# Patient Record
Sex: FEMALE | Race: WHITE | NOT HISPANIC OR LATINO | Employment: OTHER | URBAN - METROPOLITAN AREA
[De-identification: names, ages, dates, MRNs, and addresses within clinical notes are randomized per-mention and may not be internally consistent; named-entity substitution may affect disease eponyms.]

---

## 2017-01-03 ENCOUNTER — OFFICE VISIT (OUTPATIENT)
Dept: URGENT CARE | Facility: MEDICAL CENTER | Age: 72
End: 2017-01-03
Payer: COMMERCIAL

## 2017-01-03 PROCEDURE — 99213 OFFICE O/P EST LOW 20 MIN: CPT

## 2017-01-03 PROCEDURE — S9088 SERVICES PROVIDED IN URGENT: HCPCS

## 2017-02-13 ENCOUNTER — HOSPITAL ENCOUNTER (OUTPATIENT)
Dept: RADIOLOGY | Facility: MEDICAL CENTER | Age: 72
Discharge: HOME | End: 2017-02-13
Payer: COMMERCIAL

## 2017-02-13 ENCOUNTER — APPOINTMENT (OUTPATIENT)
Dept: LAB | Facility: MEDICAL CENTER | Age: 72
End: 2017-02-13
Payer: COMMERCIAL

## 2017-02-13 ENCOUNTER — TRANSCRIBE ORDERS (OUTPATIENT)
Dept: ADMINISTRATIVE | Facility: HOSPITAL | Age: 72
End: 2017-02-13

## 2017-02-13 DIAGNOSIS — M48.02 SPINAL STENOSIS IN CERVICAL REGION: ICD-10-CM

## 2017-02-13 DIAGNOSIS — R53.82 CHRONIC FATIGUE SYNDROME: ICD-10-CM

## 2017-02-13 DIAGNOSIS — E55.9 UNSPECIFIED VITAMIN D DEFICIENCY: ICD-10-CM

## 2017-02-13 DIAGNOSIS — G95.20 SPINAL CORD COMPRESSION (HCC): Primary | ICD-10-CM

## 2017-02-13 DIAGNOSIS — F32.A VASCULAR DEMENTIA WITH DEPRESSED MOOD (HCC): ICD-10-CM

## 2017-02-13 DIAGNOSIS — G95.20 SPINAL CORD COMPRESSION (HCC): ICD-10-CM

## 2017-02-13 DIAGNOSIS — K55.9 ISCHEMIC BOWEL DISEASE (HCC): ICD-10-CM

## 2017-02-13 DIAGNOSIS — K21.9 GASTROESOPHAGEAL REFLUX DISEASE, ESOPHAGITIS PRESENCE NOT SPECIFIED: ICD-10-CM

## 2017-02-13 DIAGNOSIS — F34.1 DYSTHYMIC DISORDER: ICD-10-CM

## 2017-02-13 DIAGNOSIS — I10 ESSENTIAL HYPERTENSION, MALIGNANT: ICD-10-CM

## 2017-02-13 DIAGNOSIS — M47.12 CERVICAL SPONDYLOSIS WITH MYELOPATHY: ICD-10-CM

## 2017-02-13 DIAGNOSIS — F01.50 VASCULAR DEMENTIA WITH DEPRESSED MOOD (HCC): ICD-10-CM

## 2017-02-13 DIAGNOSIS — R91.1 SOLITARY PULMONARY NODULE: ICD-10-CM

## 2017-02-13 LAB
25(OH)D3 SERPL-MCNC: 15.3 NG/ML (ref 30–100)
ALBUMIN SERPL BCP-MCNC: 3.7 G/DL (ref 3.5–5)
ALP SERPL-CCNC: 120 U/L (ref 46–116)
ALT SERPL W P-5'-P-CCNC: 32 U/L (ref 12–78)
ANION GAP SERPL CALCULATED.3IONS-SCNC: 12 MMOL/L (ref 4–13)
AST SERPL W P-5'-P-CCNC: 15 U/L (ref 5–45)
BASOPHILS # BLD AUTO: 0.05 THOUSANDS/ΜL (ref 0–0.1)
BASOPHILS NFR BLD AUTO: 1 % (ref 0–1)
BILIRUB SERPL-MCNC: 0.4 MG/DL (ref 0.2–1)
BILIRUB UR QL STRIP: NEGATIVE
BUN SERPL-MCNC: 18 MG/DL (ref 5–25)
CALCIUM SERPL-MCNC: 9.3 MG/DL (ref 8.3–10.1)
CHLORIDE SERPL-SCNC: 102 MMOL/L (ref 100–108)
CHOLEST SERPL-MCNC: 206 MG/DL (ref 50–200)
CLARITY UR: CLEAR
CO2 SERPL-SCNC: 23 MMOL/L (ref 21–32)
COLOR UR: YELLOW
CREAT SERPL-MCNC: 0.77 MG/DL (ref 0.6–1.3)
EOSINOPHIL # BLD AUTO: 0.26 THOUSAND/ΜL (ref 0–0.61)
EOSINOPHIL NFR BLD AUTO: 3 % (ref 0–6)
ERYTHROCYTE [DISTWIDTH] IN BLOOD BY AUTOMATED COUNT: 13.7 % (ref 11.6–15.1)
GFR SERPL CREATININE-BSD FRML MDRD: >60 ML/MIN/1.73SQ M
GLUCOSE SERPL-MCNC: 101 MG/DL (ref 65–140)
GLUCOSE UR STRIP-MCNC: NEGATIVE MG/DL
HCT VFR BLD AUTO: 38.9 % (ref 34.8–46.1)
HDLC SERPL-MCNC: 44 MG/DL (ref 40–60)
HGB BLD-MCNC: 12.6 G/DL (ref 11.5–15.4)
HGB UR QL STRIP.AUTO: NEGATIVE
KETONES UR STRIP-MCNC: NEGATIVE MG/DL
LDLC SERPL CALC-MCNC: 90 MG/DL (ref 0–100)
LEUKOCYTE ESTERASE UR QL STRIP: NEGATIVE
LYMPHOCYTES # BLD AUTO: 3.32 THOUSANDS/ΜL (ref 0.6–4.47)
LYMPHOCYTES NFR BLD AUTO: 33 % (ref 14–44)
MCH RBC QN AUTO: 30.2 PG (ref 26.8–34.3)
MCHC RBC AUTO-ENTMCNC: 32.4 G/DL (ref 31.4–37.4)
MCV RBC AUTO: 93 FL (ref 82–98)
MONOCYTES # BLD AUTO: 0.67 THOUSAND/ΜL (ref 0.17–1.22)
MONOCYTES NFR BLD AUTO: 7 % (ref 4–12)
NEUTROPHILS # BLD AUTO: 5.65 THOUSANDS/ΜL (ref 1.85–7.62)
NEUTS SEG NFR BLD AUTO: 56 % (ref 43–75)
NITRITE UR QL STRIP: NEGATIVE
NRBC BLD AUTO-RTO: 0 /100 WBCS
PH UR STRIP.AUTO: 6 [PH] (ref 4.5–8)
PLATELET # BLD AUTO: 292 THOUSANDS/UL (ref 149–390)
PMV BLD AUTO: 11.5 FL (ref 8.9–12.7)
POTASSIUM SERPL-SCNC: 3.9 MMOL/L (ref 3.5–5.3)
PROT SERPL-MCNC: 7.9 G/DL (ref 6.4–8.2)
PROT UR STRIP-MCNC: NEGATIVE MG/DL
RBC # BLD AUTO: 4.17 MILLION/UL (ref 3.81–5.12)
SODIUM SERPL-SCNC: 137 MMOL/L (ref 136–145)
SP GR UR STRIP.AUTO: 1.02 (ref 1–1.03)
T4 FREE SERPL-MCNC: 0.98 NG/DL (ref 0.76–1.46)
TRIGL SERPL-MCNC: 359 MG/DL
TSH SERPL DL<=0.05 MIU/L-ACNC: 2.39 UIU/ML (ref 0.36–3.74)
UROBILINOGEN UR QL STRIP.AUTO: 0.2 E.U./DL
WBC # BLD AUTO: 9.98 THOUSAND/UL (ref 4.31–10.16)

## 2017-02-13 PROCEDURE — 36415 COLL VENOUS BLD VENIPUNCTURE: CPT

## 2017-02-13 PROCEDURE — 82306 VITAMIN D 25 HYDROXY: CPT

## 2017-02-13 PROCEDURE — 85025 COMPLETE CBC W/AUTO DIFF WBC: CPT

## 2017-02-13 PROCEDURE — 72040 X-RAY EXAM NECK SPINE 2-3 VW: CPT

## 2017-02-13 PROCEDURE — 81003 URINALYSIS AUTO W/O SCOPE: CPT | Performed by: INTERNAL MEDICINE

## 2017-02-13 PROCEDURE — 80053 COMPREHEN METABOLIC PANEL: CPT

## 2017-02-13 PROCEDURE — 80061 LIPID PANEL: CPT

## 2017-02-13 PROCEDURE — 71250 CT THORAX DX C-: CPT

## 2017-02-13 PROCEDURE — 84439 ASSAY OF FREE THYROXINE: CPT

## 2017-02-13 PROCEDURE — 84443 ASSAY THYROID STIM HORMONE: CPT

## 2017-02-14 ENCOUNTER — GENERIC CONVERSION - ENCOUNTER (OUTPATIENT)
Dept: OTHER | Facility: OTHER | Age: 72
End: 2017-02-14

## 2017-02-28 ENCOUNTER — GENERIC CONVERSION - ENCOUNTER (OUTPATIENT)
Dept: OTHER | Facility: OTHER | Age: 72
End: 2017-02-28

## 2017-03-07 ENCOUNTER — GENERIC CONVERSION - ENCOUNTER (OUTPATIENT)
Dept: OTHER | Facility: OTHER | Age: 72
End: 2017-03-07

## 2017-03-30 ENCOUNTER — GENERIC CONVERSION - ENCOUNTER (OUTPATIENT)
Dept: OTHER | Facility: OTHER | Age: 72
End: 2017-03-30

## 2017-04-17 ENCOUNTER — TRANSCRIBE ORDERS (OUTPATIENT)
Dept: ADMINISTRATIVE | Facility: HOSPITAL | Age: 72
End: 2017-04-17

## 2017-04-17 ENCOUNTER — ALLSCRIPTS OFFICE VISIT (OUTPATIENT)
Dept: OTHER | Facility: OTHER | Age: 72
End: 2017-04-17

## 2017-04-17 DIAGNOSIS — Z12.31 ENCOUNTER FOR MAMMOGRAM TO ESTABLISH BASELINE MAMMOGRAM: Primary | ICD-10-CM

## 2017-04-17 DIAGNOSIS — R91.1 COIN LESION OF LUNG: Primary | ICD-10-CM

## 2017-05-15 ENCOUNTER — HOSPITAL ENCOUNTER (OUTPATIENT)
Dept: RADIOLOGY | Facility: MEDICAL CENTER | Age: 72
Discharge: HOME | End: 2017-05-15
Payer: COMMERCIAL

## 2017-05-15 DIAGNOSIS — Z12.31 ENCOUNTER FOR MAMMOGRAM TO ESTABLISH BASELINE MAMMOGRAM: ICD-10-CM

## 2017-05-15 PROCEDURE — G0202 SCR MAMMO BI INCL CAD: HCPCS

## 2017-09-10 ENCOUNTER — OFFICE VISIT (OUTPATIENT)
Dept: URGENT CARE | Facility: MEDICAL CENTER | Age: 72
End: 2017-09-10
Payer: COMMERCIAL

## 2017-09-10 PROCEDURE — 99213 OFFICE O/P EST LOW 20 MIN: CPT

## 2017-09-10 PROCEDURE — S9088 SERVICES PROVIDED IN URGENT: HCPCS

## 2017-10-09 ENCOUNTER — ALLSCRIPTS OFFICE VISIT (OUTPATIENT)
Dept: OTHER | Facility: OTHER | Age: 72
End: 2017-10-09

## 2017-10-10 NOTE — CONSULTS
Assessment  1  Essential tremor (333 1) (G25 0)    Plan  Essential tremor    · Follow-up PRN Evaluation and Treatment  Follow-up  Status: Complete  Done:  [id number]   Ordered; For: Essential tremor; Ordered By: Jennifer Prince Performed:  Due: 41TKJ1102    Discussion/Summary  Discussion Summary:   Slowly progressive action tremors of the hands and head tremor without any parkinsonian symptoms, and a family history of tremor, consistent with essential tremor  Although she has imbalance, this is chronic and not the typical balance and gait issues seen with PD  Labs obtained over the past year including TSH, CMP, Lyme testing were unremarkable  MRI of the brain from Jan 2016 unremarkable other than changes in the nasopharynx that brought up question regarding a mass  Direct visualization was suggest which I assume may have already been done  We discussed the slowly progressive nature of essential tremor, its pathology, and medical and future surgical treatment options  In reviewing medication options she wonders if she could switch her amlodipine to a beta blocker such as propranolol and metoprolol  She is seeing her PCP this week and will discuss option with him  Counseling Documentation With Imm: The patient, patient's family was counseled regarding prognosis,-patient and family education,-impressions,-risks and benefits of treatment options  total time of encounter was 45 [id number] minutes was spent counseling  Patient's Capacity to Self-Care: Patient is able to Self-Care  Medication SE Review and Pt Understands Tx: Possible side effects of new medications were reviewed with the patient/guardian today  The treatment plan was reviewed with the patient/guardian  The patient/guardian understands and agrees with the treatment plan      Chief Complaint  Chief Complaint Free Text Note Form: Patient present for a consult regarding tremor, balance problems and weakness        History of Present Illness  HPI: Senthil Fu is a 66year old  woman with tremor and imbalance who presents for movement disorder evaluation  is her with her daughter who helped with history  She developed a head and hand tremors in both hands about two years ago  Tremors are intermittent and have gotten progressively worse  Handwriting can be tremulous and hard to read  She prints better than she can write  She has no issues drinking or using utensils but can sometimes notice a little shake  is having trouble arising from chairs  She has tremors and weakness in her legs when standing for prolonged periods  She has no vocal tremor but her voice is lower and hoarse  Legs are not shaky but she cannot stand for prolonged periods because she feels her legs get weak  She will have trouble arising from a bent position  She has fallen  Last fall was last week while bending over  She uses a grab bar in the shower to help with balance  She has had balance issues for over 20 years  No trouble dressing or shower  Her father had essential tremor  are no changes in olfaction  There is no drooling or difficulty swallowing  She has had some trouble trying to find he right words to say when speaking a times  She has mild difficulty with sleep, with nighttime awakenings and trouble getting back to sleep  She wears a sleep appliance for mild sleep apnea  There are no issues with lightheadedness  has a previous history of Lyme disease in 1996 treated with oral antibiotics and then IV antibiotics because she was still having symptoms  Review of Systems  Neurological ROS:   Constitutional: no fever, no chills, no recent weight gain, no recent weight loss, no complaints of feeling tired, no changes in appetite  HEENT:  no sinus problems, not feeling congested, no blurred vision, no dryness of the eyes, no eye pain, no hearing loss, no tinnitus, no mouth sores, no sore throat, no hoarseness, no dysphagia, no masses, no bleeding  Cardiovascular:  no chest pain or pressure, no palpitations present, the heart rate was not rapid or irregular, no swelling in the arms or legs, no poor circulation  Respiratory:  no unusual or persistant cough, no shortness of breath with or without exertion  Gastrointestinal: diarrhea  Genitourinary:  no incontinence, no feelings of urinary urgency, no increase in frequency, no urinary hesitancy, no dysuria, no hematuria  Musculoskeletal: head/neck/back pain  Integumentary  no masses, no rash, no skin lesions, no livedo reticularis  Psychiatric: depression  Endocrine  no unusual weight loss or gain, no excessive urination, no excessive thirst, no hair loss or gain, no hot or cold intolerance, no menstrual period change or irregularity, no loss of sexual ability or drive, no erection difficulty, no nipple discharge  Hematologic/Lymphatic:  no unusual bleeding, no tendency for easy bruising, no clotting skin or lumps  Neurological General: headache-and-lightheadedness  Neurological Mental Status: memory problems  Neurological Cranial Nerves: vertigo or dizziness  Neurological Motor findings include: tremor-and-twitching  Neurological Coordination: balance difficulties  Neurological Sensory: tingling  Neurological Gait: difficulty walking-and-has had falls  ROS Reviewed:   ROS reviewed  Active Problems  1  Abnormal PFT (794 2) (R94 2)   2  Acute sinusitis (461 9) (J01 90)   3  Depression (311) (F32 9)   4  Hypercholesteremia (272 0) (E78 00)   5  Hypertension (401 9) (I10)   6  Obstructive sleep apnea (327 23) (G47 33)   7  Postoperative examination (V67 00) (Z09)   8  Pulmonary nodule (793 11) (R91 1)   9  Short of breath on exertion (786 05) (R06 02)    Past Medical History  1  History of Acute URI (465 9) (J06 9)   2  History of Chronic GERD (530 81) (K21 9)   3  History of hiatal hernia (V12 79) (Z87 19)  Active Problems And Past Medical History Reviewed:    The active problems and past medical history were reviewed and updated today  Surgical History  1  History of Appendectomy   2  History of Breast Surgery   3  History of Cholecystectomy   4  History of Neck Surgery   5  History of Repair Of Paraesophageal Hiatus Hernia   6  History of Tonsillectomy   7  History of Total Abdominal Colectomy  Surgical History Reviewed: The surgical history was reviewed and updated today  Family History  Mother    1  Denied: Family history of Crohn's disease   2  Denied: Family history of liver disease  Father    3  Family history of Colon cancer   4  Family history of chronic obstructive pulmonary disease (V17 6) (Z82 5)   5  Denied: Family history of Crohn's disease   6  Denied: Family history of liver disease  Daughter    9  Family history of fibromyalgia (V17 89) (Z82 69)   8  Family history of scleroderma (V19 8) (Z82 69)  Son    5  Family history of cerebrovascular accident (CVA) (V17 1) (Z82 3)  Family History    10  Family history of colitis (V18 59) (Z83 79)   11  Family history of colonic polyps (V18 51) (Z83 71)  Family History Reviewed: The family history was reviewed and updated today  Social History   · Always uses seat belt   ·    · Never a smoker   · No drug use   · Retired   · Social alcohol use (Z78 9)  Social History Reviewed: The social history was reviewed and updated today  Current Meds   1  Amlodipine Besy-Benazepril HCl - 5-10 MG Oral Capsule; TAKE 1 CAPSULE DAILY; Therapy: (Recorded:00Ktj1148) to Recorded   2  Azithromycin 250 MG Oral Tablet; Take 2 tablets today, then 1 tablet daily for 4 days; Therapy: 49Bic2823 to (Last Rx:23Zsl9896)  Requested for: 59Bci5498 Ordered   3  Benzonatate 100 MG Oral Capsule; TAKE 1 CAPSULE 2-3 TIMES DAILY AS DIRECTED; Therapy: 97Uji2352 to (Evaluate:16Lpk0223)  Requested for: 38Eum7909; Last   Rx:08Psg5766 Ordered   4   Esomeprazole Magnesium 40 MG Oral Capsule Delayed Release; TAKE 1 CAPSULE   TWICE DAILY 30 MINUTES PRIOR TO BREAKFAST AND DINNER; Therapy: 15YZT3623 to (Last Rx:29Jun2015) Ordered   5  HM Vitamin B12 1000 MCG Oral Tablet Extended Release; Therapy: (Recorded:10May2016) to Recorded   6  Lexapro 10 MG Oral Tablet; One tablet daily; Therapy: (Recorded:04May2016) to Recorded   7  Simvastatin 20 MG Oral Tablet; TAKE 1 TABLET DAILY IN THE EVENING; Therapy: (Recorded:04May2016) to Recorded   8  Vitamin D3 CAPS; one capsule daily; Therapy: (Recorded:04May2016) to Recorded  Medication List Reviewed: The medication list was reviewed and updated today  Allergies  1  Adhesive Tape TAPE   2  Biaxin XL TB24   3  Cefzil TABS   4  Cipro TABS   5  Darvon CAPS    Vitals  Signs   Recorded: 18IHH8055 02:57PM   Heart Rate: 62  Recorded: 52OET7148 91:78AZ   Systolic: 920, LUE, Standing  Diastolic: 74, LUE, Standing  Recorded: 69CFM0958 30:93IH   Systolic: 024, RUE, Sitting  Diastolic: 72, RUE, Sitting  Height: 5 ft 4 5 in  Weight: 150 lb   BMI Calculated: 25 35  BSA Calculated: 1 74    Physical Exam  Cardiovascular: no peripheral edema present  Constitutional   General appearance: No acute distress, well appearing and well nourished  Eyes   Ophthalmoscopic examination: Vision is grossly normal  Gross visual field testing by confrontation shows no abnormalities  EOMI in both eyes  Conjunctivae clear  Eyelids normal palpebral fissures equal  Orbits exhibit normal position  No discharge from the eyes  PERRL  Bilateral optic discs: not visualized  Musculoskeletal   Gait and station: Normal gait, stance and balance  Muscle strength: Normal strength throughout  Muscle tone: No atrophy, abnormal movements, flaccidity, cogwheeling or spasticity  Involuntary movements: Abnormal involuntary movements were observed  -(No bradykinesia on finger taps, handgrips , NORAH or heel taps  No rigidity, No intentional tremor on FTN but has mild tremor on spirals  Rate , intermittent no-no tremor  No vocal tremor   No rest tremor  No chin or facial tremor  Handwriting is mildly tremulous in script  )   Neurologic   Orientation to person, place, and time: Normal     Recent and remote memory: Demonstrates normal memory  Attention span and concentration: Normal thought process and attention span  Language: Names objects, able to repeat phrases and speaks spontaneously  Fund of knowledge: Normal vocabulary with appropriate knowledge of current events and past history  2nd cranial nerve: Normal     3rd, 4th, and 6th cranial nerves: Normal     5th cranial nerve: Abnormal  -(asymmetry on left side more sensitive V1 and V2?)   7th cranial nerve: Normal     8th cranial nerve: Normal     9th cranial nerve: Normal     11th cranial nerve: Normal     12th cranial nerve: Normal     Sensation: Abnormal   Sensory exam: intact to light touch  Pain/Temperature: (decreased in left C6 distribution)  Vibration: Vibratory sensation was decreased at the toes of the right foot, but normal at the toes of the left foot  Reflexes: Normal   Deep tendon reflexes: 2+ right biceps,-2+ left biceps,-2+ right patella,-2+ left patella,-2+ right ankle jerk-and-2+ left ankle jerk  Superficial/Primitive Reflexes: Babinski reflex absent on the right,-Babinski reflex absent on the left-and-primitive reflexes were absent  Coordination: Normal  -(unable to tandem) Coordination: no past-pointing,-no dysdiadochokinesia,-no finger to nose dysmetria-and-no heel-shin dysmetria        Signatures   Electronically signed by : Imelda Abbasi MD; Oct  9 2017  4:09PM EST                       (Author)

## 2017-10-11 ENCOUNTER — APPOINTMENT (OUTPATIENT)
Dept: LAB | Facility: MEDICAL CENTER | Age: 72
End: 2017-10-11
Payer: COMMERCIAL

## 2017-10-11 ENCOUNTER — TRANSCRIBE ORDERS (OUTPATIENT)
Dept: LAB | Facility: MEDICAL CENTER | Age: 72
End: 2017-10-11

## 2017-10-11 ENCOUNTER — TRANSCRIBE ORDERS (OUTPATIENT)
Dept: ADMINISTRATIVE | Facility: HOSPITAL | Age: 72
End: 2017-10-11

## 2017-10-11 DIAGNOSIS — K44.9 HIATAL HERNIA: ICD-10-CM

## 2017-10-11 DIAGNOSIS — F32.A VASCULAR DEMENTIA WITH DEPRESSED MOOD (HCC): ICD-10-CM

## 2017-10-11 DIAGNOSIS — F34.1 DYSTHYMIC DISORDER: ICD-10-CM

## 2017-10-11 DIAGNOSIS — R53.82 CHRONIC FATIGUE SYNDROME: ICD-10-CM

## 2017-10-11 DIAGNOSIS — K21.9 GASTROESOPHAGEAL REFLUX DISEASE WITHOUT ESOPHAGITIS: ICD-10-CM

## 2017-10-11 DIAGNOSIS — I10 ESSENTIAL HYPERTENSION, BENIGN: Primary | ICD-10-CM

## 2017-10-11 DIAGNOSIS — G47.30 SLEEP APNEA, UNSPECIFIED TYPE: ICD-10-CM

## 2017-10-11 DIAGNOSIS — R52 PAIN: Primary | ICD-10-CM

## 2017-10-11 DIAGNOSIS — F01.50 VASCULAR DEMENTIA WITH DEPRESSED MOOD (HCC): ICD-10-CM

## 2017-10-11 DIAGNOSIS — E55.9 AVITAMINOSIS D: ICD-10-CM

## 2017-10-11 LAB
ALBUMIN SERPL BCP-MCNC: 3.8 G/DL (ref 3.5–5)
ALP SERPL-CCNC: 108 U/L (ref 46–116)
ALT SERPL W P-5'-P-CCNC: 23 U/L (ref 12–78)
ANION GAP SERPL CALCULATED.3IONS-SCNC: 7 MMOL/L (ref 4–13)
AST SERPL W P-5'-P-CCNC: 14 U/L (ref 5–45)
BASOPHILS # BLD AUTO: 0.07 THOUSANDS/ΜL (ref 0–0.1)
BASOPHILS NFR BLD AUTO: 1 % (ref 0–1)
BILIRUB SERPL-MCNC: 0.26 MG/DL (ref 0.2–1)
BILIRUB UR QL STRIP: NEGATIVE
BUN SERPL-MCNC: 23 MG/DL (ref 5–25)
CALCIUM SERPL-MCNC: 9.3 MG/DL (ref 8.3–10.1)
CHLORIDE SERPL-SCNC: 109 MMOL/L (ref 100–108)
CHOLEST SERPL-MCNC: 132 MG/DL (ref 50–200)
CLARITY UR: CLEAR
CO2 SERPL-SCNC: 23 MMOL/L (ref 21–32)
COLOR UR: YELLOW
CREAT SERPL-MCNC: 0.79 MG/DL (ref 0.6–1.3)
EOSINOPHIL # BLD AUTO: 0.3 THOUSAND/ΜL (ref 0–0.61)
EOSINOPHIL NFR BLD AUTO: 2 % (ref 0–6)
ERYTHROCYTE [DISTWIDTH] IN BLOOD BY AUTOMATED COUNT: 13.6 % (ref 11.6–15.1)
GFR SERPL CREATININE-BSD FRML MDRD: 74 ML/MIN/1.73SQ M
GLUCOSE SERPL-MCNC: 92 MG/DL (ref 65–140)
GLUCOSE UR STRIP-MCNC: NEGATIVE MG/DL
HCT VFR BLD AUTO: 39.1 % (ref 34.8–46.1)
HDLC SERPL-MCNC: 43 MG/DL (ref 40–60)
HGB BLD-MCNC: 12.6 G/DL (ref 11.5–15.4)
HGB UR QL STRIP.AUTO: NEGATIVE
KETONES UR STRIP-MCNC: NEGATIVE MG/DL
LDLC SERPL CALC-MCNC: 33 MG/DL (ref 0–100)
LEUKOCYTE ESTERASE UR QL STRIP: NEGATIVE
LYMPHOCYTES # BLD AUTO: 3.33 THOUSANDS/ΜL (ref 0.6–4.47)
LYMPHOCYTES NFR BLD AUTO: 26 % (ref 14–44)
MCH RBC QN AUTO: 30.6 PG (ref 26.8–34.3)
MCHC RBC AUTO-ENTMCNC: 32.2 G/DL (ref 31.4–37.4)
MCV RBC AUTO: 95 FL (ref 82–98)
MONOCYTES # BLD AUTO: 0.97 THOUSAND/ΜL (ref 0.17–1.22)
MONOCYTES NFR BLD AUTO: 8 % (ref 4–12)
NEUTROPHILS # BLD AUTO: 8.19 THOUSANDS/ΜL (ref 1.85–7.62)
NEUTS SEG NFR BLD AUTO: 63 % (ref 43–75)
NITRITE UR QL STRIP: NEGATIVE
NRBC BLD AUTO-RTO: 0 /100 WBCS
PH UR STRIP.AUTO: 6 [PH] (ref 4.5–8)
PLATELET # BLD AUTO: 270 THOUSANDS/UL (ref 149–390)
PMV BLD AUTO: 11.1 FL (ref 8.9–12.7)
POTASSIUM SERPL-SCNC: 5.2 MMOL/L (ref 3.5–5.3)
PROT SERPL-MCNC: 7.7 G/DL (ref 6.4–8.2)
PROT UR STRIP-MCNC: NEGATIVE MG/DL
RBC # BLD AUTO: 4.12 MILLION/UL (ref 3.81–5.12)
SODIUM SERPL-SCNC: 139 MMOL/L (ref 136–145)
SP GR UR STRIP.AUTO: 1.02 (ref 1–1.03)
T4 FREE SERPL-MCNC: 1.1 NG/DL (ref 0.76–1.46)
TRIGL SERPL-MCNC: 280 MG/DL
TSH SERPL DL<=0.05 MIU/L-ACNC: 1.14 UIU/ML (ref 0.36–3.74)
UROBILINOGEN UR QL STRIP.AUTO: 0.2 E.U./DL
WBC # BLD AUTO: 12.9 THOUSAND/UL (ref 4.31–10.16)

## 2017-10-11 PROCEDURE — 84443 ASSAY THYROID STIM HORMONE: CPT | Performed by: INTERNAL MEDICINE

## 2017-10-11 PROCEDURE — 36415 COLL VENOUS BLD VENIPUNCTURE: CPT | Performed by: INTERNAL MEDICINE

## 2017-10-11 PROCEDURE — 81003 URINALYSIS AUTO W/O SCOPE: CPT | Performed by: INTERNAL MEDICINE

## 2017-10-11 PROCEDURE — 85025 COMPLETE CBC W/AUTO DIFF WBC: CPT | Performed by: INTERNAL MEDICINE

## 2017-10-11 PROCEDURE — 80061 LIPID PANEL: CPT | Performed by: INTERNAL MEDICINE

## 2017-10-11 PROCEDURE — 84439 ASSAY OF FREE THYROXINE: CPT | Performed by: INTERNAL MEDICINE

## 2017-10-11 PROCEDURE — 80053 COMPREHEN METABOLIC PANEL: CPT | Performed by: INTERNAL MEDICINE

## 2017-10-17 ENCOUNTER — HOSPITAL ENCOUNTER (OUTPATIENT)
Dept: RADIOLOGY | Facility: MEDICAL CENTER | Age: 72
Discharge: HOME | End: 2017-10-17
Payer: COMMERCIAL

## 2017-10-17 DIAGNOSIS — R52 PAIN: ICD-10-CM

## 2017-10-17 PROCEDURE — 74176 CT ABD & PELVIS W/O CONTRAST: CPT

## 2017-11-26 ENCOUNTER — APPOINTMENT (EMERGENCY)
Dept: RADIOLOGY | Facility: HOSPITAL | Age: 72
End: 2017-11-26
Payer: COMMERCIAL

## 2017-11-26 ENCOUNTER — APPOINTMENT (EMERGENCY)
Dept: CT IMAGING | Facility: HOSPITAL | Age: 72
End: 2017-11-26
Payer: COMMERCIAL

## 2017-11-26 ENCOUNTER — HOSPITAL ENCOUNTER (OUTPATIENT)
Facility: HOSPITAL | Age: 72
Setting detail: OBSERVATION
Discharge: HOME | End: 2017-11-27
Attending: EMERGENCY MEDICINE | Admitting: INTERNAL MEDICINE
Payer: COMMERCIAL

## 2017-11-26 DIAGNOSIS — Z90.49 STATUS POST COLECTOMY: ICD-10-CM

## 2017-11-26 DIAGNOSIS — E86.0 DEHYDRATION: Primary | ICD-10-CM

## 2017-11-26 DIAGNOSIS — R55 SYNCOPE: ICD-10-CM

## 2017-11-26 DIAGNOSIS — Z98.890 STATUS POST LAPAROSCOPIC NISSEN FUNDOPLICATION: ICD-10-CM

## 2017-11-26 PROBLEM — R19.7 INTRACTABLE DIARRHEA: Status: ACTIVE | Noted: 2017-11-26

## 2017-11-26 PROBLEM — I10 ESSENTIAL HYPERTENSION: Chronic | Status: ACTIVE | Noted: 2017-11-26

## 2017-11-26 PROBLEM — E78.5 HLD (HYPERLIPIDEMIA): Chronic | Status: ACTIVE | Noted: 2017-11-26

## 2017-11-26 LAB
ALBUMIN SERPL BCP-MCNC: 4.2 G/DL (ref 3.5–5)
ALP SERPL-CCNC: 144 U/L (ref 46–116)
ALT SERPL W P-5'-P-CCNC: 43 U/L (ref 12–78)
ANION GAP SERPL CALCULATED.3IONS-SCNC: 12 MMOL/L (ref 4–13)
AST SERPL W P-5'-P-CCNC: 27 U/L (ref 5–45)
ATRIAL RATE: 57 BPM
ATRIAL RATE: 66 BPM
BASOPHILS # BLD AUTO: 0.03 THOUSANDS/ΜL (ref 0–0.1)
BASOPHILS NFR BLD AUTO: 0 % (ref 0–1)
BILIRUB SERPL-MCNC: 0.4 MG/DL (ref 0.2–1)
BUN SERPL-MCNC: 37 MG/DL (ref 5–25)
CALCIUM SERPL-MCNC: 10.7 MG/DL (ref 8.3–10.1)
CHLORIDE SERPL-SCNC: 104 MMOL/L (ref 100–108)
CO2 SERPL-SCNC: 17 MMOL/L (ref 21–32)
CREAT SERPL-MCNC: 1.26 MG/DL (ref 0.6–1.3)
EOSINOPHIL # BLD AUTO: 0.1 THOUSAND/ΜL (ref 0–0.61)
EOSINOPHIL NFR BLD AUTO: 1 % (ref 0–6)
ERYTHROCYTE [DISTWIDTH] IN BLOOD BY AUTOMATED COUNT: 13.5 % (ref 11.6–15.1)
GFR SERPL CREATININE-BSD FRML MDRD: 42 ML/MIN/1.73SQ M
GLUCOSE SERPL-MCNC: 113 MG/DL (ref 65–140)
HCT VFR BLD AUTO: 42.1 % (ref 34.8–46.1)
HGB BLD-MCNC: 13.7 G/DL (ref 11.5–15.4)
HOLD SPECIMEN: NORMAL
LACTATE SERPL-SCNC: 1.2 MMOL/L (ref 0.5–2)
LIPASE SERPL-CCNC: 316 U/L (ref 73–393)
LYMPHOCYTES # BLD AUTO: 1.57 THOUSANDS/ΜL (ref 0.6–4.47)
LYMPHOCYTES NFR BLD AUTO: 10 % (ref 14–44)
MCH RBC QN AUTO: 30 PG (ref 26.8–34.3)
MCHC RBC AUTO-ENTMCNC: 32.5 G/DL (ref 31.4–37.4)
MCV RBC AUTO: 92 FL (ref 82–98)
MONOCYTES # BLD AUTO: 0.97 THOUSAND/ΜL (ref 0.17–1.22)
MONOCYTES NFR BLD AUTO: 6 % (ref 4–12)
NEUTROPHILS # BLD AUTO: 12.99 THOUSANDS/ΜL (ref 1.85–7.62)
NEUTS SEG NFR BLD AUTO: 83 % (ref 43–75)
P AXIS: 12 DEGREES
P AXIS: 68 DEGREES
PLATELET # BLD AUTO: 253 THOUSANDS/UL (ref 149–390)
PLATELET # BLD AUTO: 285 THOUSANDS/UL (ref 149–390)
PMV BLD AUTO: 10.7 FL (ref 8.9–12.7)
PMV BLD AUTO: 9.7 FL (ref 8.9–12.7)
POTASSIUM SERPL-SCNC: 5.1 MMOL/L (ref 3.5–5.3)
PR INTERVAL: 146 MS
PR INTERVAL: 148 MS
PROT SERPL-MCNC: 8.5 G/DL (ref 6.4–8.2)
QRS AXIS: 34 DEGREES
QRS AXIS: 39 DEGREES
QRSD INTERVAL: 74 MS
QRSD INTERVAL: 80 MS
QT INTERVAL: 386 MS
QT INTERVAL: 416 MS
QTC INTERVAL: 404 MS
QTC INTERVAL: 404 MS
RBC # BLD AUTO: 4.56 MILLION/UL (ref 3.81–5.12)
SODIUM SERPL-SCNC: 133 MMOL/L (ref 136–145)
T WAVE AXIS: 43 DEGREES
T WAVE AXIS: 67 DEGREES
TROPONIN I SERPL-MCNC: <0.02 NG/ML
TROPONIN I SERPL-MCNC: <0.02 NG/ML
VENTRICULAR RATE: 57 BPM
VENTRICULAR RATE: 66 BPM
WBC # BLD AUTO: 15.66 THOUSAND/UL (ref 4.31–10.16)

## 2017-11-26 PROCEDURE — 84484 ASSAY OF TROPONIN QUANT: CPT | Performed by: EMERGENCY MEDICINE

## 2017-11-26 PROCEDURE — 93005 ELECTROCARDIOGRAM TRACING: CPT | Performed by: EMERGENCY MEDICINE

## 2017-11-26 PROCEDURE — 96374 THER/PROPH/DIAG INJ IV PUSH: CPT

## 2017-11-26 PROCEDURE — 87493 C DIFF AMPLIFIED PROBE: CPT | Performed by: EMERGENCY MEDICINE

## 2017-11-26 PROCEDURE — 96361 HYDRATE IV INFUSION ADD-ON: CPT

## 2017-11-26 PROCEDURE — 85049 AUTOMATED PLATELET COUNT: CPT | Performed by: PHYSICIAN ASSISTANT

## 2017-11-26 PROCEDURE — 83605 ASSAY OF LACTIC ACID: CPT | Performed by: EMERGENCY MEDICINE

## 2017-11-26 PROCEDURE — 93005 ELECTROCARDIOGRAM TRACING: CPT

## 2017-11-26 PROCEDURE — 85025 COMPLETE CBC W/AUTO DIFF WBC: CPT | Performed by: EMERGENCY MEDICINE

## 2017-11-26 PROCEDURE — 83690 ASSAY OF LIPASE: CPT | Performed by: EMERGENCY MEDICINE

## 2017-11-26 PROCEDURE — 84484 ASSAY OF TROPONIN QUANT: CPT | Performed by: INTERNAL MEDICINE

## 2017-11-26 PROCEDURE — 70450 CT HEAD/BRAIN W/O DYE: CPT

## 2017-11-26 PROCEDURE — 80053 COMPREHEN METABOLIC PANEL: CPT | Performed by: EMERGENCY MEDICINE

## 2017-11-26 PROCEDURE — 99285 EMERGENCY DEPT VISIT HI MDM: CPT

## 2017-11-26 PROCEDURE — 74176 CT ABD & PELVIS W/O CONTRAST: CPT

## 2017-11-26 PROCEDURE — 71020 HB CHEST X-RAY 2VW FRONTAL&LATL: CPT

## 2017-11-26 PROCEDURE — 36415 COLL VENOUS BLD VENIPUNCTURE: CPT

## 2017-11-26 RX ORDER — PROPRANOLOL HYDROCHLORIDE 60 MG/1
60 TABLET ORAL DAILY
COMMUNITY

## 2017-11-26 RX ORDER — HEPARIN SODIUM 5000 [USP'U]/ML
5000 INJECTION, SOLUTION INTRAVENOUS; SUBCUTANEOUS EVERY 8 HOURS SCHEDULED
Status: DISCONTINUED | OUTPATIENT
Start: 2017-11-26 | End: 2017-11-27 | Stop reason: HOSPADM

## 2017-11-26 RX ORDER — MELATONIN
5000
Status: DISCONTINUED | OUTPATIENT
Start: 2017-11-26 | End: 2017-11-27 | Stop reason: HOSPADM

## 2017-11-26 RX ORDER — PANTOPRAZOLE SODIUM 40 MG/1
40 TABLET, DELAYED RELEASE ORAL
Status: DISCONTINUED | OUTPATIENT
Start: 2017-11-27 | End: 2017-11-27 | Stop reason: HOSPADM

## 2017-11-26 RX ORDER — SODIUM CHLORIDE 9 MG/ML
125 INJECTION, SOLUTION INTRAVENOUS CONTINUOUS
Status: DISCONTINUED | OUTPATIENT
Start: 2017-11-26 | End: 2017-11-27 | Stop reason: HOSPADM

## 2017-11-26 RX ORDER — MELATONIN
5000 DAILY
Status: DISCONTINUED | OUTPATIENT
Start: 2017-11-27 | End: 2017-11-26

## 2017-11-26 RX ORDER — PRAVASTATIN SODIUM 80 MG/1
80 TABLET ORAL
Status: DISCONTINUED | OUTPATIENT
Start: 2017-11-27 | End: 2017-11-26

## 2017-11-26 RX ORDER — ESCITALOPRAM OXALATE 10 MG/1
10 TABLET ORAL DAILY
Status: DISCONTINUED | OUTPATIENT
Start: 2017-11-27 | End: 2017-11-26

## 2017-11-26 RX ORDER — PRAVASTATIN SODIUM 80 MG/1
80 TABLET ORAL
Status: DISCONTINUED | OUTPATIENT
Start: 2017-11-26 | End: 2017-11-27 | Stop reason: HOSPADM

## 2017-11-26 RX ORDER — ESCITALOPRAM OXALATE 10 MG/1
10 TABLET ORAL
Status: DISCONTINUED | OUTPATIENT
Start: 2017-11-26 | End: 2017-11-27 | Stop reason: HOSPADM

## 2017-11-26 RX ORDER — ONDANSETRON 2 MG/ML
4 INJECTION INTRAMUSCULAR; INTRAVENOUS EVERY 6 HOURS PRN
Status: DISCONTINUED | OUTPATIENT
Start: 2017-11-26 | End: 2017-11-27 | Stop reason: HOSPADM

## 2017-11-26 RX ORDER — ACETAMINOPHEN 325 MG/1
650 TABLET ORAL EVERY 6 HOURS PRN
Status: DISCONTINUED | OUTPATIENT
Start: 2017-11-26 | End: 2017-11-27 | Stop reason: HOSPADM

## 2017-11-26 RX ORDER — ONDANSETRON 2 MG/ML
4 INJECTION INTRAMUSCULAR; INTRAVENOUS ONCE
Status: COMPLETED | OUTPATIENT
Start: 2017-11-26 | End: 2017-11-26

## 2017-11-26 RX ADMIN — ONDANSETRON 4 MG: 2 INJECTION INTRAMUSCULAR; INTRAVENOUS at 15:28

## 2017-11-26 RX ADMIN — PRAVASTATIN SODIUM 80 MG: 80 TABLET ORAL at 22:35

## 2017-11-26 RX ADMIN — SODIUM CHLORIDE 125 ML/HR: 0.9 INJECTION, SOLUTION INTRAVENOUS at 20:45

## 2017-11-26 RX ADMIN — SODIUM CHLORIDE 1000 ML: 0.9 INJECTION, SOLUTION INTRAVENOUS at 15:35

## 2017-11-26 RX ADMIN — ESCITALOPRAM OXALATE 10 MG: 10 TABLET ORAL at 22:35

## 2017-11-26 RX ADMIN — HEPARIN SODIUM 5000 UNITS: 5000 INJECTION, SOLUTION INTRAVENOUS; SUBCUTANEOUS at 22:42

## 2017-11-26 RX ADMIN — ACETAMINOPHEN 650 MG: 325 TABLET ORAL at 22:41

## 2017-11-26 RX ADMIN — CHOLECALCIFEROL TAB 25 MCG (1000 UNIT) 5000 UNITS: 25 TAB at 22:35

## 2017-11-26 NOTE — ED PROVIDER NOTES
History  Chief Complaint   Patient presents with    Syncope     pt reports passing out yesterday, reports she attempted to grab onto furniture and "a small armuore fell onto me"  pt reports she may have hit head on way down  Pt comes intoday because she reports new onset of vertigo and dry heaves  Pt states "i think i passed out from getting up to fast to use restroom "  pt admits to diarrhea for approx 1 month  History provided by:  Patient   used: No    Syncope   Associated symptoms: nausea and vomiting    Associated symptoms: no chest pain, no diaphoresis, no dizziness, no fever, no headaches, no palpitations, no shortness of breath and no weakness      Patient is a 75-year-old female presenting to emergency department with diarrhea, abdominal pain, syncope  Yesterday while she was walking from bathroom she had a syncopal episode  No prodromal symptoms  She is nauseous  Has had diarrhea for 3 months  So for colorectal surgeon will had no intervention  No chest pain or shortness of breath  No headache  No neck pain  No weakness, numbness, tingling  No recent antibiotics  No recent travel  MDM will do cardiac workup for syncope, CT head for trauma  Abdominal labs  CT abdomen pelvis  Re-evaluate      Prior to Admission Medications   Prescriptions Last Dose Informant Patient Reported? Taking? Cholecalciferol (VITAMIN D3) 5000 UNITS CAPS   Yes Yes   Sig: Take 1 capsule by mouth daily  acetaminophen (TYLENOL) 325 mg tablet   No Yes   Si mg every 4 to 6 hours as needed for pain  amLODIPine-benazepril (LOTREL 5-10) 5-10 MG per capsule   Yes Yes   Sig: Take 1 capsule by mouth daily  escitalopram (LEXAPRO) 10 mg tablet   Yes Yes   Sig: Take 10 mg by mouth daily  esomeprazole (NexIUM) 40 MG capsule   Yes No   Sig: Take 40 mg by mouth every evening  ibuprofen (MOTRIN) 200 mg tablet   Yes Yes   Sig: Take 400 mg by mouth every 6 (six) hours as needed for mild pain  oxyCODONE-acetaminophen (PERCOCET) 5-325 mg per tablet   No No   Sig: Earliest Fill Date: 6/3/16  1 to 2 tablets every 4 to 6 hours as needed for moderate to severe pain  propranolol (INDERAL) 60 mg tablet   Yes Yes   Sig: Take 60 mg by mouth daily   psyllium (METAMUCIL) 0 52 g capsule   Yes Yes   Sig: Take 0 52 g by mouth daily   simvastatin (ZOCOR) 40 mg tablet   Yes Yes   Sig: Take 40 mg by mouth daily at bedtime  Facility-Administered Medications: None       Past Medical History:   Diagnosis Date    Colitis     Depression     GERD (gastroesophageal reflux disease)     Hiatal hernia     High cholesterol     Hyperlipidemia     Hypertension     Sleep apnea     C pap       Past Surgical History:   Procedure Laterality Date    APPENDECTOMY      BREAST BIOPSY      CHOLECYSTECTOMY      COLECTOMY      ND LAP, REPAIR PARAESOPHAGEAL HERNIA, INCL FUNDOPLASTY W/ MESH N/A 6/2/2016    Procedure: ROBOTIC LAPAROSCOPIC Daquan Fundoplication, Liver biopsy;  Surgeon: Jack Becerra MD;  Location: [facility];  Service: General       History reviewed  No pertinent family history  I have reviewed and agree with the history as documented  Social History   Substance Use Topics    Smoking status: Never Smoker    Smokeless tobacco: Never Used    Alcohol use Yes      Comment: social        Review of Systems   Constitutional: Negative for chills, diaphoresis and fever  HENT: Negative for congestion and sore throat  Eyes: Negative for photophobia and visual disturbance  Respiratory: Negative for cough, shortness of breath, wheezing and stridor  Cardiovascular: Positive for syncope  Negative for chest pain, palpitations and leg swelling  Gastrointestinal: Positive for abdominal pain, diarrhea, nausea and vomiting  Negative for blood in stool  Genitourinary: Negative for dysuria, frequency and urgency  Musculoskeletal: Negative for neck pain and neck stiffness     Skin: Negative for pallor and rash    Neurological: Positive for syncope  Negative for dizziness, weakness, light-headedness and headaches  All other systems reviewed and are negative  Physical Exam  ED Triage Vitals   Temperature Pulse Respirations Blood Pressure SpO2   11/26/17 1400 11/26/17 1359 11/26/17 1359 11/26/17 1359 11/26/17 1359   97 9 °F (36 6 °C) 72 16 107/56 98 %      Temp Source Heart Rate Source Patient Position - Orthostatic VS BP Location FiO2 (%)   11/26/17 1400 11/26/17 1359 11/26/17 1359 11/26/17 1359 --   Oral Monitor Lying Right arm       Pain Score       11/26/17 1359       2           Orthostatic Vital Signs  Vitals:    11/26/17 2247 11/27/17 0100 11/27/17 0700 11/27/17 1524   BP: 110/56 99/53 109/60 107/53   Pulse: 64 65 68 69   Patient Position - Orthostatic VS: Lying Lying Lying        Physical Exam   Constitutional: She is oriented to person, place, and time  She appears well-developed and well-nourished  HENT:   Head: Normocephalic and atraumatic  Eyes: EOM are normal  Pupils are equal, round, and reactive to light  Neck: Normal range of motion  Neck supple  Cardiovascular: Normal rate, regular rhythm, normal heart sounds and intact distal pulses  Pulmonary/Chest: Effort normal and breath sounds normal  No respiratory distress  Abdominal: Soft  Bowel sounds are normal  There is tenderness  Periumbilical and lower abdomen   Musculoskeletal: Normal range of motion  She exhibits no edema or tenderness  Neurological: She is alert and oriented to person, place, and time  No cranial nerve deficit or sensory deficit  She exhibits normal muscle tone  Coordination normal    Skin: Skin is warm and dry  Capillary refill takes less than 2 seconds  No rash noted  No erythema  Vitals reviewed        ED Medications  Medications   sodium chloride 0 9 % bolus 1,000 mL (1,000 mL Intravenous New Bag 11/26/17 1535)   ondansetron (ZOFRAN) injection 4 mg (4 mg Intravenous Given 11/26/17 1528)       Diagnostic Studies  Results Reviewed     Procedure Component Value Units Date/Time    Clostridium difficile toxin by PCR [[id number]]  (Normal) Collected:  11/26/17 1520    Lab Status:  Final result Specimen:  Stool from Rectum Updated:  11/27/17 1118     C difficile toxin by PCR NEGATIVE for C difficle toxin by PCR  Lactic acid, plasma [[id number]]  (Normal) Collected:  11/26/17 1528    Lab Status:  Final result Specimen:  Blood from Arm, Left Updated:  11/26/17 1558     LACTIC ACID 1 2 mmol/L     Narrative:         Result may be elevated if tourniquet was used during collection  Troponin I [[id number]]  (Normal) Collected:  11/26/17 1454    Lab Status:  Final result Specimen:  Blood from Arm, Left Updated:  11/26/17 1549     Troponin I <0 02 ng/mL     Narrative:         [employer] Chemistry analyzer 99% cutoff is > 0 04 ng/mL in network labs    o cTnI 99% cutoff is useful only when applied to patients in the clinical setting of myocardial ischemia  o cTnI 99% cutoff should be interpreted in the context of clinical history, ECG findings and possibly cardiac imaging to establish correct diagnosis  o cTnI 99% cutoff may be suggestive but clearly not indicative of a coronary event without the clinical setting of myocardial ischemia      Comprehensive metabolic panel [[id number]]  (Abnormal) Collected:  11/26/17 1454    Lab Status:  Final result Specimen:  Blood from Arm, Left Updated:  11/26/17 1523     Sodium 133 (L) mmol/L      Potassium 5 1 mmol/L      Chloride 104 mmol/L      CO2 17 (L) mmol/L      Anion Gap 12 mmol/L      BUN 37 (H) mg/dL      Creatinine 1 26 mg/dL      Glucose 113 mg/dL      Calcium 10 7 (H) mg/dL      AST 27 U/L      ALT 43 U/L      Alkaline Phosphatase 144 (H) U/L      Total Protein 8 5 (H) g/dL      Albumin 4 2 g/dL      Total Bilirubin 0 40 mg/dL      eGFR 42 ml/min/1 73sq m     Narrative:         National Kidney Disease Education Program recommendations are as follows:  GFR calculation is accurate only with a steady state creatinine  Chronic Kidney disease less than 60 ml/min/1 73 sq  meters  Kidney failure less than 15 ml/min/1 73 sq  meters  Lipase [[id number]]  (Normal) Collected:  11/26/17 1454    Lab Status:  Final result Specimen:  Blood from Arm, Left Updated:  11/26/17 1515     Lipase 316 u/L     CBC and differential [[id number]]  (Abnormal) Collected:  11/26/17 1454    Lab Status:  Final result Specimen:  Blood from Arm, Left Updated:  11/26/17 1501     WBC 15 66 (H) Thousand/uL      RBC 4 56 Million/uL      Hemoglobin 13 7 g/dL      Hematocrit 42 1 %      MCV 92 fL      MCH 30 0 pg      MCHC 32 5 g/dL      RDW 13 5 %      MPV 10 7 fL      Platelets 147 Thousands/uL      Neutrophils Relative 83 (H) %      Lymphocytes Relative 10 (L) %      Monocytes Relative 6 %      Eosinophils Relative 1 %      Basophils Relative 0 %      Neutrophils Absolute 12 99 (H) Thousands/µL      Lymphocytes Absolute 1 57 Thousands/µL      Monocytes Absolute 0 97 Thousand/µL      Eosinophils Absolute 0 10 Thousand/µL      Basophils Absolute 0 03 Thousands/µL                  XR chest 2 views   Final Result by Jose Chau MD (11/27 0417)      No active pulmonary disease  Workstation performed: [id number]         CT abdomen pelvis wo contrast   Final Result by Nini Sosa DO (11/26 1626)   Limited exam performed without oral or IV contrast    No significant abnormalities are detected  No visceral or osseous injury  Workstation performed: [id number]         CT head without contrast   Final Result by Nini Sosa DO (11/26 1624)   No acute intracranial abnormality  Workstation performed: [id number]                    Procedures  Procedures       Phone Contacts  ED Phone Contact    ED Course  ED Course as of Nov 29 2004   Narayan Becker Nov 26, 2017   1523 ECG interpreted independently by me    Shows normal sinus rhythm, normal axis, normal QRS, flat T-waves which are new laterally, 1 aVL V5 and V6, no significant ST changes    1645 EKG repeated  Still flat T-waves laterally  [facility]  CritCare Time    Disposition  Final diagnoses:   Dehydration   Syncope     Time reflects when diagnosis was documented in both MDM as applicable and the Disposition within this note     Time User Action Codes Description Comment    11/26/2017  5:14 PM Franklin Leigh Add [E86 [id number]] Dehydration     11/26/2017  5:15 PM Edwardoslesli Leigh Add [[id number]] Syncope     11/27/2017 11:48 AM [place] Mcintosh L Add [[id number]] Status post colectomy     11/27/2017 11:48 AM [place] Mcintosh L Add [[id number]] Status post laparoscopic Nissen fundoplication       ED Disposition     ED Disposition Condition Comment    Admit  Case was discussed with HASEEB and the patient's admission status was agreed to be Admission Status: observation status to the service of Dr Karo Gomes   Follow-up Information     Follow up With Specialties Details Why Scot Santo MD Nephrology Call in 1 day(s) Please call to make a follow-up appointment in regards recent hospitalization for syncope with diarrhea within 7-10 days   [place], [place] [facility]ology Go on 12/19/2017 Please go to your follow-up appointment with Gastroenterology on 12/19/2017 at 2:30 p m  for chronic diarrhea [id number] [facility]  [phone]          Discharge Medication List as of 11/27/2017  5:05 PM      START taking these medications    Details   diphenoxylate-atropine (LOMOTIL) 2 5-0 025 mg per tablet Take 1 tablet by mouth 4 (four) times a day as needed for diarrhea for up to 10 days, Starting Mon 11/27/2017, Until Thu 12/7/2017, Print      ondansetron (ZOFRAN-ODT) 8 mg disintegrating tablet Take 1 tablet by mouth every 8 (eight) hours as needed for nausea or vomiting for up to 10 days, Starting Mon 11/27/2017, Until Thu 12/7/2017, Print         CONTINUE these medications which have NOT CHANGED    Details   acetaminophen (TYLENOL) 325 mg tablet 650 mg every 4 to 6 hours as needed for pain  , Print      amLODIPine-benazepril (LOTREL 5-10) 5-10 MG per capsule Take 1 capsule by mouth daily  , Until Discontinued, Historical Med      Cholecalciferol (VITAMIN D3) 5000 UNITS CAPS Take 1 capsule by mouth daily  , Until Discontinued, Historical Med      escitalopram (LEXAPRO) 10 mg tablet Take 10 mg by mouth daily  , Until Discontinued, Historical Med      propranolol (INDERAL) 60 mg tablet Take 60 mg by mouth daily, Historical Med      psyllium (METAMUCIL) 0 52 g capsule Take 0 52 g by mouth daily, Historical Med      simvastatin (ZOCOR) 40 mg tablet Take 40 mg by mouth daily at bedtime  , Until Discontinued, Historical Med      esomeprazole (NexIUM) 40 MG capsule Take 40 mg by mouth every evening , Until Discontinued, Historical Med      oxyCODONE-acetaminophen (PERCOCET) 5-325 mg per tablet Earliest Fill Date: 6/3/16  1 to 2 tablets every 4 to 6 hours as needed for moderate to severe pain  , Print         STOP taking these medications       ibuprofen (MOTRIN) 200 mg tablet Comments:   Reason for Stopping:               Outpatient Discharge Orders  Discharge Diet     Activity as tolerated     Call provider for:  persistent nausea or vomiting         ED Provider  Electronically Signed by           Judah King MD  11/29/17 2004

## 2017-11-26 NOTE — H&P
History and Physical - [facility]    Patient Information: Miladis Pimentel 74 y o  female MRN: [id number]  Unit/Bed#: KWABENA Encounter: [id number]  Admitting Physician: Kenzie Crouch PA-C  PCP: Raymond Wiseman MD  Date of Admission:  11/26/17    Assessment/Plan:    Hospital Problem List:     Principal Problem:    Syncope  Active Problems:    Intractable diarrhea    Essential hypertension    HLD (hyperlipidemia)    Status post colectomy    Status post laparoscopic Nissen fundoplication      Plan for the Primary Problem(s):  · Syncope  · Admit patient to med/surg under observation status with telemetry monitoring  · Likely secondary to volume depletion from diarrhea  · Hydrate  cc/hr   · Check echo   · Check orthostatics   · PT consult   · Monitor vitals   · Intractable diarrhea  · Has had diarrhea for the last 3 months, no outpatient testing has been performed  · Check C  Diff  · Check Stool Enteric Panel   · Check Fecal Leukocytes   · Hydration as above  · Can consider antidiarrheals once infectious process ruled out     Plan for Additional Problems:   · HTN  · BP controlled on admission  · Continue outpatient medical management - Lotrel   · HLD  · Continue Statin   · Status Post Nissen Fundoplication  · Continue PPI   · Status Post Colectomy   · Stool studies as above  · Consider colorectal consultation if needed    VTE Prophylaxis: Heparin  / reason for no mechanical VTE prophylaxis optional   Code Status: Full Code   POLST: POLST form is not discussed and not completed at this time  Anticipated Length of Stay:  Patient will be admitted on an Observation basis with an anticipated length of stay of  Less than 2 midnights  Justification for Hospital Stay: syncope work up, needs IV fluids    Total Time for Visit, including Counseling / Coordination of Care: 1 hour  Greater than 50% of this total time spent on direct patient counseling and coordination of care      Chief Complaint:   Syncopal event     History of Present Illness:    Amilcar Cabral is a 74 y o  female with a history of HTN, HLD who presents with a syncopal episode that occurred yesterday  She reports that yesterday she was sitting on the toilet and went to stand up when suddenly she lost consciousness  She states that she lost consciousness so fast that she did not have time to notice if she had any symptoms such as chest pain, palpitations, pressure, SOB, headaches, or blurred vision  She estimates that she could not have been down for more than a minute  She was not confused after she had regained consciousness  The fall was unwitnessed as her family was in bed sleeping  She continues to deny any further episodes of LOC, chest pain, pressure, or palpitations  Denies HA, change in vision, numbness, tingling, or weakness  She does report that today she had roughly 6 episodes of dry heaving, however did not vomit because she reports that she "can't"  She states that she has some abdominal pain above her navel, but states this had been on going  More concerning is the fact that the patient states that she has been having watery diarrhea for the last 3 months  She reports that the stool is liquid yellow and "pours out like I am urinating"  She reports that she has crampy abdominal pain prior to passing the stool  Denies recent antibiotic use, travel, or recent hospital admission  States that she had seen her colorectal surgeon for the issue, but no labs were drawn  Denies blood in the stool  Review of Systems:    Review of Systems   Constitutional: Negative for appetite change, chills, diaphoresis, fatigue, fever and unexpected weight change  HENT: Negative for congestion, rhinorrhea and sore throat  Eyes: Negative for visual disturbance  Respiratory: Negative for cough, chest tightness, shortness of breath and wheezing  Cardiovascular: Negative for chest pain, palpitations and leg swelling     Gastrointestinal: Positive for abdominal pain, diarrhea and nausea  Negative for constipation and vomiting  Genitourinary: Negative for dysuria  Musculoskeletal: Negative for arthralgias and myalgias  Neurological: Positive for syncope  Negative for dizziness, weakness, light-headedness, numbness and headaches  All other systems reviewed and are negative  Past Medical and Surgical History:     Past Medical History:   Diagnosis Date    Colitis     Depression     GERD (gastroesophageal reflux disease)     Hiatal hernia     High cholesterol     Hyperlipidemia     Hypertension     Sleep apnea     C pap       Past Surgical History:   Procedure Laterality Date    APPENDECTOMY      BREAST BIOPSY      CHOLECYSTECTOMY      COLECTOMY      RI LAP, REPAIR PARAESOPHAGEAL HERNIA, INCL FUNDOPLASTY W/ MESH N/A 6/2/2016    Procedure: ROBOTIC LAPAROSCOPIC Daquan Fundoplication, Liver biopsy;  Surgeon: Jack Becerra MD;  Location: [facility];  Service: General       Meds/Allergies:    Prior to Admission medications    Medication Sig Start Date End Date Taking? Authorizing Provider   acetaminophen (TYLENOL) 325 mg tablet 650 mg every 4 to 6 hours as needed for pain  6/3/16   Deborah Gutiérrez PA-C   amLODIPine-benazepril (LOTREL 5-10) 5-10 MG per capsule Take 1 capsule by mouth daily  Historical Provider, MD   Cholecalciferol (VITAMIN D3) 5000 UNITS CAPS Take 1 capsule by mouth daily  Historical Provider, MD   escitalopram (LEXAPRO) 10 mg tablet Take 10 mg by mouth daily  Historical Provider, MD   esomeprazole (NexIUM) 40 MG capsule Take 40 mg by mouth every evening  Historical Provider, MD   ibuprofen (MOTRIN) 200 mg tablet Take 400 mg by mouth every 6 (six) hours as needed for mild pain  Historical Provider, MD   oxyCODONE-acetaminophen (PERCOCET) 5-325 mg per tablet Earliest Fill Date: 6/3/16  1 to 2 tablets every 4 to 6 hours as needed for moderate to severe pain   6/3/16   Deborah Gutiérrez PA-C   simvastatin (T[place]lorton) 40 mg tablet Take 40 mg by mouth daily at bedtime  Historical Provider, MD SCHMITZ have reviewed home medications with patient personally  Allergies: Allergies   Allergen Reactions    Biaxin [Clarithromycin] GI Intolerance    Darvon [Propoxyphene]     Cefzil [Cefprozil] Rash    Ciprofloxacin Rash       Social History:     Marital Status: /Civil Union   Occupation: None  Patient Pre-hospital Living Situation: Home with family   Patient Pre-hospital Level of Mobility: Full   Patient Pre-hospital Diet Restrictions: None  Substance Use History:   History   Alcohol Use    Yes     Comment: social     History   Smoking Status    Never Smoker   Smokeless Tobacco    Never Used     History   Drug Use No       Family History:    History reviewed  No pertinent family history  Physical Exam:     Vitals:   Blood Pressure: 107/56 (11/26/17 1359)  Pulse: 72 (11/26/17 1359)  Temperature: 97 9 °F (36 6 °C) (11/26/17 1400)  Temp Source: Oral (11/26/17 1400)  Respirations: 16 (11/26/17 1359)  Weight - Scale: 66 kg (145 lb 8 1 oz) (11/26/17 1359)  SpO2: 98 % (11/26/17 1359)    Physical Exam   Constitutional: She is oriented to person, place, and time  Vital signs are normal  She appears well-developed and well-nourished  Non-toxic appearance  No distress  HENT:   Head: Normocephalic and atraumatic  Mouth/Throat: Mucous membranes are dry  No oropharyngeal exudate, posterior oropharyngeal edema, posterior oropharyngeal erythema or tonsillar abscesses  Eyes: Conjunctivae and EOM are normal  Pupils are equal, round, and reactive to light  Right pupil is round and reactive  Left pupil is round and reactive  Pupils are equal    Neck: Neck supple  Cardiovascular: Normal rate, regular rhythm, S1 normal, S2 normal, normal heart sounds and intact distal pulses  Exam reveals no S3 and no S4  No murmur heard  Pulmonary/Chest: Effort normal  No accessory muscle usage  No respiratory distress   She has no decreased breath sounds  She has no wheezes  She has no rhonchi  She has no rales  She exhibits no tenderness  Abdominal: Soft  Bowel sounds are normal  She exhibits no distension and no mass  There is generalized tenderness  There is no rigidity, no rebound and no guarding  Neurological: She is alert and oriented to person, place, and time  She has normal strength  She displays no tremor  No cranial nerve deficit or sensory deficit  She displays no seizure activity  Skin: Skin is warm and dry  Skin tenting noted         Additional Data:     Lab Results: I have personally reviewed pertinent reports  Results from last 7 days  Lab Units 11/26/17  1454   WBC Thousand/uL 15 66*   HEMOGLOBIN g/dL 13 7   HEMATOCRIT % 42 1   PLATELETS Thousands/uL 285   NEUTROS PCT % 83*   LYMPHS PCT % 10*   MONOS PCT % 6   EOS PCT % 1       Results from last 7 days  Lab Units 11/26/17  1454   SODIUM mmol/L 133*   POTASSIUM mmol/L 5 1   CHLORIDE mmol/L 104   CO2 mmol/L 17*   BUN mg/dL 37*   CREATININE mg/dL 1 26   CALCIUM mg/dL 10 7*   TOTAL PROTEIN g/dL 8 5*   BILIRUBIN TOTAL mg/dL 0 40   ALK PHOS U/L 144*   ALT U/L 43   AST U/L 27   GLUCOSE RANDOM mg/dL 113           Imaging: I have personally reviewed pertinent reports  Ct Abdomen Pelvis Wo Contrast    Result Date: 11/26/2017  Narrative: CT ABDOMEN AND PELVIS WITHOUT IV CONTRAST INDICATION:  Diarrhea  Dizziness  Patient fell  COMPARISON: October 17, 2017 TECHNIQUE:  CT examination of the abdomen and pelvis was performed without intravenous contrast   Reformatted images were created in axial, sagittal, and coronal planes  Radiation dose length product (DLP) for this visit:  282 mGy-cm   This examination, like all CT scans performed in the [facility], was performed utilizing techniques to minimize radiation dose exposure, including the use of iterative reconstruction and automated exposure control  Enteric contrast was not administered   FINDINGS: ABDOMEN LOWER CHEST:  Atelectatic changes and scarring both lung bases  Cardiac size within normal limits  No pericardial effusion  LIVER/BILIARY TREE:  Unremarkable  Prominent biliary ducts, normal status post cholecystectomy  GALLBLADDER:  Removed SPLEEN:  Unremarkable  PANCREAS:  Unremarkable  ADRENAL GLANDS:  Unremarkable  KIDNEYS/URETERS:  Unremarkable  No hydronephrosis  STOMACH AND BOWEL:  Limited evaluation of GI tract without oral contrast  Small hiatal hernia  Surgical clips in the GE junction  Stomach unremarkable  Small bowel appears average caliber without obstruction  Large bowel mostly removed  Postop changes in  the region of distal sigmoid  APPENDIX:  Removed ABDOMINOPELVIC CAVITY:  No free air or free fluid  No discrete lymphadenopathy  VESSELS:  Calcified atherosclerotic plaque  No aneurysm PELVIS REPRODUCTIVE ORGANS:  Slightly retroflexed uterus  No adnexal masses  URINARY BLADDER:  Mostly empty  Otherwise unremarkable  ABDOMINAL WALL/INGUINAL REGIONS:  Tiny fat-containing umbilical hernia  OSSEOUS STRUCTURES:  Multilevel degenerative changes  Partial superior endplate collapse/large Schmorl's node superior L3 which is chronic  Stable L2 bone hemangioma  Degenerative changes with neural foraminal narrowing L4-L5 bilaterally  Impression: Limited exam performed without oral or IV contrast  No significant abnormalities are detected  No visceral or osseous injury  Workstation performed: [id number]     Ct Head Without Contrast    Result Date: 11/26/2017  Narrative: CT BRAIN - WITHOUT CONTRAST INDICATION: Dizziness  Patient fell  COMPARISON:  None  TECHNIQUE:  CT examination of the brain was performed  In addition to axial images, coronal reformatted images were created and submitted for interpretation  Radiation dose length product (DLP) for this visit:  1326 mGy-cm     This examination, like all CT scans performed in the [facility], was performed utilizing techniques to minimize radiation dose exposure, including the use of iterative reconstruction and automated exposure control  IMAGE QUALITY:  Diagnostic  FINDINGS:  PARENCHYMA:  No intracranial mass, mass effect or midline shift  No CT signs of acute infarction  There is no parenchymal hemorrhage  VENTRICLES AND EXTRA-AXIAL SPACES:  Normal for patient's age  VISUALIZED ORBITS AND PARANASAL SINUSES:  Postop changes  CALVARIUM AND EXTRACRANIAL SOFT TISSUES:   Normal      Impression: No acute intracranial abnormality  Workstation performed: [id number]       EKG, Pathology, and Other Studies Reviewed on Admission:   · CXR PA and Lateral: no active pulmonary disease  · CT head without contrast: no acute intracranial abnormality  · CT abdomen pelvis with contrast: no significant abnormalities  No visceral or abdominal injuries  Allscripts Records Reviewed: Yes     ** Please Note: Dragon 360 Dictation voice to text software may have been used in the creation of this document   **

## 2017-11-26 NOTE — ED NOTES
Pt ambulated to restroom with no distress  Provided urine cup and hat for sample collecting        Henrry Tan RN  11/26/17 9031

## 2017-11-27 ENCOUNTER — APPOINTMENT (OUTPATIENT)
Dept: NON INVASIVE DIAGNOSTICS | Facility: HOSPITAL | Age: 72
End: 2017-11-27
Payer: COMMERCIAL

## 2017-11-27 VITALS
HEIGHT: 64 IN | RESPIRATION RATE: 18 BRPM | SYSTOLIC BLOOD PRESSURE: 107 MMHG | BODY MASS INDEX: 24.99 KG/M2 | HEART RATE: 69 BPM | OXYGEN SATURATION: 97 % | DIASTOLIC BLOOD PRESSURE: 53 MMHG | WEIGHT: 146.39 LBS | TEMPERATURE: 97.6 F

## 2017-11-27 LAB
ANION GAP SERPL CALCULATED.3IONS-SCNC: 11 MMOL/L (ref 4–13)
BUN SERPL-MCNC: 32 MG/DL (ref 5–25)
C DIFF TOX GENS STL QL NAA+PROBE: NORMAL
CALCIUM SERPL-MCNC: 9.1 MG/DL (ref 8.3–10.1)
CHLORIDE SERPL-SCNC: 109 MMOL/L (ref 100–108)
CO2 SERPL-SCNC: 16 MMOL/L (ref 21–32)
CREAT SERPL-MCNC: 1.02 MG/DL (ref 0.6–1.3)
ERYTHROCYTE [DISTWIDTH] IN BLOOD BY AUTOMATED COUNT: 13.7 % (ref 11.6–15.1)
GFR SERPL CREATININE-BSD FRML MDRD: 54 ML/MIN/1.73SQ M
GLUCOSE P FAST SERPL-MCNC: 107 MG/DL (ref 65–99)
GLUCOSE SERPL-MCNC: 107 MG/DL (ref 65–140)
HCT VFR BLD AUTO: 34.3 % (ref 34.8–46.1)
HGB BLD-MCNC: 10.8 G/DL (ref 11.5–15.4)
MCH RBC QN AUTO: 30.2 PG (ref 26.8–34.3)
MCHC RBC AUTO-ENTMCNC: 31.5 G/DL (ref 31.4–37.4)
MCV RBC AUTO: 96 FL (ref 82–98)
PLATELET # BLD AUTO: 224 THOUSANDS/UL (ref 149–390)
PMV BLD AUTO: 10.2 FL (ref 8.9–12.7)
POTASSIUM SERPL-SCNC: 3.8 MMOL/L (ref 3.5–5.3)
RBC # BLD AUTO: 3.58 MILLION/UL (ref 3.81–5.12)
SODIUM SERPL-SCNC: 136 MMOL/L (ref 136–145)
TROPONIN I SERPL-MCNC: <0.02 NG/ML
WBC # BLD AUTO: 8.53 THOUSAND/UL (ref 4.31–10.16)

## 2017-11-27 PROCEDURE — 84484 ASSAY OF TROPONIN QUANT: CPT | Performed by: INTERNAL MEDICINE

## 2017-11-27 PROCEDURE — 85027 COMPLETE CBC AUTOMATED: CPT | Performed by: PHYSICIAN ASSISTANT

## 2017-11-27 PROCEDURE — 93306 TTE W/DOPPLER COMPLETE: CPT

## 2017-11-27 PROCEDURE — 80048 BASIC METABOLIC PNL TOTAL CA: CPT | Performed by: PHYSICIAN ASSISTANT

## 2017-11-27 RX ORDER — DIPHENOXYLATE HYDROCHLORIDE AND ATROPINE SULFATE 2.5; .025 MG/1; MG/1
1 TABLET ORAL 4 TIMES DAILY PRN
Qty: 30 TABLET | Refills: 0 | Status: SHIPPED | OUTPATIENT
Start: 2017-11-27 | End: 2017-12-07

## 2017-11-27 RX ORDER — DIPHENOXYLATE HYDROCHLORIDE AND ATROPINE SULFATE 2.5; .025 MG/1; MG/1
1 TABLET ORAL 4 TIMES DAILY PRN
Status: DISCONTINUED | OUTPATIENT
Start: 2017-11-27 | End: 2017-11-27 | Stop reason: HOSPADM

## 2017-11-27 RX ORDER — ONDANSETRON 8 MG/1
8 TABLET, ORALLY DISINTEGRATING ORAL EVERY 8 HOURS PRN
Qty: 20 TABLET | Refills: 0 | Status: SHIPPED | OUTPATIENT
Start: 2017-11-27 | End: 2020-01-10 | Stop reason: ALTCHOICE

## 2017-11-27 RX ORDER — LOPERAMIDE HYDROCHLORIDE 2 MG/1
2 CAPSULE ORAL 3 TIMES DAILY PRN
Status: DISCONTINUED | OUTPATIENT
Start: 2017-11-27 | End: 2017-11-27

## 2017-11-27 RX ADMIN — HEPARIN SODIUM 5000 UNITS: 5000 INJECTION, SOLUTION INTRAVENOUS; SUBCUTANEOUS at 05:32

## 2017-11-27 RX ADMIN — ONDANSETRON 4 MG: 2 INJECTION INTRAMUSCULAR; INTRAVENOUS at 09:40

## 2017-11-27 RX ADMIN — PANTOPRAZOLE SODIUM 40 MG: 40 TABLET, DELAYED RELEASE ORAL at 05:32

## 2017-11-27 RX ADMIN — HEPARIN SODIUM 5000 UNITS: 5000 INJECTION, SOLUTION INTRAVENOUS; SUBCUTANEOUS at 14:20

## 2017-11-27 RX ADMIN — SODIUM CHLORIDE 125 ML/HR: 0.9 INJECTION, SOLUTION INTRAVENOUS at 05:33

## 2017-11-27 NOTE — PLAN OF CARE
CARDIOVASCULAR - ADULT     Maintains optimal cardiac output and hemodynamic stability Progressing     Absence of cardiac dysrhythmias or at baseline rhythm Progressing        DISCHARGE PLANNING     Discharge to home or other facility with appropriate resources Progressing        GASTROINTESTINAL - ADULT     Minimal or absence of nausea and/or vomiting Progressing     Maintains or returns to baseline bowel function Progressing     Maintains adequate nutritional intake Progressing        INFECTION - ADULT     Absence or prevention of progression during hospitalization Progressing     Absence of fever/infection during neutropenic period Progressing        Knowledge Deficit     Patient/family/caregiver demonstrates understanding of disease process, treatment plan, medications, and discharge instructions Progressing        METABOLIC, FLUID AND ELECTROLYTES - ADULT     Electrolytes maintained within normal limits Progressing     Fluid balance maintained Progressing        Nutrition/Hydration-ADULT     Nutrient/Hydration intake appropriate for improving, restoring or maintaining nutritional needs Progressing        PAIN - ADULT     Verbalizes/displays adequate comfort level or baseline comfort level Progressing        Potential for Falls     Patient will remain free of falls Progressing        SAFETY ADULT     Maintain or return to baseline ADL function Progressing     Maintain or return mobility status to optimal level Progressing        SKIN/TISSUE INTEGRITY - ADULT     Skin integrity remains intact Progressing

## 2017-11-27 NOTE — CASE MANAGEMENT
Initial Clinical Review    Admission: Date/Time/Statement: 11/26/17 @ 1716 Observation Written     Orders Placed This Encounter   Procedures    Place in Observation (expected length of stay for this patient is less than two midnights)     Standing Status:   Standing     Number of Occurrences:   1     Order Specific Question:   Admitting Physician     Answer:   Iqra Vicente     Order Specific Question:   Level of Care     Answer:   Med Surg [16]         ED: Date/Time/Mode of Arrival:   ED Arrival Information     Expected Arrival Acuity Means of Arrival Escorted By Service Admission Type    - 11/26/2017 13:52 Urgent 171 Юлия Mooney Urgent    Arrival Complaint    SYNCOPE          Chief Complaint:   Chief Complaint   Patient presents with    Syncope     pt reports passing out yesterday, reports she attempted to grab onto furniture and "a small armuore fell onto me"  pt reports she may have hit head on way down  Pt comes intoday because she reports new onset of vertigo and dry heaves  Pt states "i think i passed out from getting up to fast to use restroom "  pt admits to diarrhea for approx 1 month  History of Illness: Patient is a 68-year-old female presenting to emergency department with diarrhea, abdominal pain, syncope  Yesterday while she was walking from bathroom she had a syncopal episode  No prodromal symptoms  She is nauseous  Has had diarrhea for 3 months  So for colorectal surgeon will had no intervention  No chest pain or shortness of breath  No headache  No neck pain  No weakness, numbness, tingling  No recent antibiotics  No recent travel      ED Vital Signs:   ED Triage Vitals   Temperature Pulse Respirations Blood Pressure SpO2   11/26/17 1400 11/26/17 1359 11/26/17 1359 11/26/17 1359 11/26/17 1359   97 9 °F (36 6 °C) 72 16 107/56 98 %      Temp Source Heart Rate Source Patient Position - Orthostatic VS BP Location FiO2 (%)   11/26/17 1400 11/26/17 1359 11/26/17 1359 11/26/17 1359 --   Oral Monitor Lying Right arm       Pain Score       11/26/17 1359       2        Wt Readings from Last 1 Encounters:   11/26/17 66 4 kg (146 lb 6 2 oz)       Vital Signs (abnormal): BP 98/53    Abnormal Labs/Diagnostic Test Results:   WBC 15 66*   NEUTROS PCT 83*   LYMPHS PCT 10*     SODIUM 133*   CO2 17*   BUN 37*   CALCIUM 10 7*   TOTAL PROTEIN 8 5*   ALK PHOS 144*     CT Abd, pelvis:  Limited exam performed without oral or IV contrast  No significant abnormalities are detected  No visceral or osseous injury  CT Head:  No acute intracranial abnormality  CXR:  NAD    ED Treatment:   Medication Administration from 11/26/2017 1352 to 11/26/2017 1937       Date/Time Order Dose Route Action     11/26/2017 1535 sodium chloride 0 9 % bolus 1,000 mL 1,000 mL Intravenous New Bag     11/26/2017 1528 ondansetron (ZOFRAN) injection 4 mg 4 mg Intravenous Given          Past Medical/Surgical History: Active Ambulatory Problems     Diagnosis Date Noted    No Active Ambulatory Problems     Resolved Ambulatory Problems     Diagnosis Date Noted    No Resolved Ambulatory Problems     Past Medical History:   Diagnosis Date    Colitis     Depression     GERD (gastroesophageal reflux disease)     Hiatal hernia     High cholesterol     Hyperlipidemia     Hypertension     Sleep apnea        Admitting Diagnosis: Dehydration [E86 0]  Syncope [R55]    Age/Sex: 74 y o  female    Assessment/Plan:   Principal Problem:    Syncope  Active Problems:    Intractable diarrhea    Essential hypertension    HLD (hyperlipidemia)    Status post colectomy    Status post laparoscopic Nissen fundoplication        Plan for the Primary Problem(s):  · Syncope  ? Admit patient to med/surg under observation status with telemetry monitoring  ? Likely secondary to volume depletion from diarrhea  ? Hydrate  cc/hr   ? Check echo   ? Check orthostatics   ? PT consult   ?  Monitor vitals · Intractable diarrhea  ? Has had diarrhea for the last 3 months, no outpatient testing has been performed  ? Check C  Diff  ? Check Stool Enteric Panel   ? Check Fecal Leukocytes   ? Hydration as above  ? Can consider antidiarrheals once infectious process ruled out      Plan for Additional Problems:   · HTN  ? BP controlled on admission  ? Continue outpatient medical management - Lotrel   · HLD  ? Continue Statin   · Status Post Nissen Fundoplication  ? Continue PPI   · Status Post Colectomy   ? Stool studies as above  ? Consider colorectal consultation if needed     VTE Prophylaxis: Heparin  / reason for no mechanical VTE prophylaxis optional   Code Status: Full Code   POLST: POLST form is not discussed and not completed at this time      Anticipated Length of Stay:  Patient will be admitted on an Observation basis with an anticipated length of stay of  Less than 2 midnights     Justification for Hospital Stay: syncope work up, needs IV fluids    Admission Orders:  Telemetry  Incentive spirometry  OOB with assistance  Orthostatic BP  Echocardiogram  CXR  Stool cx  Trop q3h  Pt     Scheduled Meds:   amLODIPine-benazepril (LOTREL 5/10) combo dose  Oral HS   cholecalciferol 5,000 Units Oral HS   escitalopram 10 mg Oral HS   heparin (porcine) 5,000 Units Subcutaneous Q8H [address]   pantoprazole 40 mg Oral Early Morning   pravastatin 80 mg Oral HS     Continuous Infusions:   sodium chloride 125 mL/hr Last Rate: 125 mL/hr (11/27/17 0595)     PRN Meds:   Acetaminophen 650mg PO x1 thus far    ondansetron

## 2017-11-27 NOTE — DISCHARGE SUMMARY
[facility]    Patient Information: Alondra Pimentel 74 y o  female MRN: [id number]  Unit/Bed#: -01 Encounter: [id number]    Discharging Physician / Practitioner: Zuhair Hlil PA-C  PCP: Celina Kunz MD  Admission Date: 11/26/2017  Discharge Date: 11/27/17    Reason for Admission:  Syncope secondary to hypovolemia with chronic diarrhea    Discharge Diagnoses:     Principal Problem:    Syncope  Active Problems:    Intractable diarrhea    Essential hypertension    HLD (hyperlipidemia)    Status post colectomy    Status post laparoscopic Nissen fundoplication  Resolved Problems:    * No resolved hospital problems  *      Consultations During Hospital Stay:  · none    Procedures Performed:     · none    Significant Findings / Test Results:     · Chest x-ray-no acute pulmonary disease  · Head CT no acute intracranial abnormalities  · CT abdomen/pelvis-no significant intra-abdominal abnormalities  · C difficile PCR negative    Incidental Findings:   · none     Test Results Pending at Discharge (will require follow up): · Fecal leukocytes-pending  · Enteric bacteria PCR pending     Outpatient Tests Requested:  · none    Complications:  none    Hospital Course: Alondra Pimentel is a 74 y o  female patient past medical history of hypertension, hyperlipidemia, status post Nissen fundoplication and colectomy, chronic diarrhea who originally presented to the hospital on 11/26/2017 due to syncopal episode X 1 day  Patient admitted to quickly rising from a seated position in bathroom when suddenly losing consciousness following to the floor unwitnessed without lightheadedness or dizziness, shortness of breath, chest pain, blurred vision, headache  Patient admitted to persistent watery diarrhea without hematochezia or melena and abdominal cramping  Denies recent antibiotic patient    Patient was brought to the emergency department from home via family members for further evaluation of syncopal episode  In the ED, patient was found to be hemodynamically stable with mild hyponatremia 133 and a mild leukocytosis of 15 66 negative lactic acid CT abdomen/pelvis noted no acute intra-abdominal processes, chest x-ray was negative for acute pulmonary disease in head CT was negative for acute intracranial pathology  Thus patient was admitted to medical-surgical floor for overnight stay for further evaluation of syncope likely secondary to hypovolemia given persistent diarrhea  On the medical-surgical floor patient admitted to improved diarrhea with formed stools without abdominal cramping and resolved leukocytosis and bile hyponatremia with IVF  C difficile PCR was found to be negative and pending bacterial PCR with fecal leukocytes prior to discharge  Patient was administered Lomotil p r n  to regulate diarrhea and was heavily counseled in regards to close follow-up with Colorectal surgery and Gastroenterology outpatient  Patient was also counseled in regards to appropriate bowel regimen and was discharged home with  with Lomotil and Zofran  All questions answered to the best of my abilities,    Condition at Discharge: good     Discharge Day Visit / Exam:     Subjective:  Patient was seen and examined at the bedside with RN the same  Patient admitted to no new episodes of diarrhea able to tolerate p  o  very well with a decent appetite denies abdominal pain, nausea vomiting or diarrhea changes to urination  Patient denies fevers or chills, chest pain, shortness breath, palpitations, diaphoresis  Patient asked when she was able to go home today      Vitals: Blood Pressure: 109/60 (11/27/17 0700)  Pulse: 68 (11/27/17 0700)  Temperature: 97 6 °F (36 4 °C) (11/27/17 0700)  Temp Source: Oral (11/27/17 0700)  Respirations: 18 (11/27/17 0700)  Height: 5' 4" (162 6 cm) (11/26/17 1941)  Weight - Scale: 66 4 kg (146 lb 6 2 oz) (11/26/17 1941)  SpO2: 95 % (11/27/17 0700)  Exam: Physical Exam   Constitutional: She is oriented to person, place, and time  She appears well-developed  No distress  HENT:   Head: Normocephalic and atraumatic  Mouth/Throat: Oropharynx is clear and moist  No oropharyngeal exudate  Eyes: Conjunctivae and EOM are normal  Pupils are equal, round, and reactive to light  Right eye exhibits no discharge  Left eye exhibits no discharge  No scleral icterus  Neck: Normal range of motion  Neck supple  No JVD present  No tracheal deviation present  No thyromegaly present  Cardiovascular: Normal rate, regular rhythm and intact distal pulses  Exam reveals no gallop and no friction rub  No murmur heard  DP pulses present bilaterally, mild bilateral lower extremity edema +1 nonpitting   Pulmonary/Chest: Effort normal and breath sounds normal  No respiratory distress  She has no wheezes  She has no rales  She exhibits no tenderness  Abdominal: Soft  Bowel sounds are normal  She exhibits no distension  There is no tenderness  There is no rebound and no guarding  Musculoskeletal: Normal range of motion  She exhibits edema  She exhibits no tenderness or deformity  Lymphadenopathy:     She has no cervical adenopathy  Neurological: She is alert and oriented to person, place, and time  She displays normal reflexes  No cranial nerve deficit  Coordination abnormal    Skin: Skin is warm and dry  No rash noted  She is not diaphoretic  No erythema  No pallor  Psychiatric: Her behavior is normal  Thought content normal        Discharge instructions/Information to patient and family:   See after visit summary for information provided to patient and family  Provisions for Follow-Up Care:  See after visit summary for information related to follow-up care and any pertinent home health orders        Disposition:     Home    For Discharges to [facility] SNF:   · Not Applicable to this Patient - Not Applicable to this Patient    Planned Readmission: none Discharge Statement:  I spent 30 minutes discharging the patient  This time was spent on the day of discharge  I had direct contact with the patient on the day of discharge  Greater than 50% of the total time was spent examining patient, answering all patient questions, arranging and discussing plan of care with patient as well as directly providing post-discharge instructions  Additional time then spent on discharge activities  Discharge Medications:  See after visit summary for reconciled discharge medications provided to patient and family        ** Please Note: This note has been constructed using a voice recognition system **

## 2017-11-27 NOTE — NURSING NOTE
Discharge instructions given to patient and reviewed  Patient has no questions or concerns at this time  Prescriptions given to patient and reviewed  Patients family at bedside

## 2017-11-27 NOTE — DISCHARGE INSTRUCTIONS
Dehydration   WHAT YOU NEED TO KNOW:   Dehydration is a condition that develops when your body does not have enough fluid  You may become dehydrated if you do not drink enough water or lose too much fluid  Fluid loss may also cause loss of electrolytes (minerals), such as sodium  DISCHARGE INSTRUCTIONS:   Seek care immediately if:   · You have a seizure  · You are confused or cannot think clearly  · You are extremely sleepy, or another person cannot wake you  · You become dizzy or faint when you stand  · You are not able to urinate  · You have trouble breathing  · You have a fast or irregular heartbeat  · Your hands or feet are cold, or your face is pale  Contact your healthcare provider if:   · You have trouble drinking liquids because you are vomiting  · Your symptoms get worse  · You have a fever  · You feel very weak or tired  · You have questions or concerns about your condition or care  Follow up with your healthcare provider as directed:  Write down your questions so you remember to ask them during your visits  Prevent or manage dehydration:   · Drink liquids as directed  Liquids that contain water, sugar, and minerals can help your body hold in fluid and help prevent dehydration  Drink liquids throughout the day, not just when you feel thirsty  Men should drink about 3 liters (13 eight-ounce cups) of liquid each day  Women should drink about 2 liters (9 eight-ounce cups) of liquid each day  Drink even more liquid if you will be outdoors, in the sun for a long time, or exercising  · Stay cool  Limit the time you spend outdoors during the hottest part of the day  Dress in lightweight clothes  · Keep track of how often you urinate  If you urinate less than usual or your urine is darker, drink more liquids    © 2017 [facility] [place] [employer] Information is for End User's use only and may not be sold, redistributed or otherwise used for commercial to replace body fluids lost through diarrhea  You may also need to drink an oral rehydration solution (ORS)  An ORS has the right amounts of sugar, salt, and minerals in water to replace body fluids  ORS can be found at most grocery stores or pharmacies  Ask how much liquid to drink each day and which liquids are best for you  Do not drink liquids with caffeine or alcohol  These can increase your risk for dehydration  · Do not drink or eat foods that may make your symptoms worse  These include milk and dairy products, greasy and fatty foods, spicy foods, caffeine, and alcohol  Keep a food diary to see if your symptoms are caused by certain foods  Bring this to your follow-up visits  · Eat foods that are easy to digest   These include bananas, boiled potatoes, cooked carrots, cooked chicken, plain rice, and toast  You can also try yogurt and applesauce  · Wash your hands often  Germs on your hands can get into your mouth and cause diarrhea  Use soap and water  Use an alcohol-based hand rub if soap and water are not available  Wash your hands after you use the bathroom, change a child's diaper, or sneeze  Wash your hands before you prepare or eat food  © 2017 [facility] Information is for End User's use only and may not be sold, redistributed or otherwise used for commercial purposes  All illustrations and images included in CareNotes® are the copyrighted property of [employer]  or Jose Page  The above information is an  only  It is not intended as medical advice for individual conditions or treatments  Talk to your doctor, nurse or pharmacist before following any medical regimen to see if it is safe and effective for you

## 2017-11-28 NOTE — CASE MANAGEMENT
Notification of Discharge  This is a Notification of Discharge from our facility [address]  Please be advised that this patient has been discharge from our facility  Below you will find the admission and discharge date and time including the patients disposition  PRESENTATION DATE: 11/26/2017  1:52 PM  IP ADMISSION DATE: N/A N/A  DISCHARGE DATE: 11/27/2017  5:48 PM  DISPOSITION: [facility] in the [facility] by Jose Page for 2017  Network Utilization Review Department  Phone: [phone]; Fax [phone]  ATTENTION: The Network Utilization Review Department is now centralized for our 7 Facilities  Make a note that we have a new phone and fax numbers for our Department  Please call with any questions or concerns to [phone] and carefully follow the prompts so that you are directed to the right person  All voicemails are confidential  Fax any determinations, approvals, denials, and requests for initial or continue stay review clinical to [phone]  Due to HIGH CALL volume, it would be easier if you could please send faxed requests to expedite your requests and in part, help us provide discharge notifications faster

## 2017-11-29 NOTE — CASE MANAGEMENT
Violeta Rile, RN Registered Nurse Signed   Case Management Date of Service: 11/27/2017  8:57 AM         []Hide copied text  Initial Clinical Review     Please note that this is for an Observation  Admission: Date/Time/Statement: 11/26/17 @ 1716 Observation Written            Orders Placed This Encounter   Procedures    Place in Observation (expected length of stay for this patient is less than two midnights)       Standing Status:   Standing       Number of Occurrences:   1       Order Specific Question:   Admitting Physician       Answer:   Sayra December       Order Specific Question:   Level of Care       Answer:   Med Surg [16]            ED: Date/Time/Mode of Arrival:             ED Arrival Information      Expected Arrival Acuity Means of Arrival Escorted By Service Admission Type     - 11/26/2017 13:52 Urgent [facility] Ambulance General Medicine Urgent     Arrival Complaint     SYNCOPE             Chief Complaint:        Chief Complaint   Patient presents with    Syncope       pt reports passing out yesterday, reports she attempted to grab onto furniture and "a small armuore fell onto me"  pt reports she may have hit head on way down  Pt comes intoday because she reports new onset of vertigo and dry heaves  Pt states "i think i passed out from getting up to fast to use restroom "  pt admits to diarrhea for approx 1 month           History of Illness: Patient is a 75-year-old female presenting to emergency department with diarrhea, abdominal pain, syncope   Yesterday while she was walking from bathroom she had a syncopal episode   No prodromal symptoms  She is nauseous   Has had diarrhea for 3 months   So for colorectal surgeon will had no intervention   No chest pain or shortness of breath  No headache   No neck pain   No weakness, numbness, tingling   No recent antibiotics   No recent travel      ED Vital Signs:            ED Triage Vitals   Temperature Pulse Respirations Blood Pressure SpO2   11/26/17 1400 11/26/17 1359 11/26/17 1359 11/26/17 1359 11/26/17 1359   97 9 °F (36 6 °C) 72 16 107/56 98 %       Temp Source Heart Rate Source Patient Position - Orthostatic VS BP Location FiO2 (%)   11/26/17 1400 11/26/17 1359 11/26/17 1359 11/26/17 1359 --   Oral Monitor Lying Right arm         Pain Score           11/26/17 1359           2            Wt Readings from Last 1 Encounters:   11/26/17 66 4 kg (146 lb 6 2 oz)         Vital Signs (abnormal): BP 98/53     Abnormal Labs/Diagnostic Test Results:   WBC 15 66*   NEUTROS PCT 83*   LYMPHS PCT 10*      SODIUM 133*   CO2 17*   BUN 37*   CALCIUM 10 7*   TOTAL PROTEIN 8 5*   ALK PHOS 144*      CT Abd, pelvis:  Limited exam performed without oral or IV contrast  No significant abnormalities are detected  No visceral or osseous injury  CT Head:  No acute intracranial abnormality  CXR:  NAD     ED Treatment:             Medication Administration from 11/26/2017 1352 to 11/26/2017 1937        Date/Time Order Dose Route Action       11/26/2017 1535 sodium chloride 0 9 % bolus 1,000 mL 1,000 mL Intravenous New Bag       11/26/2017 1528 ondansetron (ZOFRAN) injection 4 mg 4 mg Intravenous Given             Past Medical/Surgical History:         Active Ambulatory Problems     Diagnosis Date Noted    No Active Ambulatory Problems           Resolved Ambulatory Problems     Diagnosis Date Noted    No Resolved Ambulatory Problems           Past Medical History:   Diagnosis Date    Colitis      Depression      GERD (gastroesophageal reflux disease)      Hiatal hernia      High cholesterol      Hyperlipidemia      Hypertension      Sleep apnea           Admitting Diagnosis: Dehydration [E86 0]  Syncope [R55]     Age/Sex: 74 y o  female     Assessment/Plan:   Principal Problem:    Syncope  Active Problems:    Intractable diarrhea    Essential hypertension    HLD (hyperlipidemia)    Status post colectomy    Status post laparoscopic Nissen Care Status to our facility [facility]  Please be advised that this patient is currently in our facility under Observation Status  Below you will find the Attending Physician and Facilitys information including NPI# and contact information for the Utilization  assigned to the [facility] where the patient is receiving services  Please feel free to contact the Utilization Review Department with any questions  Patient Information:  PATIENT NAME: Zayda Stevenson  MRN: [id number]  YOB: 1945    PRESENTATION DATE/TIME: 11/26/2017  1:52 PM  OBS ADMISSION DATE/TIME:   DISCHARGE DATE/TIME: 11/27/2017  5:48 PM   DISPOSITION: Home/Self Care    Attending Physician:  SHAKILA Regalado  [facility] Practioner ID- [id number]  [address] [place] [address]  Phone 1: [phone]  Fax: [phone]  Facility:  [facility]  Address: [address] [place] [address]    Phone: [phone]  Tax ID [id number]  NPI [id number]   Medicare: [id number]    [facility] in the [facility] by Jose Page for 2017  Network Utilization Review Department  Phone: [phone]; Fax [phone]  ATTENTION: The Network Utilization Review Department is now centralized for our 7 Facilities  Make a note that we have a new phone and fax numbers for our Department  Please call with any questions or concerns to [phone] and carefully follow the prompts so that you are directed to the right person  All voicemails are confidential  Fax any determinations, approvals, denials, and requests for initial or continue stay review clinical to [phone]  Due to HIGH CALL volume, it would be easier if you could please send faxed requests to expedite your requests and in part, help us provide discharge notifications faster

## 2017-12-19 ENCOUNTER — ALLSCRIPTS OFFICE VISIT (OUTPATIENT)
Dept: OTHER | Facility: OTHER | Age: 72
End: 2017-12-19

## 2017-12-19 DIAGNOSIS — K52.9 NONINFECTIVE GASTROENTERITIS AND COLITIS: ICD-10-CM

## 2017-12-20 ENCOUNTER — TRANSCRIBE ORDERS (OUTPATIENT)
Dept: LAB | Facility: CLINIC | Age: 72
End: 2017-12-20

## 2017-12-20 ENCOUNTER — APPOINTMENT (OUTPATIENT)
Dept: LAB | Facility: CLINIC | Age: 72
End: 2017-12-20
Payer: COMMERCIAL

## 2017-12-20 DIAGNOSIS — K52.9 NONINFECTIVE GASTROENTERITIS AND COLITIS: ICD-10-CM

## 2017-12-20 DIAGNOSIS — R19.7 DIARRHEA, UNSPECIFIED TYPE: ICD-10-CM

## 2017-12-20 DIAGNOSIS — R19.7 DIARRHEA, UNSPECIFIED TYPE: Primary | ICD-10-CM

## 2017-12-20 LAB
ANION GAP SERPL CALCULATED.3IONS-SCNC: 12 MMOL/L (ref 4–13)
BUN SERPL-MCNC: 21 MG/DL (ref 5–25)
CALCIUM SERPL-MCNC: 9.7 MG/DL (ref 8.3–10.1)
CHLORIDE SERPL-SCNC: 103 MMOL/L (ref 100–108)
CO2 SERPL-SCNC: 25 MMOL/L (ref 21–32)
CREAT SERPL-MCNC: 1.03 MG/DL (ref 0.6–1.3)
ERYTHROCYTE [DISTWIDTH] IN BLOOD BY AUTOMATED COUNT: 13.3 % (ref 11.6–15.1)
GFR SERPL CREATININE-BSD FRML MDRD: 54 ML/MIN/1.73SQ M
GLUCOSE SERPL-MCNC: 108 MG/DL (ref 65–140)
HCT VFR BLD AUTO: 36.5 % (ref 34.8–46.1)
HGB BLD-MCNC: 12 G/DL (ref 11.5–15.4)
IGA SERPL-MCNC: 348 MG/DL (ref 70–400)
IGG SERPL-MCNC: 768 MG/DL (ref 700–1600)
IGM SERPL-MCNC: 398 MG/DL (ref 40–230)
MAGNESIUM SERPL-MCNC: 1.9 MG/DL (ref 1.6–2.6)
MCH RBC QN AUTO: 30.2 PG (ref 26.8–34.3)
MCHC RBC AUTO-ENTMCNC: 32.9 G/DL (ref 31.4–37.4)
MCV RBC AUTO: 92 FL (ref 82–98)
PHOSPHATE SERPL-MCNC: 3.8 MG/DL (ref 2.3–4.1)
PLATELET # BLD AUTO: 266 THOUSANDS/UL (ref 149–390)
PMV BLD AUTO: 10.1 FL (ref 8.9–12.7)
POTASSIUM SERPL-SCNC: 4 MMOL/L (ref 3.5–5.3)
RBC # BLD AUTO: 3.97 MILLION/UL (ref 3.81–5.12)
SODIUM SERPL-SCNC: 140 MMOL/L (ref 136–145)
WBC # BLD AUTO: 9.11 THOUSAND/UL (ref 4.31–10.16)

## 2017-12-20 PROCEDURE — 82784 ASSAY IGA/IGD/IGG/IGM EACH: CPT

## 2017-12-20 PROCEDURE — 86255 FLUORESCENT ANTIBODY SCREEN: CPT

## 2017-12-20 PROCEDURE — 36415 COLL VENOUS BLD VENIPUNCTURE: CPT

## 2017-12-20 PROCEDURE — 84100 ASSAY OF PHOSPHORUS: CPT

## 2017-12-20 PROCEDURE — 83735 ASSAY OF MAGNESIUM: CPT

## 2017-12-20 PROCEDURE — 83516 IMMUNOASSAY NONANTIBODY: CPT

## 2017-12-20 PROCEDURE — 80048 BASIC METABOLIC PNL TOTAL CA: CPT

## 2017-12-20 PROCEDURE — 85027 COMPLETE CBC AUTOMATED: CPT

## 2017-12-20 NOTE — PROGRESS NOTES
Assessment  1  Belching (787 3) (R14 2)   2  Epigastric discomfort (789 06) (R10 13)   3  GERD without esophagitis (530 81) (K21 9)   4  Chronic diarrhea (787 91) (K52 9)    Plan  Belching, Epigastric discomfort, GERD without esophagitis    · EGD; Status:Active; Requested for:45Vjj5862;    Perform:[facility]; Due:90Ede6451; Last Updated By:Aiyana Joy; 12/19/2017 4:48:56 PM;Ordered; For:Belching, Epigastric discomfort, GERD without esophagitis; Ordered By:Suzette Lee;  Belching, GERD without esophagitis    · RaNITidine HCl - 150 MG Oral Tablet; TAKE 1 TABLET EVERY 12 HOURS DAILY   Rx By: Darlene Rios; Dispense: 30 Days ; #:60 Tablet; Refill: 3;For: Belching, GERD without esophagitis; MANDI = N; Verified Transmission to [facility]Y #[id number] Last Updated By: System, Wave [employer]; 12/19/2017 3:24:55 PM  Chronic diarrhea    · Cholestyramine Light 4 GM Oral Packet; MIX THE CONTENTS OF 1 POWDERPACKET WITH 2-6 OZ OF NONCARBONATED BEVERAGE AND SWALLOW UP TO 3TIMES DAILY FOR DIARRHEA   Rx By: Darlene Rios; Dispense: 30 Days ; #:90 Packet; Refill: 2;For: Chronic diarrhea; MANDI = N; Verified Transmission to [facility]/PHARMACY #[id number] Last Updated By: System, Wave [employer]ator; 12/19/2017 3:35:55 PM   · (1) BASIC METABOLIC PROFILE; Status:Active; Requested for:20Pqd4042;    Perform:[facility]; Due:41Sci1786; Ordered; For:Chronic diarrhea; Ordered By:Suzette Lee;   · (1) CBC/ PLT (NO DIFF); Status:Active; Requested for:18Fld4085;    Perform:[facility]; Due:77Nzn5578; Ordered; For:Chronic diarrhea; Ordered By:Suzette Lee;   · (1) CELIAC DISEASE AB PROFILE; Status:Active; Requested for:48Lqc0475;    Perform:[facility]; Due:75Apt2278; Ordered; For:Chronic diarrhea; Ordered By:Suzette Lee;   · (1) STOOL ENTERIC BACTERIAL PATHOGENS PANEL BY PCR; Status:Active; Requested for:78Npr9894;    Perform:[facility]; Due:77Ekm6770; Ordered; For:Chronic diarrhea; Ordered By:Jesus Marcelino Nur; Discussion/Summary  Discussion Summary:   AND PLAN#1  Chronic diarrhea: C diff negative in hospital  Stool PCR never collected  Lead to dehydration and syncope  Differential includes microscopic colitis, celiac disease, IBS, less likely IBD  -Check stool PCR to complete infectious work up-CBC, BMP-Celiac panel-Avoid dairy-Start cholestyramine for symptom control-Good hydration-Patient to see Dr Angelica Torres tomorrow who follows her since colectomy  He plans for do flex sig in office per patient  Will await report  #2  Acid reflux and epigastric discomfort: s/p Nissen fundoplication; with belching  Not on any meds-Start Zantac BID-Anti-reflux measures discussed-Plan for EGD to further assess for gastritis or PUD  -If patient has resolution of symptoms on Zantac BID, she was told to let us know and we could cancel the endoscopy  Counseling Documentation With Imm: The patient was counseled regarding diagnostic results,-- instructions for management,-- risk factor reductions,-- prognosis,-- patient and family education,-- risks and benefits of treatment options,-- importance of compliance with treatment  Goals and Barriers: The patient has the current Goals: Cholestyramine  The patent has the current Barriers:   Patient's Capacity to Self-Care: Patient is able to Self-Care  Medication SE Review and Pt Understands Tx: Possible side effects of new medications were reviewed with the patient/guardian today  The treatment plan was reviewed with the patient/guardian  The patient/guardian understands and agrees with the treatment plan   Self Referrals:   Self Referrals: No      Chief Complaint  Chief Complaint Free Text Note Form: diarrhea, epigastric discomfort, belching      History of Present Illness  HPI: is a 74year old female with a 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, Nissen fundiplication 1 year ago, GERD, and HTN who is here for hospital f/u   She was admitted to the hospital due to dehydration and syncope in November  She has been having chronic watery diarrhea 5-6 times daily for the past 5 months  She denies any new medication during that time  She reports trying Lomotril and Imodium without much relief  Prior to that her baseline stool was loose due to her colectomy but was never like this  She repots urgency and incontinence at times  She denies any blood or black stools  She denies any vomiting but has nausea and belching  She also has an associated epigastric discomfort  She is not taking anything for acid reflux currently  She has a lack of appetite and has lost about 20 pounds in the last year  She is due to see Dr Angelica Torres tomorrow for possible flex sig in office  History Reviewed: The history was obtained today from the patient and I agree with the documented history  Review of Systems  Complete-Female GI Adult:  Constitutional: feeling poorly,-- feeling tired-- and-- recent 20 lb weight loss, but-- no fever,-- no recent weight gain-- and-- no chills  Eyes: No complaints of eye pain, no red eyes, no eyesight problems, no discharge, no dry eyes, no itching of eyes  ENT: no complaints of earache, no loss of hearing, no nose bleeds, no nasal discharge, no sore throat, no hoarseness  Cardiovascular: No complaints of slow heart rate, no fast heart rate, no chest pain, no palpitations, no leg claudication, no lower extremity edema  Respiratory: No complaints of shortness of breath, no wheezing, no cough, no SOB on exertion, no orthopnea, no PND  Gastrointestinal: abdominal pain,-- nausea-- and-- diarrhea, but-- no vomiting,-- no constipation-- and-- no blood in stools  Genitourinary: No complaints of dysuria, no incontinence, no pelvic pain, no dysmenorrhea, no vaginal discharge or bleeding  Musculoskeletal: No complaints of arthralgias, no myalgias, no joint swelling or stiffness, no limb pain or swelling    Integumentary: No complaints of skin rash or lesions, no itching, no skin wounds, no breast pain or lump  Psychiatric: Not suicidal, no sleep disturbance, no anxiety or depression, no change in personality, no emotional problems  Endocrine: No complaints of proptosis, no hot flashes, no muscle weakness, no deepening of the voice, no feelings of weakness  Hematologic/Lymphatic: No complaints of swollen glands, no swollen glands in the neck, does not bleed easily, does not bruise easily  Active Problems  1  Abnormal PFT (794 2) (R94 2)   2  Acute sinusitis (461 9) (J01 90)   3  Depression (311) (F32 9)   4  Essential tremor (333 1) (G25 0)   5  Hypercholesteremia (272 0) (E78 00)   6  Hypertension (401 9) (I10)   7  Obstructive sleep apnea (327 23) (G47 33)   8  Postoperative examination (V67 00) (Z09)   9  Pulmonary nodule (793 11) (R91 1)   10  Short of breath on exertion (786 05) (R06 02)    Past Medical History  1  History of Acute URI (465 9) (J06 9)   2  History of hiatal hernia (V12 79) (Z87 19)  Active Problems And Past Medical History Reviewed: The active problems and past medical history were reviewed and updated today  Surgical History  1  History of Appendectomy   2  History of Breast Surgery   3  History of Cholecystectomy   4  History of Neck Surgery   5  History of Repair Of Paraesophageal Hiatus Hernia   6  History of Tonsillectomy   7  History of Total Abdominal Colectomy  Surgical History Reviewed: The surgical history was reviewed and updated today  Family History  Mother    1  Denied: Family history of Crohn's disease   2  Denied: Family history of liver disease  Father    3  Family history of Colon cancer   4  Family history of chronic obstructive pulmonary disease (V17 6) (Z82 5)   5  Denied: Family history of Crohn's disease   6  Denied: Family history of liver disease  Daughter    9  Family history of fibromyalgia (V17 89) (Z82 69)   8  Family history of scleroderma (V19 8) (Z82 69)  Son    5   Family history of cerebrovascular accident (CVA) (V17 1) (Z82 3)  Family History    10  Family history of colitis (V18 59) (Z83 79)   11  Family history of colonic polyps (V18 51) (Z83 71)  Family History Reviewed: The family history was reviewed and updated today  Social History   · Always uses seat belt   ·    · Never a smoker   · No drug use   · Retired   · Social alcohol use (Z78 9)  Social History Reviewed: The social history was reviewed and updated today  The social history was reviewed and is unchanged  Current Meds   1  Amlodipine Besy-Benazepril HCl - 5-10 MG Oral Capsule; TAKE 1 CAPSULE DAILY; Therapy: (Recorded:91Xny6534) to Recorded   2  Azithromycin 250 MG Oral Tablet; Take 2 tablets today, then 1 tablet daily for 4 days; Therapy: 29Ozu7500 to (Last Rx:56Yhf7340)  Requested for: 05Kwj1398 Ordered   3  Benzonatate 100 MG Oral Capsule; TAKE 1 CAPSULE 2-3 TIMES DAILY AS DIRECTED; Therapy: 15Xgm8201 to (Evaluate:02Qsu2177)  Requested for: 98Ide2957; Last Rx:27Nwx9627 Ordered   4  Esomeprazole Magnesium 40 MG Oral Capsule Delayed Release; TAKE 1 CAPSULE TWICE DAILY 30 MINUTES PRIOR TO BREAKFAST AND DINNER; Therapy: 61JMB5679 to (Last Rx:29Jun2015) Ordered   5  HM Vitamin B12 1000 MCG Oral Tablet Extended Release; Therapy: (Recorded:10May2016) to Recorded   6  Lexapro 10 MG Oral Tablet; One tablet daily; Therapy: (Recorded:04May2016) to Recorded   7  Propranolol HCl - 60 MG Oral Tablet; Therapy: (Recorded:09Qhn9732) to Recorded   8  Simvastatin 20 MG Oral Tablet; TAKE 1 TABLET DAILY IN THE EVENING; Therapy: (Recorded:04May2016) to Recorded   9  Vitamin D3 CAPS; one capsule daily; Therapy: (Recorded:04May2016) to Recorded  Medication List Reviewed: The medication list was reviewed and updated today  Allergies  1  Adhesive Tape TAPE   2  Biaxin XL TB24   3  Cefzil TABS   4  Cipro TABS   5   Darvon CAPS    Vitals  Vital Signs    Recorded: 83ODD7835 02:57PM   Temperature 98 1 F   Heart Rate 83   Respiration 16   Systolic 294 Diastolic 64   Height 5 ft 4 in   Weight 150 lb 8 oz   BMI Calculated 25 83   BSA Calculated 1 73   O2 Saturation 98     Physical Exam   Constitutional  General appearance: No acute distress, well appearing and well nourished  Eyes  Conjunctiva and lids: No swelling, erythema or discharge  Pupils and irises: Equal, round and reactive to light  Ears, Nose, Mouth, and Throat  Nasal mucosa, septum, and turbinates: Normal without edema or erythema  Oropharynx: Normal with no erythema, edema, exudate or lesions  Pulmonary  Respiratory effort: No increased work of breathing or signs of respiratory distress  Auscultation of lungs: Clear to auscultation  Cardiovascular  Auscultation of heart: Normal rate and rhythm, normal S1 and S2, without murmurs  Examination of extremities for edema and/or varicosities: Normal    Abdomen  Abdomen: Non-tender, no masses  Liver and spleen: No hepatomegaly or splenomegaly  Lymphatic  Palpation of lymph nodes in neck: No lymphadenopathy  Musculoskeletal  Gait and station: Normal    Skin  Skin and subcutaneous tissue: Normal without rashes or lesions  Psychiatric  Orientation to person, place, and time: Normal    Mood and affect: Normal          Future Appointments    Date/Time Provider Specialty Site   01/22/2018 10:30 AM SHAKILA Gonzalez   Gastroenterology Adult [address] [facility] OUTPATIENT     Signatures   Electronically signed by : Alok Hernandez, [facility]; Dec 19 2017  4:13PM EST                       (Author)    Electronically signed by : SHAKILA Cai ; Dec 19 2017  7:23PM EST                       (Author)    Electronically signed by : SHAKILA Cai ; Dec 19 2017  7:24PM EST                       (Author)

## 2017-12-21 LAB
ENDOMYSIUM IGA SER QL: NEGATIVE
GLIADIN PEPTIDE IGA SER-ACNC: 4 UNITS (ref 0–19)
GLIADIN PEPTIDE IGG SER-ACNC: 3 UNITS (ref 0–19)
IGA SERPL-MCNC: 321 MG/DL (ref 64–422)
TTG IGA SER-ACNC: <2 U/ML (ref 0–3)
TTG IGG SER-ACNC: <2 U/ML (ref 0–5)

## 2017-12-22 ENCOUNTER — APPOINTMENT (OUTPATIENT)
Dept: LAB | Facility: MEDICAL CENTER | Age: 72
End: 2017-12-22
Payer: COMMERCIAL

## 2017-12-22 ENCOUNTER — TRANSCRIBE ORDERS (OUTPATIENT)
Dept: ADMINISTRATIVE | Facility: HOSPITAL | Age: 72
End: 2017-12-22

## 2017-12-22 ENCOUNTER — GENERIC CONVERSION - ENCOUNTER (OUTPATIENT)
Dept: OTHER | Facility: OTHER | Age: 72
End: 2017-12-22

## 2017-12-22 DIAGNOSIS — K52.9 NONINFECTIVE GASTROENTERITIS AND COLITIS: ICD-10-CM

## 2017-12-22 DIAGNOSIS — R19.7 DIARRHEA, UNSPECIFIED TYPE: Primary | ICD-10-CM

## 2017-12-22 PROCEDURE — 87209 SMEAR COMPLEX STAIN: CPT | Performed by: COLON & RECTAL SURGERY

## 2017-12-22 PROCEDURE — 87505 NFCT AGENT DETECTION GI: CPT

## 2017-12-22 PROCEDURE — 89055 LEUKOCYTE ASSESSMENT FECAL: CPT | Performed by: COLON & RECTAL SURGERY

## 2017-12-22 PROCEDURE — 83497 ASSAY OF 5-HIAA: CPT | Performed by: COLON & RECTAL SURGERY

## 2017-12-22 PROCEDURE — 87177 OVA AND PARASITES SMEARS: CPT | Performed by: COLON & RECTAL SURGERY

## 2017-12-22 PROCEDURE — 87493 C DIFF AMPLIFIED PROBE: CPT | Performed by: COLON & RECTAL SURGERY

## 2017-12-23 LAB
C DIFF TOX GENS STL QL NAA+PROBE: NORMAL
CAMPYLOBACTER DNA SPEC NAA+PROBE: NORMAL
SALMONELLA DNA SPEC QL NAA+PROBE: NORMAL
SHIGA TOXIN STX GENE SPEC NAA+PROBE: NORMAL
SHIGELLA DNA SPEC QL NAA+PROBE: NORMAL

## 2017-12-24 ENCOUNTER — GENERIC CONVERSION - ENCOUNTER (OUTPATIENT)
Dept: OTHER | Facility: OTHER | Age: 72
End: 2017-12-24

## 2017-12-28 LAB
5OH-INDOLEACETATE 24H UR-MCNC: 3.1 MG/L
5OH-INDOLEACETATE 24H UR-MRATE: 2 MG/24 HR (ref 0–14.9)
O+P STL CONC: NORMAL
WBC SPEC QL GRAM STN: NORMAL

## 2018-01-02 LAB — MISCELLANEOUS LAB TEST RESULT: NORMAL

## 2018-01-10 NOTE — RESULT NOTES
Verified Results  (1) TYPE & SCREEN 41UTC6678 11:16AM Dalila Turpin Order Number: [id number]     Test Name Result Flag Reference   ABO GROUPING A     RH FACTOR Positive     ANTIBODY SCREEN Negative

## 2018-01-13 VITALS
HEART RATE: 78 BPM | TEMPERATURE: 98.1 F | OXYGEN SATURATION: 96 % | WEIGHT: 155 LBS | SYSTOLIC BLOOD PRESSURE: 118 MMHG | HEIGHT: 65 IN | RESPIRATION RATE: 18 BRPM | BODY MASS INDEX: 25.83 KG/M2 | DIASTOLIC BLOOD PRESSURE: 64 MMHG

## 2018-01-14 VITALS
DIASTOLIC BLOOD PRESSURE: 74 MMHG | SYSTOLIC BLOOD PRESSURE: 124 MMHG | HEART RATE: 62 BPM | HEIGHT: 65 IN | WEIGHT: 150 LBS | BODY MASS INDEX: 24.99 KG/M2

## 2018-01-15 ENCOUNTER — ANESTHESIA EVENT (OUTPATIENT)
Dept: PERIOP | Facility: AMBULARY SURGERY CENTER | Age: 73
End: 2018-01-15
Payer: COMMERCIAL

## 2018-01-15 NOTE — MISCELLANEOUS
Message  Patient called the office today requesting CT results  I reviewed CAT scan which showed stable right upper lobe groundglass opacity  She was last seen by Dr Russell Desai in August of 2016  I reviewed CAT scan results with Regina Davis over the phone today  I have asked her to call and schedule follow-up appointment with Dr Russell Desai as he recommended 6 month follow-up at her last appointment  She is in agreement with above plan        Signatures   Electronically signed by : Severa Linea, [facility]; Feb 28 2017  1:30PM EST                       (Author)

## 2018-01-15 NOTE — RESULT NOTES
Verified Results  (1) TISSUE EXAM 78EJH3448 05:24PM Catherine Felipe     Test Name Result Flag Reference   LAB AP CASE REPORT (Report)     Surgical Pathology Report             Case: [id number]                   Authorizing Provider: Day Muñoz MD    Collected:      06/02/2016 1724        Ordering Location:   [address] [place] [address]   Received:      06/03/2016 [facility] Operating Room                            Pathologist:      Enid Serrano MD                                 Specimen:  Liver, liver bx   LAB AP FINAL DIAGNOSIS (Report)     A  Liver (core needle biopsy):  - Consistent with simple biliary cyst  - Background moderate steatosis of liver (30%)    Comment: The biopsy predominately consists of a cyst wall lined by a   single layer of bilary mucosa as demonstrated by CK19, CK7 staining  Ki67   is low  The features support the above diagnosis  Lesional cells are negative for CD31, HHV8, Factor 8,   Background   liver demonstrates moderate steatosis (30%) along with a single cauterized   von Meyenburg complex  Reticulin stain shows 1-2 cell hepatic plates  Trichrome shows no significant fibrosis in this limited material  The   iron stain shows no significant hemosiderin deposition  LAB AP SURGICAL ADDITIONAL INFORMATION (Report)     These tests were developed and their performance characteristics   determined by [facility] or [facility]  They may not be cleared or approved by the [place]  Food and   Drug Administration  The FDA has determined that such clearance or   approval is not necessary  These tests are used for clinical purposes  They should not be regarded as investigational or for research  This   laboratory has been approved by CLIA 88, designated as a high-complexity   laboratory and is qualified to perform these tests  LAB AP GROSS DESCRIPTION (Report)     A   The specimen is received in formalin, labeled with the patient's name   and medical record number, and is designated liver biopsy  The specimen   consists of 3 orange tan brown soft rubbery and partially ragged tissue   fragments measuring 0 3 cm, 0 5 centimeters, and 2 8 cm in greatest   dimension  Entirely submitted  One cassette  Note: The estimated total formalin fixation time based upon information   provided by the submitting clinician and the standard processing schedule   is 27 0 hours    MAS

## 2018-01-22 ENCOUNTER — GENERIC CONVERSION - ENCOUNTER (OUTPATIENT)
Dept: OTHER | Facility: OTHER | Age: 73
End: 2018-01-22

## 2018-01-22 ENCOUNTER — HOSPITAL ENCOUNTER (OUTPATIENT)
Facility: AMBULARY SURGERY CENTER | Age: 73
Setting detail: OUTPATIENT SURGERY
Discharge: HOME | End: 2018-01-22
Attending: INTERNAL MEDICINE | Admitting: INTERNAL MEDICINE
Payer: COMMERCIAL

## 2018-01-22 ENCOUNTER — ANESTHESIA (OUTPATIENT)
Dept: PERIOP | Facility: AMBULARY SURGERY CENTER | Age: 73
End: 2018-01-22
Payer: COMMERCIAL

## 2018-01-22 VITALS
SYSTOLIC BLOOD PRESSURE: 108 MMHG | HEART RATE: 83 BPM | BODY MASS INDEX: 25.7 KG/M2 | HEIGHT: 64 IN | TEMPERATURE: 98.1 F | WEIGHT: 150.5 LBS | OXYGEN SATURATION: 98 % | DIASTOLIC BLOOD PRESSURE: 64 MMHG | RESPIRATION RATE: 16 BRPM

## 2018-01-22 VITALS
HEART RATE: 61 BPM | TEMPERATURE: 97 F | BODY MASS INDEX: 24.32 KG/M2 | RESPIRATION RATE: 16 BRPM | DIASTOLIC BLOOD PRESSURE: 59 MMHG | SYSTOLIC BLOOD PRESSURE: 106 MMHG | WEIGHT: 146 LBS | OXYGEN SATURATION: 96 % | HEIGHT: 65 IN

## 2018-01-22 RX ORDER — BISMUTH SUBSALICYLATE 262 MG/1
262 TABLET, CHEWABLE ORAL 2 TIMES DAILY PRN
COMMUNITY

## 2018-01-22 RX ORDER — PROPOFOL 10 MG/ML
INJECTION, EMULSION INTRAVENOUS AS NEEDED
Status: DISCONTINUED | OUTPATIENT
Start: 2018-01-22 | End: 2018-01-22 | Stop reason: SURG

## 2018-01-22 RX ORDER — RANITIDINE 150 MG/1
150 TABLET ORAL 2 TIMES DAILY
COMMUNITY
End: 2019-03-21 | Stop reason: ALTCHOICE

## 2018-01-22 RX ORDER — SODIUM CHLORIDE 9 MG/ML
100 INJECTION, SOLUTION INTRAVENOUS CONTINUOUS
Status: DISCONTINUED | OUTPATIENT
Start: 2018-01-22 | End: 2018-01-22 | Stop reason: HOSPADM

## 2018-01-22 RX ADMIN — PROPOFOL 150 MG: 10 INJECTION, EMULSION INTRAVENOUS at 11:53

## 2018-01-22 RX ADMIN — SODIUM CHLORIDE 100 ML/HR: 0.9 INJECTION, SOLUTION INTRAVENOUS at 11:09

## 2018-01-22 NOTE — ANESTHESIA POSTPROCEDURE EVALUATION
Post-Op Assessment Note      CV Status:  Stable    Mental Status:  Alert and awake    Hydration Status:  Euvolemic    PONV Controlled:  Controlled    Airway Patency:  Patent and adequate    Post Op Vitals Reviewed: Yes          Staff: CRNA       Comments: Airway reflexes intact          /50 (01/22/18 1208)    Temp (!) 96 °F (35 6 °C) (01/22/18 1208)    Pulse 57 (01/22/18 1208)   Resp 16 (01/22/18 1208)    SpO2 94 % (01/22/18 1208)

## 2018-01-22 NOTE — OP NOTE
ESOPHAGOGASTRODUODENOSCOPY    PROCEDURE: EGD    INDICATIONS: Dyspepsia    POST-OP DIAGNOSIS: See the impression below    SEDATION: Monitored anesthesia care, check anesthesia records    PHYSICAL EXAM:    Vitals:    01/22/18 1050   BP: 114/83   Pulse: 55   Resp: 18   Temp: 97 7 °F (36 5 °C)   SpO2: 97%    Body mass index is 24 3 kg/m²  General: NAD  Heart: S1 & S2 normal, RRR  Lungs: CTA, No rales or rhonchi  Abdomen: Soft, nontender, nondistended, good bowel sounds    CONSENT:  Informed consent was obtained for the procedure, including sedation after explaining the risks and benefits of the procedure  Risks including but not limited to bleeding, perforation, infection, aspiration were discussed in detail  Also explained about less than 100% sensitivity with the exam and other alternatives  PREPARATION:   EKG tracing, pulse oximetry, blood pressure were monitored throughout the procedure  Patient was identified by myself both verbally and by visual inspection of ID band  DESCRIPTION:   Patient was placed in the left lateral decubitus position and was sedated with the above medication  The gastroscope was introduced in to the oropharynx and the esophagus was intubated under direct visualization  Scope was passed down the esophagus up to 2nd part of the duodenum  A careful inspection was made as the gastroscope was withdrawn, including a retroflexed view of the stomach; findings and interventions are described below  FINDINGS:    #1  Esophagus and GEJ-small hiatal hernia noted    #2  Stomach-undigested food material in the gastric body and could not visualize underlying mucosa well  #3  Duodenum-normal         IMPRESSIONS:      As above    RECOMMENDATIONS:     Advised about small frequent low-fat meal    Consider repeat upper endoscopy in few months    COMPLICATIONS:  None; patient tolerated the procedure well            DISPOSITION: PACU           CONDITION: Stable

## 2018-01-22 NOTE — H&P
History and Physical - [facility]  Alisson Pimentel 74 y o  female MRN: [id number]        HPI:  68-year-old female with history of total colectomy has been having discomfort in the stomach with belching    She was seen by Felton Schumacher and was started on ranitidine    Historical Information   Past Medical History:   Diagnosis Date    Colitis     Depression     GERD (gastroesophageal reflux disease)     Hiatal hernia     High cholesterol     Hyperlipidemia     Hypertension     Sleep apnea     C pap     Past Surgical History:   Procedure Laterality Date    APPENDECTOMY      BACK SURGERY      Cervical spine    BREAST BIOPSY      CHOLECYSTECTOMY      COLECTOMY      WA LAP, REPAIR PARAESOPHAGEAL HERNIA, INCL FUNDOPLASTY W/ MESH N/A 6/2/2016    Procedure: ROBOTIC LAPAROSCOPIC Daquan Fundoplication, Liver biopsy;  Surgeon: Sara Mccain MD;  Location: [facility];  Service: General     Social History   History   Alcohol Use    Yes     Comment: social     History   Drug Use No     History   Smoking Status    Never Smoker   Smokeless Tobacco    Never Used     Family History   Problem Relation Age of Onset    Cancer Mother     Cancer Father        Meds/Allergies     Prescriptions Prior to Admission   Medication    acetaminophen (TYLENOL) 325 mg tablet    amLODIPine-benazepril (LOTREL 5-10) 5-10 MG per capsule    bismuth subsalicylate (PEPTO BISMOL) 262 MG chewable tablet    Cholecalciferol (VITAMIN D3) 5000 UNITS CAPS    escitalopram (LEXAPRO) 10 mg tablet    propranolol (INDERAL) 60 mg tablet    ranitidine (ZANTAC) 150 mg tablet    simvastatin (ZOCOR) 40 mg tablet    ondansetron (ZOFRAN-ODT) 8 mg disintegrating tablet       Allergies   Allergen Reactions    Biaxin [Clarithromycin] GI Intolerance    Darvon [Propoxyphene]     Cefzil [Cefprozil] Rash    Ciprofloxacin Rash       Objective     Blood pressure 114/83, pulse 55, temperature 97 7 °F (36 5 °C), temperature source Temporal, resp  rate 18, height 5' 5" (1 651 m), weight 66 2 kg (146 lb), SpO2 97 %      PHYSICAL EXAM:    Gen: NAD  CV: S1 & S2 normal, RRR  CHEST: Clear to auscultate  ABD: soft, NT/ND, good bowel sounds  EXT: no edema    ASSESSMENT:     Dyspepsia    PLAN:    EGD

## 2018-01-22 NOTE — ANESTHESIA PREPROCEDURE EVALUATION
Review of Systems/Medical History  Patient summary reviewed  Chart reviewed      Cardiovascular  EKG reviewed, Exercise tolerance: good,  Hypertension controlled,   Comment: SUMMARY     LEFT VENTRICLE:  Systolic function was normal  Ejection fraction was estimated to be 60 %  There were no regional wall motion abnormalities  Doppler parameters were consistent with abnormal left ventricular relaxation  (grade 1 diastolic dysfunction)      TRICUSPID VALVE:  There was trace to mild regurgitation    ,  Pulmonary  Sleep apnea Sleep Study completed,        GI/Hepatic       Negative  ROS        Endo/Other  Negative endo/other ROS      GYN  Negative gynecology ROS          Hematology  Negative hematology ROS      Musculoskeletal  Negative musculoskeletal ROS        Neurology   Psychology           Physical Exam    Airway    Mallampati score: II  TM Distance: >3 FB  Neck ROM: full     Dental   Comment: No loose,     Cardiovascular  Rate: normal,     Pulmonary  Breath sounds clear to auscultation,     Other Findings        Anesthesia Plan  ASA Score- 2     Anesthesia Type- IV sedation with anesthesia with ASA Monitors  Additional Monitors:   Airway Plan:         Plan Factors-  Patient did not smoke on day of surgery  Induction- intravenous  Postoperative Plan-     Informed Consent- Anesthetic plan and risks discussed with patient  I personally reviewed this patient with the CRNA  Discussed and agreed on the Anesthesia Plan with the CRNA  [place]

## 2018-01-23 NOTE — RESULT NOTES
Discussion/Summary   please inform patient that there is no infection in the stool based on her stool sample  this is good news        Verified Results  (1) STOOL ENTERIC BACTERIAL PATHOGENS PANEL BY PCR 60Gna7952 02:06PM Yesenia Archibald Order Number: [id number]     Test Name Result Flag Reference   SHIGA TOXIN 1/SHIGA TOXIN 2 GENES PCR None Detected  None Detected   CAMPYLOBACTER SP (JEJUNI AND COLI) PCR None Detected  None Detected   SHIGELLA SP /ENTEROINVASIVE E  COLI (EIEC) PCR None Detected  None Detected   SALMONELLA SP PCR None Detected  None Detected

## 2018-01-23 NOTE — RESULT NOTES
Discussion/Summary   Please inform patient that all of her labs are completely normal including her celaic panel  This is a good news  I am still awaiting her stool study  Verified Results  (1) BASIC METABOLIC PROFILE [id number] 04:04PM [facility] Order Number: [id number]     Test Name Result Flag Reference   GLUCOSE,RANDM 108 mg/dL     If the patient is fasting, the ADA then defines impaired fasting glucose as > 100 mg/dL and diabetes as > or equal to 123 mg/dL  Specimen collection should occur prior to Sulfasalazine administration due to the potential for falsely depressed results  Specimen collection should occur prior to Sulfapyridine administration due to the potential for falsely elevated results  SODIUM 140 mmol/L  136-145   POTASSIUM 4 0 mmol/L  3 5-5 3   CHLORIDE 103 mmol/L  100-108   CARBON DIOXIDE 25 mmol/L  21-32   ANION GAP (CALC) 12 mmol/L  4-13   BLOOD UREA NITROGEN 21 mg/dL  5-25   CREATININE 1 03 mg/dL  0 60-1 30   Standardized to IDMS reference method   CALCIUM 9 7 mg/dL  8 3-10 1   eGFR 54 ml/min/1 73[facility] Disease Education Program recommendations are as follows:  GFR calculation is accurate only with a steady state creatinine  Chronic Kidney disease less than 60 ml/min/1 73 sq  meters  Kidney failure less than 15 ml/min/1 73 sq  meters       (1) CBC/ PLT (NO DIFF) [id number] 04:04PM [facility] Order Number: [id number]     Test Name Result Flag Reference   HEMATOCRIT 36 5 %  34 8-46 1   HEMOGLOBIN 12 0 g/dL  11 5-15 4   MCHC 32 9 g/dL  31 4-37 4   MCH 30 2 pg  26 8-34 3   MCV 92 fL  82-98   PLATELET COUNT 690 Thousands/uL  149-390   RBC COUNT 3 97 Million/uL  3 81-5 12   RDW 13 3 %  11 6-15 1   WBC COUNT 9 11 Thousand/uL  4 31-10 16   MPV 10 1 fL  8 9-12 7     (1) CELIAC DISEASE AB PROFILE [id number] 04:04PM [facility] Order Number: [id number]     Test Name Result Flag Reference   tTG IGG <2 U/mL  0 - 5   Negative 0 - 5                                Weak Positive   6 - 9                                Positive           >9   tTG IGA <2 U/mL  0 - 3   Negative        0 -  3                                Weak Positive   4 - 10                                Positive           >10   Tissue Transglutaminase (tTG) has been identified   as the endomysial antigen  Studies have demonstr-   ated that endomysial IgA antibodies have over 99%   specificity for gluten sensitive enteropathy     GLIADA 4 units  0 - 19   Negative                   0 - 19                     Weak Positive             20 - 30                     Moderate to Strong Positive   >30   GLIADG 3 units  0 - 19   Negative                   0 - 19                     Weak Positive             20 - 30                     Moderate to Strong Positive   >30   ENDOMYSIAL AB IGA Negative  Negative   Performed at:  [facility]  [id number]  : Tayla Reyna MD, Phone:  [phone]    mg/dL  64 - 581

## 2018-02-14 ENCOUNTER — APPOINTMENT (OUTPATIENT)
Dept: LAB | Facility: AMBULARY SURGERY CENTER | Age: 73
End: 2018-02-14
Payer: COMMERCIAL

## 2018-02-14 ENCOUNTER — TRANSCRIBE ORDERS (OUTPATIENT)
Dept: LAB | Facility: AMBULARY SURGERY CENTER | Age: 73
End: 2018-02-14

## 2018-02-14 DIAGNOSIS — R35.89 POLYURIA: ICD-10-CM

## 2018-02-14 DIAGNOSIS — R35.89 POLYURIA: Primary | ICD-10-CM

## 2018-02-14 LAB
EST. AVERAGE GLUCOSE BLD GHB EST-MCNC: 120 MG/DL
HBA1C MFR BLD: 5.8 % (ref 4.2–6.3)

## 2018-02-14 PROCEDURE — 36415 COLL VENOUS BLD VENIPUNCTURE: CPT

## 2018-02-14 PROCEDURE — 83036 HEMOGLOBIN GLYCOSYLATED A1C: CPT

## 2018-02-19 ENCOUNTER — HOSPITAL ENCOUNTER (OUTPATIENT)
Dept: RADIOLOGY | Facility: MEDICAL CENTER | Age: 73
Discharge: HOME | End: 2018-02-19
Payer: COMMERCIAL

## 2018-02-19 DIAGNOSIS — R91.1 SOLITARY PULMONARY NODULE: ICD-10-CM

## 2018-02-19 PROCEDURE — 71250 CT THORAX DX C-: CPT

## 2018-02-27 ENCOUNTER — TELEPHONE (OUTPATIENT)
Dept: PULMONOLOGY | Facility: CLINIC | Age: 73
End: 2018-02-27

## 2018-02-27 DIAGNOSIS — R91.1 PULMONARY NODULE: Primary | ICD-10-CM

## 2018-02-27 NOTE — TELEPHONE ENCOUNTER
Results note - Pulmonary Medicine   Vitalenasra Pimentel 74 y o  female MRN: [id number]    Result discussed:  CT scan of the chest    Pertinent details:  I discussed with the patient regarding her CT scan of the chest   This has been followed since April 2016 and has remained relatively stable radiographically  Unfortunately, the most recent study from February 2018 suggest that there has been interval growth in the lesion which raises significant concern for a malignant process  She is a lifelong nonsmoker  She currently has no malignancy risk factors other than of father with a family history of colon cancer  We discussed her potential options for surveillance, biopsy, or surgical resection  I believe she would be reasonable surgical candidate based on her prior pulmonary function testing and she has opted for surgical referral and evaluation  This likely is amenable to a wedge resection with completion lobectomy if cancer is confirmed  I will be referring her to Dr Chandra Lerner for evaluation  Pulmonary function testing will be ordered  I have told her that they may request a PET scan but I will defer that decision to thoracic surgery

## 2018-03-08 ENCOUNTER — HOSPITAL ENCOUNTER (OUTPATIENT)
Dept: PULMONOLOGY | Facility: HOSPITAL | Age: 73
Discharge: HOME | End: 2018-03-08
Attending: INTERNAL MEDICINE
Payer: COMMERCIAL

## 2018-03-08 DIAGNOSIS — R91.1 PULMONARY NODULE: ICD-10-CM

## 2018-03-08 PROCEDURE — 94729 DIFFUSING CAPACITY: CPT

## 2018-03-08 PROCEDURE — 94726 PLETHYSMOGRAPHY LUNG VOLUMES: CPT

## 2018-03-08 PROCEDURE — 94729 DIFFUSING CAPACITY: CPT | Performed by: INTERNAL MEDICINE

## 2018-03-08 PROCEDURE — 94760 N-INVAS EAR/PLS OXIMETRY 1: CPT

## 2018-03-08 PROCEDURE — 94726 PLETHYSMOGRAPHY LUNG VOLUMES: CPT | Performed by: INTERNAL MEDICINE

## 2018-03-08 PROCEDURE — 94010 BREATHING CAPACITY TEST: CPT | Performed by: INTERNAL MEDICINE

## 2018-03-08 PROCEDURE — 94010 BREATHING CAPACITY TEST: CPT

## 2018-03-20 ENCOUNTER — OFFICE VISIT (OUTPATIENT)
Dept: CARDIAC SURGERY | Facility: CLINIC | Age: 73
End: 2018-03-20
Payer: COMMERCIAL

## 2018-03-20 VITALS
BODY MASS INDEX: 25.69 KG/M2 | DIASTOLIC BLOOD PRESSURE: 55 MMHG | HEART RATE: 67 BPM | TEMPERATURE: 98.4 F | HEIGHT: 65 IN | SYSTOLIC BLOOD PRESSURE: 118 MMHG | WEIGHT: 154.2 LBS | OXYGEN SATURATION: 96 %

## 2018-03-20 DIAGNOSIS — R91.8 GROUND GLASS OPACITY PRESENT ON IMAGING OF LUNG: Primary | ICD-10-CM

## 2018-03-20 PROCEDURE — 99204 OFFICE O/P NEW MOD 45 MIN: CPT | Performed by: THORACIC SURGERY (CARDIOTHORACIC VASCULAR SURGERY)

## 2018-03-20 PROCEDURE — 4040F PNEUMOC VAC/ADMIN/RCVD: CPT | Performed by: THORACIC SURGERY (CARDIOTHORACIC VASCULAR SURGERY)

## 2018-03-20 NOTE — PROGRESS NOTES
Thoracic Consult  Assessment/Plan:    Ground glass opacity present on imaging of lung  Ms Pimentel has a 1 5 cm pure ground-glass nodule in her right upper lobe that has shown only slight interval growth (3-4mm) over the past year  We discussed the fact that persistent ground-glass nodules commonly contain a low-grade form of lung cancer called adenocarcinoma in situ  We discussed that this is a noninvasive form of lung cancer but that there is a risk that it could turn invasive over time  We discussed options including immediate resection as well as continued close follow-up using imaging and resecting it if it dramatically increased in size or developed any solid component  She prefers to follow this radiographically and we have set her up for a non contrasted CT scan of the chest in 6 months  If there is no growth that I think she could be followed with yearly CT scans  Both the patient and her  understand the risks and benefits associated with this approach  Diagnoses and all orders for this visit:    Ground glass opacity present on imaging of lung  -     CT chest wo contrast; Future          Thoracic History   Diagnosis: Right upper lobe 1 5 cm pure ground-glass nodule   Procedures/Surgeries:    Pathology:    Adjuvant Therapy:       Subjective:    Patient ID: Nella Schaumann is a 74 y o  female  Ms Madhavi Nuñez is a 47-year-old lifetime nonsmoker who is referred by Dr Josr Davis for a growing ground-glass nodule in the right upper lobe  She has had this followed since April of 2016 and her most recent CT scan demonstrated interval slight enlargement to 1 5 cm  The nodule is a pure ground-glass nodule with no solid component and there is no worrisome mediastinal or hilar adenopathy  There are no other worrisome pulmonary nodules    She underwent pulmonary function tests which demonstrated an FVC of 3 15 or 105% of predicted, an FEV1 of 2 48 or 109% of predicted, and a DLCO of 52% of predicted  Ms  Kaci Kumar denies unexpected weight loss, productive cough, or hemoptysis  She denies new headaches, blurry vision, new bone or joint pains, or new numbness or weakness in any extremity  Although she is a lifetime nonsmoker she has been exposed to pipe smoke from her  through the years  The following portions of the patient's history were reviewed and updated as appropriate: allergies, current medications, past family history, past medical history, past social history, past surgical history and problem list     Past Medical History:   Diagnosis Date    Colitis     Depression     GERD (gastroesophageal reflux disease)     Hiatal hernia     High cholesterol     Hyperlipidemia     Hypertension     Sleep apnea     C pap      Past Surgical History:   Procedure Laterality Date    APPENDECTOMY      BACK SURGERY      Cervical spine    BREAST BIOPSY      CHOLECYSTECTOMY      COLECTOMY      ESOPHAGOGASTRODUODENOSCOPY N/A 1/22/2018    Procedure: ESOPHAGOGASTRODUODENOSCOPY (EGD); Surgeon: Oliver Morales MD;  [facility]; Service: Gastroenterology    CT LAP, REPAIR PARAESOPHAGEAL HERNIA, INCL FUNDOPLASTY W/ MESH N/A 6/2/2016    Procedure: ROBOTIC LAPAROSCOPIC Daquan Fundoplication, Liver biopsy;  Surgeon: Parke Fothergill, MD;  Location: [facility];  Service: General      Family History   Problem Relation Age of Onset    Cancer Mother     Cancer Father       Social History     Social History    Marital status: /Civil Union     Spouse name: N/A    Number of children: N/A    Years of education: N/A     Occupational History    Not on file       Social History Main Topics    Smoking status: Never Smoker    Smokeless tobacco: Never Used    Alcohol use Yes      Comment: social    Drug use: No    Sexual activity: Not on file     Other Topics Concern    Not on file     Social History Narrative    No narrative on file      Review of Systems   Constitutional: Negative for activity change, appetite change, chills, fever and unexpected weight change  HENT: Negative for voice change  Respiratory: Negative for cough, shortness of breath and wheezing  Cardiovascular: Negative for chest pain, palpitations and leg swelling  Gastrointestinal: Negative for abdominal pain, constipation, diarrhea, nausea and vomiting  Musculoskeletal: Negative for arthralgias and myalgias  Skin: Negative for rash  Neurological: Negative for dizziness, seizures, weakness, numbness and headaches  Hematological: Negative for adenopathy  Psychiatric/Behavioral: Negative for confusion  Objective:   Physical Exam   Constitutional: She is oriented to person, place, and time  She appears well-developed and well-nourished  HENT:   Head: Normocephalic and atraumatic  Mouth/Throat: No oropharyngeal exudate  Eyes: Conjunctivae and EOM are normal  Pupils are equal, round, and reactive to light  No scleral icterus  Neck: Normal range of motion  Neck supple  No tracheal deviation present  No thyromegaly present  Cardiovascular: Normal rate, regular rhythm and normal heart sounds  Pulmonary/Chest: Effort normal and breath sounds normal  No respiratory distress  She has no wheezes  Abdominal: Soft  She exhibits no distension  There is no tenderness  Musculoskeletal: Normal range of motion  Lymphadenopathy:     She has no cervical adenopathy  Neurological: She is alert and oriented to person, place, and time  Skin: Skin is warm and dry  Psychiatric: She has a normal mood and affect   Her behavior is normal  Judgment and thought content normal    /55 (BP Location: Left arm, Patient Position: Sitting, Cuff Size: Adult)   Pulse 67   Temp 98 4 °F (36 9 °C)   Ht 5' 5" (1 651 m)   Wt 69 9 kg (154 lb 3 2 oz)   SpO2 96%   BMI 25 66 kg/m²          Ct Chest Wo Contrast    Result Date: 2/23/2018  Narrative CT CHEST WITHOUT IV CONTRAST INDICATION: R91 1: Solitary pulmonary nodule  History taken directly from the electronic ordering system  Follow-up nodule  COMPARISON: 2/13/2017, 4/8/2016  TECHNIQUE: CT examination of the chest was performed without intravenous contrast   Axial, sagittal, and coronal 2D reformatted images were created from the source data and submitted for interpretation  Radiation dose length product (DLP) for this visit:  78 mGy-cm   This examination, like all CT scans performed in the [facility], was performed utilizing techniques to minimize radiation dose exposure, including the use of iterative reconstruction and automated exposure control  FINDINGS: LUNGS:  There has been slight enlargement of the pure groundglass nodule at the lateral aspect right upper lobe, measuring 1 5 x 1 2 x 2 0 cm (previously 1 1 x 0 8 x 1 8 cm)  There is no tracheal or endobronchial lesion  PLEURA:  Unremarkable  HEART/GREAT VESSELS:  Normal heart size  Coronary artery calcifications noted  Normal caliber thoracic aorta with mild atherosclerotic calcifications  MEDIASTINUM AND NICOLAS:  Unremarkable  CHEST WALL AND LOWER NECK:   Unremarkable  VISUALIZED STRUCTURES IN THE UPPER ABDOMEN:  Surgical clips surrounding the gastroesophageal junction again identified  OSSEOUS STRUCTURES:  No acute fracture or destructive osseous lesion  Impression Interval enlargement of the groundglass nodule in the right upper lobe   Workstation performed: [id number]

## 2018-03-20 NOTE — ASSESSMENT & PLAN NOTE
Ms Willian Xiong has a 1 5 cm pure ground-glass nodule in her right upper lobe that has shown only slight interval growth (3-4mm) over the past year  We discussed the fact that persistent ground-glass nodules commonly contain a low-grade form of lung cancer called adenocarcinoma in situ  We discussed that this is a noninvasive form of lung cancer but that there is a risk that it could turn invasive over time  We discussed options including immediate resection as well as continued close follow-up using imaging and resecting it if it dramatically increased in size or developed any solid component  She prefers to follow this radiographically and we have set her up for a non contrasted CT scan of the chest in 6 months  If there is no growth that I think she could be followed with yearly CT scans  Both the patient and her  understand the risks and benefits associated with this approach

## 2018-05-10 ENCOUNTER — OFFICE VISIT (OUTPATIENT)
Dept: URGENT CARE | Facility: MEDICAL CENTER | Age: 73
End: 2018-05-10
Payer: COMMERCIAL

## 2018-05-10 VITALS
HEIGHT: 65 IN | TEMPERATURE: 98.3 F | RESPIRATION RATE: 18 BRPM | SYSTOLIC BLOOD PRESSURE: 110 MMHG | WEIGHT: 157 LBS | DIASTOLIC BLOOD PRESSURE: 70 MMHG | BODY MASS INDEX: 26.16 KG/M2 | HEART RATE: 80 BPM

## 2018-05-10 DIAGNOSIS — L50.9 URTICARIA: Primary | ICD-10-CM

## 2018-05-10 PROCEDURE — S9088 SERVICES PROVIDED IN URGENT: HCPCS | Performed by: PHYSICIAN ASSISTANT

## 2018-05-10 PROCEDURE — 99203 OFFICE O/P NEW LOW 30 MIN: CPT | Performed by: PHYSICIAN ASSISTANT

## 2018-05-10 RX ORDER — PREDNISONE 20 MG/1
20 TABLET ORAL 2 TIMES DAILY WITH MEALS
Qty: 10 TABLET | Refills: 0 | Status: SHIPPED | OUTPATIENT
Start: 2018-05-10 | End: 2018-05-15

## 2018-05-10 NOTE — PATIENT INSTRUCTIONS
1  Take prednisone 20mg  1 tablet twice daily x 5 days  2  Recommend Allegra 180mg  1 tablet daily until allergy season resolves  3   Follow-up with PCP if symptoms persist

## 2018-09-12 ENCOUNTER — TRANSCRIBE ORDERS (OUTPATIENT)
Dept: ADMINISTRATIVE | Facility: HOSPITAL | Age: 73
End: 2018-09-12

## 2018-09-12 DIAGNOSIS — Z12.31 VISIT FOR SCREENING MAMMOGRAM: Primary | ICD-10-CM

## 2018-09-13 ENCOUNTER — HOSPITAL ENCOUNTER (OUTPATIENT)
Dept: RADIOLOGY | Facility: MEDICAL CENTER | Age: 73
Discharge: HOME | End: 2018-09-13
Payer: COMMERCIAL

## 2018-09-13 DIAGNOSIS — Z12.31 VISIT FOR SCREENING MAMMOGRAM: ICD-10-CM

## 2018-09-13 PROCEDURE — 77067 SCR MAMMO BI INCL CAD: CPT

## 2018-09-13 PROCEDURE — 77063 BREAST TOMOSYNTHESIS BI: CPT

## 2018-09-18 ENCOUNTER — HOSPITAL ENCOUNTER (OUTPATIENT)
Dept: RADIOLOGY | Facility: MEDICAL CENTER | Age: 73
Discharge: HOME | End: 2018-09-18
Payer: COMMERCIAL

## 2018-09-18 DIAGNOSIS — R91.8 GROUND GLASS OPACITY PRESENT ON IMAGING OF LUNG: ICD-10-CM

## 2018-09-18 PROCEDURE — 71250 CT THORAX DX C-: CPT

## 2018-09-25 DIAGNOSIS — R91.8 LUNG MASS: Primary | ICD-10-CM

## 2018-10-05 RX ORDER — FLUTICASONE PROPIONATE 50 MCG
SPRAY, SUSPENSION (ML) NASAL
Refills: 0 | OUTPATIENT
Start: 2018-10-05

## 2018-12-21 ENCOUNTER — TRANSCRIBE ORDERS (OUTPATIENT)
Dept: ADMINISTRATIVE | Facility: HOSPITAL | Age: 73
End: 2018-12-21

## 2018-12-21 ENCOUNTER — APPOINTMENT (OUTPATIENT)
Dept: LAB | Facility: MEDICAL CENTER | Age: 73
End: 2018-12-21
Payer: COMMERCIAL

## 2018-12-21 ENCOUNTER — APPOINTMENT (OUTPATIENT)
Dept: RADIOLOGY | Facility: MEDICAL CENTER | Age: 73
End: 2018-12-21
Payer: COMMERCIAL

## 2018-12-21 DIAGNOSIS — F32.A VASCULAR DEMENTIA WITH DEPRESSED MOOD (HCC): ICD-10-CM

## 2018-12-21 DIAGNOSIS — Z13.220 SCREENING FOR LIPOID DISORDERS: ICD-10-CM

## 2018-12-21 DIAGNOSIS — F01.50 VASCULAR DEMENTIA WITH DEPRESSED MOOD (HCC): ICD-10-CM

## 2018-12-21 DIAGNOSIS — R52 PAIN: Primary | ICD-10-CM

## 2018-12-21 DIAGNOSIS — R52 PAIN: ICD-10-CM

## 2018-12-21 DIAGNOSIS — K21.9 GASTROESOPHAGEAL REFLUX DISEASE, ESOPHAGITIS PRESENCE NOT SPECIFIED: ICD-10-CM

## 2018-12-21 DIAGNOSIS — I10 ESSENTIAL HYPERTENSION, BENIGN: ICD-10-CM

## 2018-12-21 DIAGNOSIS — I10 ESSENTIAL HYPERTENSION, BENIGN: Primary | ICD-10-CM

## 2018-12-21 LAB
ALBUMIN SERPL BCP-MCNC: 3.9 G/DL (ref 3.5–5)
ALP SERPL-CCNC: 95 U/L (ref 46–116)
ALT SERPL W P-5'-P-CCNC: 23 U/L (ref 12–78)
ANION GAP SERPL CALCULATED.3IONS-SCNC: 7 MMOL/L (ref 4–13)
AST SERPL W P-5'-P-CCNC: 13 U/L (ref 5–45)
BACTERIA UR QL AUTO: ABNORMAL /HPF
BASOPHILS # BLD AUTO: 0.11 THOUSANDS/ΜL (ref 0–0.1)
BASOPHILS NFR BLD AUTO: 1 % (ref 0–1)
BILIRUB SERPL-MCNC: 0.36 MG/DL (ref 0.2–1)
BILIRUB UR QL STRIP: NEGATIVE
BUN SERPL-MCNC: 24 MG/DL (ref 5–25)
CALCIUM SERPL-MCNC: 9.6 MG/DL (ref 8.3–10.1)
CHLORIDE SERPL-SCNC: 105 MMOL/L (ref 100–108)
CHOLEST SERPL-MCNC: 188 MG/DL (ref 50–200)
CLARITY UR: CLEAR
CO2 SERPL-SCNC: 26 MMOL/L (ref 21–32)
COLOR UR: YELLOW
CREAT SERPL-MCNC: 1.02 MG/DL (ref 0.6–1.3)
EOSINOPHIL # BLD AUTO: 0.3 THOUSAND/ΜL (ref 0–0.61)
EOSINOPHIL NFR BLD AUTO: 3 % (ref 0–6)
ERYTHROCYTE [DISTWIDTH] IN BLOOD BY AUTOMATED COUNT: 12.7 % (ref 11.6–15.1)
GFR SERPL CREATININE-BSD FRML MDRD: 54 ML/MIN/1.73SQ M
GLUCOSE P FAST SERPL-MCNC: 95 MG/DL (ref 65–99)
GLUCOSE UR STRIP-MCNC: NEGATIVE MG/DL
HCT VFR BLD AUTO: 38.8 % (ref 34.8–46.1)
HDLC SERPL-MCNC: 48 MG/DL (ref 40–60)
HGB BLD-MCNC: 12.3 G/DL (ref 11.5–15.4)
HGB UR QL STRIP.AUTO: NEGATIVE
HYALINE CASTS #/AREA URNS LPF: ABNORMAL /LPF
IMM GRANULOCYTES # BLD AUTO: 0.02 THOUSAND/UL (ref 0–0.2)
IMM GRANULOCYTES NFR BLD AUTO: 0 % (ref 0–2)
KETONES UR STRIP-MCNC: NEGATIVE MG/DL
LDLC SERPL CALC-MCNC: 89 MG/DL (ref 0–100)
LEUKOCYTE ESTERASE UR QL STRIP: NEGATIVE
LYMPHOCYTES # BLD AUTO: 3.38 THOUSANDS/ΜL (ref 0.6–4.47)
LYMPHOCYTES NFR BLD AUTO: 32 % (ref 14–44)
MCH RBC QN AUTO: 31.1 PG (ref 26.8–34.3)
MCHC RBC AUTO-ENTMCNC: 31.7 G/DL (ref 31.4–37.4)
MCV RBC AUTO: 98 FL (ref 82–98)
MONOCYTES # BLD AUTO: 0.91 THOUSAND/ΜL (ref 0.17–1.22)
MONOCYTES NFR BLD AUTO: 9 % (ref 4–12)
NEUTROPHILS # BLD AUTO: 5.88 THOUSANDS/ΜL (ref 1.85–7.62)
NEUTS SEG NFR BLD AUTO: 55 % (ref 43–75)
NITRITE UR QL STRIP: NEGATIVE
NON-SQ EPI CELLS URNS QL MICRO: ABNORMAL /HPF
NONHDLC SERPL-MCNC: 140 MG/DL
NRBC BLD AUTO-RTO: 0 /100 WBCS
PH UR STRIP.AUTO: 5.5 [PH] (ref 4.5–8)
PLATELET # BLD AUTO: 263 THOUSANDS/UL (ref 149–390)
PMV BLD AUTO: 10.4 FL (ref 8.9–12.7)
POTASSIUM SERPL-SCNC: 4.7 MMOL/L (ref 3.5–5.3)
PROT SERPL-MCNC: 7.8 G/DL (ref 6.4–8.2)
PROT UR STRIP-MCNC: NEGATIVE MG/DL
RBC # BLD AUTO: 3.96 MILLION/UL (ref 3.81–5.12)
RBC #/AREA URNS AUTO: ABNORMAL /HPF
SODIUM SERPL-SCNC: 138 MMOL/L (ref 136–145)
SP GR UR STRIP.AUTO: 1.02 (ref 1–1.03)
T4 FREE SERPL-MCNC: 1.01 NG/DL (ref 0.76–1.46)
TRIGL SERPL-MCNC: 256 MG/DL
TSH SERPL DL<=0.05 MIU/L-ACNC: 1.47 UIU/ML (ref 0.36–3.74)
UROBILINOGEN UR QL STRIP.AUTO: 0.2 E.U./DL
WBC # BLD AUTO: 10.6 THOUSAND/UL (ref 4.31–10.16)
WBC #/AREA URNS AUTO: ABNORMAL /HPF

## 2018-12-21 PROCEDURE — 85025 COMPLETE CBC W/AUTO DIFF WBC: CPT

## 2018-12-21 PROCEDURE — 84439 ASSAY OF FREE THYROXINE: CPT

## 2018-12-21 PROCEDURE — 84443 ASSAY THYROID STIM HORMONE: CPT

## 2018-12-21 PROCEDURE — 80061 LIPID PANEL: CPT

## 2018-12-21 PROCEDURE — 81001 URINALYSIS AUTO W/SCOPE: CPT | Performed by: INTERNAL MEDICINE

## 2018-12-21 PROCEDURE — 72050 X-RAY EXAM NECK SPINE 4/5VWS: CPT

## 2018-12-21 PROCEDURE — 73030 X-RAY EXAM OF SHOULDER: CPT

## 2018-12-21 PROCEDURE — 36415 COLL VENOUS BLD VENIPUNCTURE: CPT

## 2018-12-21 PROCEDURE — 80053 COMPREHEN METABOLIC PANEL: CPT

## 2018-12-21 PROCEDURE — 73562 X-RAY EXAM OF KNEE 3: CPT

## 2019-03-21 ENCOUNTER — TELEPHONE (OUTPATIENT)
Dept: GASTROENTEROLOGY | Facility: AMBULARY SURGERY CENTER | Age: 74
End: 2019-03-21

## 2019-03-21 ENCOUNTER — OFFICE VISIT (OUTPATIENT)
Dept: GASTROENTEROLOGY | Facility: AMBULARY SURGERY CENTER | Age: 74
End: 2019-03-21
Payer: COMMERCIAL

## 2019-03-21 VITALS
BODY MASS INDEX: 27.52 KG/M2 | WEIGHT: 165.2 LBS | RESPIRATION RATE: 14 BRPM | SYSTOLIC BLOOD PRESSURE: 124 MMHG | DIASTOLIC BLOOD PRESSURE: 82 MMHG | TEMPERATURE: 98 F | HEIGHT: 65 IN | HEART RATE: 72 BPM

## 2019-03-21 DIAGNOSIS — Z90.49 STATUS POST COLECTOMY: ICD-10-CM

## 2019-03-21 DIAGNOSIS — R10.13 EPIGASTRIC PAIN: Primary | ICD-10-CM

## 2019-03-21 DIAGNOSIS — K59.1 FUNCTIONAL DIARRHEA: ICD-10-CM

## 2019-03-21 DIAGNOSIS — Z98.890 STATUS POST LAPAROSCOPIC NISSEN FUNDOPLICATION: ICD-10-CM

## 2019-03-21 DIAGNOSIS — R11.0 NAUSEA: ICD-10-CM

## 2019-03-21 PROBLEM — R19.7 DIARRHEA: Status: ACTIVE | Noted: 2019-03-21

## 2019-03-21 PROBLEM — A48.0: Status: ACTIVE | Noted: 2019-03-21

## 2019-03-21 PROCEDURE — 99214 OFFICE O/P EST MOD 30 MIN: CPT | Performed by: PHYSICIAN ASSISTANT

## 2019-03-21 RX ORDER — FAMOTIDINE 40 MG/1
40 TABLET, FILM COATED ORAL DAILY
Qty: 30 TABLET | Refills: 5 | Status: SHIPPED | OUTPATIENT
Start: 2019-03-21 | End: 2019-09-11 | Stop reason: SDUPTHER

## 2019-03-21 RX ORDER — CHOLESTYRAMINE 4 G/9G
1 POWDER, FOR SUSPENSION ORAL
Qty: 60 PACKET | Refills: 11 | Status: SHIPPED | OUTPATIENT
Start: 2019-03-21

## 2019-03-21 NOTE — PATIENT INSTRUCTIONS
Patient is scheduled for an egd at [address] [address] on 4/17/19 with dr Nicole Nava  Patient reports that she is hypogylcemic

## 2019-03-21 NOTE — PROGRESS NOTES
[facility] - Outpatient Follow-up Note  Dori Pimentel 74 y o  female MRN: [id number]  Encounter: [id number]    ASSESSMENT AND PLAN:        Chronic Diarrhea  -hx of ischemic colitis s/p total colectomy 5 years ago  -she has had diarrhea since the procedure  -she will trial cholestyramine powder  -follow up in 2-3 months      GERD  Epigastric pain   -s/p laparoscopic nissen fundoplication  -EGD last year incomplete given retained food in the stomach, we will repeat EGD at this time  -She will start on famotidine once daily  -reviewed dietary modifications for gerd/gastritis    ______________________________________________________________________    SUBJECTIVEBlaise Carlton presents for follow up  She has a history of ischemic colitis and colectomy 4 years ago with Dr Crow To  Since her surgery she has struggled with diarrhea  She states she has a bm right after eating and many times during the day and it has been quite disruptive for her  She had a flex sig with Dr Crow To last year that was unremarkable  She had tried imodium however this gave her stomach cramping  She also has a history of GERD sp nissen fundoplication  She reports epigastric discomfort, worst with eating  She is not currently taking a PPI or h2 blocker  She had an EGD 1/2018 however was incomplete as she had retained food in the stomach  She denies NSAID use  No nausea  No black or bloody stools  No unintentional weight loss      REVIEW OF SYSTEMS IS OTHERWISE NEGATIVE        Historical Information   Past Medical History:   Diagnosis Date    Colitis     Dehydration     Depression     Diabetes mellitus (HCC)     GERD (gastroesophageal reflux disease)     Ground glass opacity present on imaging of lung     Hiatal hernia     High cholesterol     Hyperlipidemia     Hypertension     Sleep apnea     C pap    Solitary pulmonary nodule     Syncope      Past Surgical History:   Procedure Laterality Date    APPENDECTOMY      BACK SURGERY      Cervical spine    BREAST BIOPSY      CHOLECYSTECTOMY      COLECTOMY      ESOPHAGOGASTRODUODENOSCOPY N/A 1/22/2018    Procedure: ESOPHAGOGASTRODUODENOSCOPY (EGD); Surgeon: Christiana Saldaña MD;  [facility]; Service: Gastroenterology    TX LAP, REPAIR PARAESOPHAGEAL HERNIA, INCL FUNDOPLASTY W/ MESH N/A 6/2/2016    Procedure: ROBOTIC LAPAROSCOPIC Daquan Fundoplication, Liver biopsy;  Surgeon: Matt Howard MD;  Location:  MAIN OR;  Service: General     Social History   Social History     Substance and Sexual Activity   Alcohol Use Not Currently    Comment: social     Social History     Substance and Sexual Activity   Drug Use No     Social History     Tobacco Use   Smoking Status Never Smoker   Smokeless Tobacco Never Used     Family History   Problem Relation Age of Onset    Cancer Mother     Cancer Father        Meds/Allergies       Current Outpatient Medications:     acetaminophen (TYLENOL) 325 mg tablet    bismuth subsalicylate (PEPTO BISMOL) 262 MG chewable tablet    Cholecalciferol (VITAMIN D3) 5000 UNITS CAPS    escitalopram (LEXAPRO) 10 mg tablet    propranolol (INDERAL) 60 mg tablet    simvastatin (ZOCOR) 40 mg tablet    amLODIPine-benazepril (LOTREL 5-10) 5-10 MG per capsule    ondansetron (ZOFRAN-ODT) 8 mg disintegrating tablet    ranitidine (ZANTAC) 150 mg tablet    Allergies   Allergen Reactions    Biaxin [Clarithromycin] GI Intolerance    Cephalosporins     Darvon [Propoxyphene]     Other     Wound Dressing Adhesive     Cefzil [Cefprozil] Rash    Ciprofloxacin Rash           Objective     Blood pressure 124/82, pulse 72, temperature 98 °F (36 7 °C), temperature source Tympanic, resp  rate 14, height 5' 5" (1 651 m), weight 74 9 kg (165 lb 3 2 oz)  Body mass index is 27 49 kg/m²        PHYSICAL EXAM:      General Appearance:   Alert, cooperative, no distress   HEENT:   Normocephalic, atraumatic, anicteric      Neck:  Supple, symmetrical, trachea midline   Lungs:   Clear to auscultation bilaterally   Heart[de-identified]   Regular rate    Abdomen:   Soft, non-tender to palpation, non-distended; normal bowel sounds                             Lab Results:   No visits with results within 1 Day(s) from this visit     Latest known visit with results is:   Appointment on 12/21/2018   Component Date Value    WBC 12/21/2018 10 60*    RBC 12/21/2018 3 96     Hemoglobin 12/21/2018 12 3     Hematocrit 12/21/2018 38 8     MCV 12/21/2018 98     MCH 12/21/2018 31 1     MCHC 12/21/2018 31 7     RDW 12/21/2018 12 7     MPV 12/21/2018 10 4     Platelets 16/73/0847 263     nRBC 12/21/2018 0     Neutrophils Relative 12/21/2018 55     Immat GRANS % 12/21/2018 0     Lymphocytes Relative 12/21/2018 32     Monocytes Relative 12/21/2018 9     Eosinophils Relative 12/21/2018 3     Basophils Relative 12/21/2018 1     Neutrophils Absolute 12/21/2018 5 88     Immature Grans Absolute 12/21/2018 0 02     Lymphocytes Absolute 12/21/2018 3 38     Monocytes Absolute 12/21/2018 0 91     Eosinophils Absolute 12/21/2018 0 30     Basophils Absolute 12/21/2018 0 11*    Sodium 12/21/2018 138     Potassium 12/21/2018 4 7     Chloride 12/21/2018 105     CO2 12/21/2018 26     ANION GAP 12/21/2018 7     BUN 12/21/2018 24     Creatinine 12/21/2018 1 02     Glucose, Fasting 12/21/2018 95     Calcium 12/21/2018 9 6     AST 12/21/2018 13     ALT 12/21/2018 23     Alkaline Phosphatase 12/21/2018 95     Total Protein 12/21/2018 7 8     Albumin 12/21/2018 3 9     Total Bilirubin 12/21/2018 0 36     eGFR 12/21/2018 54     Cholesterol 12/21/2018 188     Triglycerides 12/21/2018 256*    HDL, Direct 12/21/2018 48     LDL Calculated 12/21/2018 89     Non-HDL-Chol (CHOL-HDL) 12/21/2018 140     Free T4 12/21/2018 1 01     TSH 3RD GENERATON 12/21/2018 1 470

## 2019-04-05 ENCOUNTER — ANESTHESIA EVENT (OUTPATIENT)
Dept: PERIOP | Facility: AMBULARY SURGERY CENTER | Age: 74
End: 2019-04-05
Payer: COMMERCIAL

## 2019-04-16 RX ORDER — SODIUM CHLORIDE 9 MG/ML
100 INJECTION, SOLUTION INTRAVENOUS CONTINUOUS
Status: CANCELLED | OUTPATIENT
Start: 2019-04-16

## 2019-04-17 ENCOUNTER — HOSPITAL ENCOUNTER (OUTPATIENT)
Facility: AMBULARY SURGERY CENTER | Age: 74
Setting detail: OUTPATIENT SURGERY
Discharge: HOME | End: 2019-04-17
Attending: INTERNAL MEDICINE | Admitting: INTERNAL MEDICINE
Payer: COMMERCIAL

## 2019-04-17 ENCOUNTER — ANESTHESIA (OUTPATIENT)
Dept: PERIOP | Facility: AMBULARY SURGERY CENTER | Age: 74
End: 2019-04-17
Payer: COMMERCIAL

## 2019-04-17 VITALS
HEART RATE: 63 BPM | HEIGHT: 65 IN | RESPIRATION RATE: 18 BRPM | TEMPERATURE: 97.6 F | OXYGEN SATURATION: 95 % | SYSTOLIC BLOOD PRESSURE: 144 MMHG | WEIGHT: 160 LBS | DIASTOLIC BLOOD PRESSURE: 77 MMHG | BODY MASS INDEX: 26.66 KG/M2

## 2019-04-17 DIAGNOSIS — R19.7 DIARRHEA, UNSPECIFIED TYPE: ICD-10-CM

## 2019-04-17 DIAGNOSIS — R10.13 EPIGASTRIC PAIN: ICD-10-CM

## 2019-04-17 DIAGNOSIS — A48.0: ICD-10-CM

## 2019-04-17 DIAGNOSIS — R11.0 NAUSEA: ICD-10-CM

## 2019-04-17 PROCEDURE — 88341 IMHCHEM/IMCYTCHM EA ADD ANTB: CPT | Performed by: PATHOLOGY

## 2019-04-17 PROCEDURE — 88342 IMHCHEM/IMCYTCHM 1ST ANTB: CPT | Performed by: PATHOLOGY

## 2019-04-17 PROCEDURE — 43239 EGD BIOPSY SINGLE/MULTIPLE: CPT | Performed by: INTERNAL MEDICINE

## 2019-04-17 PROCEDURE — 88305 TISSUE EXAM BY PATHOLOGIST: CPT | Performed by: PATHOLOGY

## 2019-04-17 RX ORDER — PROPOFOL 10 MG/ML
INJECTION, EMULSION INTRAVENOUS AS NEEDED
Status: DISCONTINUED | OUTPATIENT
Start: 2019-04-17 | End: 2019-04-17 | Stop reason: SURG

## 2019-04-17 RX ORDER — LIDOCAINE HYDROCHLORIDE 10 MG/ML
INJECTION, SOLUTION INFILTRATION; PERINEURAL AS NEEDED
Status: DISCONTINUED | OUTPATIENT
Start: 2019-04-17 | End: 2019-04-17 | Stop reason: SURG

## 2019-04-17 RX ORDER — SODIUM CHLORIDE, SODIUM LACTATE, POTASSIUM CHLORIDE, CALCIUM CHLORIDE 600; 310; 30; 20 MG/100ML; MG/100ML; MG/100ML; MG/100ML
INJECTION, SOLUTION INTRAVENOUS CONTINUOUS PRN
Status: DISCONTINUED | OUTPATIENT
Start: 2019-04-17 | End: 2019-04-17 | Stop reason: SURG

## 2019-04-17 RX ADMIN — SODIUM CHLORIDE, SODIUM LACTATE, POTASSIUM CHLORIDE, AND CALCIUM CHLORIDE: .6; .31; .03; .02 INJECTION, SOLUTION INTRAVENOUS at 10:38

## 2019-04-17 RX ADMIN — PROPOFOL 50 MG: 10 INJECTION, EMULSION INTRAVENOUS at 11:23

## 2019-04-17 RX ADMIN — LIDOCAINE HYDROCHLORIDE ANHYDROUS 40 MG: 10 INJECTION, SOLUTION INFILTRATION at 11:23

## 2019-04-17 RX ADMIN — PROPOFOL 50 MG: 10 INJECTION, EMULSION INTRAVENOUS at 11:24

## 2019-04-22 ENCOUNTER — TELEPHONE (OUTPATIENT)
Dept: GASTROENTEROLOGY | Facility: AMBULARY SURGERY CENTER | Age: 74
End: 2019-04-22

## 2019-09-11 DIAGNOSIS — R10.13 EPIGASTRIC PAIN: ICD-10-CM

## 2019-09-11 RX ORDER — FAMOTIDINE 40 MG/1
TABLET, FILM COATED ORAL
Qty: 90 TABLET | Refills: 1 | Status: SHIPPED | OUTPATIENT
Start: 2019-09-11 | End: 2020-01-10 | Stop reason: ALTCHOICE

## 2019-09-17 ENCOUNTER — HOSPITAL ENCOUNTER (OUTPATIENT)
Dept: RADIOLOGY | Facility: MEDICAL CENTER | Age: 74
Discharge: HOME | End: 2019-09-17
Payer: COMMERCIAL

## 2019-09-17 DIAGNOSIS — R91.8 LUNG MASS: ICD-10-CM

## 2019-09-17 PROCEDURE — 71250 CT THORAX DX C-: CPT

## 2019-09-24 ENCOUNTER — OFFICE VISIT (OUTPATIENT)
Dept: CARDIAC SURGERY | Facility: CLINIC | Age: 74
End: 2019-09-24
Payer: COMMERCIAL

## 2019-09-24 VITALS
DIASTOLIC BLOOD PRESSURE: 47 MMHG | WEIGHT: 168 LBS | TEMPERATURE: 98.2 F | SYSTOLIC BLOOD PRESSURE: 113 MMHG | OXYGEN SATURATION: 94 % | HEIGHT: 65 IN | HEART RATE: 72 BPM | BODY MASS INDEX: 27.99 KG/M2

## 2019-09-24 DIAGNOSIS — R91.8 GROUND GLASS OPACITY PRESENT ON IMAGING OF LUNG: Primary | ICD-10-CM

## 2019-09-24 PROCEDURE — 99213 OFFICE O/P EST LOW 20 MIN: CPT | Performed by: THORACIC SURGERY (CARDIOTHORACIC VASCULAR SURGERY)

## 2019-09-24 NOTE — ASSESSMENT & PLAN NOTE
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

## 2019-10-02 ENCOUNTER — HOSPITAL ENCOUNTER (OUTPATIENT)
Dept: RADIOLOGY | Age: 74
Discharge: HOME | End: 2019-10-02
Payer: COMMERCIAL

## 2019-10-02 DIAGNOSIS — D38.1 NEOPLASM OF UNCERTAIN BEHAVIOR OF RIGHT UPPER LOBE OF LUNG: ICD-10-CM

## 2019-10-02 LAB — GLUCOSE SERPL-MCNC: 105 MG/DL (ref 65–140)

## 2019-10-02 PROCEDURE — A9552 F18 FDG: HCPCS

## 2019-10-02 PROCEDURE — 78815 PET IMAGE W/CT SKULL-THIGH: CPT

## 2019-10-02 PROCEDURE — 82948 REAGENT STRIP/BLOOD GLUCOSE: CPT

## 2019-10-04 ENCOUNTER — HOSPITAL ENCOUNTER (OUTPATIENT)
Dept: PULMONOLOGY | Facility: HOSPITAL | Age: 74
Discharge: HOME | End: 2019-10-04
Payer: COMMERCIAL

## 2019-10-04 DIAGNOSIS — R91.8 GROUND GLASS OPACITY PRESENT ON IMAGING OF LUNG: ICD-10-CM

## 2019-10-04 PROCEDURE — 94760 N-INVAS EAR/PLS OXIMETRY 1: CPT

## 2019-10-04 PROCEDURE — 94060 EVALUATION OF WHEEZING: CPT

## 2019-10-04 PROCEDURE — 94726 PLETHYSMOGRAPHY LUNG VOLUMES: CPT

## 2019-10-04 PROCEDURE — 94729 DIFFUSING CAPACITY: CPT | Performed by: INTERNAL MEDICINE

## 2019-10-04 PROCEDURE — 94726 PLETHYSMOGRAPHY LUNG VOLUMES: CPT | Performed by: INTERNAL MEDICINE

## 2019-10-04 PROCEDURE — 94060 EVALUATION OF WHEEZING: CPT | Performed by: INTERNAL MEDICINE

## 2019-10-04 PROCEDURE — 94729 DIFFUSING CAPACITY: CPT

## 2019-10-04 RX ORDER — ALBUTEROL SULFATE 2.5 MG/3ML
2.5 SOLUTION RESPIRATORY (INHALATION) ONCE
Status: COMPLETED | OUTPATIENT
Start: 2019-10-04 | End: 2019-10-04

## 2019-10-04 RX ADMIN — ALBUTEROL SULFATE 2.5 MG: 2.5 SOLUTION RESPIRATORY (INHALATION) at 14:10

## 2019-10-08 ENCOUNTER — OFFICE VISIT (OUTPATIENT)
Dept: CARDIAC SURGERY | Facility: CLINIC | Age: 74
End: 2019-10-08
Payer: COMMERCIAL

## 2019-10-08 VITALS
TEMPERATURE: 97.7 F | HEART RATE: 65 BPM | BODY MASS INDEX: 27.49 KG/M2 | HEIGHT: 65 IN | OXYGEN SATURATION: 94 % | SYSTOLIC BLOOD PRESSURE: 142 MMHG | DIASTOLIC BLOOD PRESSURE: 66 MMHG | WEIGHT: 165 LBS

## 2019-10-08 DIAGNOSIS — R91.8 GROUND GLASS OPACITY PRESENT ON IMAGING OF LUNG: Primary | ICD-10-CM

## 2019-10-08 PROCEDURE — 99214 OFFICE O/P EST MOD 30 MIN: CPT | Performed by: THORACIC SURGERY (CARDIOTHORACIC VASCULAR SURGERY)

## 2019-10-08 RX ORDER — CEFAZOLIN SODIUM 1 G/50ML
1000 SOLUTION INTRAVENOUS ONCE
Status: CANCELLED | OUTPATIENT
Start: 2019-10-08 | End: 2019-10-08

## 2019-10-08 RX ORDER — ACETAMINOPHEN 325 MG/1
975 TABLET ORAL ONCE
Status: CANCELLED | OUTPATIENT
Start: 2019-10-08 | End: 2019-10-08

## 2019-10-08 RX ORDER — GABAPENTIN 300 MG/1
600 CAPSULE ORAL ONCE
Status: CANCELLED | OUTPATIENT
Start: 2019-10-08 | End: 2019-10-08

## 2019-10-08 RX ORDER — HEPARIN SODIUM 5000 [USP'U]/ML
5000 INJECTION, SOLUTION INTRAVENOUS; SUBCUTANEOUS
Status: CANCELLED | OUTPATIENT
Start: 2019-10-09 | End: 2019-10-10

## 2019-10-08 NOTE — PROGRESS NOTES
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, as previously discussed  There was also some uptake in the nasopharyngeal area, as well as an ovarian cyst  We are recommending for her to follow up with her PCP for the nasopharyngeal finding and her gynecologist for the pelvic finding  Her lung function was normal, outside of a somewhat diminished DLCO  Dr Love Stringer is recommending a flexible bronchoscopy and right thoracoscopic upper lobe wedge resection  Since it is purely ground glass, we will use IR localization  If frozen section is consistent with invasive adenocarcinoma, a lobectomy would be the standard of care  However, secondary to her clinical lung function, that would likely cause her an increase in her daily dyspnea  The procedure, possible risks, and post operative care were reviewed and all questions were answered  We will schedule this with IR for November 7 ,2019  Diagnoses and all orders for this visit:    Ground glass opacity present on imaging of lung  -     Type and screen; Future  -     Basic metabolic panel; Future  -     APTT; Future  -     CBC and Platelet; Future  -     Protime-INR; Future  -     EKG 12 lead; Future  -     Case request operating room: BRONCHOSCOPY FLEXIBLE, THORACOSCOPY VIDEO ASSISTED SURGERY (VATS), RESECTION WEDGE LUNG; Standing  -     Case request operating room: BRONCHOSCOPY FLEXIBLE, THORACOSCOPY VIDEO ASSISTED SURGERY (VATS), RESECTION WEDGE LUNG    Other orders  -     Diet NPO; Sips with meds; Standing  -     Nursing communcation Please give pre-op Carbohydrate drink to patient 2-4 hours prior to surgery; Standing  -     Void on call to OR; Standing  -     Insert peripheral IV;  Standing  -     Place sequential compression device; Standing  -     acetaminophen (TYLENOL) tablet 975 mg  - gabapentin (NEURONTIN) capsule 600 mg  -     heparin (porcine) subcutaneous injection 5,000 Units  -     ceFAZolin (ANCEF) IVPB (premix) 1,000 mg          Thoracic History       Diagnosis: Right upper lobe lung nodule       Patient ID: Maida Frankel is a 74 y o  female  HPI    Ms Pimentel is a 75 yo non smoking female who was originally referred by Dr Ana Coronado for a right upper lobe ground glass nodule found in April 2016  There was slight enlargement seen in February 2018, with no solid component  She was last seen on 9/24/19 at which point the right upper lobe nodule revealed slight enlargement/denser appearance from 9/2018  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  Dr Donna Keller recommended PFT's and a PET scan  She returns with a PET scan from 10/2/19  This demonstrated mild uptake in the 1 5 x 1 2 cm right upper lobe nodule of 1 6  Some non specific activity in the left posterior nasopharynx and left anterior 5th rib, without any CT correlate  There was also a 2 2 cm right adnexal cyst without any uptake for which radiology recommends a pelvic ultrasound  PFT's from 10/4/19, demonstrating a FVC of 3 15L, 106% predicted, FEV1 of 2 43L, 108% predicted, and a DLCO of 55% predicted  On discussion, she is doing okay   She does have dyspnea on exertion with activity, but denies fever, chills, cough, or shortness of breath at rest      Past Medical History:   Diagnosis Date    Colitis     Dehydration     Depression     Diabetes mellitus ([address] )     GERD (gastroesophageal reflux disease)     Ground glass opacity present on imaging of lung     Hiatal hernia     High cholesterol     Hyperlipidemia     Hypertension     Sleep apnea     C pap    Solitary pulmonary nodule     Syncope      Past Surgical History:   Procedure Laterality Date    APPENDECTOMY      BACK SURGERY      Cervical spine    BREAST BIOPSY      CHOLECYSTECTOMY      COLECTOMY      complete colectomy    ESOPHAGOGASTRODUODENOSCOPY N/A 1/22/2018    Procedure: ESOPHAGOGASTRODUODENOSCOPY (EGD); Surgeon: Xiomara Henriquez MD;  [facility]; Service: Gastroenterology    MA ESOPHAGOGASTRODUODENOSCOPY TRANSORAL DIAGNOSTIC N/A 4/17/2019    Procedure: ESOPHAGOGASTRODUODENOSCOPY (EGD); Surgeon: Xiomara Henriquez MD;  [facility];   Service: Gastroenterology    MA LAP, REPAIR PARAESOPHAGEAL HERNIA, INCL FUNDOPLASTY W/ MESH N/A 6/2/2016    Procedure: ROBOTIC LAPAROSCOPIC Daquan Fundoplication, Liver biopsy;  Surgeon: Kevin Velarde MD;  [facility];  Service: General       Family History   Problem Relation Age of Onset    Leukemia Mother 58    Colon cancer Father 79    Leukemia Brother 61     Social History     Socioeconomic History    Marital status: /Civil Union     Spouse name: Not on file    Number of children: Not on file    Years of education: Not on file    Highest education level: Not on file   Occupational History    Not on file   Social Needs    Financial resource strain: Not on file    Food insecurity:     Worry: Not on file     Inability: Not on file    Transportation needs:     Medical: Not on file     Non-medical: Not on file   Tobacco Use    Smoking status: Never Smoker    Smokeless tobacco: Never Used   Substance and Sexual Activity    Alcohol use: Not Currently    Drug use: No    Sexual activity: Not on file   Lifestyle    Physical activity:     Days per week: Not on file     Minutes per session: Not on file    Stress: Not on file   Relationships    Social connections:     Talks on phone: Not on file     Gets together: Not on file     Attends Scientologist service: Not on file     Active member of club or organization: Not on file     Attends meetings of clubs or organizations: Not on file     Relationship status: Not on file    Intimate partner violence:     Fear of current or ex partner: Not on file     Emotionally abused: Not on file     Physically abused: Not on file     Forced sexual activity: Not on file   Other Topics Concern    Not on file   Social History Narrative    Not on file     Allergies   Allergen Reactions    Biaxin [Clarithromycin] GI Intolerance    Cephalosporins     Darvon [Propoxyphene]     Other     Wound Dressing Adhesive     Cefzil [Cefprozil] Rash    Ciprofloxacin Rash     Current Outpatient Medications on File Prior to Visit   Medication Sig Dispense Refill    acetaminophen (TYLENOL) 325 mg tablet 650 mg every 4 to 6 hours as needed for pain  30 tablet 0    bismuth subsalicylate (PEPTO BISMOL) 262 MG chewable tablet Chew 262 mg 2 (two) times a day as needed for indigestion      Cholecalciferol (VITAMIN D3) 5000 UNITS CAPS Take 1 capsule by mouth daily   cholestyramine (QUESTRAN) 4 g packet Take 1 packet (4 g total) by mouth 3 (three) times a day with meals Take 1 a day, can increase to 3 a day  Take 2 hrs apart from other meds 60 packet 11    escitalopram (LEXAPRO) 10 mg tablet Take 10 mg by mouth daily   famotidine (PEPCID) 40 MG tablet TAKE 1 TABLET BY MOUTH EVERY DAY 90 tablet 1    propranolol (INDERAL) 60 mg tablet Take 60 mg by mouth daily      simvastatin (ZOCOR) 40 mg tablet Take 40 mg by mouth daily at bedtime   ondansetron (ZOFRAN-ODT) 8 mg disintegrating tablet Take 1 tablet by mouth every 8 (eight) hours as needed for nausea or vomiting for up to 10 days 20 tablet 0     No current facility-administered medications on file prior to visit  Review of Systems       Objective:   Physical Exam  /66 (BP Location: Left arm, Patient Position: Sitting, Cuff Size: Large)   Pulse 65   Temp 97 7 °F (36 5 °C)   Ht 5' 5" (1 651 m)   Wt 74 8 kg (165 lb)   SpO2 94%   BMI 27 46 kg/m²     Nm Pet Ct Skull Base To Mid Thigh    Result Date: 10/2/2019  Narrative PET/CT SCAN INDICATION: Abnormal chest CT  Enlarging groundglass nodule    D38 1: Neoplasm of uncertain behavior of trachea, bronchus and lung MODIFIER: PI COMPARISON: CT chest exam 9/17/2019, CT abdomen pelvis 11/26/2017 and additional priors CELL TYPE:  N/A TECHNIQUE:   8 5 mCi F-18-FDG administered IV  Multiplanar attenuation corrected and non attenuation corrected PET images are available for interpretation, and contiguous, low dose, axial CT sections were obtained from the skull base through the femurs   Intravenous contrast material was not utilized  This examination, like all CT scans performed in the [facility], was performed utilizing techniques to minimize radiation dose exposure, including the use of iterative reconstruction and automated exposure control  Fasting serum glucose: 105 mg/dl FINDINGS: VISUALIZED BRAIN:   No acute abnormalities are seen  HEAD/NECK:   Asymmetric FDG uptake at the left posterior nasopharynx, SUV max of 7 2  No obvious findings here on CT  No FDG avid lymph nodes  CT images: Unremarkable  CHEST:   Mild FDG uptake noted at the 1 5 x 1 2 cm right upper lobe lateral groundglass nodule  SUV max here is 1 6, SUV max of 2 4 on the Q clear images  This is higher than background activity, SUV max of 0 7  This has demonstrated slow interval enlargement over time  On the 2/13/2017 CT examination, this measured 1 1 x 0 8 cm  No suspicious focal FDG uptake in the mediastinal or perihilar regions  CT images: Small hiatal hernia  Mild to moderate coronary artery calcifications  ABDOMEN:   No FDG avid soft tissue lesions are seen  CT images: Prior cholecystectomy  PELVIS: No FDG avid soft tissue lesions are seen  CT images: Bowel staple line at the sigmoid colon  2 2 cm right adnexal cyst without FDG uptake  This does appear new from a 2017 CT exam  OSSEOUS STRUCTURES: Slight focal FDG uptake at the left anterior 5th rib, SUV max of 1 3  No obvious findings here on CT, probably posttraumatic  Mild focal FDG uptake at the right iliac wing laterally where there is enthesopathy on CT, SUV max of 2 5    This is likely inflammatory  CT images: Prior posterior fusion and laminectomy in the cervical spine  Vertebral body hemangioma noted of L2  Impression 1  Mild FDG uptake noted in the 1 5 cm right upper lobe groundglass nodule, above lung background activity  Given the slow interval enlargement of this nodule, this is likely a low-grade adenocarcinoma  2    No findings suspicious for hypermetabolic metastasis  3   Asymmetric FDG uptake at the left posterior nasopharynx, SUV max of 7 2  No obvious findings here on CT  While this could be inflammatory an underlying lesion is not excluded  Correlate with direct visualization  4  Slight focal FDG uptake at the left anterior 5th rib  No obvious findings here on CT, favor posttraumatic  Correlate clinically  5   2 2 cm right adnexal cyst   No focal FDG uptake here but cystic neoplasms may not demonstrate significant FDG uptake  According to current guidelines (J Am Yoan Radiol 9463;60:352-330) in this late postmenopausal woman, this should be evaluated by pelvic ultrasound  The study was marked in EPIC for significant notification   Workstation performed: [id number]

## 2019-10-08 NOTE — ASSESSMENT & PLAN NOTE
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, as previously discussed  There was also some uptake in the nasopharyngeal area, as well as an ovarian cyst  We are recommending for her to follow up with her PCP for the nasopharyngeal finding and her gynecologist for the pelvic finding  Her lung function was normal, outside of a somewhat diminished DLCO  Dr León Lr is recommending a flexible bronchoscopy and right thoracoscopic upper lobe wedge resection  Since it is purely ground glass, we will use IR localization  If frozen section is consistent with invasive adenocarcinoma, a lobectomy would be the standard of care  However, secondary to her clinical lung function, that would likely cause her an increase in her daily dyspnea  The procedure, possible risks, and post operative care were reviewed and all questions were answered  We will schedule this with IR for November 7 ,2019

## 2019-10-08 NOTE — LETTER
October 8, 2019     Paulette Navarrete, [address] [id number]    Patient: Sharita Gant   YOB: 1945   Date of Visit: 10/8/2019       Dear Dr Rawls Records: Thank you for referring Nat Bethea to me for evaluation  Below are my notes for this consultation  If you have questions, please do not hesitate to call me  I look forward to following your patient along with you  Sincerely,        Veda Branch MD        CC: MD Jerardo Monahan PA-C  10/8/2019  1:47 PM  Sign at close encounter  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, as previously discussed  There was also some uptake in the nasopharyngeal area, as well as an ovarian cyst  We are recommending for her to follow up with her PCP for the nasopharyngeal finding and her gynecologist for the pelvic finding  Her lung function was normal, outside of a somewhat diminished DLCO  Dr Adithya Barragan is recommending a flexible bronchoscopy and right thoracoscopic upper lobe wedge resection  Since it is purely ground glass, we will use IR localization  If frozen section is consistent with invasive adenocarcinoma, a lobectomy would be the standard of care  However, secondary to her clinical lung function, that would likely cause her an increase in her daily dyspnea  The procedure, possible risks, and post operative care were reviewed and all questions were answered  We will schedule this with IR for November 7 ,2019  Diagnoses and all orders for this visit:    Ground glass opacity present on imaging of lung  -     Type and screen; Future  -     Basic metabolic panel; Future  -     APTT; Future  -     CBC and Platelet; Future  -     Protime-INR; Future  -     EKG 12 lead;  Future  -     Case request operating room: BRONCHOSCOPY FLEXIBLE, THORACOSCOPY VIDEO ASSISTED SURGERY (VATS), RESECTION WEDGE LUNG; Standing  -     Case request operating room: BRONCHOSCOPY FLEXIBLE, THORACOSCOPY VIDEO ASSISTED SURGERY (VATS), RESECTION WEDGE LUNG    Other orders  -     Diet NPO; Sips with meds; Standing  -     Nursing communcation Please give pre-op Carbohydrate drink to patient 2-4 hours prior to surgery; Standing  -     Void on call to OR; Standing  -     Insert peripheral IV; Standing  -     Place sequential compression device; Standing  -     acetaminophen (TYLENOL) tablet 975 mg  -     gabapentin (NEURONTIN) capsule 600 mg  -     heparin (porcine) subcutaneous injection 5,000 Units  -     ceFAZolin (ANCEF) IVPB (premix) 1,000 mg          Thoracic History       Diagnosis: Right upper lobe lung nodule       Patient ID: Mikal Lucas is a 74 y o  female  HPI    Ms Pimentel is a 73 yo non smoking female who was originally referred by Dr Bobby Curran for a right upper lobe ground glass nodule found in April 2016  There was slight enlargement seen in February 2018, with no solid component  She was last seen on 9/24/19 at which point the right upper lobe nodule revealed slight enlargement/denser appearance from 9/2018  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  Dr Twana Cowden recommended PFT's and a PET scan  She returns with a PET scan from 10/2/19  This demonstrated mild uptake in the 1 5 x 1 2 cm right upper lobe nodule of 1 6  Some non specific activity in the left posterior nasopharynx and left anterior 5th rib, without any CT correlate  There was also a 2 2 cm right adnexal cyst without any uptake for which radiology recommends a pelvic ultrasound  PFT's from 10/4/19, demonstrating a FVC of 3 15L, 106% predicted, FEV1 of 2 43L, 108% predicted, and a DLCO of 55% predicted  On discussion, she is doing okay   She does have dyspnea on exertion with activity, but denies fever, chills, cough, or shortness of breath at rest      Past Medical History:   Diagnosis Date    Colitis     Dehydration     Depression     Diabetes mellitus (HCC)     GERD (gastroesophageal reflux disease)     Ground glass opacity present on imaging of lung     Hiatal hernia     High cholesterol     Hyperlipidemia     Hypertension     Sleep apnea     C pap    Solitary pulmonary nodule     Syncope      Past Surgical History:   Procedure Laterality Date    APPENDECTOMY      BACK SURGERY      Cervical spine    BREAST BIOPSY      CHOLECYSTECTOMY      COLECTOMY      complete colectomy    ESOPHAGOGASTRODUODENOSCOPY N/A 1/22/2018    Procedure: ESOPHAGOGASTRODUODENOSCOPY (EGD); Surgeon: Santos Cowan MD;  [facility]; Service: Gastroenterology    ID ESOPHAGOGASTRODUODENOSCOPY TRANSORAL DIAGNOSTIC N/A 4/17/2019    Procedure: ESOPHAGOGASTRODUODENOSCOPY (EGD); Surgeon: Santos Cowan MD;  [facility];   Service: Gastroenterology    ID LAP, REPAIR PARAESOPHAGEAL HERNIA, INCL FUNDOPLASTY W/ MESH N/A 6/2/2016    Procedure: ROBOTIC LAPAROSCOPIC Daquan Fundoplication, Liver biopsy;  Surgeon: Laya Rivera MD;  [facility];  Service: General       Family History   Problem Relation Age of Onset    Leukemia Mother 58    Colon cancer Father 79    Leukemia Brother 61     Social History     Socioeconomic History    Marital status: /Civil Union     Spouse name: Not on file    Number of children: Not on file    Years of education: Not on file    Highest education level: Not on file   Occupational History    Not on file   Social Needs    Financial resource strain: Not on file    Food insecurity:     Worry: Not on file     Inability: Not on file    Transportation needs:     Medical: Not on file     Non-medical: Not on file   Tobacco Use    Smoking status: Never Smoker    Smokeless tobacco: Never Used   Substance and Sexual Activity    Alcohol use: Not Currently    Drug use: No    Sexual activity: Not on file   Lifestyle    Physical activity:     Days per week: Not on file     Minutes per session: Not on file    Stress: Not on file   Relationships    Social connections:     Talks on phone: Not on file     Gets together: Not on file     Attends Yarsani service: Not on file     Active member of club or organization: Not on file     Attends meetings of clubs or organizations: Not on file     Relationship status: Not on file    Intimate partner violence:     Fear of current or ex partner: Not on file     Emotionally abused: Not on file     Physically abused: Not on file     Forced sexual activity: Not on file   Other Topics Concern    Not on file   Social History Narrative    Not on file     Allergies   Allergen Reactions    Biaxin [Clarithromycin] GI Intolerance    Cephalosporins     Darvon [Propoxyphene]     Other     Wound Dressing Adhesive     Cefzil [Cefprozil] Rash    Ciprofloxacin Rash     Current Outpatient Medications on File Prior to Visit   Medication Sig Dispense Refill    acetaminophen (TYLENOL) 325 mg tablet 650 mg every 4 to 6 hours as needed for pain  30 tablet 0    bismuth subsalicylate (PEPTO BISMOL) 262 MG chewable tablet Chew 262 mg 2 (two) times a day as needed for indigestion      Cholecalciferol (VITAMIN D3) 5000 UNITS CAPS Take 1 capsule by mouth daily   cholestyramine (QUESTRAN) 4 g packet Take 1 packet (4 g total) by mouth 3 (three) times a day with meals Take 1 a day, can increase to 3 a day  Take 2 hrs apart from other meds 60 packet 11    escitalopram (LEXAPRO) 10 mg tablet Take 10 mg by mouth daily   famotidine (PEPCID) 40 MG tablet TAKE 1 TABLET BY MOUTH EVERY DAY 90 tablet 1    propranolol (INDERAL) 60 mg tablet Take 60 mg by mouth daily      simvastatin (ZOCOR) 40 mg tablet Take 40 mg by mouth daily at bedtime        ondansetron (ZOFRAN-ODT) 8 mg disintegrating tablet Take 1 tablet by mouth every 8 (eight) hours as needed for nausea or vomiting for up to 10 days 20 tablet 0     No current facility-administered medications on file prior to visit  Review of Systems       Objective:   Physical Exam  /66 (BP Location: Left arm, Patient Position: Sitting, Cuff Size: Large)   Pulse 65   Temp 97 7 °F (36 5 °C)   Ht 5' 5" (1 651 m)   Wt 74 8 kg (165 lb)   SpO2 94%   BMI 27 46 kg/m²      Nm Pet Ct Skull Base To Mid Thigh    Result Date: 10/2/2019  Narrative PET/CT SCAN INDICATION: Abnormal chest CT  Enlarging groundglass nodule  D38 1: Neoplasm of uncertain behavior of trachea, bronchus and lung MODIFIER: PI COMPARISON: CT chest exam 9/17/2019, CT abdomen pelvis 11/26/2017 and additional priors CELL TYPE:  N/A TECHNIQUE:   8 5 mCi F-18-FDG administered IV  Multiplanar attenuation corrected and non attenuation corrected PET images are available for interpretation, and contiguous, low dose, axial CT sections were obtained from the skull base through the femurs   Intravenous contrast material was not utilized  This examination, like all CT scans performed in the [facility], was performed utilizing techniques to minimize radiation dose exposure, including the use of iterative reconstruction and automated exposure control  Fasting serum glucose: 105 mg/dl FINDINGS: VISUALIZED BRAIN:   No acute abnormalities are seen  HEAD/NECK:   Asymmetric FDG uptake at the left posterior nasopharynx, SUV max of 7 2  No obvious findings here on CT  No FDG avid lymph nodes  CT images: Unremarkable  CHEST:   Mild FDG uptake noted at the 1 5 x 1 2 cm right upper lobe lateral groundglass nodule  SUV max here is 1 6, SUV max of 2 4 on the Q clear images  This is higher than background activity, SUV max of 0 7  This has demonstrated slow interval enlargement over time  On the 2/13/2017 CT examination, this measured 1 1 x 0 8 cm  No suspicious focal FDG uptake in the mediastinal or perihilar regions  CT images: Small hiatal hernia  Mild to moderate coronary artery calcifications  ABDOMEN:   No FDG avid soft tissue lesions are seen  CT images: Prior cholecystectomy  PELVIS: No FDG avid soft tissue lesions are seen  CT images: Bowel staple line at the sigmoid colon  2 2 cm right adnexal cyst without FDG uptake  This does appear new from a 2017 CT exam  OSSEOUS STRUCTURES: Slight focal FDG uptake at the left anterior 5th rib, SUV max of 1 3  No obvious findings here on CT, probably posttraumatic  Mild focal FDG uptake at the right iliac wing laterally where there is enthesopathy on CT, SUV max of 2 5  This is likely inflammatory  CT images: Prior posterior fusion and laminectomy in the cervical spine  Vertebral body hemangioma noted of L2  Impression 1  Mild FDG uptake noted in the 1 5 cm right upper lobe groundglass nodule, above lung background activity  Given the slow interval enlargement of this nodule, this is likely a low-grade adenocarcinoma  2    No findings suspicious for hypermetabolic metastasis  3   Asymmetric FDG uptake at the left posterior nasopharynx, SUV max of 7 2  No obvious findings here on CT  While this could be inflammatory an underlying lesion is not excluded  Correlate with direct visualization  4  Slight focal FDG uptake at the left anterior 5th rib  No obvious findings here on CT, favor posttraumatic  Correlate clinically  5   2 2 cm right adnexal cyst   No focal FDG uptake here but cystic neoplasms may not demonstrate significant FDG uptake  According to current guidelines (J Am Yoan Radiol 9063;33:528-928) in this late postmenopausal woman, this should be evaluated by pelvic ultrasound  The study was marked in EPIC for significant notification   Workstation performed: [id number]

## 2019-10-21 ENCOUNTER — OFFICE VISIT (OUTPATIENT)
Dept: LAB | Facility: HOSPITAL | Age: 74
DRG: 165 | End: 2019-10-21
Payer: COMMERCIAL

## 2019-10-21 ENCOUNTER — APPOINTMENT (OUTPATIENT)
Dept: LAB | Facility: HOSPITAL | Age: 74
DRG: 165 | End: 2019-10-21
Payer: COMMERCIAL

## 2019-10-21 ENCOUNTER — ANESTHESIA EVENT (INPATIENT)
Dept: PERIOP | Facility: HOSPITAL | Age: 74
DRG: 165 | End: 2019-10-21
Payer: COMMERCIAL

## 2019-10-21 DIAGNOSIS — R91.8 GROUND GLASS OPACITY PRESENT ON IMAGING OF LUNG: ICD-10-CM

## 2019-10-21 LAB
ABO GROUP BLD: NORMAL
ANION GAP SERPL CALCULATED.3IONS-SCNC: 8 MMOL/L (ref 4–13)
APTT PPP: 28 SECONDS (ref 23–37)
BLD GP AB SCN SERPL QL: NEGATIVE
BUN SERPL-MCNC: 25 MG/DL (ref 5–25)
CALCIUM SERPL-MCNC: 10 MG/DL (ref 8.3–10.1)
CHLORIDE SERPL-SCNC: 109 MMOL/L (ref 100–108)
CO2 SERPL-SCNC: 20 MMOL/L (ref 21–32)
CREAT SERPL-MCNC: 1.13 MG/DL (ref 0.6–1.3)
ERYTHROCYTE [DISTWIDTH] IN BLOOD BY AUTOMATED COUNT: 13.3 % (ref 11.6–15.1)
GFR SERPL CREATININE-BSD FRML MDRD: 48 ML/MIN/1.73SQ M
GLUCOSE P FAST SERPL-MCNC: 100 MG/DL (ref 65–99)
HCT VFR BLD AUTO: 42.3 % (ref 34.8–46.1)
HGB BLD-MCNC: 13.4 G/DL (ref 11.5–15.4)
INR PPP: 1.03 (ref 0.84–1.19)
MCH RBC QN AUTO: 30.1 PG (ref 26.8–34.3)
MCHC RBC AUTO-ENTMCNC: 31.7 G/DL (ref 31.4–37.4)
MCV RBC AUTO: 95 FL (ref 82–98)
PLATELET # BLD AUTO: 260 THOUSANDS/UL (ref 149–390)
PMV BLD AUTO: 10.4 FL (ref 8.9–12.7)
POTASSIUM SERPL-SCNC: 4.6 MMOL/L (ref 3.5–5.3)
PROTHROMBIN TIME: 13.1 SECONDS (ref 11.6–14.5)
RBC # BLD AUTO: 4.45 MILLION/UL (ref 3.81–5.12)
RH BLD: POSITIVE
SODIUM SERPL-SCNC: 137 MMOL/L (ref 136–145)
SPECIMEN EXPIRATION DATE: NORMAL
WBC # BLD AUTO: 11.27 THOUSAND/UL (ref 4.31–10.16)

## 2019-10-21 PROCEDURE — 93005 ELECTROCARDIOGRAM TRACING: CPT

## 2019-10-21 PROCEDURE — 85610 PROTHROMBIN TIME: CPT

## 2019-10-21 PROCEDURE — 86900 BLOOD TYPING SEROLOGIC ABO: CPT

## 2019-10-21 PROCEDURE — 85027 COMPLETE CBC AUTOMATED: CPT

## 2019-10-21 PROCEDURE — 80048 BASIC METABOLIC PNL TOTAL CA: CPT

## 2019-10-21 PROCEDURE — 86850 RBC ANTIBODY SCREEN: CPT

## 2019-10-21 PROCEDURE — 36415 COLL VENOUS BLD VENIPUNCTURE: CPT

## 2019-10-21 PROCEDURE — 85730 THROMBOPLASTIN TIME PARTIAL: CPT

## 2019-10-21 PROCEDURE — 86901 BLOOD TYPING SEROLOGIC RH(D): CPT

## 2019-10-21 NOTE — PRE-PROCEDURE INSTRUCTIONS
Pre-Surgery Instructions:   Medication Instructions    acetaminophen (TYLENOL) 325 mg tablet Instructed patient per Anesthesia Guidelines   bismuth subsalicylate (PEPTO BISMOL) 262 MG chewable tablet Instructed patient per Anesthesia Guidelines   Cholecalciferol (VITAMIN D3) 5000 UNITS CAPS Instructed patient per Anesthesia Guidelines   cholestyramine (QUESTRAN) 4 g packet Instructed patient per Anesthesia Guidelines   escitalopram (LEXAPRO) 10 mg tablet Instructed patient per Anesthesia Guidelines   famotidine (PEPCID) 40 MG tablet Instructed patient per Anesthesia Guidelines   ondansetron (ZOFRAN-ODT) 8 mg disintegrating tablet Instructed patient per Anesthesia Guidelines   propranolol (INDERAL) 60 mg tablet Instructed patient per Anesthesia Guidelines   simvastatin (ZOCOR) 40 mg tablet Instructed patient per Anesthesia Guidelines

## 2019-10-21 NOTE — PRE-PROCEDURE INSTRUCTIONS
Pre-Surgery Instructions:   Medication Instructions    acetaminophen (TYLENOL) 325 mg tablet Instructed patient per Anesthesia Guidelines   bismuth subsalicylate (PEPTO BISMOL) 262 MG chewable tablet Instructed patient per Anesthesia Guidelines   Cholecalciferol (VITAMIN D3) 5000 UNITS CAPS Instructed patient per Anesthesia Guidelines   cholestyramine (QUESTRAN) 4 g packet Instructed patient per Anesthesia Guidelines   escitalopram (LEXAPRO) 10 mg tablet Instructed patient per Anesthesia Guidelines   famotidine (PEPCID) 40 MG tablet Instructed patient per Anesthesia Guidelines   ondansetron (ZOFRAN-ODT) 8 mg disintegrating tablet Instructed patient per Anesthesia Guidelines   propranolol (INDERAL) 60 mg tablet Instructed patient per Anesthesia Guidelines   simvastatin (ZOCOR) 40 mg tablet Instructed patient per Anesthesia Guidelines  Pt instructed to drink as much carb ensure as she can due to her nissen surgery in the past   Pt received shower instructions from surgeons office  Pt instructed to take RX meds only on day of surgery with 1-2 sips of water

## 2019-10-23 LAB
ATRIAL RATE: 68 BPM
P AXIS: 19 DEGREES
PR INTERVAL: 134 MS
QRS AXIS: 20 DEGREES
QRSD INTERVAL: 72 MS
QT INTERVAL: 426 MS
QTC INTERVAL: 452 MS
T WAVE AXIS: 50 DEGREES
VENTRICULAR RATE: 68 BPM

## 2019-10-23 PROCEDURE — 93010 ELECTROCARDIOGRAM REPORT: CPT | Performed by: INTERNAL MEDICINE

## 2019-10-25 NOTE — H&P (VIEW-ONLY)
[facility]  OTOLARYNGOLOGY -   [address]  [id number]  1945    HISTORY & PHYSICAL    Chief Complaint   Patient presents with    Evaluation of Abnormal Diagnostic Test     f/u after PET scan        History of Present Illness: 53-year-old woman who presents for further evaluation of an abnormality on her PET CT scan  She has been following with thoracic surgery for a left upper lobe opacity  She says she is scheduled for surgery in the next few weeks  As part of the workup, she got a PET CT scan  There is some increased uptake in the nasopharynx, left, with an SUV of 7 2  On the corresponding CT scan, there is no soft tissue changes or evidence of mass  However, she is here for further evaluation of this  The patient states that she does have a lot of sinus drainage and mucus  She does clear her throat all the time  She has had a history of nosebleeds in the past mostly on the left side  She also states that she has a deviated septum  She has seen various ENT is in the past including Dr Sid Alston  She states that she had a scope exam back in the 1980s  She also has a history of reflux and had a Nissen fundoplication in 4762  She is still on the famotidine        Referring Provider: Tulio Ely MD  [address], [place], [place] [address]      Allergies   Allergen Reactions    Biaxin [Clarithromycin] GI Intolerance    Cephalosporins     Darvon [Propoxyphene]     Other     Wound Dressing Adhesive     Cefzil [Cefprozil] Rash    Ciprofloxacin Rash       Past Medical History:   Diagnosis Date    Colitis     Dehydration     Depression     Diabetes mellitus ([address] )     Familial tremor     GERD (gastroesophageal reflux disease)     Ground glass opacity present on imaging of lung     Hiatal hernia     High cholesterol     Hyperlipidemia     Hypertension     Hypoglycemia     Migraine     Nosebleed     Sleep apnea     C pap    Solitary pulmonary nodule     Syncope        Past Surgical History:   Procedure Laterality Date    APPENDECTOMY      BACK SURGERY      Cervical spine    BREAST BIOPSY      CHOLECYSTECTOMY      COLECTOMY      complete colectomy    ESOPHAGOGASTRODUODENOSCOPY N/A 1/22/2018    Procedure: ESOPHAGOGASTRODUODENOSCOPY (EGD); Surgeon: Tia Sauceda MD;  [facility]; Service: Gastroenterology    NISSEN FUNDOPLICATION      AL ESOPHAGOGASTRODUODENOSCOPY TRANSORAL DIAGNOSTIC N/A 4/17/2019    Procedure: ESOPHAGOGASTRODUODENOSCOPY (EGD); Surgeon: Tia Sauceda MD;  [facility]; Service: Gastroenterology    AL LAP, REPAIR PARAESOPHAGEAL HERNIA, INCL FUNDOPLASTY W/ MESH N/A 6/2/2016    Procedure: ROBOTIC LAPAROSCOPIC Daquan Fundoplication, Liver biopsy;  Surgeon: Kuldeep Collazo MD;  [facility];  Service: General       Family History   Problem Relation Age of Onset    Leukemia Mother 58    Colon cancer Father 79    Leukemia Brother 61        Social History     Tobacco Use    Smoking status: Never Smoker    Smokeless tobacco: Never Used   Substance Use Topics    Alcohol use: Not Currently    Drug use: No       Current Outpatient Medications on File Prior to Visit   Medication Sig Dispense Refill    acetaminophen (TYLENOL) 325 mg tablet 650 mg every 4 to 6 hours as needed for pain  30 tablet 0    bismuth subsalicylate (PEPTO BISMOL) 262 MG chewable tablet Chew 262 mg 2 (two) times a day as needed for indigestion      Cholecalciferol (VITAMIN D3) 5000 UNITS CAPS Take 1 capsule by mouth daily   cholestyramine (QUESTRAN) 4 g packet Take 1 packet (4 g total) by mouth 3 (three) times a day with meals Take 1 a day, can increase to 3 a day  Take 2 hrs apart from other meds (Patient taking differently: Take 1 packet by mouth as needed ) 60 packet 11    escitalopram (LEXAPRO) 10 mg tablet Take 10 mg by mouth daily        famotidine (PEPCID) 40 MG tablet TAKE 1 TABLET BY MOUTH EVERY DAY 90 tablet 1    ondansetron (ZOFRAN-ODT) 8 mg disintegrating tablet Take 1 tablet by mouth every 8 (eight) hours as needed for nausea or vomiting for up to 10 days 20 tablet 0    propranolol (INDERAL) 60 mg tablet Take 60 mg by mouth daily      simvastatin (ZOCOR) 40 mg tablet Take 40 mg by mouth daily at bedtime  No current facility-administered medications on file prior to visit  Review of Systems:  10-point ROS performed  Patient denies fevers or chills  All other systems reviewed and found to be negative unless otherwise noted in the HPI or MA note  Vitals:    10/25/19 1010   Weight: 72 6 kg (160 lb)   Height: 5' 5" (1 651 m)         General Appearance: No apparent distress    Head: Normocephalic, atraumatic  Face: Symmetric without obvious lesions  Eyes: Conjunctiva clear, extraocular movements are intact  Ears: Pinna normal shape and position  Canals are clear  TMs intact with no middle ear effusion  Nose: External pyramid midline  See procedure note  Oral cavity/Oropharynx: No mucosal lesions, masses, or pharyngeal asymmetry  Neck: No cervical lymphadenopathy or masses appreciated    Skin: Skin warm and dry  Neurological: Cranial nerves II to XII are intact  Respiratory: No stridor or labored breathing  Cardiovascular: Good perfusion of the upper extremities  No cyanosis of the fingers or hands  Psychiatric: Alert and oriented  Procedure: The patient was very sensitive  I sprayed her nose with Afrin and lidocaine, used Afrin and lidocaine cotton balls, and actually used tetracaine cotton balls as well as anesthetize the nose  At this point, she is able to let me pass the scope through the right side and along the nasal floor into the nasopharynx  In the nasopharynx, she did have a tuft of tissue in the left superior area  This was not exophytic, but did appear to be different than the surrounding tissue    It is unclear whether this is just remaining adenoid tissue versus an underlying lesion/mass  The patient tolerated the procedure well after adequate anesthesia  Data Reviewed: PET CT scan is reviewed  Indeed, there is some increased uptake in the left nasopharynx  The SUV is read as 7 2  The read states that this could be inflammatory in nature, but they could not rule out an underlying lesion  Assessment:  1  Chronic nasopharyngitis     2  Nasal congestion     3  PND (post-nasal drip)     4  Throat clearing     5  Ground glass opacity present on imaging of lung            Plan:  I counseled the patient that even though there is not a CT correlation on her PET scan with a mass or underlying lesion, that on my direct visualization I do see a tuft of tissue in the left superior area of the nasopharynx  I told her that we could continue to observe this or proceed with biopsy to get a more definitive answer  Because she is undergoing surgery on November 7 for her lung, I will try to add this on as a combination case  As she is already under general anesthesia, I will be able to perform the biopsy at the same time  I explain all the risks and benefits of the procedure and she wishes to proceed  We will follow-up at the time of surgery  Nathan Guzman MD  Otolaryngology - Head & Neck Surgery  [facility]      ** Please Note: Dictation voice to text software may have been used in the creation of this document   **

## 2019-10-28 PROBLEM — J39.2 NASOPHARYNGEAL MASS: Status: ACTIVE | Noted: 2019-10-28

## 2019-10-29 ENCOUNTER — OFFICE VISIT (OUTPATIENT)
Dept: CARDIAC SURGERY | Facility: CLINIC | Age: 74
End: 2019-10-29
Payer: COMMERCIAL

## 2019-10-29 VITALS
HEIGHT: 65 IN | OXYGEN SATURATION: 94 % | SYSTOLIC BLOOD PRESSURE: 125 MMHG | HEART RATE: 64 BPM | BODY MASS INDEX: 27.52 KG/M2 | TEMPERATURE: 98.5 F | WEIGHT: 165.2 LBS | DIASTOLIC BLOOD PRESSURE: 71 MMHG

## 2019-10-29 DIAGNOSIS — R91.8 GROUND GLASS OPACITY PRESENT ON IMAGING OF LUNG: Primary | ICD-10-CM

## 2019-10-29 PROBLEM — J39.2 OTHER DISEASES OF PHARYNX: Status: ACTIVE | Noted: 2019-10-08

## 2019-10-29 PROCEDURE — 99214 OFFICE O/P EST MOD 30 MIN: CPT | Performed by: THORACIC SURGERY (CARDIOTHORACIC VASCULAR SURGERY)

## 2019-10-29 NOTE — PROGRESS NOTES
Thoracic Follow-Up  Assessment/Plan:    Ground glass opacity present on imaging of lung  Ms Pimentel underwent an updated H and P today and all questions regarding her procedure were discussed  We will attempt to perform a diagnostic and therapeutic wedge resection, given her poor clinical pulmonary function  She underwent her blood work and EKG and is scheduled for 11/7/19  Diagnoses and all orders for this visit:    Ground glass opacity present on imaging of lung          Thoracic History       Diagnosis: Right upper lobe lung nodule        Patient ID: Ruby Schaefer is a 74 y o  female  HPI    Ms Pimentel is a 73 yo non smoking female who was originally referred by Dr Arianna Khan for a right upper lobe ground glass nodule found in April 2016  There was slight enlargement seen in February 2018, with no solid component  She was last seen on 10/8/19, at which time her PET from revealed mild uptake in the 1 5 x 1 2 cm right upper lobe nodule, along with some non-specific activity in the posterior nasopharynx and left anterior 5th rib, without a CT correlate  She also had a 2 2 cm right adnexal cyst without any uptake  PFT's from 10/4/19, demonstrated a FVC of 3 15L, 106% predicted, FEV1 of 2 43L, 108% predicted, and a DLCO of 55% predicted  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 and therefore the recommendation was a wedge resection, since her clinical lung function is impaired  We will use IR localization, since there is no solid component, which would make it difficult to palpate  The procedure has been scheduled for 11/7/19  She presents today for an updated H and P  On discussion, she is feeling fine  She continues with dyspnea on exertion, but denies fever, chills, cough, shortness of breath at rest, or hemoptysis  She was seen by ENT for her nasopharyngeal uptake on PET scan  They are planning on performing a biopsy during the procedure   She will follow up with her PCP or gynecologist for the adnexal cyst      Past Medical History:   Diagnosis Date    Colitis     Dehydration     Depression     Diabetes mellitus (HCC)     Familial tremor     GERD (gastroesophageal reflux disease)     Ground glass opacity present on imaging of lung     Hiatal hernia     High cholesterol     Hyperlipidemia     Hypertension     Hypoglycemia     Migraine     Nosebleed     Sleep apnea     C pap    Solitary pulmonary nodule     Syncope      Past Surgical History:   Procedure Laterality Date    APPENDECTOMY      BACK SURGERY      Cervical spine    BREAST BIOPSY      CHOLECYSTECTOMY      COLECTOMY      complete colectomy    ESOPHAGOGASTRODUODENOSCOPY N/A 1/22/2018    Procedure: ESOPHAGOGASTRODUODENOSCOPY (EGD); Surgeon: Jeannette Leslie MD;  [facility]; Service: Gastroenterology    NISSEN FUNDOPLICATION      GA ESOPHAGOGASTRODUODENOSCOPY TRANSORAL DIAGNOSTIC N/A 4/17/2019    Procedure: ESOPHAGOGASTRODUODENOSCOPY (EGD); Surgeon: Jeannette Leslie MD;  [facility]; Service: Gastroenterology    GA LAP, REPAIR PARAESOPHAGEAL HERNIA, INCL FUNDOPLASTY W/ MESH N/A 6/2/2016    Procedure: ROBOTIC LAPAROSCOPIC Daquan Fundoplication, Liver biopsy;  Surgeon: Jeet Castellanos MD;  [facility];  Service: General     Family History   Problem Relation Age of Onset    Leukemia Mother 58    Colon cancer Father 79    Leukemia Brother 61     Social History     Allergies   Allergen Reactions    Biaxin [Clarithromycin] GI Intolerance    Cephalosporins     Darvon [Propoxyphene]     Latex      Added based on information entered during case entry, please review and add reactions, type, and severity as needed    Other     Wound Dressing Adhesive     Cefzil [Cefprozil] Rash    Ciprofloxacin Rash     Current Outpatient Medications on File Prior to Visit   Medication Sig Dispense Refill    acetaminophen (TYLENOL) 325 mg tablet 650 mg every 4 to 6 hours as needed for pain   30 tablet 0    bismuth subsalicylate (PEPTO BISMOL) 262 MG chewable tablet Chew 262 mg 2 (two) times a day as needed for indigestion      Cholecalciferol (VITAMIN D3) 5000 UNITS CAPS Take 1 capsule by mouth daily   cholestyramine (QUESTRAN) 4 g packet Take 1 packet (4 g total) by mouth 3 (three) times a day with meals Take 1 a day, can increase to 3 a day  Take 2 hrs apart from other meds (Patient taking differently: Take 1 packet by mouth as needed ) 60 packet 11    escitalopram (LEXAPRO) 10 mg tablet Take 10 mg by mouth daily   famotidine (PEPCID) 40 MG tablet TAKE 1 TABLET BY MOUTH EVERY DAY 90 tablet 1    ondansetron (ZOFRAN-ODT) 8 mg disintegrating tablet Take 1 tablet by mouth every 8 (eight) hours as needed for nausea or vomiting for up to 10 days 20 tablet 0    propranolol (INDERAL) 60 mg tablet Take 60 mg by mouth daily      simvastatin (ZOCOR) 40 mg tablet Take 40 mg by mouth daily at bedtime  No current facility-administered medications on file prior to visit  Review of Systems   Constitutional: Negative for chills, fever and unexpected weight change  HENT: Negative for congestion, trouble swallowing and voice change  Respiratory: Positive for shortness of breath  Negative for cough and wheezing  Cardiovascular: Negative for chest pain  Gastrointestinal: Negative for nausea and vomiting  Musculoskeletal: Negative for back pain and gait problem  Neurological: Negative for weakness and headaches  Psychiatric/Behavioral: Negative for agitation, behavioral problems and confusion  All other systems reviewed and are negative  Objective:   Physical Exam   Constitutional: She is oriented to person, place, and time  She appears well-developed and well-nourished  HENT:   Head: Normocephalic and atraumatic  Mouth/Throat: No oropharyngeal exudate  Eyes: Pupils are equal, round, and reactive to light  EOM are normal  No scleral icterus    + glasses   Neck: Normal range of motion  Neck supple  Cardiovascular: Normal rate, regular rhythm and normal heart sounds  Pulmonary/Chest: Effort normal and breath sounds normal  No respiratory distress  She has no wheezes  Abdominal: Soft  She exhibits no distension  Musculoskeletal: Normal range of motion  Neurological: She is alert and oriented to person, place, and time  Skin: Skin is warm and dry  Psychiatric: She has a normal mood and affect  Her behavior is normal    Vitals reviewed  /71 (BP Location: Left arm, Patient Position: Sitting, Cuff Size: Adult)   Pulse 64   Temp 98 5 °F (36 9 °C)   Ht 5' 5" (1 651 m)   Wt 74 9 kg (165 lb 3 2 oz)   SpO2 94%   BMI 27 49 kg/m²       Nm Pet Ct Skull Base To Mid Thigh    Result Date: 10/2/2019  Narrative PET/CT SCAN INDICATION: Abnormal chest CT  Enlarging groundglass nodule  D38 1: Neoplasm of uncertain behavior of trachea, bronchus and lung MODIFIER: PI COMPARISON: CT chest exam 9/17/2019, CT abdomen pelvis 11/26/2017 and additional priors CELL TYPE:  N/A TECHNIQUE:   8 5 mCi F-18-FDG administered IV  Multiplanar attenuation corrected and non attenuation corrected PET images are available for interpretation, and contiguous, low dose, axial CT sections were obtained from the skull base through the femurs   Intravenous contrast material was not utilized  This examination, like all CT scans performed in the [facility], was performed utilizing techniques to minimize radiation dose exposure, including the use of iterative reconstruction and automated exposure control  Fasting serum glucose: 105 mg/dl FINDINGS: VISUALIZED BRAIN:   No acute abnormalities are seen  HEAD/NECK:   Asymmetric FDG uptake at the left posterior nasopharynx, SUV max of 7 2  No obvious findings here on CT  No FDG avid lymph nodes  CT images: Unremarkable  CHEST:   Mild FDG uptake noted at the 1 5 x 1 2 cm right upper lobe lateral groundglass nodule    SUV max here is 1 6, SUV max of 2 4 on the Q clear images  This is higher than background activity, SUV max of 0 7  This has demonstrated slow interval enlargement over time  On the 2/13/2017 CT examination, this measured 1 1 x 0 8 cm  No suspicious focal FDG uptake in the mediastinal or perihilar regions  CT images: Small hiatal hernia  Mild to moderate coronary artery calcifications  ABDOMEN:   No FDG avid soft tissue lesions are seen  CT images: Prior cholecystectomy  PELVIS: No FDG avid soft tissue lesions are seen  CT images: Bowel staple line at the sigmoid colon  2 2 cm right adnexal cyst without FDG uptake  This does appear new from a 2017 CT exam  OSSEOUS STRUCTURES: Slight focal FDG uptake at the left anterior 5th rib, SUV max of 1 3  No obvious findings here on CT, probably posttraumatic  Mild focal FDG uptake at the right iliac wing laterally where there is enthesopathy on CT, SUV max of 2 5  This is likely inflammatory  CT images: Prior posterior fusion and laminectomy in the cervical spine  Vertebral body hemangioma noted of L2  Impression 1  Mild FDG uptake noted in the 1 5 cm right upper lobe groundglass nodule, above lung background activity  Given the slow interval enlargement of this nodule, this is likely a low-grade adenocarcinoma  2    No findings suspicious for hypermetabolic metastasis  3   Asymmetric FDG uptake at the left posterior nasopharynx, SUV max of 7 2  No obvious findings here on CT  While this could be inflammatory an underlying lesion is not excluded  Correlate with direct visualization  4  Slight focal FDG uptake at the left anterior 5th rib  No obvious findings here on CT, favor posttraumatic  Correlate clinically  5   2 2 cm right adnexal cyst   No focal FDG uptake here but cystic neoplasms may not demonstrate significant FDG uptake  According to current guidelines (J Am Yoan Radiol 4003;78:689-331) in this late postmenopausal woman, this should be evaluated by pelvic ultrasound   The study was marked in EPIC for significant notification   Workstation performed: [id number]

## 2019-10-29 NOTE — ASSESSMENT & PLAN NOTE
Ms Angela Greene underwent an updated H and P today and all questions regarding her procedure were discussed  We will attempt to perform a diagnostic and therapeutic wedge resection, given her poor clinical pulmonary function  She underwent her blood work and EKG and is scheduled for 11/7/19

## 2019-11-04 RX ORDER — SODIUM CHLORIDE 9 MG/ML
75 INJECTION, SOLUTION INTRAVENOUS CONTINUOUS
Status: CANCELLED | OUTPATIENT
Start: 2019-11-04

## 2019-11-06 ENCOUNTER — ANESTHESIA EVENT (INPATIENT)
Dept: RADIOLOGY | Facility: HOSPITAL | Age: 74
DRG: 165 | End: 2019-11-06
Payer: COMMERCIAL

## 2019-11-06 ENCOUNTER — TELEPHONE (OUTPATIENT)
Dept: SURGERY | Facility: HOSPITAL | Age: 74
End: 2019-11-06

## 2019-11-07 ENCOUNTER — ANESTHESIA (INPATIENT)
Dept: RADIOLOGY | Facility: HOSPITAL | Age: 74
DRG: 165 | End: 2019-11-07
Payer: COMMERCIAL

## 2019-11-07 ENCOUNTER — APPOINTMENT (INPATIENT)
Dept: RADIOLOGY | Facility: HOSPITAL | Age: 74
DRG: 165 | End: 2019-11-07
Payer: COMMERCIAL

## 2019-11-07 ENCOUNTER — HOSPITAL ENCOUNTER (INPATIENT)
Facility: HOSPITAL | Age: 74
LOS: 1 days | Discharge: HOME | DRG: 165 | End: 2019-11-08
Attending: THORACIC SURGERY (CARDIOTHORACIC VASCULAR SURGERY) | Admitting: THORACIC SURGERY (CARDIOTHORACIC VASCULAR SURGERY)
Payer: COMMERCIAL

## 2019-11-07 ENCOUNTER — HOSPITAL ENCOUNTER (OUTPATIENT)
Dept: RADIOLOGY | Facility: HOSPITAL | Age: 74
Discharge: HOME | DRG: 165 | End: 2019-11-07
Attending: THORACIC SURGERY (CARDIOTHORACIC VASCULAR SURGERY)
Payer: COMMERCIAL

## 2019-11-07 ENCOUNTER — ANESTHESIA (INPATIENT)
Dept: PERIOP | Facility: HOSPITAL | Age: 74
DRG: 165 | End: 2019-11-07
Payer: COMMERCIAL

## 2019-11-07 DIAGNOSIS — R91.8 GROUND GLASS OPACITY PRESENT ON IMAGING OF LUNG: ICD-10-CM

## 2019-11-07 DIAGNOSIS — R91.1 LUNG NODULE: ICD-10-CM

## 2019-11-07 DIAGNOSIS — R10.13 EPIGASTRIC PAIN: ICD-10-CM

## 2019-11-07 DIAGNOSIS — R91.1 NODULE OF LEFT LUNG: ICD-10-CM

## 2019-11-07 DIAGNOSIS — J39.2 OTHER DISEASES OF PHARYNX: ICD-10-CM

## 2019-11-07 DIAGNOSIS — N28.9 RENAL INSUFFICIENCY: Primary | ICD-10-CM

## 2019-11-07 LAB
ANION GAP SERPL CALCULATED.3IONS-SCNC: 6 MMOL/L (ref 4–13)
BUN SERPL-MCNC: 18 MG/DL (ref 5–25)
CALCIUM SERPL-MCNC: 9 MG/DL (ref 8.3–10.1)
CHLORIDE SERPL-SCNC: 107 MMOL/L (ref 100–108)
CO2 SERPL-SCNC: 23 MMOL/L (ref 21–32)
CREAT SERPL-MCNC: 1.15 MG/DL (ref 0.6–1.3)
ERYTHROCYTE [DISTWIDTH] IN BLOOD BY AUTOMATED COUNT: 13.5 % (ref 11.6–15.1)
GFR SERPL CREATININE-BSD FRML MDRD: 46 ML/MIN/1.73SQ M
GLUCOSE SERPL-MCNC: 104 MG/DL (ref 65–140)
GLUCOSE SERPL-MCNC: 149 MG/DL (ref 65–140)
GLUCOSE SERPL-MCNC: 150 MG/DL (ref 65–140)
GLUCOSE SERPL-MCNC: 156 MG/DL (ref 65–140)
GLUCOSE SERPL-MCNC: 164 MG/DL (ref 65–140)
HCT VFR BLD AUTO: 40.8 % (ref 34.8–46.1)
HGB BLD-MCNC: 12.7 G/DL (ref 11.5–15.4)
MAGNESIUM SERPL-MCNC: 2.2 MG/DL (ref 1.6–2.6)
MCH RBC QN AUTO: 30.6 PG (ref 26.8–34.3)
MCHC RBC AUTO-ENTMCNC: 31.1 G/DL (ref 31.4–37.4)
MCV RBC AUTO: 98 FL (ref 82–98)
PLATELET # BLD AUTO: 265 THOUSANDS/UL (ref 149–390)
PMV BLD AUTO: 9.5 FL (ref 8.9–12.7)
POTASSIUM SERPL-SCNC: 5 MMOL/L (ref 3.5–5.3)
RBC # BLD AUTO: 4.15 MILLION/UL (ref 3.81–5.12)
SODIUM SERPL-SCNC: 136 MMOL/L (ref 136–145)
WBC # BLD AUTO: 15.06 THOUSAND/UL (ref 4.31–10.16)

## 2019-11-07 PROCEDURE — 85027 COMPLETE CBC AUTOMATED: CPT | Performed by: PHYSICIAN ASSISTANT

## 2019-11-07 PROCEDURE — 09BN7ZX EXCISION OF NASOPHARYNX, VIA NATURAL OR ARTIFICIAL OPENING, DIAGNOSTIC: ICD-10-PCS | Performed by: OTOLARYNGOLOGY

## 2019-11-07 PROCEDURE — 0BBC4ZZ EXCISION OF RIGHT UPPER LUNG LOBE, PERCUTANEOUS ENDOSCOPIC APPROACH: ICD-10-PCS | Performed by: OTOLARYNGOLOGY

## 2019-11-07 PROCEDURE — 88342 IMHCHEM/IMCYTCHM 1ST ANTB: CPT | Performed by: PATHOLOGY

## 2019-11-07 PROCEDURE — 88333 PATH CONSLTJ SURG CYTO XM 1: CPT | Performed by: PATHOLOGY

## 2019-11-07 PROCEDURE — 94660 CPAP INITIATION&MGMT: CPT

## 2019-11-07 PROCEDURE — 83735 ASSAY OF MAGNESIUM: CPT | Performed by: PHYSICIAN ASSISTANT

## 2019-11-07 PROCEDURE — 32553 INS MARK THOR FOR RT PERQ: CPT

## 2019-11-07 PROCEDURE — 88307 TISSUE EXAM BY PATHOLOGIST: CPT | Performed by: PATHOLOGY

## 2019-11-07 PROCEDURE — 32666 THORACOSCOPY W/WEDGE RESECT: CPT | Performed by: THORACIC SURGERY (CARDIOTHORACIC VASCULAR SURGERY)

## 2019-11-07 PROCEDURE — 31237 NSL/SINS NDSC SURG BX POLYPC: CPT | Performed by: OTOLARYNGOLOGY

## 2019-11-07 PROCEDURE — 80048 BASIC METABOLIC PNL TOTAL CA: CPT | Performed by: PHYSICIAN ASSISTANT

## 2019-11-07 PROCEDURE — 0BBC4ZX EXCISION OF RIGHT UPPER LUNG LOBE, PERCUTANEOUS ENDOSCOPIC APPROACH, DIAGNOSTIC: ICD-10-PCS | Performed by: OTOLARYNGOLOGY

## 2019-11-07 PROCEDURE — 10035 PLMT SFT TISS LOCLZJ DEV 1ST: CPT | Performed by: RADIOLOGY

## 2019-11-07 PROCEDURE — 88331 PATH CONSLTJ SURG 1 BLK 1SPC: CPT | Performed by: PATHOLOGY

## 2019-11-07 PROCEDURE — C9290 INJ, BUPIVACAINE LIPOSOME: HCPCS | Performed by: THORACIC SURGERY (CARDIOTHORACIC VASCULAR SURGERY)

## 2019-11-07 PROCEDURE — 94760 N-INVAS EAR/PLS OXIMETRY 1: CPT

## 2019-11-07 PROCEDURE — 88341 IMHCHEM/IMCYTCHM EA ADD ANTB: CPT | Performed by: PATHOLOGY

## 2019-11-07 PROCEDURE — 0B9C3ZX DRAINAGE OF RIGHT UPPER LUNG LOBE, PERCUTANEOUS APPROACH, DIAGNOSTIC: ICD-10-PCS | Performed by: RADIOLOGY

## 2019-11-07 PROCEDURE — 38792 RA TRACER ID OF SENTINL NODE: CPT

## 2019-11-07 PROCEDURE — 82948 REAGENT STRIP/BLOOD GLUCOSE: CPT

## 2019-11-07 PROCEDURE — 88305 TISSUE EXAM BY PATHOLOGIST: CPT | Performed by: PATHOLOGY

## 2019-11-07 PROCEDURE — A9540 TC99M MAA: HCPCS

## 2019-11-07 PROCEDURE — 77012 CT SCAN FOR NEEDLE BIOPSY: CPT

## 2019-11-07 PROCEDURE — 88313 SPECIAL STAINS GROUP 2: CPT | Performed by: PATHOLOGY

## 2019-11-07 PROCEDURE — 71045 X-RAY EXAM CHEST 1 VIEW: CPT

## 2019-11-07 RX ORDER — PANTOPRAZOLE SODIUM 40 MG/1
40 TABLET, DELAYED RELEASE ORAL
Status: DISCONTINUED | OUTPATIENT
Start: 2019-11-08 | End: 2019-11-08 | Stop reason: HOSPADM

## 2019-11-07 RX ORDER — ROCURONIUM BROMIDE 10 MG/ML
INJECTION, SOLUTION INTRAVENOUS AS NEEDED
Status: DISCONTINUED | OUTPATIENT
Start: 2019-11-07 | End: 2019-11-07 | Stop reason: SURG

## 2019-11-07 RX ORDER — SODIUM CHLORIDE 9 MG/ML
INJECTION INTRAVENOUS AS NEEDED
Status: DISCONTINUED | OUTPATIENT
Start: 2019-11-07 | End: 2019-11-07 | Stop reason: HOSPADM

## 2019-11-07 RX ORDER — MIDAZOLAM HYDROCHLORIDE 2 MG/2ML
INJECTION, SOLUTION INTRAMUSCULAR; INTRAVENOUS AS NEEDED
Status: DISCONTINUED | OUTPATIENT
Start: 2019-11-07 | End: 2019-11-07 | Stop reason: SURG

## 2019-11-07 RX ORDER — GABAPENTIN 300 MG/1
300 CAPSULE ORAL 3 TIMES DAILY
Status: DISCONTINUED | OUTPATIENT
Start: 2019-11-07 | End: 2019-11-08 | Stop reason: HOSPADM

## 2019-11-07 RX ORDER — CEFAZOLIN SODIUM 1 G/50ML
1000 SOLUTION INTRAVENOUS ONCE
Status: COMPLETED | OUTPATIENT
Start: 2019-11-07 | End: 2019-11-07

## 2019-11-07 RX ORDER — OXYCODONE HYDROCHLORIDE 5 MG/1
5 TABLET ORAL EVERY 4 HOURS PRN
Status: DISCONTINUED | OUTPATIENT
Start: 2019-11-07 | End: 2019-11-08 | Stop reason: HOSPADM

## 2019-11-07 RX ORDER — LIDOCAINE HYDROCHLORIDE 10 MG/ML
INJECTION, SOLUTION INFILTRATION; PERINEURAL AS NEEDED
Status: DISCONTINUED | OUTPATIENT
Start: 2019-11-07 | End: 2019-11-07 | Stop reason: SURG

## 2019-11-07 RX ORDER — DIPHENHYDRAMINE HYDROCHLORIDE 50 MG/ML
INJECTION INTRAMUSCULAR; INTRAVENOUS AS NEEDED
Status: DISCONTINUED | OUTPATIENT
Start: 2019-11-07 | End: 2019-11-07 | Stop reason: SURG

## 2019-11-07 RX ORDER — HYDROMORPHONE HCL/PF 1 MG/ML
0.5 SYRINGE (ML) INJECTION EVERY 4 HOURS PRN
Status: DISCONTINUED | OUTPATIENT
Start: 2019-11-07 | End: 2019-11-08 | Stop reason: HOSPADM

## 2019-11-07 RX ORDER — DEXAMETHASONE SODIUM PHOSPHATE 10 MG/ML
INJECTION, SOLUTION INTRAMUSCULAR; INTRAVENOUS AS NEEDED
Status: DISCONTINUED | OUTPATIENT
Start: 2019-11-07 | End: 2019-11-07 | Stop reason: SURG

## 2019-11-07 RX ORDER — ACETAMINOPHEN 325 MG/1
975 TABLET ORAL EVERY 6 HOURS SCHEDULED
Status: DISCONTINUED | OUTPATIENT
Start: 2019-11-07 | End: 2019-11-08 | Stop reason: HOSPADM

## 2019-11-07 RX ORDER — PROMETHAZINE HYDROCHLORIDE 25 MG/ML
12.5 INJECTION, SOLUTION INTRAMUSCULAR; INTRAVENOUS
Status: DISCONTINUED | OUTPATIENT
Start: 2019-11-07 | End: 2019-11-07

## 2019-11-07 RX ORDER — ONDANSETRON 2 MG/ML
INJECTION INTRAMUSCULAR; INTRAVENOUS AS NEEDED
Status: DISCONTINUED | OUTPATIENT
Start: 2019-11-07 | End: 2019-11-07 | Stop reason: SURG

## 2019-11-07 RX ORDER — SODIUM CHLORIDE, SODIUM LACTATE, POTASSIUM CHLORIDE, CALCIUM CHLORIDE 600; 310; 30; 20 MG/100ML; MG/100ML; MG/100ML; MG/100ML
75 INJECTION, SOLUTION INTRAVENOUS CONTINUOUS
Status: DISCONTINUED | OUTPATIENT
Start: 2019-11-07 | End: 2019-11-07

## 2019-11-07 RX ORDER — LIDOCAINE HYDROCHLORIDE AND EPINEPHRINE 10; 10 MG/ML; UG/ML
INJECTION, SOLUTION INFILTRATION; PERINEURAL AS NEEDED
Status: DISCONTINUED | OUTPATIENT
Start: 2019-11-07 | End: 2019-11-07 | Stop reason: HOSPADM

## 2019-11-07 RX ORDER — LABETALOL 20 MG/4 ML (5 MG/ML) INTRAVENOUS SYRINGE
AS NEEDED
Status: DISCONTINUED | OUTPATIENT
Start: 2019-11-07 | End: 2019-11-07 | Stop reason: SURG

## 2019-11-07 RX ORDER — SCOLOPAMINE TRANSDERMAL SYSTEM 1 MG/1
1 PATCH, EXTENDED RELEASE TRANSDERMAL
Status: DISCONTINUED | OUTPATIENT
Start: 2019-11-07 | End: 2019-11-07

## 2019-11-07 RX ORDER — POLYETHYLENE GLYCOL 3350 17 G/17G
17 POWDER, FOR SOLUTION ORAL DAILY
Status: DISCONTINUED | OUTPATIENT
Start: 2019-11-07 | End: 2019-11-08 | Stop reason: HOSPADM

## 2019-11-07 RX ORDER — ACETAMINOPHEN 325 MG/1
975 TABLET ORAL ONCE
Status: COMPLETED | OUTPATIENT
Start: 2019-11-07 | End: 2019-11-07

## 2019-11-07 RX ORDER — FENTANYL CITRATE 50 UG/ML
INJECTION, SOLUTION INTRAMUSCULAR; INTRAVENOUS AS NEEDED
Status: DISCONTINUED | OUTPATIENT
Start: 2019-11-07 | End: 2019-11-07 | Stop reason: SURG

## 2019-11-07 RX ORDER — SUCCINYLCHOLINE/SOD CL,ISO/PF 100 MG/5ML
SYRINGE (ML) INTRAVENOUS AS NEEDED
Status: DISCONTINUED | OUTPATIENT
Start: 2019-11-07 | End: 2019-11-07 | Stop reason: SURG

## 2019-11-07 RX ORDER — SODIUM CHLORIDE, SODIUM LACTATE, POTASSIUM CHLORIDE, CALCIUM CHLORIDE 600; 310; 30; 20 MG/100ML; MG/100ML; MG/100ML; MG/100ML
60 INJECTION, SOLUTION INTRAVENOUS CONTINUOUS
Status: DISCONTINUED | OUTPATIENT
Start: 2019-11-07 | End: 2019-11-08

## 2019-11-07 RX ORDER — NEOSTIGMINE METHYLSULFATE 1 MG/ML
INJECTION INTRAVENOUS AS NEEDED
Status: DISCONTINUED | OUTPATIENT
Start: 2019-11-07 | End: 2019-11-07 | Stop reason: SURG

## 2019-11-07 RX ORDER — HYDROMORPHONE HCL/PF 1 MG/ML
0.25 SYRINGE (ML) INJECTION
Status: DISCONTINUED | OUTPATIENT
Start: 2019-11-07 | End: 2019-11-07 | Stop reason: HOSPADM

## 2019-11-07 RX ORDER — PROPOFOL 10 MG/ML
INJECTION, EMULSION INTRAVENOUS AS NEEDED
Status: DISCONTINUED | OUTPATIENT
Start: 2019-11-07 | End: 2019-11-07 | Stop reason: SURG

## 2019-11-07 RX ORDER — SODIUM CHLORIDE 9 MG/ML
INJECTION, SOLUTION INTRAVENOUS CONTINUOUS PRN
Status: DISCONTINUED | OUTPATIENT
Start: 2019-11-07 | End: 2019-11-07 | Stop reason: SURG

## 2019-11-07 RX ORDER — LIDOCAINE HYDROCHLORIDE 10 MG/ML
0.5 INJECTION, SOLUTION EPIDURAL; INFILTRATION; INTRACAUDAL; PERINEURAL ONCE AS NEEDED
Status: DISCONTINUED | OUTPATIENT
Start: 2019-11-07 | End: 2019-11-07 | Stop reason: HOSPADM

## 2019-11-07 RX ORDER — GLYCOPYRROLATE 0.2 MG/ML
INJECTION INTRAMUSCULAR; INTRAVENOUS AS NEEDED
Status: DISCONTINUED | OUTPATIENT
Start: 2019-11-07 | End: 2019-11-07 | Stop reason: SURG

## 2019-11-07 RX ORDER — ALBUTEROL SULFATE 2.5 MG/3ML
2.5 SOLUTION RESPIRATORY (INHALATION) AS NEEDED
Status: DISCONTINUED | OUTPATIENT
Start: 2019-11-07 | End: 2019-11-07 | Stop reason: HOSPADM

## 2019-11-07 RX ORDER — ONDANSETRON 2 MG/ML
4 INJECTION INTRAMUSCULAR; INTRAVENOUS EVERY 6 HOURS PRN
Status: DISCONTINUED | OUTPATIENT
Start: 2019-11-07 | End: 2019-11-08 | Stop reason: HOSPADM

## 2019-11-07 RX ORDER — PROMETHAZINE HYDROCHLORIDE 25 MG/ML
12.5 INJECTION, SOLUTION INTRAMUSCULAR; INTRAVENOUS
Status: DISCONTINUED | OUTPATIENT
Start: 2019-11-07 | End: 2019-11-07 | Stop reason: HOSPADM

## 2019-11-07 RX ORDER — SODIUM CHLORIDE, SODIUM LACTATE, POTASSIUM CHLORIDE, CALCIUM CHLORIDE 600; 310; 30; 20 MG/100ML; MG/100ML; MG/100ML; MG/100ML
INJECTION, SOLUTION INTRAVENOUS CONTINUOUS PRN
Status: DISCONTINUED | OUTPATIENT
Start: 2019-11-07 | End: 2019-11-07 | Stop reason: SURG

## 2019-11-07 RX ORDER — ONDANSETRON 2 MG/ML
4 INJECTION INTRAMUSCULAR; INTRAVENOUS ONCE AS NEEDED
Status: COMPLETED | OUTPATIENT
Start: 2019-11-07 | End: 2019-11-07

## 2019-11-07 RX ORDER — DOCUSATE SODIUM 100 MG/1
100 CAPSULE, LIQUID FILLED ORAL 2 TIMES DAILY
Status: DISCONTINUED | OUTPATIENT
Start: 2019-11-07 | End: 2019-11-08 | Stop reason: HOSPADM

## 2019-11-07 RX ORDER — OXYMETAZOLINE HYDROCHLORIDE 0.05 G/100ML
SPRAY NASAL AS NEEDED
Status: DISCONTINUED | OUTPATIENT
Start: 2019-11-07 | End: 2019-11-07 | Stop reason: HOSPADM

## 2019-11-07 RX ORDER — SODIUM CHLORIDE, SODIUM LACTATE, POTASSIUM CHLORIDE, CALCIUM CHLORIDE 600; 310; 30; 20 MG/100ML; MG/100ML; MG/100ML; MG/100ML
125 INJECTION, SOLUTION INTRAVENOUS CONTINUOUS
Status: DISCONTINUED | OUTPATIENT
Start: 2019-11-07 | End: 2019-11-07

## 2019-11-07 RX ORDER — PROPOFOL 10 MG/ML
INJECTION, EMULSION INTRAVENOUS CONTINUOUS PRN
Status: DISCONTINUED | OUTPATIENT
Start: 2019-11-07 | End: 2019-11-07 | Stop reason: SURG

## 2019-11-07 RX ORDER — GABAPENTIN 300 MG/1
600 CAPSULE ORAL ONCE
Status: COMPLETED | OUTPATIENT
Start: 2019-11-07 | End: 2019-11-07

## 2019-11-07 RX ORDER — ESCITALOPRAM OXALATE 10 MG/1
10 TABLET ORAL DAILY
Status: DISCONTINUED | OUTPATIENT
Start: 2019-11-07 | End: 2019-11-08 | Stop reason: HOSPADM

## 2019-11-07 RX ORDER — PROPRANOLOL HYDROCHLORIDE 20 MG/1
60 TABLET ORAL DAILY
Status: DISCONTINUED | OUTPATIENT
Start: 2019-11-07 | End: 2019-11-08 | Stop reason: HOSPADM

## 2019-11-07 RX ORDER — HEPARIN SODIUM 5000 [USP'U]/ML
5000 INJECTION, SOLUTION INTRAVENOUS; SUBCUTANEOUS
Status: DISCONTINUED | OUTPATIENT
Start: 2019-11-07 | End: 2019-11-07 | Stop reason: HOSPADM

## 2019-11-07 RX ORDER — BUPIVACAINE HYDROCHLORIDE 2.5 MG/ML
INJECTION, SOLUTION INFILTRATION; PERINEURAL AS NEEDED
Status: DISCONTINUED | OUTPATIENT
Start: 2019-11-07 | End: 2019-11-07 | Stop reason: HOSPADM

## 2019-11-07 RX ORDER — FENTANYL CITRATE/PF 50 MCG/ML
25 SYRINGE (ML) INJECTION
Status: DISCONTINUED | OUTPATIENT
Start: 2019-11-07 | End: 2019-11-07 | Stop reason: HOSPADM

## 2019-11-07 RX ORDER — SENNOSIDES 8.6 MG
1 TABLET ORAL DAILY
Status: DISCONTINUED | OUTPATIENT
Start: 2019-11-07 | End: 2019-11-08 | Stop reason: HOSPADM

## 2019-11-07 RX ADMIN — NEOSTIGMINE METHYLSULFATE 3 MG: 1 INJECTION, SOLUTION INTRAVENOUS at 11:37

## 2019-11-07 RX ADMIN — PROMETHAZINE HYDROCHLORIDE 12.5 MG: 25 INJECTION INTRAMUSCULAR; INTRAVENOUS at 13:19

## 2019-11-07 RX ADMIN — PROPOFOL 50 MG: 10 INJECTION, EMULSION INTRAVENOUS at 09:35

## 2019-11-07 RX ADMIN — Medication 100 MG: at 09:34

## 2019-11-07 RX ADMIN — PROPOFOL 150 MG: 10 INJECTION, EMULSION INTRAVENOUS at 09:33

## 2019-11-07 RX ADMIN — LIDOCAINE HYDROCHLORIDE 50 MG: 10 INJECTION, SOLUTION INFILTRATION; PERINEURAL at 09:33

## 2019-11-07 RX ADMIN — ACETAMINOPHEN 975 MG: 325 TABLET ORAL at 14:49

## 2019-11-07 RX ADMIN — LABETALOL 20 MG/4 ML (5 MG/ML) INTRAVENOUS SYRINGE 5 MG: at 11:40

## 2019-11-07 RX ADMIN — FENTANYL CITRATE 50 MCG: 50 INJECTION, SOLUTION INTRAMUSCULAR; INTRAVENOUS at 08:53

## 2019-11-07 RX ADMIN — SODIUM CHLORIDE, SODIUM LACTATE, POTASSIUM CHLORIDE, AND CALCIUM CHLORIDE: .6; .31; .03; .02 INJECTION, SOLUTION INTRAVENOUS at 08:00

## 2019-11-07 RX ADMIN — SODIUM CHLORIDE, SODIUM LACTATE, POTASSIUM CHLORIDE, AND CALCIUM CHLORIDE: .6; .31; .03; .02 INJECTION, SOLUTION INTRAVENOUS at 07:20

## 2019-11-07 RX ADMIN — LABETALOL 20 MG/4 ML (5 MG/ML) INTRAVENOUS SYRINGE 5 MG: at 11:50

## 2019-11-07 RX ADMIN — ONDANSETRON 4 MG: 2 INJECTION INTRAMUSCULAR; INTRAVENOUS at 12:48

## 2019-11-07 RX ADMIN — MIDAZOLAM 1 MG: 1 INJECTION INTRAMUSCULAR; INTRAVENOUS at 08:53

## 2019-11-07 RX ADMIN — ROCURONIUM BROMIDE 440 MG: 50 INJECTION, SOLUTION INTRAVENOUS at 09:34

## 2019-11-07 RX ADMIN — GLYCOPYRROLATE 0.4 MG: 0.2 INJECTION, SOLUTION INTRAMUSCULAR; INTRAVENOUS at 11:36

## 2019-11-07 RX ADMIN — SODIUM CHLORIDE, SODIUM LACTATE, POTASSIUM CHLORIDE, AND CALCIUM CHLORIDE 60 ML/HR: .6; .31; .03; .02 INJECTION, SOLUTION INTRAVENOUS at 13:19

## 2019-11-07 RX ADMIN — GABAPENTIN 600 MG: 300 CAPSULE ORAL at 07:21

## 2019-11-07 RX ADMIN — ACETAMINOPHEN 975 MG: 325 TABLET ORAL at 23:17

## 2019-11-07 RX ADMIN — ACETAMINOPHEN 975 MG: 325 TABLET ORAL at 07:21

## 2019-11-07 RX ADMIN — PROPRANOLOL HYDROCHLORIDE 60 MG: 20 TABLET ORAL at 17:23

## 2019-11-07 RX ADMIN — CEFAZOLIN SODIUM 1000 MG: 1 SOLUTION INTRAVENOUS at 10:12

## 2019-11-07 RX ADMIN — MIDAZOLAM 1 MG: 1 INJECTION INTRAMUSCULAR; INTRAVENOUS at 09:26

## 2019-11-07 RX ADMIN — DIPHENHYDRAMINE HYDROCHLORIDE 12.5 MG: 50 INJECTION, SOLUTION INTRAMUSCULAR; INTRAVENOUS at 09:50

## 2019-11-07 RX ADMIN — PHENYLEPHRINE HYDROCHLORIDE 100 MCG: 10 INJECTION INTRAVENOUS at 09:51

## 2019-11-07 RX ADMIN — GABAPENTIN 300 MG: 300 CAPSULE ORAL at 20:54

## 2019-11-07 RX ADMIN — PROPOFOL 50 MCG/KG/MIN: 10 INJECTION, EMULSION INTRAVENOUS at 08:53

## 2019-11-07 RX ADMIN — SODIUM CHLORIDE: 9 INJECTION, SOLUTION INTRAVENOUS at 09:39

## 2019-11-07 RX ADMIN — ONDANSETRON 4 MG: 2 INJECTION INTRAMUSCULAR; INTRAVENOUS at 10:52

## 2019-11-07 RX ADMIN — GABAPENTIN 300 MG: 300 CAPSULE ORAL at 17:23

## 2019-11-07 RX ADMIN — FENTANYL CITRATE 50 MCG: 50 INJECTION, SOLUTION INTRAMUSCULAR; INTRAVENOUS at 10:08

## 2019-11-07 RX ADMIN — ROCURONIUM BROMIDE 10 MG: 50 INJECTION, SOLUTION INTRAVENOUS at 09:55

## 2019-11-07 RX ADMIN — DEXAMETHASONE SODIUM PHOSPHATE 10 MG: 10 INJECTION, SOLUTION INTRAMUSCULAR; INTRAVENOUS at 09:50

## 2019-11-07 NOTE — ANESTHESIA PREPROCEDURE EVALUATION
Review of Systems/Medical History  Patient summary reviewed  Chart reviewed  No history of anesthetic complications     Cardiovascular  Exercise tolerance (METS): >4,  Hyperlipidemia, Hypertension ,    Pulmonary  Sleep apnea (uses oral appliance) ,   Comment: pulm nodule, for IR injection and VATS     GI/Hepatic    GERD well controlled,  Hiatal hernia,        Negative  ROS        Endo/Other  Negative endo/other ROS      GYN  Negative gynecology ROS          Hematology  Negative hematology ROS      Musculoskeletal  Negative musculoskeletal ROS        Neurology  Negative neurology ROS      Psychology   Depression ,              Physical Exam    Airway    Mallampati score: III  TM Distance: <3 FB  Neck ROM: full     Dental   Comment: Prominent incisors,     Cardiovascular      Pulmonary      Other Findings       Lab Results   Component Value Date    WBC 11 27 (H) 10/21/2019    HGB 13 4 10/21/2019     10/21/2019     Lab Results   Component Value Date    SODIUM 137 10/21/2019    K 4 6 10/21/2019    BUN 25 10/21/2019    CREATININE 1 13 10/21/2019    EGFR 48 10/21/2019    GLUCOSE 88 10/09/2015     Lab Results   Component Value Date    PTT 28 10/21/2019      Lab Results   Component Value Date    INR 1 03 10/21/2019     Blood type A+/antibody neg  Lab Results   Component Value Date    HGBA1C 6 2 10/21/2019     Anesthesia Plan  ASA Score- 3     Anesthesia Type- IV sedation with anesthesia and general with ASA Monitors  Additional Monitors:   Airway Plan:     Comment: Sedation in IR, GA/CHESTER in OR  Plan Factors-    Induction- intravenous  Postoperative Plan-     Informed Consent- Anesthetic plan and risks discussed with patient and daughter  I personally reviewed this patient with the CRNA  Discussed and agreed on the Anesthesia Plan with the CRNA  Faith Luther

## 2019-11-07 NOTE — PROGRESS NOTES
Thoracic Surgery Post-Op Check     S: Patient states she feels okay currently  Endorses baseline SOB, unchanged from prior to procedure  No other complaints  O:   · R CT serosang: WS, -AL  · SpO2 98% on 1 5L O2 via NC  Vitals:    11/07/19 1400   BP: 139/77   Pulse: 64   Resp: 13   Temp:    SpO2: 98%      I/O       11/05 0701 - 11/06 0700 11/06 0701 - 11/07 0700 11/07 0701 - 11/08 0700    I V  (mL/kg)   1400 (19 3)    Total Intake(mL/kg)   1400 (19 3)    Urine (mL/kg/hr)   300 (0 4)    Blood   25    Chest Tube   0    Total Output   325    Net   +1075               PE:  GEN: NAD  HEENT: MMM  CV: RRR  Lung: normal effort   R CT serosang: WS, -AL  Ab: Soft, NT/ND  Extrem: No CCE  Neuro:  A+Ox3, motor and sensation grossly intact     Lab Results   Component Value Date    WBC 15 06 (H) 11/07/2019    HGB 12 7 11/07/2019    HCT 40 8 11/07/2019    MCV 98 11/07/2019     11/07/2019     Lab Results   Component Value Date    GLUCOSE 88 10/09/2015    CALCIUM 9 0 11/07/2019     10/09/2015    K 5 0 11/07/2019    CO2 23 11/07/2019     11/07/2019    BUN 18 11/07/2019    CREATININE 1 15 11/07/2019           A/P: 74yo F s/p R VATS wedge resection, nasopharynx mass resection  · Regular diet  · LR @ 60  · Maintain R chest tube to water seal  · Pain control PRN  · Encourage IS  · Aggressive pulmonary toilet  · Nutrition   · F/u AM labs  · DVT PPx    Kapil Griajlva MD

## 2019-11-07 NOTE — INTERVAL H&P NOTE
H&P reviewed  After examining the patient I find no changes in the patients condition since the H&P had been written      Vitals:    11/07/19 0656   BP: 115/73   Pulse: 66   Resp: 20   Temp: 98 1 °F (36 7 °C)   SpO2: 97%

## 2019-11-07 NOTE — BRIEF OP NOTE (RAD/CATH)
IR CT RADIOLOCALIZATION     Procedure Note    PATIENT NAME: Jorge Mary  : 1945  MRN: [id number]     Pre-op Diagnosis:   1  Renal insufficiency    2  Nodule of left lung    3  Lung nodule      Post-op Diagnosis:   1  Renal insufficiency    2  Nodule of left lung    3   Lung nodule        Surgeon:   Bnoita Choi MD  Assistants:     No qualified resident was available, Resident is only observing    Estimated Blood Loss: minimal  Findings: right upper lobe GG nodule    Radiotracer implanatation via 22g needle  0 35 mCi [id number] in 0 4cc saline    Specimens: none    Complications:  None immediate    Anesthesia: MAC     Plan: to Aydee Reed MD     Date: 2019  Time: 9:29 AM

## 2019-11-07 NOTE — PERIOPERATIVE NURSING NOTE
Dr Ashlyn Gutierrez here to evaluate oral bleeding  No active bleeding noted  Nausea improved after phenergan given

## 2019-11-07 NOTE — OP NOTE
OPERATIVE REPORT  PATIENT NAME: Sharita Gant    :  1945  MRN: [id number]  Pt Location: BE OR ROOM 08    SURGERY DATE: 2019    Surgeon(s) and Role:  Panel 1:     * Veda Branch MD - Primary     * Ree Wiley - Assisting     * Luis Medrano MD - Assisting  Panel 2:     * Sampson Ngo MD - Primary    Preop Diagnosis:  Ground glass opacity present on imaging of lung [R91 8]  Other diseases of pharynx [J39 2]    Post-Op Diagnosis Codes:     * Ground glass opacity present on imaging of lung [R91 8]     * Other diseases of pharynx [J39 2]    Procedure(s) (LRB):  BRONCHOSCOPY FLEXIBLE (N/A)  RIGHT THORACOSCOPIC THERAPEUTIC WEDGE RESECTION  ENDOSCOPIC BIOPSY OF NASOPHARYNX LESION (N/A)    Specimen(s):  ID Type Source Tests Collected by Time Destination   1 : nasopharynx lesion Tissue Nasopharynx/Oropharynx TISSUE EXAM Sampson Ngo MD 2019 1002    2 : RUL wedge resection Tissue Lung, Right Upper Lobe TISSUE EXAM Veda Branch MD 2019 1059        Estimated Blood Loss:   25 mL    Drains:  Chest Tube 1 Right Pleural 24 Fr  (Active)   Number of days: 0       Anesthesia Type:   General    Operative Indications:  Ground glass opacity present on imaging of lung [R91 8]  Other diseases of pharynx [J39 2]  Ms Pimentel is a 76-year-old lifetime nonsmoker with a ground-glass nodule that has continued to increase  She underwent a recent PET-CT scan which demonstrated mild hypermetabolism within the mass suggesting there may be a at least micro invasive disease present  There was not a significant solid component radiographically  Her PET-CT scan also demonstrated hypermetabolism in a nasopharyngeal lesion  She is brought to the operating for biopsy of her nasopharyngeal region by ENT as well as resection of her ground-glass nodule    Due to the fact that her ground-glass nodule has no solid component a radiotracer was placed at the base of the nodule to facilitate identification intraoperatively  Operative Findings:  Frozen section demonstrates a grossly negative margin with a scrape prep of her staple line which is negative  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  Complications:   None    Procedure and Technique:  After a radiotracer was placed at the base of the nodule by Interventional Radiology, the patient is brought to the operating room and placed in the supine position  She was intubated with a double-lumen endotracheal tube and fiberoptic bronchoscopy was performed  The distal trachea appears normal in the tato was sharp  The airways were normal to a subsegmental level bilaterally  The endotracheal tube was then positioned bronchoscopically  Dr Daniella Carr then did her nasopharyngeal biopsy and this will be dictated separately by him  Once he was done we turned her into the left lateral decubitus position and her entire right chest was prepped and draped into a sterile field  Standard port incisions were utilized with a 5 mm port in the 8th intercostal space in the posterior axillary line as well as a 3 cm incision more anteriorly  Using a Joe-probe, the nodule was easily identified  The nodule was resected as a therapeutic wedge resection taking care to obtain as deep a margin as possible  The specimen was then placed inside of a protective bag and removed from the body  Pathologic evaluation confirmed this a grossly negative margin and a scrape prep of the staple line is negative for malignancy  Frozen section of the nodule itself confirms at least adenocarcinoma in situ with likely some micro invasive disease  Hemostasis was excellent  Paravertebral blocks using a mixture of 20 cc of Exparel with 20 cc of 0 25% Marcaine were performed over 8 levels  A 24 [place] chest tube was placed through the camera port to lie posteriorly and up towards the apex    The lung was watched as a completely re-expanded  The anterior access incision was then closed in layers with running absorbable suture  Dry sterile dressings were applied  The chest tube was affixed to the chest wall and connected to [place] drainage  The patient was then extubated on the table and brought to the recovery unit in stable condition having tolerated the procedure well  Sponge and instrument counts were correct     I was present for the entire procedure    Patient Disposition:  PACU     SIGNATURE: Terry Moffett MD  DATE: November 7, 2019  TIME: 11:45 AM

## 2019-11-07 NOTE — OP NOTE
OPERATIVE REPORT  PATIENT NAME: Uma Arthur    :  1945  MRN: [id number]  Pt Location: BE OR ROOM 08    SURGERY DATE: 2019    Surgeon(s) and Role:     * Rena Roberson MD - Primary    Preop Diagnosis:  Ground glass opacity present on imaging of lung [R91 8]  Other diseases of pharynx [J39 2]  Nasopharyngeal Lesion    Post-Op Diagnosis Codes:     * Ground glass opacity present on imaging of lung [R91 8]     * Other diseases of pharynx [J39 2]     * Nasopharyngeal Lesion    Procedure(s) (LRB):  ENDOSCOPIC BIOPSY OF NASOPHARYNX LESION (N/A)    Specimen(s):  ID Type Source Tests Collected by Time Destination   1 : nasopharynx lesion Tissue Nasopharynx/Oropharynx TISSUE EXAM Rena Roberson MD 2019 1002        Estimated Blood Loss:   Minimal    Drains:  * No LDAs found *    Anesthesia Type:   General    Operative Indications:  Ground glass opacity present on imaging of lung [R91 8]  Other diseases of pharynx [J442]  47-year-old woman with a history of a lung lesion  She underwent workup with a PET-CT scan  There was some hypermetabolic tissue seen in nasopharynx at that time  She is deemed appropriate candidate for the above procedures  Operative Findings:  Small tuft of tissue in this left superior area of nasopharynx  Complications:   None    Procedure and Technique:  After the patient was allowed to ask any remaining questions in the preoperative area, the patient was escorted to the operating suite by both ENT and anesthesia  There, surgical time-out was performed to ensure the proper patient and procedure  Once this was done general endotracheal anesthesia was induced without incident  Once given the go ahead by anesthesia, the patient was prepped and draped in normal fashion for the above procedures  Afrin-soaked pledgets were placed bilaterally  After enough time was given, the pledgets were removed  The endoscope was introduced into the nasal cavity    I did use a [place] to lateralize right inferior turbinate  I was then able to visualize the nasopharynx  The tissue was present in the nasopharynx and I removed this using Blakesley forceps  I took the mass/lesion in about 3 bites  Bleeding was controlled with suction Bovie cautery at a setting of 25  I then used Afrin-soaked pledgets to pack the area  These remained in nasopharynx until the end of the case  The patient then remained in the OR for the thoracic surgery portion of the procedure  I was present for the entire procedure  Patient Disposition:  Remained in OR for thoracic portion of the procedure       SIGNATURE: Ramona Soto MD  DATE: November 7, 2019  TIME: 10:26 AM

## 2019-11-07 NOTE — PERIOPERATIVE NURSING NOTE
Upon arrival to the PACU, anesthesia reported B/L nasal bleeding noted  Mod amt nasal drainage noted

## 2019-11-07 NOTE — INTERVAL H&P NOTE
H&P reviewed  After examining the patient I find no changes in the patients condition since the H&P had been written  Vitals:    11/07/19 0656   BP: 115/73   Pulse: 66   Resp: 20   Temp: 98 1 °F (36 7 °C)   SpO2: 97%     Plan: To OR for endoscopic biopsy of nasopharynx

## 2019-11-07 NOTE — PLAN OF CARE
Problem: PAIN - ADULT  Goal: Verbalizes/displays adequate comfort level or baseline comfort level  Description  Interventions:  - Encourage patient to monitor pain and request assistance  - Assess pain using appropriate pain scale  - Administer analgesics based on type and severity of pain and evaluate response  - Implement non-pharmacological measures as appropriate and evaluate response  - Consider cultural and social influences on pain and pain management  - Notify physician/advanced practitioner if interventions unsuccessful or patient reports new pain  Outcome: Progressing     Problem: INFECTION - ADULT  Goal: Absence or prevention of progression during hospitalization  Description  INTERVENTIONS:  - Assess and monitor for signs and symptoms of infection  - Monitor lab/diagnostic results  - Monitor all insertion sites, i e  indwelling lines, tubes, and drains  - Monitor endotracheal if appropriate and nasal secretions for changes in amount and color  - [place] appropriate cooling/warming therapies per order  - Administer medications as ordered  - Instruct and encourage patient and family to use good hand hygiene technique  - Identify and instruct in appropriate isolation precautions for identified infection/condition  Outcome: Progressing  Goal: Absence of fever/infection during neutropenic period  Description  INTERVENTIONS:  - Monitor WBC    Outcome: Progressing     Problem: SAFETY ADULT  Goal: Patient will remain free of falls  Description  INTERVENTIONS:  - Assess patient frequently for physical needs  -  Identify cognitive and physical deficits and behaviors that affect risk of falls    -  [place] fall precautions as indicated by assessment   - Educate patient/family on patient safety including physical limitations  - Instruct patient to call for assistance with activity based on assessment  - Modify environment to reduce risk of injury  - Consider OT/PT consult to assist with strengthening/mobility  Outcome: Progressing  Goal: Maintain or return to baseline ADL function  Description  INTERVENTIONS:  -  Assess patient's ability to carry out ADLs; assess patient's baseline for ADL function and identify physical deficits which impact ability to perform ADLs (bathing, care of mouth/teeth, toileting, grooming, dressing, etc )  - Assess/evaluate cause of self-care deficits   - Assess range of motion  - Assess patient's mobility; develop plan if impaired  - Assess patient's need for assistive devices and provide as appropriate  - Encourage maximum independence but intervene and supervise when necessary  - Involve family in performance of ADLs  - Assess for home care needs following discharge   - Consider OT consult to assist with ADL evaluation and planning for discharge  - Provide patient education as appropriate  Outcome: Progressing  Goal: Maintain or return mobility status to optimal level  Description  INTERVENTIONS:  - Assess patient's baseline mobility status (ambulation, transfers, stairs, etc )    - Identify cognitive and physical deficits and behaviors that affect mobility  - Identify mobility aids required to assist with transfers and/or ambulation (gait belt, sit-to-stand, lift, walker, cane, etc )  - [place] fall precautions as indicated by assessment  - Record patient progress and toleration of activity level on Mobility SBAR; progress patient to next Phase/Stage  - Instruct patient to call for assistance with activity based on assessment  - Consider rehabilitation consult to assist with strengthening/weightbearing, etc   Outcome: Progressing     Problem: DISCHARGE PLANNING  Goal: Discharge to home or other facility with appropriate resources  Description  INTERVENTIONS:  - Identify barriers to discharge w/patient and caregiver  - Arrange for needed discharge resources and transportation as appropriate  - Identify discharge learning needs (meds, wound care, etc )  - Arrange for interpretive services to assist at discharge as needed  - Refer to Case Management Department for coordinating discharge planning if the patient needs post-hospital services based on physician/advanced practitioner order or complex needs related to functional status, cognitive ability, or social support system  Outcome: Progressing     Problem: Knowledge Deficit  Goal: Patient/family/caregiver demonstrates understanding of disease process, treatment plan, medications, and discharge instructions  Description  Complete learning assessment and assess knowledge base    Interventions:  - Provide teaching at level of understanding  - Provide teaching via preferred learning methods  Outcome: Progressing

## 2019-11-07 NOTE — ANESTHESIA POSTPROCEDURE EVALUATION
Post-Op Assessment Note    CV Status:  Stable  Pain Score: 0    Pain management: adequate     Mental Status:  Alert and awake   Hydration Status:  Euvolemic   PONV Controlled:  Controlled   Airway Patency:  Patent   Post Op Vitals Reviewed: Yes      Staff: Anesthesiologist, CRNA           BP   114/59   Temp      Pulse  59   Resp   20   SpO2   98

## 2019-11-07 NOTE — INTERVAL H&P NOTE
Update: (This section must be completed if the H&P was completed greater than 24 hrs to procedure or admission)    H&P reviewed  After examining the patient, I find no changed to the H&P since it had been written      74w/ Left lung GG nodule    Thoracic surgery will resect the nodule and we will inject radiotracer  I discussed the risks with the patient including pneumothorax, damage to the lung, and unintentional injection of radiotracer in inappropriate compartments which would result in challenges during resection  She wishes to proceed  /73   Pulse 66   Temp 98 1 °F (36 7 °C) (Tympanic)   Resp 20   Ht 5' 5" (1 651 m)   Wt 72 6 kg (160 lb)   SpO2 97%   BMI 26 63 kg/m²     Abx per surgery    Anesthesia will support    Patient re-evaluated   Accept as history and physical     Nirav Paredes MD/November 7, 2019/8:43 AM

## 2019-11-07 NOTE — RESPIRATORY THERAPY NOTE
RT Protocol Note  Donald Pimentel 66 y o  female MRN: [id number]  Unit/Bed#: [facility] 425-01 Encounter: [id number]    Assessment    Principal Problem:    Ground glass opacity present on imaging of lung  Active Problems:    Other diseases of pharynx      Home Pulmonary Medications:         Past Medical History:   Diagnosis Date    Colitis     Dehydration     Depression     Diabetes mellitus (HCC)     Familial tremor     GERD (gastroesophageal reflux disease)     Ground glass opacity present on imaging of lung     Hiatal hernia     High cholesterol     Hyperlipidemia     Hypertension     Hypoglycemia     Migraine     Nosebleed     Sleep apnea     C pap    Solitary pulmonary nodule     Syncope      Social History     Socioeconomic History    Marital status: /Civil Union     Spouse name: None    Number of children: None    Years of education: None    Highest education level: None   Occupational History    None   Social Needs    Financial resource strain: None    Food insecurity:     Worry: None     Inability: None    Transportation needs:     Medical: None     Non-medical: None   Tobacco Use    Smoking status: Never Smoker    Smokeless tobacco: Never Used   Substance and Sexual Activity    Alcohol use: Not Currently    Drug use: No    Sexual activity: None   Lifestyle    Physical activity:     Days per week: None     Minutes per session: None    Stress: None   Relationships    Social connections:     Talks on phone: None     Gets together: None     Attends Buddhism service: None     Active member of club or organization: None     Attends meetings of clubs or organizations: None     Relationship status: None    Intimate partner violence:     Fear of current or ex partner: None     Emotionally abused: None     Physically abused: None     Forced sexual activity: None   Other Topics Concern    None   Social History Narrative    None       Subjective         Objective    Physical Exam:   Assessment Type: Assess only  Cough: Non-productive    Vitals:  Blood pressure 139/77, pulse 64, temperature 97 7 °F (36 5 °C), resp  rate 13, height 5' 5" (1 651 m), weight 72 6 kg (160 lb), SpO2 98 %  Imaging and other studies: I have personally reviewed pertinent reports  Plan       Airway Clearance Plan: Incentive Spirometer     Resp Comments: Pt alert, sleepy  Instructed and given pt IS  Done well with encouragement  Continue IS as ordered

## 2019-11-07 NOTE — PERIOPERATIVE NURSING NOTE
White surgery notified lg blood clot expectorated by pt  Minimal bleeding noted nasally   Pt resting comfortably and nausea subsiding

## 2019-11-08 ENCOUNTER — APPOINTMENT (INPATIENT)
Dept: RADIOLOGY | Facility: HOSPITAL | Age: 74
DRG: 165 | End: 2019-11-08
Payer: COMMERCIAL

## 2019-11-08 VITALS
SYSTOLIC BLOOD PRESSURE: 107 MMHG | TEMPERATURE: 96.8 F | DIASTOLIC BLOOD PRESSURE: 58 MMHG | OXYGEN SATURATION: 92 % | BODY MASS INDEX: 28.02 KG/M2 | WEIGHT: 168.2 LBS | RESPIRATION RATE: 18 BRPM | HEART RATE: 62 BPM | HEIGHT: 65 IN

## 2019-11-08 LAB
ANION GAP SERPL CALCULATED.3IONS-SCNC: 6 MMOL/L (ref 4–13)
BUN SERPL-MCNC: 20 MG/DL (ref 5–25)
CALCIUM SERPL-MCNC: 9 MG/DL (ref 8.3–10.1)
CHLORIDE SERPL-SCNC: 105 MMOL/L (ref 100–108)
CO2 SERPL-SCNC: 27 MMOL/L (ref 21–32)
CREAT SERPL-MCNC: 1.12 MG/DL (ref 0.6–1.3)
ERYTHROCYTE [DISTWIDTH] IN BLOOD BY AUTOMATED COUNT: 13.5 % (ref 11.6–15.1)
GFR SERPL CREATININE-BSD FRML MDRD: 48 ML/MIN/1.73SQ M
GLUCOSE SERPL-MCNC: 115 MG/DL (ref 65–140)
GLUCOSE SERPL-MCNC: 132 MG/DL (ref 65–140)
GLUCOSE SERPL-MCNC: 204 MG/DL (ref 65–140)
GLUCOSE SERPL-MCNC: 416 MG/DL (ref 65–140)
HCT VFR BLD AUTO: 33.5 % (ref 34.8–46.1)
HGB BLD-MCNC: 10.5 G/DL (ref 11.5–15.4)
MAGNESIUM SERPL-MCNC: 2 MG/DL (ref 1.6–2.6)
MCH RBC QN AUTO: 30.4 PG (ref 26.8–34.3)
MCHC RBC AUTO-ENTMCNC: 31.3 G/DL (ref 31.4–37.4)
MCV RBC AUTO: 97 FL (ref 82–98)
PLATELET # BLD AUTO: 229 THOUSANDS/UL (ref 149–390)
PMV BLD AUTO: 9.6 FL (ref 8.9–12.7)
POTASSIUM SERPL-SCNC: 5 MMOL/L (ref 3.5–5.3)
RBC # BLD AUTO: 3.45 MILLION/UL (ref 3.81–5.12)
SODIUM SERPL-SCNC: 138 MMOL/L (ref 136–145)
WBC # BLD AUTO: 18.21 THOUSAND/UL (ref 4.31–10.16)

## 2019-11-08 PROCEDURE — 99024 POSTOP FOLLOW-UP VISIT: CPT | Performed by: PHYSICIAN ASSISTANT

## 2019-11-08 PROCEDURE — NC001 PR NO CHARGE: Performed by: THORACIC SURGERY (CARDIOTHORACIC VASCULAR SURGERY)

## 2019-11-08 PROCEDURE — 83735 ASSAY OF MAGNESIUM: CPT | Performed by: THORACIC SURGERY (CARDIOTHORACIC VASCULAR SURGERY)

## 2019-11-08 PROCEDURE — 99024 POSTOP FOLLOW-UP VISIT: CPT | Performed by: THORACIC SURGERY (CARDIOTHORACIC VASCULAR SURGERY)

## 2019-11-08 PROCEDURE — 85027 COMPLETE CBC AUTOMATED: CPT | Performed by: PHYSICIAN ASSISTANT

## 2019-11-08 PROCEDURE — 0WP9X0Z REMOVAL OF DRAINAGE DEVICE FROM RIGHT PLEURAL CAVITY, EXTERNAL APPROACH: ICD-10-PCS | Performed by: THORACIC SURGERY (CARDIOTHORACIC VASCULAR SURGERY)

## 2019-11-08 PROCEDURE — 71046 X-RAY EXAM CHEST 2 VIEWS: CPT

## 2019-11-08 PROCEDURE — 80048 BASIC METABOLIC PNL TOTAL CA: CPT | Performed by: THORACIC SURGERY (CARDIOTHORACIC VASCULAR SURGERY)

## 2019-11-08 PROCEDURE — 82948 REAGENT STRIP/BLOOD GLUCOSE: CPT

## 2019-11-08 RX ORDER — ACETAMINOPHEN 325 MG/1
TABLET ORAL
Status: COMPLETED
Start: 2019-11-08 | End: 2019-11-08

## 2019-11-08 RX ORDER — OXYCODONE HYDROCHLORIDE 5 MG/1
5 TABLET ORAL EVERY 4 HOURS PRN
Qty: 30 TABLET | Refills: 0 | Status: SHIPPED | OUTPATIENT
Start: 2019-11-08 | End: 2019-11-18

## 2019-11-08 RX ORDER — GABAPENTIN 300 MG/1
300 CAPSULE ORAL 3 TIMES DAILY
Qty: 63 CAPSULE | Refills: 0 | Status: SHIPPED | OUTPATIENT
Start: 2019-11-08 | End: 2021-04-13 | Stop reason: ALTCHOICE

## 2019-11-08 RX ORDER — DOCUSATE SODIUM 100 MG/1
100 CAPSULE, LIQUID FILLED ORAL 2 TIMES DAILY
Qty: 10 CAPSULE | Refills: 0 | Status: SHIPPED | OUTPATIENT
Start: 2019-11-08 | End: 2020-01-10 | Stop reason: ALTCHOICE

## 2019-11-08 RX ORDER — OXYCODONE HYDROCHLORIDE 5 MG/1
5 TABLET ORAL EVERY 4 HOURS PRN
Qty: 30 TABLET | Refills: 0 | Status: CANCELLED | OUTPATIENT
Start: 2019-11-08 | End: 2019-11-18

## 2019-11-08 RX ADMIN — PROPRANOLOL HYDROCHLORIDE 60 MG: 20 TABLET ORAL at 08:06

## 2019-11-08 RX ADMIN — ACETAMINOPHEN 975 MG: 325 TABLET ORAL at 13:09

## 2019-11-08 RX ADMIN — DOCUSATE SODIUM 100 MG: 100 CAPSULE, LIQUID FILLED ORAL at 08:07

## 2019-11-08 RX ADMIN — GABAPENTIN 300 MG: 300 CAPSULE ORAL at 08:06

## 2019-11-08 RX ADMIN — ESCITALOPRAM OXALATE 10 MG: 10 TABLET ORAL at 08:06

## 2019-11-08 RX ADMIN — ENOXAPARIN SODIUM 40 MG: 40 INJECTION SUBCUTANEOUS at 08:07

## 2019-11-08 RX ADMIN — ACETAMINOPHEN 975 MG: 325 TABLET ORAL at 05:22

## 2019-11-08 RX ADMIN — PANTOPRAZOLE SODIUM 40 MG: 40 TABLET, DELAYED RELEASE ORAL at 05:22

## 2019-11-08 NOTE — UTILIZATION REVIEW
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    Admission Orders: Date/Time/Statement: Inpatient Admission Orders (From admission, onward)     Ordered        11/07/19 0709  Inpatient Admission  Once                   Orders Placed This Encounter   Procedures    Inpatient Admission     Standing Status:   Standing     Number of Occurrences:   1     Order Specific Question:   Admitting Physician     Answer:   Lex Rodas [[id number]]     Order Specific Question:   Level of Care     Answer:   Med Surg [16]     Order Specific Question:   Estimated length of stay     Answer:   Inpatient Only Surgery     Vital Signs: /58 (BP Location: Left arm)   Pulse 62   Temp (!) 96 8 °F (36 °C) (Axillary)   Resp 18   Ht 5' 5" (1 651 m)   Wt 76 3 kg (168 lb 3 2 oz)   SpO2 92%   BMI 27 99 kg/m²   Diet: regular  Mobility: Ambulate TID  DVT Prophylaxis: Min SDCs  Chest Tube to water seal  TELM  Elevate HOB  Medications/Pain Control:   Scheduled Medications:    Medications:  acetaminophen 975 mg Oral Q6H [facility]   docusate sodium 100 mg Oral BID   enoxaparin 40 mg Subcutaneous Daily   escitalopram 10 mg Oral Daily   gabapentin 300 mg Oral TID   pantoprazole 40 mg Oral Early Morning   polyethylene glycol 17 g Oral Daily   propranolol 60 mg Oral Daily   senna 1 tablet Oral Daily     Continuous IV Infusions:     PRN Meds:    HYDROmorphone 0 5 mg Intravenous Q4H PRN   ondansetron 4 mg Intravenous Q6H PRN   oxyCODONE 5 mg Oral Q4H PRN       Network Utilization Review Department  [email or web address]  ATTENTION: Please call with any questions or concerns to [phone] and carefully listen to the prompts so that you are directed to the right person  All voicemails are confidential   [place] all requests for admission clinical reviews, approved or denied determinations and any other requests to dedicated fax number below belonging to the campus where the patient is receiving treatment    FACILITY NAME UR FAX NUMBER   ADMISSION DENIALS (Administrative/Medical Necessity) [address] [facility] (Maternity/NICU/Pediatrics) [phone]   [facility] [address] [place] [address] [address] [facility] [phone]   [address] [facility] [address] [facility] [phone]   [facility] [address] [phone]

## 2019-11-08 NOTE — RESTORATIVE TECHNICIAN NOTE
Restorative Specialist Mobility Note       Activity: Ambulate in mosqueda, Chair     Assistive Device: None No

## 2019-11-08 NOTE — DISCHARGE SUMMARY
Discharge Summary - Rad Pimentel 66 y o  female MRN: [id number]    Unit/Bed#: [facility] 425-01 Encounter: [id number]    Admission Date:   Admission Orders (From admission, onward)     Ordered        11/07/19 0709  Inpatient Admission  Once                     Admitting Diagnosis: Ground glass opacity present on imaging of lung [R91 8]  Other diseases of pharynx [J39 2]    HPI: Ms Phuong Sexton is a 75 yo non smoking female who was originally referred by Dr Boom Richards for a right upper lobe ground glass nodule found in April 2016  There was slight enlargement seen in February 2018, with no solid component  She was last seen on 10/8/19, at which time her PET from revealed mild uptake in the 1 5 x 1 2 cm right upper lobe nodule, along with some non-specific activity in the posterior nasopharynx and left anterior 5th rib, without a CT correlate  She also had a 2 2 cm right adnexal cyst without any uptake  PFT's from 10/4/19, demonstrated a FVC of 3 15L, 106% predicted, FEV1 of 2 43L, 108% predicted, and a DLCO of 55% predicted       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 and therefore the recommendation was a wedge resection, since her clinical lung function is impaired  We will use IR localization, since there is no solid component, which would make it difficult to palpate  The procedure has been scheduled for 11/7/19  She presents today for an updated H and P  On discussion, she is feeling fine  She continues with dyspnea on exertion, but denies fever, chills, cough, shortness of breath at rest, or hemoptysis  She was seen by ENT for her nasopharyngeal uptake on PET scan  They are planning on performing a biopsy during the procedure  She will follow up with her PCP or gynecologist for the adnexal cyst  HPI per Heidi Collazo PA-C on 10/29    Procedures Performed: No orders of the defined types were placed in this encounter        Summary of Hospital Course: Patient was admitted to the hospital on 11/7 where she underwent a R VATS wedge resection and nasopharynx mass resection  For full details of the operation please see the Op Note dictated on 11/7  Patient was transferred to PACU in stable condition  She had an uneventful fela-operative period and was transferred to the floor that evening  POD1 her chest tube was removed  She was tolerating a regular diet, oxygenating well on room air, urinating without difficulty and ambulating well with physical therapy  The surgical team deemed her stable for discharge on POD1, 11/8, with follow up in the Thoracic surgery office as an outpatient  Significant Findings, Care, Treatment and Services Provided:  R VATS wedge resection and nasopharynx mass resection    Complications: None    Discharge Diagnosis:  Ground glass opacity present on imaging of lung [R91 8]  Other diseases of pharynx [J39 2]    Resolved Problems  Date Reviewed: 11/8/2019    None          Condition at Discharge: good         Discharge instructions/Information to patient and family:   See after visit summary for information provided to patient and family  Provisions for Follow-Up Care:  See after visit summary for information related to follow-up care and any pertinent home health orders  PCP: Herber Santiago MD    Disposition: See After Visit Summary for discharge disposition information  Planned Readmission: No      Discharge Statement   I spent 20 minutes discharging the patient  This time was spent on the day of discharge  I had direct contact with the patient on the day of discharge  Additional documentation is required if more than 30 minutes were spent on discharge  Discharge Medications:  See after visit summary for reconciled discharge medications provided to patient and family

## 2019-11-08 NOTE — DISCHARGE INSTRUCTIONS
Gently wash your incisions daily with soap and water, do not soak in a tub  Do not apply any lotions, creams, or ointments to incisions  No lifting over 10 lbs or strenuous exercise  No driving until seen at your post operative visit  The blue stitch will be removed at your post operative visit  Please obtain a pa/lat chest xray at a [facility] within 3 days of your follow up visit  You will be prescribed 2 medications to take at home, that should be taken exactly as directed  These have been shown to greatly improve post-operative pain:     1  Gabapentin 300mg tablet  Take 1 tablet by mouth 3x a day for 3 weeks  2    Tylenol 325mg tablet  Take 2 tablets by mouth, every 6 hours, for 1 week  You will also be prescribed a medication that you may take on an as needed basis:  Oxycodone 5mg tablet  Take 1-2 tablets every 4 hours as needed for pain

## 2019-11-08 NOTE — PROGRESS NOTES
11/08/19    Procedure: Chest tube removal    Right chest tube removed in routine fashion without incident  The patient tolerated the procedure well  A dry, sterile dressing was placed  Will check a pa/lat chest x-ray       Hue Chand PA-C

## 2019-11-08 NOTE — RESTORATIVE TECHNICIAN NOTE
Restorative Specialist Mobility Note       Activity: Ambulate in mosqueda, Chair     Assistive Device: Front wheel walker

## 2019-11-08 NOTE — PROGRESS NOTES
Progress Note - Thoracic Surgery   Donald Pimentel 66 y o  female MRN: [id number]  Unit/Bed#: [facility] 425-01 Encounter: [id number]    Assessment:  74yo F s/p R VATS wedge resection, nasopharynx mass resection    R CT: 75 serosang (WS, -AL)  SpO2 95% on 1 5 L O2 via NC    Plan:  · Regular diet  · D/c IVF  · Maintain R chest tube to water seal  · Likely d/c chest tube later today  · Pain control PRN  · Encourage IS  · Pulmonary toilet  · Nutrition  · DVT PPx  · Dispo: possible d/c later today      Subjective/Objective     Subjective:   No acute events overnight  Patient c/o baseline SOB as well as continued throat pain, which is improving  Denies any other complaints  Objective:     Blood pressure 118/65, pulse 63, temperature 97 5 °F (36 4 °C), temperature source Axillary, resp  rate 18, height 5' 5" (1 651 m), weight 76 3 kg (168 lb 3 2 oz), SpO2 95 %  ,Body mass index is 27 99 kg/m²        Intake/Output Summary (Last 24 hours) at 11/8/2019 0626  Last data filed at 11/8/2019 0425  Gross per 24 hour   Intake 1400 ml   Output 1000 ml   Net 400 ml       Invasive Devices     Peripheral Intravenous Line            Peripheral IV 11/07/19 Left Hand less than 1 day    Peripheral IV 11/07/19 Right Wrist less than 1 day          Drain            Chest Tube 1 Right Pleural 24 Fr  less than 1 day                Physical Exam:   Gen: NAD, resting in bed  HEENT: MMM, EOMI  CV: RRR  Lungs: nl effort on 1 5 L O2 via NC   R CT: 75 serosang (WS, -AL)  Abd: soft, NT/ND  Ext: no CCE  Skin: no rashes  Neuro: A&Ox3, motor and sensation grossly intact  Psych: appropriate     Lab, Imaging and other studies:  CBC:   Lab Results   Component Value Date    WBC 18 21 (H) 11/08/2019    HGB 10 5 (L) 11/08/2019    HCT 33 5 (L) 11/08/2019    MCV 97 11/08/2019     11/08/2019    MCH 30 4 11/08/2019    MCHC 31 3 (L) 11/08/2019    RDW 13 5 11/08/2019    MPV 9 6 11/08/2019   , CMP:   Lab Results   Component Value Date    SODIUM 138 11/08/2019    K 5 0 11/08/2019     11/08/2019    CO2 27 11/08/2019    BUN 20 11/08/2019    CREATININE 1 12 11/08/2019    CALCIUM 9 0 11/08/2019    EGFR 48 11/08/2019     VTE Pharmacologic Prophylaxis: Enoxaparin (Lovenox)  VTE Mechanical Prophylaxis: sequential compression device

## 2019-11-11 ENCOUNTER — TELEPHONE (OUTPATIENT)
Dept: CARDIAC SURGERY | Facility: CLINIC | Age: 74
End: 2019-11-11

## 2019-11-13 ENCOUNTER — TELEPHONE (OUTPATIENT)
Dept: CARDIAC SURGERY | Facility: CLINIC | Age: 74
End: 2019-11-13

## 2019-11-13 NOTE — TELEPHONE ENCOUNTER
1  Constipation: (Yes: Colace 100 mg BID, Senokot 2 tabs QHS or Senokot S 2 tabs qhs   No: Dulcolax/Miralax/suppository/enema)  No         2  Pain Management: (Oxycodone 5-10 mg prn, Tylenol 650 mg q6 hrs and +/- Advil 600 mg TID for 7 days  Neurontin 300 mg TID for 21 days)  Taking Tylenol Cold for runny nose  Robertoaudie Michel wasn't taking Gabapentin encouraged her to do so as prescribed  3   Fever: (Chills? Call for temp >100 4 )  No fever or chills today  Roberto Michel thought she had a fever yesterday but didn't take her temperature  Advised her to take her temperature if she feels warm and to call for temp >100 4  4   Cough: (Dry, productive?)  Yes productive for mucus streaked with blood  Advised to call if she has more blood in mucus  5   Shortness of breath: (At rest, with activity?)  No      6  Appetite:   Good      7  Incisions: (Warmth, redness, drainage? May shower, no baths  No creams, lotions, or ointments to incisions)  No redness, warmth or drainage  8   Ambulation/Incentive Spirometry: (Any activity? Use IS every 15 min) yes, encouraged ambulation in the house

## 2019-11-15 ENCOUNTER — APPOINTMENT (OUTPATIENT)
Dept: RADIOLOGY | Facility: MEDICAL CENTER | Age: 74
End: 2019-11-15
Payer: COMMERCIAL

## 2019-11-15 DIAGNOSIS — R10.13 EPIGASTRIC PAIN: ICD-10-CM

## 2019-11-15 PROCEDURE — 71046 X-RAY EXAM CHEST 2 VIEWS: CPT

## 2019-11-19 ENCOUNTER — OFFICE VISIT (OUTPATIENT)
Dept: CARDIAC SURGERY | Facility: CLINIC | Age: 74
End: 2019-11-19

## 2019-11-19 ENCOUNTER — DOCUMENTATION (OUTPATIENT)
Dept: CARDIAC SURGERY | Facility: CLINIC | Age: 74
End: 2019-11-19

## 2019-11-19 VITALS
DIASTOLIC BLOOD PRESSURE: 58 MMHG | TEMPERATURE: 97.8 F | OXYGEN SATURATION: 94 % | HEIGHT: 65 IN | HEART RATE: 87 BPM | WEIGHT: 163.8 LBS | SYSTOLIC BLOOD PRESSURE: 112 MMHG | BODY MASS INDEX: 27.29 KG/M2

## 2019-11-19 DIAGNOSIS — C34.11 PRIMARY ADENOCARCINOMA OF UPPER LOBE OF RIGHT LUNG (HCC): Primary | ICD-10-CM

## 2019-11-19 PROCEDURE — 99024 POSTOP FOLLOW-UP VISIT: CPT | Performed by: PHYSICIAN ASSISTANT

## 2019-11-19 NOTE — PROGRESS NOTES
Thoracic Follow-Up  Assessment/Plan:    Primary adenocarcinoma of upper lobe of right lung [facility])  Ms Pimentel is progressing slowly from a thoracic surgery standpoint  Her pathology was consistent with stage I adenocarcinoma  Her cxr does reveal some right medial basilar opacities, which may be consistent with pneumonia  Since she is not having any overt symptoms, we will monitor her closely over the next week  She should call us with any fever, chills, or green sputum production  We encouraged pulmonary toilet with IS and increased ambulation  We will have her return in one month with a new cxr for her 2nd post operative visit  She is able to return to driving at this point  Diagnoses and all orders for this visit:    Primary adenocarcinoma of upper lobe of right lung (HCC)  -     XR chest pa & lateral; Future          Thoracic History       Diagnosis: Right upper lobe adenocarcinoma   Procedure: Flexible bronchoscopy, right thoracoscopic upper lobe therapeutic wedge resection, endoscopic biopsy of nasopharyngeal lesion (Dr Maday Carmona) performed on 11/7/19  Pathology: Right upper lobe tumor revealing acinar pattern invasive adenocarcinoma, measuring 1 8 cm with negative margins  8th Edition AJCC tumor stage IA2 (pT1b,pNx)  Nasopharyngeal bx pending        Patient ID: Ghislaine Peña is a 66 y o  female  HPI       Ms Pimentel is a 65 yo female who was electively admitted on 11/7/19 to undergo a right thoracoscopic upper lobe therapeutic wedge resection for invasive adenocarcinoma  She also underwent a nasopharyngeal biopsy intra-operatively  Her hospital course was uneventful and she was discharged on 11/8/19  She returns to the office today for her first post operative visit  CXR from 11/15/19 revealed resolution of right pneumothorax and mild right basilar parenchymal opacities, possibly atelectasis  On discussion, she is having some fatigue, shortness of breath, and cough   For pain medication, she is on Gabapentin TID  She stopped Tylenol and never had the Oxycodone filled, but is still having some pain around the anterior incision  The following portions of the patient's history were reviewed and updated as   appropriate: allergies, current medications, past family history, past medical history, past social history, past surgical history and problem list     Review of Systems      Objective:   Physical Exam   Constitutional: She is oriented to person, place, and time  She appears well-developed and well-nourished  HENT:   Head: Normocephalic and atraumatic  Eyes: EOM are normal    + glasses   Neck: Normal range of motion  Neck supple  Cardiovascular: Normal rate and regular rhythm  Pulmonary/Chest: Effort normal and breath sounds normal  No respiratory distress  She has no wheezes  Neurological: She is alert and oriented to person, place, and time  No cranial nerve deficit  Skin: She is not diaphoretic  Right thoracoscopic incisions healing well  One prolene suture removed  Anterior access incision with some erythema, likely related to the exofin glue  Psychiatric: She has a normal mood and affect  Her behavior is normal    Vitals reviewed  /58 (BP Location: Left arm, Patient Position: Sitting, Cuff Size: Large)   Pulse 87   Temp 97 8 °F (36 6 °C)   Ht 5' 5" (1 651 m)   Wt 74 3 kg (163 lb 12 8 oz)   SpO2 94%   BMI 27 26 kg/m²     Xr Chest Pa & Lateral    Result Date: 11/15/2019  Impression There is mild right basilar parenchymal opacities, which could be atelectasis or pneumonia   Workstation performed: [id number]

## 2019-11-19 NOTE — ASSESSMENT & PLAN NOTE
Ms Evaristo Benjamin is progressing slowly from a thoracic surgery standpoint  Her pathology was consistent with stage I adenocarcinoma  Her cxr does reveal some right medial basilar opacities, which may be consistent with pneumonia  Since she is not having any overt symptoms, we will monitor her closely over the next week  She should call us with any fever, chills, or green sputum production  We encouraged pulmonary toilet with IS and increased ambulation  We will have her return in one month with a new cxr for her 2nd post operative visit  She is able to return to driving at this point

## 2019-11-21 ENCOUNTER — TELEPHONE (OUTPATIENT)
Dept: CARDIAC SURGERY | Facility: CLINIC | Age: 74
End: 2019-11-21

## 2019-11-21 NOTE — TELEPHONE ENCOUNTER
Pt called in stating that she has a fever of 100  Pt stated she is having back pain and it hurts when she urinates  I let pt know that she should go to see her PCP or if her symptoms become worse, to go to the ED  I let pt know I would also speak to the PA's regarding this  Pt can be reached back at [phone]

## 2019-11-22 ENCOUNTER — TELEPHONE (OUTPATIENT)
Dept: CARDIAC SURGERY | Facility: CLINIC | Age: 74
End: 2019-11-22

## 2019-11-22 NOTE — TELEPHONE ENCOUNTER
Contacted eloy to see how she was doing  She's currently being treated for a UTI with sulfa antibiotic  She developed a fever, low back ache and burning with urination  She contacted PCP and he treated her  Shes feeling better with the exception antibiotic is upsetting her stomach  Suggested she try taking it with food  She will call with any future problems

## 2019-11-22 NOTE — TELEPHONE ENCOUNTER
Attempted call to Yajaira Car to see how she's feeling  Message left on voice mail to call the office to speak with me

## 2019-12-13 ENCOUNTER — TRANSCRIBE ORDERS (OUTPATIENT)
Dept: ADMINISTRATIVE | Facility: HOSPITAL | Age: 74
End: 2019-12-13

## 2019-12-13 ENCOUNTER — APPOINTMENT (OUTPATIENT)
Dept: RADIOLOGY | Facility: MEDICAL CENTER | Age: 74
End: 2019-12-13
Payer: COMMERCIAL

## 2019-12-13 ENCOUNTER — APPOINTMENT (OUTPATIENT)
Dept: LAB | Facility: MEDICAL CENTER | Age: 74
End: 2019-12-13
Payer: COMMERCIAL

## 2019-12-13 DIAGNOSIS — C34.90 MALIGNANT NEOPLASM OF BRONCHUS AND LUNG (HCC): Primary | ICD-10-CM

## 2019-12-13 DIAGNOSIS — C34.11 PRIMARY ADENOCARCINOMA OF UPPER LOBE OF RIGHT LUNG (HCC): ICD-10-CM

## 2019-12-13 DIAGNOSIS — K52.0 GASTROENTERITIS AND COLITIS DUE TO RADIATION: ICD-10-CM

## 2019-12-13 DIAGNOSIS — C34.90 MALIGNANT NEOPLASM OF BRONCHUS AND LUNG (HCC): ICD-10-CM

## 2019-12-13 DIAGNOSIS — I10 ESSENTIAL HYPERTENSION, BENIGN: ICD-10-CM

## 2019-12-13 LAB
25(OH)D3 SERPL-MCNC: 25.8 NG/ML (ref 30–100)
ALBUMIN SERPL BCP-MCNC: 4.1 G/DL (ref 3.5–5)
ALP SERPL-CCNC: 109 U/L (ref 46–116)
ALT SERPL W P-5'-P-CCNC: 29 U/L (ref 12–78)
ANION GAP SERPL CALCULATED.3IONS-SCNC: 5 MMOL/L (ref 4–13)
AST SERPL W P-5'-P-CCNC: 19 U/L (ref 5–45)
BACTERIA UR QL AUTO: ABNORMAL /HPF
BASOPHILS # BLD AUTO: 0.11 THOUSANDS/ΜL (ref 0–0.1)
BASOPHILS NFR BLD AUTO: 1 % (ref 0–1)
BILIRUB SERPL-MCNC: 0.3 MG/DL (ref 0.2–1)
BILIRUB UR QL STRIP: NEGATIVE
BUN SERPL-MCNC: 25 MG/DL (ref 5–25)
CALCIUM SERPL-MCNC: 9.5 MG/DL (ref 8.3–10.1)
CHLORIDE SERPL-SCNC: 109 MMOL/L (ref 100–108)
CHOLEST SERPL-MCNC: 243 MG/DL (ref 50–200)
CLARITY UR: CLEAR
CO2 SERPL-SCNC: 24 MMOL/L (ref 21–32)
COLOR UR: YELLOW
CREAT SERPL-MCNC: 1.49 MG/DL (ref 0.6–1.3)
EOSINOPHIL # BLD AUTO: 0.28 THOUSAND/ΜL (ref 0–0.61)
EOSINOPHIL NFR BLD AUTO: 3 % (ref 0–6)
ERYTHROCYTE [DISTWIDTH] IN BLOOD BY AUTOMATED COUNT: 13.7 % (ref 11.6–15.1)
GFR SERPL CREATININE-BSD FRML MDRD: 34 ML/MIN/1.73SQ M
GLUCOSE P FAST SERPL-MCNC: 74 MG/DL (ref 65–99)
GLUCOSE UR STRIP-MCNC: NEGATIVE MG/DL
HCT VFR BLD AUTO: 39.8 % (ref 34.8–46.1)
HDLC SERPL-MCNC: 48 MG/DL
HGB BLD-MCNC: 12.1 G/DL (ref 11.5–15.4)
HGB UR QL STRIP.AUTO: NEGATIVE
HYALINE CASTS #/AREA URNS LPF: ABNORMAL /LPF
IMM GRANULOCYTES # BLD AUTO: 0.03 THOUSAND/UL (ref 0–0.2)
IMM GRANULOCYTES NFR BLD AUTO: 0 % (ref 0–2)
KETONES UR STRIP-MCNC: NEGATIVE MG/DL
LDLC SERPL CALC-MCNC: 148 MG/DL (ref 0–100)
LEUKOCYTE ESTERASE UR QL STRIP: NEGATIVE
LYMPHOCYTES # BLD AUTO: 3.37 THOUSANDS/ΜL (ref 0.6–4.47)
LYMPHOCYTES NFR BLD AUTO: 35 % (ref 14–44)
MCH RBC QN AUTO: 30 PG (ref 26.8–34.3)
MCHC RBC AUTO-ENTMCNC: 30.4 G/DL (ref 31.4–37.4)
MCV RBC AUTO: 99 FL (ref 82–98)
MONOCYTES # BLD AUTO: 1.07 THOUSAND/ΜL (ref 0.17–1.22)
MONOCYTES NFR BLD AUTO: 11 % (ref 4–12)
NEUTROPHILS # BLD AUTO: 4.74 THOUSANDS/ΜL (ref 1.85–7.62)
NEUTS SEG NFR BLD AUTO: 50 % (ref 43–75)
NITRITE UR QL STRIP: NEGATIVE
NON-SQ EPI CELLS URNS QL MICRO: ABNORMAL /HPF
NONHDLC SERPL-MCNC: 195 MG/DL
NRBC BLD AUTO-RTO: 0 /100 WBCS
PH UR STRIP.AUTO: 6 [PH]
PLATELET # BLD AUTO: 260 THOUSANDS/UL (ref 149–390)
PMV BLD AUTO: 12 FL (ref 8.9–12.7)
POTASSIUM SERPL-SCNC: 5 MMOL/L (ref 3.5–5.3)
PROT SERPL-MCNC: 7.8 G/DL (ref 6.4–8.2)
PROT UR STRIP-MCNC: ABNORMAL MG/DL
RBC # BLD AUTO: 4.04 MILLION/UL (ref 3.81–5.12)
RBC #/AREA URNS AUTO: ABNORMAL /HPF
SODIUM SERPL-SCNC: 138 MMOL/L (ref 136–145)
SP GR UR STRIP.AUTO: 1.02 (ref 1–1.03)
T4 FREE SERPL-MCNC: 0.98 NG/DL (ref 0.76–1.46)
TRIGL SERPL-MCNC: 234 MG/DL
TSH SERPL DL<=0.05 MIU/L-ACNC: 1.84 UIU/ML (ref 0.36–3.74)
UROBILINOGEN UR QL STRIP.AUTO: 0.2 E.U./DL
WBC # BLD AUTO: 9.6 THOUSAND/UL (ref 4.31–10.16)
WBC #/AREA URNS AUTO: ABNORMAL /HPF

## 2019-12-13 PROCEDURE — 71046 X-RAY EXAM CHEST 2 VIEWS: CPT

## 2019-12-13 PROCEDURE — 36415 COLL VENOUS BLD VENIPUNCTURE: CPT

## 2019-12-13 PROCEDURE — 80053 COMPREHEN METABOLIC PANEL: CPT

## 2019-12-13 PROCEDURE — 81001 URINALYSIS AUTO W/SCOPE: CPT | Performed by: INTERNAL MEDICINE

## 2019-12-13 PROCEDURE — 85025 COMPLETE CBC W/AUTO DIFF WBC: CPT

## 2019-12-13 PROCEDURE — 82306 VITAMIN D 25 HYDROXY: CPT

## 2019-12-13 PROCEDURE — 80061 LIPID PANEL: CPT

## 2019-12-13 PROCEDURE — 84443 ASSAY THYROID STIM HORMONE: CPT

## 2019-12-13 PROCEDURE — 84439 ASSAY OF FREE THYROXINE: CPT

## 2019-12-18 ENCOUNTER — DOCUMENTATION (OUTPATIENT)
Dept: PERIOP | Facility: HOSPITAL | Age: 74
End: 2019-12-18

## 2020-01-10 ENCOUNTER — ONCOLOGY SURVIVORSHIP (OUTPATIENT)
Dept: CARDIAC SURGERY | Facility: CLINIC | Age: 75
End: 2020-01-10

## 2020-01-10 ENCOUNTER — OFFICE VISIT (OUTPATIENT)
Dept: CARDIAC SURGERY | Facility: CLINIC | Age: 75
End: 2020-01-10

## 2020-01-10 VITALS
TEMPERATURE: 97.7 F | DIASTOLIC BLOOD PRESSURE: 66 MMHG | HEART RATE: 62 BPM | SYSTOLIC BLOOD PRESSURE: 124 MMHG | OXYGEN SATURATION: 95 % | BODY MASS INDEX: 27.26 KG/M2 | HEIGHT: 65 IN | WEIGHT: 163.6 LBS

## 2020-01-10 DIAGNOSIS — C34.11 PRIMARY ADENOCARCINOMA OF UPPER LOBE OF RIGHT LUNG (HCC): Primary | ICD-10-CM

## 2020-01-10 PROCEDURE — 99024 POSTOP FOLLOW-UP VISIT: CPT | Performed by: PHYSICIAN ASSISTANT

## 2020-01-10 RX ORDER — ERGOCALCIFEROL 1.25 MG/1
50000 CAPSULE ORAL WEEKLY
COMMUNITY
End: 2021-04-13 | Stop reason: ALTCHOICE

## 2020-01-10 RX ORDER — ESCITALOPRAM OXALATE 20 MG/1
10 TABLET ORAL DAILY
COMMUNITY
Start: 2019-12-02

## 2020-01-10 NOTE — PROGRESS NOTES
Thoracic Follow-Up  Assessment/Plan:    Primary adenocarcinoma of upper lobe of right lung [facility])  Ms Pimentel presents 2 months out from right thoracoscopic therapeutic wedge resection for Stage IA adenocarcinoma of the lung  Her CXR 12/13/19 demonstrated resolution of right basilar opacity from one month prior  She is not having an incisional issues, and her dyspnea on exertion is at baseline  At this point, we will enter into surveillance with a chest CT in 6 months from surgery, so early May 2020  She is in agreement with this plan and will call in the interim with any questions or concerns  Diagnoses and all orders for this visit:    Primary adenocarcinoma of upper lobe of right lung ([facility] [address] )  -     CT chest wo contrast; Future    Other orders  -     escitalopram (LEXAPRO) 20 mg tablet  -     ergocalciferol (VITAMIN D2) 50,000 units; Take 50,000 Units by mouth once a week          Thoracic History   Diagnosis: Right upper lobe adenocarcinoma   Procedure: Flexible bronchoscopy, right thoracoscopic upper lobe therapeutic wedge resection, endoscopic biopsy of nasopharyngeal lesion (Dr Shanta Lui) performed on 11/7/19  Pathology: Right upper lobe tumor revealing acinar pattern invasive adenocarcinoma, measuring 1 8 cm with negative margins  8th Edition AJCC tumor stage IA2 (pT1b,pNx)  Nasopharyngeal bx pending        Subjective:    Patient ID: Sharita Gant is a 66 y o  female  HPI  Ms Pimentel is a 65 yo female who was electively admitted on 11/7/19 to undergo a right thoracoscopic upper lobe therapeutic wedge resection for invasive adenocarcinoma  She also underwent a nasopharyngeal biopsy intra-operatively  Her hospital course was uneventful and she was discharged on 11/8/19  She was last seen in the office 11/19/19 at which time CXR demonstrated right basilar opacities  She was asymptomatic for pneumonia at that time     She returns today with a more recent CXR 12/13/19 demonstrating resolution of the right base opacity, without pneumothorax or effusion  Her nasopharyngeal lesion biopsy was benign  On discussion, she reports dyspnea on exertion that is stable since before surgery  She denies cough, hemoptysis, weight loss, fevers, chills, or incisional pain  The following portions of the patient's history were reviewed and updated as appropriate: allergies, current medications, past family history, past medical history, past social history, past surgical history and problem list     Review of Systems   Constitutional: Negative for activity change, appetite change, chills, diaphoresis, fatigue, fever and unexpected weight change  HENT: Positive for postnasal drip  Respiratory: Positive for shortness of breath (exertion)  Negative for cough, chest tightness and wheezing  Cardiovascular: Negative for chest pain and leg swelling  Musculoskeletal: Negative for joint swelling and myalgias  Skin: Negative for color change and rash  Hematological: Negative for adenopathy  Does not bruise/bleed easily  Objective:   Physical Exam   Constitutional: She is oriented to person, place, and time  She appears well-developed and well-nourished  No distress  HENT:   Head: Normocephalic and atraumatic  Eyes: Conjunctivae are normal  No scleral icterus  Neck: Neck supple  No tracheal deviation present  Cardiovascular: Normal rate, regular rhythm and normal heart sounds  Pulmonary/Chest: Effort normal and breath sounds normal  No respiratory distress  Right thoracoscopic incisions well healed   Abdominal: Soft  She exhibits no distension  Lymphadenopathy:     She has no cervical adenopathy  Neurological: She is alert and oriented to person, place, and time  Skin: Skin is warm and dry  Psychiatric: She has a normal mood and affect   Her behavior is normal  Judgment and thought content normal    /66 (BP Location: Right arm, Patient Position: Sitting, Cuff Size: Large)   Pulse 62 Temp 97 7 °F (36 5 °C) (Tympanic)   Ht 5' 5" (1 651 m)   Wt 74 2 kg (163 lb 9 6 oz) Comment: shoes and jacket  SpO2 95%   BMI 27 22 kg/m²     Xr Chest Pa & Lateral    Result Date: 12/16/2019  Impression No acute cardiopulmonary disease  Surgical changes in the right upper lung with resolution of previous right base opacity  Workstation performed: [id number]     Ct Chest Wo Contrast    Result Date: 9/19/2019  Narrative CT CHEST WITHOUT IV CONTRAST INDICATION:   R91 8: Other nonspecific abnormal finding of lung field  History of groundglass nodule in the right upper lobe  COMPARISON:  CT chest 9/18/2018 TECHNIQUE: CT examination of the chest was performed without intravenous contrast   Axial, sagittal, and coronal 2D reformatted images were created from the source data and submitted for interpretation  This examination, like all CT scans performed in the [place], was performed utilizing techniques to minimize radiation dose exposure, including the use of iterative reconstruction and automated exposure control  FINDINGS: LUNGS:  Enlarging groundglass nodule in the anterior aspect of the right upper lobe measuring 1 5 x 1 5 x 2 2 cm  While there is no measurable solid component, there is overall increased density making this suspicious  No other lung nodule detected  No endotracheal or endobronchial lesion  PLEURA:  Unremarkable  HEART/GREAT VESSELS:  Coronary artery calcifications  MEDIASTINUM AND NICOLAS:  Unremarkable  CHEST WALL AND LOWER NECK:   Unremarkable  VISUALIZED STRUCTURES IN THE UPPER ABDOMEN:  Stable hiatal hernia  Upper abdomen otherwise unremarkable  OSSEOUS STRUCTURES:  No acute fracture or destructive osseous lesion  Impression Enlarging groundglass nodule in the anterior aspect of the right upper lobe measuring 1 5 x 1 5 x 2 2 cm  While there is no measurable solid component, there is overall increased density making this suspicious    Recommend thoracic surgery consultation for further management  The study was marked in [facility] for immediate notification  Workstation performed: [id number]     Nm Pet Ct Skull Base To Mid Thigh    Result Date: 10/2/2019  Narrative PET/CT SCAN INDICATION: Abnormal chest CT  Enlarging groundglass nodule  D38 1: Neoplasm of uncertain behavior of trachea, bronchus and lung MODIFIER: PI COMPARISON: CT chest exam 9/17/2019, CT abdomen pelvis 11/26/2017 and additional priors CELL TYPE:  N/A TECHNIQUE:   8 5 mCi F-18-FDG administered IV  Multiplanar attenuation corrected and non attenuation corrected PET images are available for interpretation, and contiguous, low dose, axial CT sections were obtained from the skull base through the femurs   Intravenous contrast material was not utilized  This examination, like all CT scans performed in the [facility], was performed utilizing techniques to minimize radiation dose exposure, including the use of iterative reconstruction and automated exposure control  Fasting serum glucose: 105 mg/dl FINDINGS: VISUALIZED BRAIN:   No acute abnormalities are seen  HEAD/NECK:   Asymmetric FDG uptake at the left posterior nasopharynx, SUV max of 7 2  No obvious findings here on CT  No FDG avid lymph nodes  CT images: Unremarkable  CHEST:   Mild FDG uptake noted at the 1 5 x 1 2 cm right upper lobe lateral groundglass nodule  SUV max here is 1 6, SUV max of 2 4 on the Q clear images  This is higher than background activity, SUV max of 0 7  This has demonstrated slow interval enlargement over time  On the 2/13/2017 CT examination, this measured 1 1 x 0 8 cm  No suspicious focal FDG uptake in the mediastinal or perihilar regions  CT images: Small hiatal hernia  Mild to moderate coronary artery calcifications  ABDOMEN:   No FDG avid soft tissue lesions are seen  CT images: Prior cholecystectomy  PELVIS: No FDG avid soft tissue lesions are seen  CT images: Bowel staple line at the sigmoid colon    2 2 cm right adnexal cyst without FDG uptake  This does appear new from a 2017 CT exam  OSSEOUS STRUCTURES: Slight focal FDG uptake at the left anterior 5th rib, SUV max of 1 3  No obvious findings here on CT, probably posttraumatic  Mild focal FDG uptake at the right iliac wing laterally where there is enthesopathy on CT, SUV max of 2 5  This is likely inflammatory  CT images: Prior posterior fusion and laminectomy in the cervical spine  Vertebral body hemangioma noted of L2  Impression 1  Mild FDG uptake noted in the 1 5 cm right upper lobe groundglass nodule, above lung background activity  Given the slow interval enlargement of this nodule, this is likely a low-grade adenocarcinoma  2    No findings suspicious for hypermetabolic metastasis  3   Asymmetric FDG uptake at the left posterior nasopharynx, SUV max of 7 2  No obvious findings here on CT  While this could be inflammatory an underlying lesion is not excluded  Correlate with direct visualization  4  Slight focal FDG uptake at the left anterior 5th rib  No obvious findings here on CT, favor posttraumatic  Correlate clinically  5   2 2 cm right adnexal cyst   No focal FDG uptake here but cystic neoplasms may not demonstrate significant FDG uptake  According to current guidelines (J Am Yoan Radiol 6761;35:133-398) in this late postmenopausal woman, this should be evaluated by pelvic ultrasound  The study was marked in EPIC for significant notification   Workstation performed: [id number]

## 2020-01-10 NOTE — ASSESSMENT & PLAN NOTE
Ms Cristhian Bernal presents 2 months out from right thoracoscopic therapeutic wedge resection for Stage IA adenocarcinoma of the lung  Her CXR 12/13/19 demonstrated resolution of right basilar opacity from one month prior  She is not having an incisional issues, and her dyspnea on exertion is at baseline  At this point, we will enter into surveillance with a chest CT in 6 months from surgery, so early May 2020  She is in agreement with this plan and will call in the interim with any questions or concerns

## 2020-01-10 NOTE — PROGRESS NOTES
Survivorship treatment summary and care plan provided to patient and reviewed  All questions were answered and form signed  Copies made and sent to PCP on record and to [employer] right fax for scanning to be placed into electronic chart

## 2020-01-24 ENCOUNTER — TRANSCRIBE ORDERS (OUTPATIENT)
Dept: ADMINISTRATIVE | Facility: HOSPITAL | Age: 75
End: 2020-01-24

## 2020-01-24 ENCOUNTER — APPOINTMENT (OUTPATIENT)
Dept: LAB | Facility: MEDICAL CENTER | Age: 75
End: 2020-01-24
Payer: COMMERCIAL

## 2020-01-24 DIAGNOSIS — N18.30 CHRONIC RENAL FAILURE, STAGE 3 (MODERATE) (HCC): Primary | ICD-10-CM

## 2020-01-24 LAB
ANION GAP SERPL CALCULATED.3IONS-SCNC: 6 MMOL/L (ref 4–13)
BUN SERPL-MCNC: 17 MG/DL (ref 5–25)
CALCIUM SERPL-MCNC: 9.5 MG/DL (ref 8.3–10.1)
CHLORIDE SERPL-SCNC: 108 MMOL/L (ref 100–108)
CO2 SERPL-SCNC: 27 MMOL/L (ref 21–32)
CREAT SERPL-MCNC: 1.06 MG/DL (ref 0.6–1.3)
GFR SERPL CREATININE-BSD FRML MDRD: 51 ML/MIN/1.73SQ M
GLUCOSE P FAST SERPL-MCNC: 101 MG/DL (ref 65–99)
POTASSIUM SERPL-SCNC: 4.3 MMOL/L (ref 3.5–5.3)
SODIUM SERPL-SCNC: 141 MMOL/L (ref 136–145)

## 2020-01-24 PROCEDURE — 36415 COLL VENOUS BLD VENIPUNCTURE: CPT | Performed by: INTERNAL MEDICINE

## 2020-01-24 PROCEDURE — 80048 BASIC METABOLIC PNL TOTAL CA: CPT | Performed by: INTERNAL MEDICINE

## 2020-02-01 ENCOUNTER — APPOINTMENT (EMERGENCY)
Dept: RADIOLOGY | Facility: HOSPITAL | Age: 75
End: 2020-02-01
Payer: COMMERCIAL

## 2020-02-01 ENCOUNTER — HOSPITAL ENCOUNTER (OUTPATIENT)
Facility: HOSPITAL | Age: 75
Setting detail: OBSERVATION
Discharge: HOME | End: 2020-02-02
Attending: EMERGENCY MEDICINE | Admitting: INTERNAL MEDICINE
Payer: COMMERCIAL

## 2020-02-01 DIAGNOSIS — R09.02 HYPOXIA: ICD-10-CM

## 2020-02-01 DIAGNOSIS — R50.9 FEVER: Primary | ICD-10-CM

## 2020-02-01 DIAGNOSIS — J06.9 URI, ACUTE: ICD-10-CM

## 2020-02-01 PROBLEM — A41.9 SEPSIS WITHOUT ACUTE ORGAN DYSFUNCTION (HCC): Status: ACTIVE | Noted: 2020-02-01

## 2020-02-01 PROBLEM — B34.9 NONSPECIFIC SYNDROME SUGGESTIVE OF VIRAL ILLNESS: Status: ACTIVE | Noted: 2020-02-01

## 2020-02-01 LAB
ALBUMIN SERPL BCP-MCNC: 3.6 G/DL (ref 3.5–5)
ALP SERPL-CCNC: 158 U/L (ref 46–116)
ALT SERPL W P-5'-P-CCNC: 71 U/L (ref 12–78)
ANION GAP SERPL CALCULATED.3IONS-SCNC: 9 MMOL/L (ref 4–13)
AST SERPL W P-5'-P-CCNC: 60 U/L (ref 5–45)
BACTERIA UR QL AUTO: ABNORMAL /HPF
BASOPHILS # BLD AUTO: 0.05 THOUSANDS/ΜL (ref 0–0.1)
BASOPHILS NFR BLD AUTO: 1 % (ref 0–1)
BILIRUB SERPL-MCNC: 0.81 MG/DL (ref 0.2–1)
BILIRUB UR QL STRIP: NEGATIVE
BILIRUB UR QL STRIP: NEGATIVE
BUN SERPL-MCNC: 15 MG/DL (ref 5–25)
CALCIUM SERPL-MCNC: 9.6 MG/DL (ref 8.3–10.1)
CHLORIDE SERPL-SCNC: 102 MMOL/L (ref 100–108)
CLARITY UR: CLEAR
CLARITY UR: CLEAR
CO2 SERPL-SCNC: 24 MMOL/L (ref 21–32)
COLOR UR: YELLOW
COLOR UR: YELLOW
CREAT SERPL-MCNC: 1.12 MG/DL (ref 0.6–1.3)
EOSINOPHIL # BLD AUTO: 0.01 THOUSAND/ΜL (ref 0–0.61)
EOSINOPHIL NFR BLD AUTO: 0 % (ref 0–6)
ERYTHROCYTE [DISTWIDTH] IN BLOOD BY AUTOMATED COUNT: 12.8 % (ref 11.6–15.1)
FLUAV RNA NPH QL NAA+PROBE: NORMAL
FLUBV RNA NPH QL NAA+PROBE: NORMAL
GFR SERPL CREATININE-BSD FRML MDRD: 48 ML/MIN/1.73SQ M
GLUCOSE SERPL-MCNC: 126 MG/DL (ref 65–140)
GLUCOSE UR STRIP-MCNC: NEGATIVE MG/DL
GLUCOSE UR STRIP-MCNC: NEGATIVE MG/DL
HCT VFR BLD AUTO: 38.7 % (ref 34.8–46.1)
HGB BLD-MCNC: 12.4 G/DL (ref 11.5–15.4)
HGB UR QL STRIP.AUTO: ABNORMAL
HGB UR QL STRIP.AUTO: NEGATIVE
IMM GRANULOCYTES # BLD AUTO: 0.04 THOUSAND/UL (ref 0–0.2)
IMM GRANULOCYTES NFR BLD AUTO: 0 % (ref 0–2)
KETONES UR STRIP-MCNC: NEGATIVE MG/DL
KETONES UR STRIP-MCNC: NEGATIVE MG/DL
LACTATE SERPL-SCNC: 0.9 MMOL/L (ref 0.5–2)
LEUKOCYTE ESTERASE UR QL STRIP: NEGATIVE
LEUKOCYTE ESTERASE UR QL STRIP: NEGATIVE
LIPASE SERPL-CCNC: 172 U/L (ref 73–393)
LYMPHOCYTES # BLD AUTO: 1.04 THOUSANDS/ΜL (ref 0.6–4.47)
LYMPHOCYTES NFR BLD AUTO: 11 % (ref 14–44)
MCH RBC QN AUTO: 30.2 PG (ref 26.8–34.3)
MCHC RBC AUTO-ENTMCNC: 32 G/DL (ref 31.4–37.4)
MCV RBC AUTO: 94 FL (ref 82–98)
MONOCYTES # BLD AUTO: 0.74 THOUSAND/ΜL (ref 0.17–1.22)
MONOCYTES NFR BLD AUTO: 8 % (ref 4–12)
NEUTROPHILS # BLD AUTO: 7.99 THOUSANDS/ΜL (ref 1.85–7.62)
NEUTS SEG NFR BLD AUTO: 80 % (ref 43–75)
NITRITE UR QL STRIP: NEGATIVE
NITRITE UR QL STRIP: NEGATIVE
NON-SQ EPI CELLS URNS QL MICRO: ABNORMAL /HPF
NRBC BLD AUTO-RTO: 0 /100 WBCS
PH UR STRIP.AUTO: 5.5 [PH] (ref 4.5–8)
PH UR STRIP.AUTO: 5.5 [PH] (ref 4.5–8)
PLATELET # BLD AUTO: 238 THOUSANDS/UL (ref 149–390)
PMV BLD AUTO: 9.8 FL (ref 8.9–12.7)
POTASSIUM SERPL-SCNC: 4.3 MMOL/L (ref 3.5–5.3)
PROT SERPL-MCNC: 7.8 G/DL (ref 6.4–8.2)
PROT UR STRIP-MCNC: NEGATIVE MG/DL
PROT UR STRIP-MCNC: NEGATIVE MG/DL
RBC # BLD AUTO: 4.11 MILLION/UL (ref 3.81–5.12)
RBC #/AREA URNS AUTO: ABNORMAL /HPF
RSV RNA NPH QL NAA+PROBE: NORMAL
SODIUM SERPL-SCNC: 135 MMOL/L (ref 136–145)
SP GR UR STRIP.AUTO: 1.02 (ref 1–1.03)
SP GR UR STRIP.AUTO: 1.02 (ref 1–1.03)
UROBILINOGEN UR QL STRIP.AUTO: 0.2 E.U./DL
UROBILINOGEN UR QL STRIP.AUTO: 0.2 E.U./DL
WBC # BLD AUTO: 9.87 THOUSAND/UL (ref 4.31–10.16)
WBC #/AREA URNS AUTO: ABNORMAL /HPF

## 2020-02-01 PROCEDURE — 84145 PROCALCITONIN (PCT): CPT | Performed by: PHYSICIAN ASSISTANT

## 2020-02-01 PROCEDURE — 71046 X-RAY EXAM CHEST 2 VIEWS: CPT

## 2020-02-01 PROCEDURE — 80053 COMPREHEN METABOLIC PANEL: CPT | Performed by: EMERGENCY MEDICINE

## 2020-02-01 PROCEDURE — 83690 ASSAY OF LIPASE: CPT | Performed by: EMERGENCY MEDICINE

## 2020-02-01 PROCEDURE — 87077 CULTURE AEROBIC IDENTIFY: CPT | Performed by: EMERGENCY MEDICINE

## 2020-02-01 PROCEDURE — 85025 COMPLETE CBC W/AUTO DIFF WBC: CPT | Performed by: EMERGENCY MEDICINE

## 2020-02-01 PROCEDURE — 87186 SC STD MICRODIL/AGAR DIL: CPT | Performed by: EMERGENCY MEDICINE

## 2020-02-01 PROCEDURE — 99285 EMERGENCY DEPT VISIT HI MDM: CPT

## 2020-02-01 PROCEDURE — 81003 URINALYSIS AUTO W/O SCOPE: CPT

## 2020-02-01 PROCEDURE — 99220 PR INITIAL OBSERVATION CARE/DAY 70 MINUTES: CPT | Performed by: PHYSICIAN ASSISTANT

## 2020-02-01 PROCEDURE — 96360 HYDRATION IV INFUSION INIT: CPT

## 2020-02-01 PROCEDURE — 36415 COLL VENOUS BLD VENIPUNCTURE: CPT | Performed by: EMERGENCY MEDICINE

## 2020-02-01 PROCEDURE — 87631 RESP VIRUS 3-5 TARGETS: CPT | Performed by: EMERGENCY MEDICINE

## 2020-02-01 PROCEDURE — 83605 ASSAY OF LACTIC ACID: CPT | Performed by: EMERGENCY MEDICINE

## 2020-02-01 PROCEDURE — 99285 EMERGENCY DEPT VISIT HI MDM: CPT | Performed by: EMERGENCY MEDICINE

## 2020-02-01 PROCEDURE — 87040 BLOOD CULTURE FOR BACTERIA: CPT | Performed by: EMERGENCY MEDICINE

## 2020-02-01 PROCEDURE — 81001 URINALYSIS AUTO W/SCOPE: CPT

## 2020-02-01 RX ORDER — ACETAMINOPHEN 325 MG/1
975 TABLET ORAL ONCE
Status: COMPLETED | OUTPATIENT
Start: 2020-02-01 | End: 2020-02-01

## 2020-02-01 RX ORDER — ESCITALOPRAM OXALATE 20 MG/1
20 TABLET ORAL DAILY
Status: DISCONTINUED | OUTPATIENT
Start: 2020-02-02 | End: 2020-02-02 | Stop reason: HOSPADM

## 2020-02-01 RX ORDER — ONDANSETRON 2 MG/ML
4 INJECTION INTRAMUSCULAR; INTRAVENOUS EVERY 6 HOURS PRN
Status: DISCONTINUED | OUTPATIENT
Start: 2020-02-01 | End: 2020-02-02 | Stop reason: HOSPADM

## 2020-02-01 RX ORDER — ATORVASTATIN CALCIUM 40 MG/1
40 TABLET, FILM COATED ORAL
Status: DISCONTINUED | OUTPATIENT
Start: 2020-02-02 | End: 2020-02-02 | Stop reason: HOSPADM

## 2020-02-01 RX ORDER — MELATONIN
2000 DAILY
Status: DISCONTINUED | OUTPATIENT
Start: 2020-02-02 | End: 2020-02-02 | Stop reason: HOSPADM

## 2020-02-01 RX ORDER — ACETAMINOPHEN 325 MG/1
650 TABLET ORAL EVERY 6 HOURS PRN
Status: DISCONTINUED | OUTPATIENT
Start: 2020-02-01 | End: 2020-02-02 | Stop reason: HOSPADM

## 2020-02-01 RX ORDER — PROPRANOLOL HYDROCHLORIDE 20 MG/1
60 TABLET ORAL DAILY
Status: DISCONTINUED | OUTPATIENT
Start: 2020-02-02 | End: 2020-02-02 | Stop reason: HOSPADM

## 2020-02-01 RX ORDER — SODIUM CHLORIDE 9 MG/ML
100 INJECTION, SOLUTION INTRAVENOUS CONTINUOUS
Status: DISPENSED | OUTPATIENT
Start: 2020-02-01 | End: 2020-02-02

## 2020-02-01 RX ORDER — GABAPENTIN 300 MG/1
300 CAPSULE ORAL 3 TIMES DAILY
Status: DISCONTINUED | OUTPATIENT
Start: 2020-02-01 | End: 2020-02-02 | Stop reason: HOSPADM

## 2020-02-01 RX ADMIN — SODIUM CHLORIDE 1000 ML: 0.9 INJECTION, SOLUTION INTRAVENOUS at 18:24

## 2020-02-01 RX ADMIN — ACETAMINOPHEN 975 MG: 325 TABLET, FILM COATED ORAL at 19:26

## 2020-02-01 RX ADMIN — SODIUM CHLORIDE 100 ML/HR: 0.9 INJECTION, SOLUTION INTRAVENOUS at 22:11

## 2020-02-01 RX ADMIN — AZITHROMYCIN MONOHYDRATE 500 MG: 500 INJECTION, POWDER, LYOPHILIZED, FOR SOLUTION INTRAVENOUS at 21:19

## 2020-02-01 RX ADMIN — GABAPENTIN 300 MG: 300 CAPSULE ORAL at 22:11

## 2020-02-01 NOTE — ED PROVIDER NOTES
History  Chief Complaint   Patient presents with    Fever - 75 years or older     fever, vomiting, diarrhea , aches all over and headache X 2 days      66 y o  female presents today c/o fever, chills, myalgias, and diarrhea for 2 days  She was vaccinated with the influenza vaccine this year  Is not immunocompromised  No recent travel out of the country  Is not currently pregnant  Has taken tylenol with some relief  History provided by:  Patient  Flu Symptoms   Presenting symptoms: cough, diarrhea, fatigue, fever (subjective), myalgias and nausea    Presenting symptoms: no headaches and no shortness of breath    Severity:  Mild  Onset quality:  Unable to specify  Duration:  2 days  Progression:  Unchanged  Chronicity:  New  Relieved by:  None tried  Associated symptoms: chills and nasal congestion    Risk factors: being elderly and kidney disease        Prior to Admission Medications   Prescriptions Last Dose Informant Patient Reported? Taking? Cholecalciferol (VITAMIN D3) 5000 UNITS CAPS   Yes No   Sig: Take 1 capsule by mouth daily  acetaminophen (TYLENOL) 325 mg tablet   No No   Si mg every 4 to 6 hours as needed for pain  bismuth subsalicylate (PEPTO BISMOL) 262 MG chewable tablet   Yes No   Sig: Chew 262 mg 2 (two) times a day as needed for indigestion    cholestyramine (QUESTRAN) 4 g packet   No No   Sig: Take 1 packet (4 g total) by mouth 3 (three) times a day with meals Take 1 a day, can increase to 3 a day   Take 2 hrs apart from other meds   Patient taking differently: Take 1 packet by mouth as needed    ergocalciferol (VITAMIN D2) 50,000 units   Yes No   Sig: Take 50,000 Units by mouth once a week   escitalopram (LEXAPRO) 20 mg tablet   Yes No   gabapentin (NEURONTIN) 300 mg capsule   No No   Sig: Take 1 capsule (300 mg total) by mouth 3 (three) times a day   propranolol (INDERAL) 60 mg tablet   Yes No   Sig: Take 60 mg by mouth daily   simvastatin (ZOCOR) 40 mg tablet   Yes No Sig: Take 80 mg by mouth daily at bedtime       Facility-Administered Medications: None       Past Medical History:   Diagnosis Date    Colitis     COPD (chronic obstructive pulmonary disease) (HCC)     Dehydration     Depression     Diabetes mellitus (HCC)     Familial tremor     GERD (gastroesophageal reflux disease)     Ground glass opacity present on imaging of lung     Hiatal hernia     High cholesterol     Hyperlipidemia     Hypertension     Hypoglycemia     Migraine     Nosebleed     Primary adenocarcinoma of upper lobe of right lung (HCC)     Sleep apnea     C pap    Solitary pulmonary nodule     Syncope        Past Surgical History:   Procedure Laterality Date    APPENDECTOMY      BACK SURGERY      Cervical spine    BREAST BIOPSY      CHOLECYSTECTOMY      COLECTOMY      complete colectomy    CT NEEDLE BX ASPIRATION INJECTION LOCALIZATION  11/7/2019    ESOPHAGOGASTRODUODENOSCOPY N/A 1/22/2018    Procedure: ESOPHAGOGASTRODUODENOSCOPY (EGD); Surgeon: Tia Sauceda MD;  [facility]; Service: Gastroenterology    LUNG SEGMENTECTOMY Right 11/7/2019    Procedure: RESECTION WEDGE LUNG;  Surgeon: Veda Branch MD;  [facility];  Service: Thoracic    NASAL/SINUS ENDOSCOPY N/A 11/7/2019    Procedure: ENDOSCOPIC BIOPSY OF NASOPHARYNX LESION;  Surgeon: Sampson Ngo MD;  [facility];  Service: ENT    NISSEN [place] N/A 11/7/2019    Procedure: Isauro Oliva;  Surgeon: Veda Branch MD;  [facility];  Service: Thoracic    AK ESOPHAGOGASTRODUODENOSCOPY TRANSORAL DIAGNOSTIC N/A 4/17/2019    Procedure: ESOPHAGOGASTRODUODENOSCOPY (EGD); Surgeon: Tia Sauceda MD;  [facility];   Service: Gastroenterology    AK LAP, REPAIR PARAESOPHAGEAL HERNIA, INCL FUNDOPLASTY W/ MESH N/A 6/2/2016    Procedure: ROBOTIC LAPAROSCOPIC Daquan Fundoplication, Liver biopsy;  Surgeon: Kuldeep Collazo MD; [facility];  Service: General    OK THORACOSCOPY W/THERA WEDGE RESEXN INITIAL UNILAT Right 11/7/2019    Procedure: THORACOSCOPY VIDEO ASSISTED SURGERY (VATS); Surgeon: Veda Branch MD;  [facility];  Service: Thoracic       Family History   Problem Relation Age of Onset    Leukemia Mother 58    Colon cancer Father 79    Leukemia Brother 61     I have reviewed and agree with the history as documented  Social History     Tobacco Use    Smoking status: Never Smoker    Smokeless tobacco: Never Used   Substance Use Topics    Alcohol use: Not Currently    Drug use: No        Review of Systems   Constitutional: Positive for chills, fatigue and fever (subjective)  HENT: Positive for congestion and postnasal drip  Eyes: Negative for visual disturbance  Respiratory: Positive for cough  Negative for chest tightness and shortness of breath  Cardiovascular: Negative for chest pain and leg swelling  Gastrointestinal: Positive for diarrhea and nausea  Genitourinary: Negative for difficulty urinating and dysuria  Musculoskeletal: Positive for myalgias  Skin: Negative for pallor, rash and wound  Neurological: Negative for dizziness and headaches  Psychiatric/Behavioral: Negative for confusion  Physical Exam  Physical Exam   Constitutional: She is oriented to person, place, and time  She appears well-developed and well-nourished  HENT:   Head: Normocephalic and atraumatic  Mouth/Throat: Uvula is midline, oropharynx is clear and moist and mucous membranes are normal  No tonsillar exudate  Eyes: Pupils are equal, round, and reactive to light  Neck: Normal range of motion  Neck supple  Cardiovascular: Normal rate and regular rhythm  Pulmonary/Chest: Effort normal and breath sounds normal    Abdominal: Soft  Bowel sounds are normal  There is no tenderness  There is no rebound and no guarding  Musculoskeletal: She exhibits no edema, tenderness or deformity  Neurological: She is alert and oriented to person, place, and time  Patient moving all extremities equally, no focal neuro deficits noted  Skin: Skin is warm and dry  Capillary refill takes less than 2 seconds  Psychiatric: She has a normal mood and affect  Nursing note and vitals reviewed        Vital Signs  ED Triage Vitals   Temperature Pulse Respirations Blood Pressure SpO2   02/01/20 1718 02/01/20 1718 02/01/20 1718 02/01/20 1718 02/01/20 1718   100 4 °F (38 °C) 72 20 161/73 97 %      Temp Source Heart Rate Source Patient Position - Orthostatic VS BP Location FiO2 (%)   02/01/20 1718 02/01/20 1934 02/01/20 1934 02/01/20 1718 --   Oral Monitor Lying Right arm       Pain Score       02/01/20 1718       6           Vitals:    02/01/20 2015 02/01/20 2122 02/01/20 2142 02/01/20 2145   BP:  108/54 111/60 111/60   Pulse: 82 78 78 78   Patient Position - Orthostatic VS:  Lying  Lying         Visual Acuity      ED Medications  Medications   escitalopram (LEXAPRO) tablet 20 mg (has no administration in time range)   gabapentin (NEURONTIN) capsule 300 mg (300 mg Oral Given 2/1/20 2211)   propranolol (INDERAL) tablet 60 mg (has no administration in time range)   atorvastatin (LIPITOR) tablet 40 mg (has no administration in time range)   cholecalciferol (VITAMIN D3) tablet 2,000 Units (has no administration in time range)   sodium chloride 0 9 % infusion (100 mL/hr Intravenous New Bag 2/1/20 2211)   ondansetron (ZOFRAN) injection 4 mg (has no administration in time range)   enoxaparin (LOVENOX) subcutaneous injection 40 mg (has no administration in time range)   acetaminophen (TYLENOL) tablet 650 mg (has no administration in time range)   sodium chloride 0 9 % bolus 1,000 mL (0 mL Intravenous Stopped 2/1/20 1927)   acetaminophen (TYLENOL) tablet 975 mg (975 mg Oral Given 2/1/20 1926)   azithromycin (ZITHROMAX) 500 mg in sodium chloride 0 9% 250mL IVPB 500 mg (500 mg Intravenous New Bag 2/1/20 2119) Diagnostic Studies  Results Reviewed     Procedure Component Value Units Date/Time    Procalcitonin [[id number]]  (Abnormal) Collected:  02/01/20 2121    Lab Status:  Final result Specimen:  Blood from Arm, Right Updated:  02/02/20 0014     Procalcitonin 0 44 ng/ml     Lactic acid, plasma x2 [[id number]]  (Normal) Collected:  02/01/20 2049    Lab Status:  Final result Specimen:  Blood from Arm, Right Updated:  02/01/20 2120     LACTIC ACID 0 9 mmol/L     Narrative:       Result may be elevated if tourniquet was used during collection  Blood culture #2 [[id number]] Collected:  02/01/20 2049    Lab Status: In process Specimen:  Blood from Arm, Right Updated:  02/01/20 2056    Blood culture #1 [[id number]] Collected:  02/01/20 2049    Lab Status:   In process Specimen:  Blood from Arm, Left Updated:  02/01/20 2056    Lactic acid, plasma x2 [[id number]]     Lab Status:  No result Specimen:  Blood     Urine Microscopic [[id number]]  (Abnormal) Collected:  02/01/20 1946    Lab Status:  Final result Specimen:  Urine, Clean Catch Updated:  02/01/20 2014     RBC, UA 0-1 /hpf      WBC, UA 1-2 /hpf      Epithelial Cells Occasional /hpf      Bacteria, UA None Seen /hpf     Urine Macroscopic, POC [[id number]] Collected:  02/01/20 1951    Lab Status:  Final result Specimen:  Urine Updated:  02/01/20 1946     Color, UA Yellow     Clarity, UA Clear     pH, UA 5 5     Leukocytes, UA Negative     Nitrite, UA Negative     Protein, UA Negative mg/dl      Glucose, UA Negative mg/dl      Ketones, UA Negative mg/dl      Urobilinogen, UA 0 2 E U /dl      Bilirubin, UA Negative     Blood, UA Negative     Specific Gravity, UA 1 020    Narrative:       CLINITEK RESULT    Urine Macroscopic, POC [[id number]]  (Abnormal) Collected:  02/01/20 1946    Lab Status:  Final result Specimen:  Urine Updated:  02/01/20 1942     Color, UA Yellow     Clarity, UA Clear     pH, UA 5 5     Leukocytes, UA Negative     Nitrite, UA Negative     Protein, UA Negative mg/dl      Glucose, UA Negative mg/dl      Ketones, UA Negative mg/dl      Urobilinogen, UA 0 2 E U /dl      Bilirubin, UA Negative     Blood, UA Large     Specific [place], UA 1 020    Narrative:       CLINITEK RESULT    Comprehensive metabolic panel [[id number]]  (Abnormal) Collected:  02/01/20 1823    Lab Status:  Final result Specimen:  Blood from Arm, Left Updated:  02/01/20 1903     Sodium 135 mmol/L      Potassium 4 3 mmol/L      Chloride 102 mmol/L      CO2 24 mmol/L      ANION GAP 9 mmol/L      BUN 15 mg/dL      Creatinine 1 12 mg/dL      Glucose 126 mg/dL      Calcium 9 6 mg/dL      AST 60 U/L      ALT 71 U/L      Alkaline Phosphatase 158 U/L      Total Protein 7 8 g/dL      Albumin 3 6 g/dL      Total Bilirubin 0 81 mg/dL      eGFR 48 ml/min/1 73sq m     Narrative:       [facility] guidelines for Chronic Kidney Disease (CKD):     Stage 1 with normal or high GFR (GFR > 90 mL/min/1 73 square meters)    Stage 2 Mild CKD (GFR = 60-89 mL/min/1 73 square meters)    Stage 3A Moderate CKD (GFR = 45-59 mL/min/1 73 square meters)    Stage 3B Moderate CKD (GFR = 30-44 mL/min/1 73 square meters)    Stage 4 Severe CKD (GFR = 15-29 mL/min/1 73 square meters)    Stage 5 End Stage CKD (GFR <15 mL/min/1 73 square meters)  Note: GFR calculation is accurate only with a steady state creatinine    Lipase [[id number]]  (Normal) Collected:  02/01/20 1823    Lab Status:  Final result Specimen:  Blood from Arm, Left Updated:  02/01/20 1902     Lipase 172 u/L     CBC and differential [[id number]]  (Abnormal) Collected:  02/01/20 1823    Lab Status:  Final result Specimen:  Blood from Arm, Left Updated:  02/01/20 1846     WBC 9 87 Thousand/uL      RBC 4 11 Million/uL      Hemoglobin 12 4 g/dL      Hematocrit 38 7 %      MCV 94 fL      MCH 30 2 pg      MCHC 32 0 g/dL      RDW 12 8 %      MPV 9 8 fL      Platelets 858 Thousands/uL      nRBC 0 /100 WBCs      Neutrophils Relative 80 %      Immat GRANS % 0 %      Lymphocytes Relative 11 %      Monocytes Relative 8 %      Eosinophils Relative 0 %      Basophils Relative 1 %      Neutrophils Absolute 7 99 Thousands/µL      Immature Grans Absolute 0 04 Thousand/uL      Lymphocytes Absolute 1 04 Thousands/µL      Monocytes Absolute 0 74 Thousand/µL      Eosinophils Absolute 0 01 Thousand/µL      Basophils Absolute 0 05 Thousands/µL     Influenza A/B and RSV PCR [[id number]]  (Normal) Collected:  02/01/20 1732    Lab Status:  Final result Specimen:  Nose Updated:  02/01/20 1817     INFLUENZA A PCR None Detected     INFLUENZA B PCR None Detected     RSV PCR None Detected                 XR chest 2 views    (Results Pending)              Procedures  Procedures         ED Course                               MDM  Number of Diagnoses or Management Options  Fever: new and requires workup  Hypoxia: new and requires workup  Diagnosis management comments: MDM: Patient presents to the Emergency Department and was diagnosed with acute bronchitis with hypoxia  This is a new problem that will require additional planned work-up in a hospitalized setting  Clinical laboratory testing, radiology imaging, and medical testing (EKG) were ordered  I independently reviewed the radiologic imaging, EKG, and laboratory studies  This case is considered high risk secondary to the above listed disease process that poses a threat to bodily function that requires further diagnostic testing and management which may include the administration of parenteral controlled substances  Discussed with HASEEB  We had a detailed discussion of the patient's condition and case,  including need for admission  Accepts to his/her service  Bed request/bridging orders placed             Amount and/or Complexity of Data Reviewed  Clinical lab tests: ordered and reviewed  Tests in the radiology section of CPT®: ordered and reviewed  Tests in the medicine section of CPT®: reviewed and ordered  Obtain history from someone other than the patient: yes  Review and summarize past medical records: yes  Discuss the patient with other providers: yes  Independent visualization of images, tracings, or specimens: yes    Risk of Complications, Morbidity, and/or Mortality  Presenting problems: high  Diagnostic procedures: high  Management options: high    Patient Progress  Patient progress: stable        Disposition  Final diagnoses:   Fever   Hypoxia     Time reflects when diagnosis was documented in both MDM as applicable and the Disposition within this note     Time User Action Codes Description Comment    2/1/2020  8:54 PM Tommy Ding Add [R50 9] Fever     2/1/2020  8:54 PM Deedee Villatoro Add [R09 02] Hypoxia       ED Disposition     ED Disposition Condition Date/Time Comment    Admit Stable Sat Feb 1, 2020  8:54 PM Case was discussed with HASEEB and the patient's admission status was agreed to be Admission Status: observation status to the service of Dr Keyla Flowers   Follow-up Information    None         Current Discharge Medication List      CONTINUE these medications which have NOT CHANGED    Details   acetaminophen (TYLENOL) 325 mg tablet 650 mg every 4 to 6 hours as needed for pain  Qty: 30 tablet, Refills: 0      bismuth subsalicylate (PEPTO BISMOL) 262 MG chewable tablet Chew 262 mg 2 (two) times a day as needed for indigestion       Cholecalciferol (VITAMIN D3) 5000 UNITS CAPS Take 1 capsule by mouth daily  cholestyramine (QUESTRAN) 4 g packet Take 1 packet (4 g total) by mouth 3 (three) times a day with meals Take 1 a day, can increase to 3 a day   Take 2 hrs apart from other meds  Qty: 60 packet, Refills: 11    Associated Diagnoses: Functional diarrhea      ergocalciferol (VITAMIN D2) 50,000 units Take 50,000 Units by mouth once a week      escitalopram (LEXAPRO) 20 mg tablet       gabapentin (NEURONTIN) 300 mg capsule Take 1 capsule (300 mg total) by mouth 3 (three) times a day  Qty: 63 capsule, Refills: 0 Associated Diagnoses: Epigastric pain      propranolol (INDERAL) 60 mg tablet Take 60 mg by mouth daily      simvastatin (ZOCOR) 40 mg tablet Take 80 mg by mouth daily at bedtime            No discharge procedures on file      ED Provider  Electronically Signed by           Whitney He DO  02/02/20 6049

## 2020-02-02 VITALS
OXYGEN SATURATION: 96 % | DIASTOLIC BLOOD PRESSURE: 61 MMHG | TEMPERATURE: 99.6 F | HEART RATE: 63 BPM | BODY MASS INDEX: 26.96 KG/M2 | HEIGHT: 65 IN | WEIGHT: 161.8 LBS | RESPIRATION RATE: 18 BRPM | SYSTOLIC BLOOD PRESSURE: 91 MMHG

## 2020-02-02 PROBLEM — A41.9 SEPSIS WITHOUT ACUTE ORGAN DYSFUNCTION (HCC): Status: RESOLVED | Noted: 2020-02-01 | Resolved: 2020-02-02

## 2020-02-02 LAB
ANION GAP SERPL CALCULATED.3IONS-SCNC: 10 MMOL/L (ref 4–13)
BASOPHILS # BLD MANUAL: 0 THOUSAND/UL (ref 0–0.1)
BASOPHILS NFR MAR MANUAL: 0 % (ref 0–1)
BUN SERPL-MCNC: 13 MG/DL (ref 5–25)
CALCIUM SERPL-MCNC: 8.2 MG/DL (ref 8.3–10.1)
CHLORIDE SERPL-SCNC: 106 MMOL/L (ref 100–108)
CO2 SERPL-SCNC: 24 MMOL/L (ref 21–32)
CREAT SERPL-MCNC: 1.08 MG/DL (ref 0.6–1.3)
EOSINOPHIL # BLD MANUAL: 0 THOUSAND/UL (ref 0–0.4)
EOSINOPHIL NFR BLD MANUAL: 0 % (ref 0–6)
ERYTHROCYTE [DISTWIDTH] IN BLOOD BY AUTOMATED COUNT: 12.9 % (ref 11.6–15.1)
GFR SERPL CREATININE-BSD FRML MDRD: 50 ML/MIN/1.73SQ M
GLUCOSE SERPL-MCNC: 99 MG/DL (ref 65–140)
HCT VFR BLD AUTO: 31.5 % (ref 34.8–46.1)
HGB BLD-MCNC: 10.1 G/DL (ref 11.5–15.4)
LYMPHOCYTES # BLD AUTO: 1.79 THOUSAND/UL (ref 0.6–4.47)
LYMPHOCYTES # BLD AUTO: 22 % (ref 14–44)
MCH RBC QN AUTO: 30.4 PG (ref 26.8–34.3)
MCHC RBC AUTO-ENTMCNC: 32.1 G/DL (ref 31.4–37.4)
MCV RBC AUTO: 95 FL (ref 82–98)
MONOCYTES # BLD AUTO: 1.14 THOUSAND/UL (ref 0–1.22)
MONOCYTES NFR BLD: 14 % (ref 4–12)
NEUTROPHILS # BLD MANUAL: 5.22 THOUSAND/UL (ref 1.85–7.62)
NEUTS SEG NFR BLD AUTO: 64 % (ref 43–75)
NRBC BLD AUTO-RTO: 0 /100 WBCS
PLATELET # BLD AUTO: 178 THOUSANDS/UL (ref 149–390)
PLATELET BLD QL SMEAR: ADEQUATE
PMV BLD AUTO: 9.7 FL (ref 8.9–12.7)
POTASSIUM SERPL-SCNC: 3.5 MMOL/L (ref 3.5–5.3)
PROCALCITONIN SERPL-MCNC: 0.44 NG/ML
RBC # BLD AUTO: 3.32 MILLION/UL (ref 3.81–5.12)
SODIUM SERPL-SCNC: 140 MMOL/L (ref 136–145)
TOTAL CELLS COUNTED SPEC: 100
WBC # BLD AUTO: 8.15 THOUSAND/UL (ref 4.31–10.16)

## 2020-02-02 PROCEDURE — 85007 BL SMEAR W/DIFF WBC COUNT: CPT | Performed by: PHYSICIAN ASSISTANT

## 2020-02-02 PROCEDURE — 99217 PR OBSERVATION CARE DISCHARGE MANAGEMENT: CPT | Performed by: INTERNAL MEDICINE

## 2020-02-02 PROCEDURE — 85027 COMPLETE CBC AUTOMATED: CPT | Performed by: PHYSICIAN ASSISTANT

## 2020-02-02 PROCEDURE — 80048 BASIC METABOLIC PNL TOTAL CA: CPT | Performed by: PHYSICIAN ASSISTANT

## 2020-02-02 RX ORDER — DOXYCYCLINE HYCLATE 100 MG/1
100 CAPSULE ORAL EVERY 12 HOURS SCHEDULED
Qty: 8 CAPSULE | Refills: 0 | Status: SHIPPED | OUTPATIENT
Start: 2020-02-02 | End: 2020-02-06

## 2020-02-02 RX ADMIN — ENOXAPARIN SODIUM 40 MG: 40 INJECTION SUBCUTANEOUS at 11:37

## 2020-02-02 RX ADMIN — ESCITALOPRAM OXALATE 20 MG: 20 TABLET ORAL at 11:41

## 2020-02-02 RX ADMIN — ACETAMINOPHEN 650 MG: 325 TABLET, FILM COATED ORAL at 05:27

## 2020-02-02 RX ADMIN — GABAPENTIN 300 MG: 300 CAPSULE ORAL at 11:37

## 2020-02-02 RX ADMIN — ACETAMINOPHEN 650 MG: 325 TABLET, FILM COATED ORAL at 12:07

## 2020-02-02 RX ADMIN — MELATONIN 2000 UNITS: at 11:37

## 2020-02-02 NOTE — H&P
H&P- Tahmina Stone 1945, 66 y o  female MRN: [id number]  Unit/Bed#: W -01 Encounter: [id number]  Primary Care Provider: Mary Ellen Ho MD   Date and time admitted to hospital: 2/1/2020  5:13 PM    * Nonspecific syndrome suggestive of viral illness  Assessment & Plan  · Patient presents with 1 day of malaise, chills, fatigue, myalgias, some shortness of breath, nausea, vomiting, diarrhea  · Likely unspecified viral infection  · CXR: No infiltrates noted  Awaiting official read  · Urinalysis is bland  · Was given IV Azithromycin emergency department, will hold on additional antibiotics at this time at this is likely viral   · Flu and RSV negative  · Obtain procalcitonin  · Monitoring off of antibiotics  Follow-up on blood cultures  · Supportive care  Sepsis without acute organ dysfunction [facility])  Assessment & Plan   SIRS criteria: tachypnea, fever   Suspected source:  Unspecified viral illness   Lactic acid: 0 9   End organ damage: none   IV Fluids:  Normal saline at 100 mL/hr overnight   IV antibiotics:  Was given 1 g IV azithromycin in the emergency department, holding initial IV antibiotics at this time as they are not indicated   Follow up on culture results   Monitor vital signs, laboratory studies  Primary adenocarcinoma of upper lobe of right lung [facility])  Assessment & Plan  · Patient is status post wedge resection of right upper lobe  Follows with Dr Albert Lan  · History of right upper lobe adenocarcinoma stage I     Essential hypertension  Assessment & Plan   Blood pressure is reviewed and acceptable   o Last recorded pressure: 111/60   Continue propranolol   Monitor blood pressure  VTE Prophylaxis: Enoxaparin (Lovenox)  / sequential compression device   Code Status: FULL CODE  POLST: POLST form is not discussed and not completed at this time    Discussion with family: patient at bedside    Anticipated Length of Stay:  Patient will be admitted on an Observation basis with an anticipated length of stay of < 2 midnights  Justification for Hospital Stay:  Unspecified viral illness, sepsis    Total Time for Visit, including Counseling / Coordination of Care: 1 hour  Greater than 50% of this total time spent on direct patient counseling and coordination of care  Chief Complaint:   Myalgias, malaise, fatigue, chills, fever, nausea, vomiting, diarrhea, shortness of breath    History of Present Illness:    Evangelina Smiley is a 66 y o  female who presents with viral syndrome over the past two days  Patient has multiple complaints  She reports chills, fatigue, as well as myalgias  She has felt generally weak and achy  She has had some shortness of breath, however no coughing, chest pain, lightheadedness, dizziness, headaches  She denies any neck stiffness  Reports fever at home with T-max of 102°  She did receive the flu shot this year  Reports she had episodes of nausea with vomiting, as well as diarrhea  She has not had any episodes of diarrhea or vomiting today  Denies any changes in her skin, rashes, wounds  No urinary symptoms  Review of Systems:    Review of Systems   Constitutional: Positive for chills, fatigue and fever  Negative for activity change and appetite change  HENT: Negative for congestion, rhinorrhea, sinus pressure and sore throat  Eyes: Negative for photophobia, pain and visual disturbance  Respiratory: Positive for shortness of breath  Negative for cough and wheezing  Cardiovascular: Negative for chest pain, palpitations and leg swelling  Gastrointestinal: Positive for diarrhea, nausea and vomiting  Negative for abdominal distention, abdominal pain and constipation  Endocrine: Negative for cold intolerance, heat intolerance, polydipsia and polyuria  Genitourinary: Negative for difficulty urinating, dysuria, flank pain, frequency and hematuria  Musculoskeletal: Positive for myalgias   Negative for arthralgias, back pain and joint swelling  Skin: Negative for color change, pallor and rash  Allergic/Immunologic: Negative  Neurological: Negative for dizziness, syncope, weakness, light-headedness and headaches  Hematological: Negative  Psychiatric/Behavioral: Negative  Past Medical and Surgical History:     Past Medical History:   Diagnosis Date    Colitis     COPD (chronic obstructive pulmonary disease) ([facility] [address] )     Dehydration     Depression     Diabetes mellitus (HCC)     Familial tremor     GERD (gastroesophageal reflux disease)     Ground glass opacity present on imaging of lung     Hiatal hernia     High cholesterol     Hyperlipidemia     Hypertension     Hypoglycemia     Migraine     Nosebleed     Primary adenocarcinoma of upper lobe of right lung (HCC)     Sleep apnea     C pap    Solitary pulmonary nodule     Syncope        Past Surgical History:   Procedure Laterality Date    APPENDECTOMY      BACK SURGERY      Cervical spine    BREAST BIOPSY      CHOLECYSTECTOMY      COLECTOMY      complete colectomy    CT NEEDLE BX ASPIRATION INJECTION LOCALIZATION  11/7/2019    ESOPHAGOGASTRODUODENOSCOPY N/A 1/22/2018    Procedure: ESOPHAGOGASTRODUODENOSCOPY (EGD); Surgeon: Jennifer Carrasco MD;  [facility]; Service: Gastroenterology    LUNG SEGMENTECTOMY Right 11/7/2019    Procedure: RESECTION WEDGE LUNG;  Surgeon: Yassine Roman MD;  [facility];  Service: Thoracic    NASAL/SINUS ENDOSCOPY N/A 11/7/2019    Procedure: ENDOSCOPIC BIOPSY OF NASOPHARYNX LESION;  Surgeon: Lluu Winkler MD;  [facility];  Service: ENT    N[place]EN [place] N/A 11/7/2019    Procedure: Herschell Needle;  Surgeon: Yassine Roman MD;  [facility];  Service: Thoracic    NM ESOPHAGOGASTRODUODENOSCOPY TRANSORAL DIAGNOSTIC N/A 4/17/2019    Procedure: ESOPHAGOGASTRODUODENOSCOPY (EGD);   Surgeon: Jennifer Carrasco MD;  [facility]; Service: Gastroenterology    KS LAP, REPAIR PARAESOPHAGEAL HERNIA, INCL FUNDOPLASTY W/ MESH N/A 6/2/2016    Procedure: ROBOTIC LAPAROSCOPIC Daquan Fundoplication, Liver biopsy;  Surgeon: Marcy Hill MD;  [facility];  Service: General    KS THORACOSCOPY W/THERA WEDGE RESEXN INITIAL UNILAT Right 11/7/2019    Procedure: THORACOSCOPY VIDEO ASSISTED SURGERY (VATS); Surgeon: Eladia Tolentino MD;  [facility];  Service: Thoracic       Meds/Allergies:    Prior to Admission medications    Medication Sig Start Date End Date Taking? Authorizing Provider   acetaminophen (TYLENOL) 325 mg tablet 650 mg every 4 to 6 hours as needed for pain  6/3/16   Jonathan Gutiérrez PA-C   bismuth subsalicylate (PEPTO BISMOL) 262 MG chewable tablet Chew 262 mg 2 (two) times a day as needed for indigestion     Historical Provider, MD   Cholecalciferol (VITAMIN D3) 5000 UNITS CAPS Take 1 capsule by mouth daily  Historical Provider, MD   cholestyramine (QUESTRAN) 4 g packet Take 1 packet (4 g total) by mouth 3 (three) times a day with meals Take 1 a day, can increase to 3 a day  Take 2 hrs apart from other meds  Patient taking differently: Take 1 packet by mouth as needed  3/21/19   Ismael Pimentel PA-C   ergocalciferol (VITAMIN D2) 50,000 units Take 50,000 Units by mouth once a week    Historical Provider, MD   escitalopram (LEXAPRO) 20 mg tablet  12/2/19   Historical Provider, MD   gabapentin (NEURONTIN) 300 mg capsule Take 1 capsule (300 mg total) by mouth 3 (three) times a day 11/8/19   Mo Louis DO   propranolol (INDERAL) 60 mg tablet Take 60 mg by mouth daily    Historical Provider, MD   simvastatin (ZOCOR) 40 mg tablet Take 80 mg by mouth daily at bedtime     Historical Provider, MD     I have reviewed home medications with patient personally  Allergies:    Allergies   Allergen Reactions    Biaxin [Clarithromycin] GI Intolerance    Cephalosporins     Darvon [Propoxyphene]     Latex      Added based on information entered during case entry, please review and add reactions, type, and severity as needed    Other     Wound Dressing Adhesive     Cefzil [Cefprozil] Rash    Ciprofloxacin Rash       Social History:     Marital Status: /Civil Union   Occupation: non-contributory  Patient Pre-hospital Living Situation: home  Patient Pre-hospital Level of Mobility: full  Patient Pre-hospital Diet Restrictions: none  Substance Use History:   Social History     Substance and Sexual Activity   Alcohol Use Not Currently     Social History     Tobacco Use   Smoking Status Never Smoker   Smokeless Tobacco Never Used     Social History     Substance and Sexual Activity   Drug Use No       Family History:    Family History   Problem Relation Age of Onset    Leukemia Mother 58    Colon cancer Father 79    Leukemia Brother 61       Physical Exam:     Vitals:   Blood Pressure: 111/60 (02/01/20 2145)  Pulse: 78 (02/01/20 2145)  Temperature: 100 °F (37 8 °C) (02/01/20 2145)  Temp Source: Oral (02/01/20 1906)  Respirations: 18 (02/01/20 2145)  Height: 5' 5" (165 1 cm) (02/01/20 2122)  Weight - Scale: 73 4 kg (161 lb 12 8 oz) (02/01/20 2122)  SpO2: 98 % (02/01/20 2145)    Physical Exam   Constitutional: She is oriented to person, place, and time  She appears well-developed and well-nourished  No distress  HENT:   Head: Normocephalic and atraumatic  Mouth/Throat: Oropharynx is clear and moist    Eyes: Pupils are equal, round, and reactive to light  EOM are normal  No scleral icterus  Neck: Neck supple  Cardiovascular: Normal rate, regular rhythm and normal heart sounds  No murmur heard  Pulmonary/Chest: Effort normal and breath sounds normal  No respiratory distress  She has no wheezes  She has no rales  Abdominal: Soft  Bowel sounds are normal  She exhibits no distension  There is no tenderness  There is no rebound and no guarding  Musculoskeletal: She exhibits no deformity     Neurological: She is alert and oriented to person, place, and time  No cranial nerve deficit  Skin: Skin is warm and dry  Capillary refill takes less than 2 seconds  No rash noted  She is not diaphoretic  No erythema  No pallor  Psychiatric: She has a normal mood and affect  Nursing note and vitals reviewed  Additional Data:     Lab Results: I have personally reviewed pertinent reports  Results from last 7 days   Lab Units 02/01/20  1823   WBC Thousand/uL 9 87   HEMOGLOBIN g/dL 12 4   HEMATOCRIT % 38 7   PLATELETS Thousands/uL 238   NEUTROS PCT % 80*   LYMPHS PCT % 11*   MONOS PCT % 8   EOS PCT % 0     Results from last 7 days   Lab Units 02/01/20  1823   SODIUM mmol/L 135*   POTASSIUM mmol/L 4 3   CHLORIDE mmol/L 102   CO2 mmol/L 24   BUN mg/dL 15   CREATININE mg/dL 1 12   ANION GAP mmol/L 9   CALCIUM mg/dL 9 6   ALBUMIN g/dL 3 6   TOTAL BILIRUBIN mg/dL 0 81   ALK PHOS U/L 158*   ALT U/L 71   AST U/L 60*   GLUCOSE RANDOM mg/dL 126                 Results from last 7 days   Lab Units 02/01/20  2049   LACTIC ACID mmol/L 0 9       Imaging: I have personally reviewed pertinent films in PACS    XR chest 2 views    (Results Pending)       EKG, Pathology, and Other Studies Reviewed on Admission:   · Prior pertinent studies and records reviewed in [employer] Records Reviewed: Yes     ** Please Note: This note has been constructed using a voice recognition system   **

## 2020-02-02 NOTE — ASSESSMENT & PLAN NOTE
 Blood pressure is reviewed and acceptable   o Last recorded pressure: 111/60   Continue propranolol   Monitor blood pressure

## 2020-02-02 NOTE — ASSESSMENT & PLAN NOTE
· Patient is status post wedge resection of right upper lobe  Follows with Dr Love Stringer    · History of right upper lobe adenocarcinoma stage I

## 2020-02-02 NOTE — ASSESSMENT & PLAN NOTE
· Patient presents with 1 day of malaise, chills, fatigue, myalgias, some shortness of breath, nausea, vomiting, diarrhea  · Likely unspecified viral infection  · CXR: No infiltrates noted  Awaiting official read  · Urinalysis is bland  · Was given IV Azithromycin emergency department, will hold on additional antibiotics at this time at this is likely viral   · Flu and RSV negative  · Obtain procalcitonin  · Monitoring off of antibiotics  Follow-up on blood cultures  · Supportive care

## 2020-02-02 NOTE — DISCHARGE SUMMARY
[facility]    Patient Information: Kennedi Pimentel 66 y o  female MRN: [id number]  Unit/Bed#: JESS EVANS 404-01 Encounter: [id number]    Discharging Physician / Practitioner: Karin Montague MD  PCP: Holli Campos MD  Admission Date: 2/1/2020  Discharge Date: 02/02/20    Reason for Admission:  SIRS    Discharge Diagnoses:     Principal Problem:    Nonspecific syndrome suggestive of viral illness  Active Problems:    Essential hypertension    Primary adenocarcinoma of upper lobe of right lung ([address] )    SIRS    Consultations During Hospital Stay:  · None    Procedures Performed:   · None    Significant findings:  · None    Hospital Course: Kennedi Pimentel is a 66 y o  female patient who originally presented to the hospital on 2/1/2020 due to 1 day history of generalized malaise, myalgias, fever, nausea, vomiting, diarrhea with chills and shortness of breath  She was placed on observation overnight  Initial fever resolved, she had no leukocytosis  Influenza/RSV PCR was negative  She was suspected of possible viral syndrome  She was initially monitored off antibiotics  In view of elevated procalcitonin and URI symptoms however, she will complete 4 more days oral doxycycline course(GI intolerance to p o  Macrolide)  Patient remained systemically well and nontoxic through her hospital course  By the day following presentation, she felt overall improved with resolution of fever  She was cleared for discharge home on 2/2/20  Condition at Discharge: stable     Discharge Day Visit / Exam:     Subjective:  Feels much better today    No fever or chills, no nausea, vomiting or diarrhea    Vitals: Blood Pressure: 91/61 (02/02/20 1140)  Pulse: 63 (02/02/20 1140)  Temperature: 99 6 °F (37 6 °C) (02/02/20 0641)  Temp Source: Oral (02/01/20 2145)  Respirations: 18 (02/02/20 0641)  Height: 5' 5" (165 1 cm) (02/01/20 2122)  Weight - Scale: 73 4 kg (161 lb 12 8 oz) (02/01/20 2122)  SpO2: 96 % (02/02/20 1140)    General Appearance:    Alert, cooperative, no distress, appropriately responsive    Head:    Normocephalic, without obvious abnormality, atraumatic, mucous membranes moist    Eyes:    Conjunctiva/corneas clear, EOM's intact   Neck:   Supple   Lungs:     Clear to auscultation bilaterally, respirations unlabored, no crackles or wheeze     Heart:    Regular rate and rhythm, S1 and S2    Abdomen:     Soft, non-tender, bowel sounds active all four quadrants,     no masses, no organomegaly   Extremities:   Extremities normal, atraumatic, no cyanosis or edema   Neurologic:  nonfocal      Discharge instructions/Information to patient and family:   See after visit summary for information provided to patient and family  Provisions for Follow-Up Care:  See after visit summary for information related to follow-up care and any pertinent home health orders  Disposition: Home      Discharge Statement:  I spent >30 minutes discharging the patient  This time was spent on the day of discharge  I had direct contact with the patient on the day of discharge  Greater than 50% of the total time was spent examining patient, answering all patient questions, arranging and discussing plan of care with patient as well as directly providing post-discharge instructions  Additional time then spent on discharge activities  Discharge Medications:  See after visit summary for reconciled discharge medications provided to patient and family  ** Please Note: Dragon 360 Dictation voice to text software may have been used in the creation of this document   **

## 2020-02-02 NOTE — UTILIZATION REVIEW
Initial Clinical Review    Admission: Date/Time/Statement:   2/1/2020 2059 Observation   Orders Placed This Encounter   Procedures    Place in Observation (expected length of stay for this patient is less than two midnights)     Standing Status:   Standing     Number of Occurrences:   1     Order Specific Question:   Admitting Physician     Answer:   Bear Strong     Order Specific Question:   Level of Care     Answer:   Med Surg [16]     ED Arrival Information     Expected Arrival Acuity Means of Arrival Escorted By Service Admission Type    - 2/1/2020 17:06 Urgent Walk-In Family Member Hospitalist Urgent    Arrival Complaint    abd pain        Chief Complaint   Patient presents with    Fever - 75 years or older     fever, vomiting, diarrhea , aches all over and headache X 2 days      Assessment/Plan: this is a 66year old female from home to ED admitted to observation due to sepsis with viral illness suspected source  Presented due to fever, chills, myalgias and diarrhea for last 2 days  No physical abnormality on exam, sat to 91% room air and oxygen applied  Procalcitonin 0 44  UA large blood  ast 60  Wbc 9 87  Blood cultures done  Given IV azithromycin in ED and to hold further antibiotics  IVF in progress  Follow up on  Blood cultures  ED Triage Vitals   Temperature Pulse Respirations Blood Pressure SpO2   02/01/20 1718 02/01/20 1718 02/01/20 1718 02/01/20 1718 02/01/20 1718   100 4 °F (38 °C) 72 20 161/73 97 %      Temp Source Heart Rate Source Patient Position - Orthostatic VS BP Location FiO2 (%)   02/01/20 1718 02/01/20 1934 02/01/20 1934 02/01/20 1718 --   Oral Monitor Lying Right arm       Pain Score       02/01/20 1718       6        Wt Readings from Last 1 Encounters:   02/01/20 73 4 kg (161 lb 12 8 oz)     Additional Vital Signs:  Oxygen 2 liters     02/02/20 06:41:59  99 6 °F (37 6 °C)  79  18  115/61  79  95 %  --  --   02/01/20 21:45:31  100 °F (37 8 °C)  78  18  111/60  77 98 %  Nasal cannula  Lying   02/01/20 21:42:41  --  78  16  111/60  77  98 %  --  --   02/01/20 2122  --  78  16  108/54  --  97 %  Nasal cannula  Lying   02/01/20 2015  --  82  --  --  --  91 %  None (Room air)  --   02/01/20 1906  101 2 °F (38 4 °C)Abnormal   --  --  --  --  --           Pertinent Labs/Diagnostic Test Results:   Results from last 7 days   Lab Units 02/02/20  0816 02/01/20  1823   WBC Thousand/uL 8 15 9 87   HEMOGLOBIN g/dL 10 1* 12 4   HEMATOCRIT % 31 5* 38 7   PLATELETS Thousands/uL 178 238   NEUTROS ABS Thousands/µL  --  7 99*         Results from last 7 days   Lab Units 02/01/20  1823   SODIUM mmol/L 135*   POTASSIUM mmol/L 4 3   CHLORIDE mmol/L 102   CO2 mmol/L 24   ANION GAP mmol/L 9   BUN mg/dL 15   CREATININE mg/dL 1 12   EGFR ml/min/1 73sq m 48   CALCIUM mg/dL 9 6     Results from last 7 days   Lab Units 02/01/20  1823   AST U/L 60*   ALT U/L 71   ALK PHOS U/L 158*   TOTAL PROTEIN g/dL 7 8   ALBUMIN g/dL 3 6   TOTAL BILIRUBIN mg/dL 0 81     Results from last 7 days   Lab Units 02/01/20  1823   GLUCOSE RANDOM mg/dL 126     Results from last 7 days   Lab Units 02/01/20  2121   PROCALCITONIN ng/ml 0 44*     Results from last 7 days   Lab Units 02/01/20  2049   LACTIC ACID mmol/L 0 9     Results from last 7 days   Lab Units 02/01/20  1823   LIPASE u/L 172     Results from last 7 days   Lab Units 02/01/20  1951 02/01/20  1946   CLARITY UA  Clear Clear   COLOR UA  Yellow Yellow   SPEC GRAV UA  1 020 1 020   PH UA  5 5 5 5   GLUCOSE UA mg/dl Negative Negative   KETONES UA mg/dl Negative Negative   BLOOD UA  Negative Large*   PROTEIN UA mg/dl Negative Negative   NITRITE UA  Negative Negative   BILIRUBIN UA  Negative Negative   UROBILINOGEN UA E U /dl 0 2 0 2   LEUKOCYTES UA  Negative Negative   WBC UA /hpf  --  1-2*   RBC UA /hpf  --  0-1*   BACTERIA UA /hpf  --  None Seen   EPITHELIAL CELLS WET PREP /hpf  --  Occasional     Results from last 7 days   Lab Units 02/01/20  2967   INFLUENZA A PCR None Detected   INFLUENZA B PCR  None Detected   RSV PCR  None Detected     Results from last 7 days   Lab Units 02/01/20  2049   BLOOD CULTURE  Received in Microbiology Lab  Culture in Progress  Received in Microbiology Lab  Culture in Progress       Results from last 7 days   Lab Units 02/02/20  0816   TOTAL COUNTED  100     ED Treatment:   Medication Administration from 02/01/2020 1706 to 02/01/2020 2133       Date/Time Order Dose Route Action Comments     02/01/2020 1824 sodium chloride 0 9 % bolus 1,000 mL 1,000 mL Intravenous New Bag      02/01/2020 1926 acetaminophen (TYLENOL) tablet 975 mg 975 mg Oral Given      02/01/2020 2119 azithromycin (ZITHROMAX) 500 mg in sodium chloride 0 9% 250mL IVPB 500 mg 500 mg Intravenous New Bag         Past Medical History:   Diagnosis Date    Colitis     COPD (chronic obstructive pulmonary disease) (HCC)     Dehydration     Depression     Diabetes mellitus (HCC)     Familial tremor     GERD (gastroesophageal reflux disease)     Ground glass opacity present on imaging of lung     Hiatal hernia     High cholesterol     Hyperlipidemia     Hypertension     Hypoglycemia     Migraine     Nosebleed     [facility] adenocarcinoma [facility] upper lobe [facility] right lung [facility]     Sleep apnea     C pap    Solitary pulmonary nodule     Syncope      Present on Admission:   Primary adenocarcinoma of upper lobe of right lung ([facility] [address] )   Essential hypertension   Nonspecific syndrome suggestive of viral illness   Sepsis without acute organ dysfunction ([facility] [address] )      Admitting Diagnosis: Abdominal pain [R10 9]  Hypoxia [R09 02]  Fever [R50 9]  Age/Sex: 66 y o  female  Admission Orders: 2/1/2020 2059 Observation   Scheduled Medications:  Medications:  atorvastatin 40 mg Oral Daily With Dinner   cholecalciferol 2,000 Units Oral Daily   enoxaparin 40 mg Subcutaneous Daily   escitalopram 20 mg Oral Daily   gabapentin 300 mg Oral TID   propranolol 60 mg Oral Daily     Continuous IV Infusions: sodium chloride 0 9 % infusion   Rate: 100 mL/hr Dose: 100 mL/hr  Freq: Continuous Route: IV  Indications of Use: IV Hydration  Last Dose: Stopped (02/02/20 0825)  Start: 02/01/20 2200 End: 02/02/20 0810    PRN Meds:  Acetaminophen - used x 1  650 mg Oral Q6H PRN   ondansetron 4 mg Intravenous Q6H PRN         Network Utilization Review Department  [email or web address]  ATTENTION: Please call with any questions or concerns to [phone] and carefully listen to the prompts so that you are directed to the right person  All voicemails are confidential   Jacky Los all requests for admission clinical reviews, approved or denied determinations and any other requests to dedicated fax number below belonging to the campus where the patient is receiving treatment   List of dedicated fax numbers for the Facilities:  [address] DENIALS (Administrative/Medical Necessity) [phone]   [address] (Maternity/NICU/Pediatrics) [phone]   [facility] [phone]   [facility] [phone]   [facility] [phone]   [facility] [phone]   [address] [facility] [address] [facility] [phone]   [facility]  [phone]   [address] [facility] [address] [facility]  [address] [facility] [address] [facility] [phone]

## 2020-02-02 NOTE — DISCHARGE INSTR - AVS FIRST PAGE
Dear Delonte Calzada,     It was our pleasure to care for you here at [place], [facility]  It is our hope that we were always able to exceed the expected standards for your care during your stay  You were hospitalized due to SIRS symptoms with suspected viral syndrome  You were cared for on the [facility] 4th floor under the service of Nenita Walters MD with the [facility] who covers for your primary care physician (PCP), Josephine Nyhan, MD, while you were hospitalized  If you have any questions or concerns related to this hospitalization, you may contact us at [phone]  For follow up as well as medication refills, we recommend that you follow up with your primary care physician  A registered nurse will reach out to you by phone within a few days after your discharge to answer any additional questions that you may have after going home  However, at this time we provide for you here, the most important instructions / recommendations at discharge:       · Notable Medication Adjustments -   · Continue doxycycline 100 mg twice daily for 4 days    · Testing Required after Discharge -   · None    · Important follow up information -   · Follow-up with her primary care physician in 1 week    · Other Instructions -   · Stay well hydrated with at least 6-8 cups of fluid daily    · Please review this entire after visit summary as additional general instructions including medication list, appointments, activity, diet, any pertinent wound care, and other additional recommendations from your care team that may be provided for you        Sincerely,     Nenita Walters MD

## 2020-02-02 NOTE — ED NOTES
Pt placed on 2L O2 via nasal canula   Dr Jamia Ojeda aware     Christiana Luna, MAURO  02/01/20 2026       Christiana Luna, MAURO  02/01/20 2027

## 2020-02-02 NOTE — ASSESSMENT & PLAN NOTE
 SIRS criteria: tachypnea, fever   Suspected source:  Unspecified viral illness   Lactic acid: 0 9   End organ damage: none   IV Fluids:  Normal saline at 100 mL/hr overnight   IV antibiotics:  Was given 1 g IV azithromycin in the emergency department, holding initial IV antibiotics at this time as they are not indicated   Follow up on culture results   Monitor vital signs, laboratory studies

## 2020-02-04 LAB
BACTERIA BLD CULT: ABNORMAL
BACTERIA BLD CULT: ABNORMAL
GRAM STN SPEC: ABNORMAL
GRAM STN SPEC: ABNORMAL

## 2020-04-24 ENCOUNTER — DOCUMENTATION (OUTPATIENT)
Dept: CARDIAC SURGERY | Facility: CLINIC | Age: 75
End: 2020-04-24

## 2020-04-24 DIAGNOSIS — C34.11 PRIMARY ADENOCARCINOMA OF UPPER LOBE OF RIGHT LUNG (HCC): Primary | ICD-10-CM

## 2020-05-07 ENCOUNTER — TELEPHONE (OUTPATIENT)
Dept: CARDIAC SURGERY | Facility: CLINIC | Age: 75
End: 2020-05-07

## 2020-05-08 ENCOUNTER — TELEPHONE (OUTPATIENT)
Dept: CARDIAC SURGERY | Facility: CLINIC | Age: 75
End: 2020-05-08

## 2020-05-11 ENCOUNTER — OFFICE VISIT (OUTPATIENT)
Dept: URGENT CARE | Facility: MEDICAL CENTER | Age: 75
End: 2020-05-11
Payer: COMMERCIAL

## 2020-05-11 VITALS
WEIGHT: 160 LBS | BODY MASS INDEX: 26.66 KG/M2 | HEART RATE: 88 BPM | HEIGHT: 65 IN | SYSTOLIC BLOOD PRESSURE: 134 MMHG | TEMPERATURE: 98.2 F | DIASTOLIC BLOOD PRESSURE: 74 MMHG | OXYGEN SATURATION: 96 %

## 2020-05-11 DIAGNOSIS — R05.9 COUGH: Primary | ICD-10-CM

## 2020-05-11 PROCEDURE — S9088 SERVICES PROVIDED IN URGENT: HCPCS | Performed by: PHYSICIAN ASSISTANT

## 2020-05-11 PROCEDURE — U0003 INFECTIOUS AGENT DETECTION BY NUCLEIC ACID (DNA OR RNA); SEVERE ACUTE RESPIRATORY SYNDROME CORONAVIRUS 2 (SARS-COV-2) (CORONAVIRUS DISEASE [COVID-19]), AMPLIFIED PROBE TECHNIQUE, MAKING USE OF HIGH THROUGHPUT TECHNOLOGIES AS DESCRIBED BY CMS-2020-01-R: HCPCS | Performed by: PHYSICIAN ASSISTANT

## 2020-05-11 PROCEDURE — 99213 OFFICE O/P EST LOW 20 MIN: CPT | Performed by: PHYSICIAN ASSISTANT

## 2020-05-12 ENCOUNTER — TELEMEDICINE (OUTPATIENT)
Dept: CARDIAC SURGERY | Facility: CLINIC | Age: 75
End: 2020-05-12
Payer: COMMERCIAL

## 2020-05-12 ENCOUNTER — HOSPITAL ENCOUNTER (OUTPATIENT)
Dept: CT IMAGING | Facility: HOSPITAL | Age: 75
Discharge: HOME | End: 2020-05-12
Payer: COMMERCIAL

## 2020-05-12 DIAGNOSIS — C34.11 PRIMARY ADENOCARCINOMA OF UPPER LOBE OF RIGHT LUNG (HCC): Primary | ICD-10-CM

## 2020-05-12 DIAGNOSIS — C34.11 PRIMARY ADENOCARCINOMA OF UPPER LOBE OF RIGHT LUNG (HCC): ICD-10-CM

## 2020-05-12 LAB — SARS-COV-2 RNA SPEC QL NAA+PROBE: NOT DETECTED

## 2020-05-12 PROCEDURE — 99213 OFFICE O/P EST LOW 20 MIN: CPT | Performed by: THORACIC SURGERY (CARDIOTHORACIC VASCULAR SURGERY)

## 2020-05-12 PROCEDURE — 71250 CT THORAX DX C-: CPT

## 2020-07-06 ENCOUNTER — TRANSCRIBE ORDERS (OUTPATIENT)
Dept: ADMINISTRATIVE | Facility: HOSPITAL | Age: 75
End: 2020-07-06

## 2020-07-06 ENCOUNTER — APPOINTMENT (OUTPATIENT)
Dept: RADIOLOGY | Facility: MEDICAL CENTER | Age: 75
End: 2020-07-06
Payer: COMMERCIAL

## 2020-07-06 DIAGNOSIS — R07.81 PAIN IN RIB: Primary | ICD-10-CM

## 2020-07-06 DIAGNOSIS — R07.81 PAIN IN RIB: ICD-10-CM

## 2020-07-06 PROCEDURE — 71046 X-RAY EXAM CHEST 2 VIEWS: CPT

## 2020-07-29 ENCOUNTER — TRANSCRIBE ORDERS (OUTPATIENT)
Dept: LAB | Facility: CLINIC | Age: 75
End: 2020-07-29

## 2020-07-29 ENCOUNTER — APPOINTMENT (OUTPATIENT)
Dept: LAB | Facility: CLINIC | Age: 75
End: 2020-07-29
Payer: COMMERCIAL

## 2020-07-29 DIAGNOSIS — E55.9 AVITAMINOSIS D: ICD-10-CM

## 2020-07-29 DIAGNOSIS — N18.30 CHRONIC KIDNEY DISEASE, STAGE III (MODERATE) (HCC): ICD-10-CM

## 2020-07-29 DIAGNOSIS — I10 ESSENTIAL HYPERTENSION, MALIGNANT: Primary | ICD-10-CM

## 2020-07-29 DIAGNOSIS — I10 ESSENTIAL HYPERTENSION, MALIGNANT: ICD-10-CM

## 2020-07-29 DIAGNOSIS — K21.9 GASTROESOPHAGEAL REFLUX DISEASE WITHOUT ESOPHAGITIS: ICD-10-CM

## 2020-07-29 LAB
25(OH)D3 SERPL-MCNC: 31.1 NG/ML (ref 30–100)
ALBUMIN SERPL BCP-MCNC: 3.9 G/DL (ref 3.5–5)
ALP SERPL-CCNC: 133 U/L (ref 46–116)
ALT SERPL W P-5'-P-CCNC: 37 U/L (ref 12–78)
ANION GAP SERPL CALCULATED.3IONS-SCNC: 11 MMOL/L (ref 4–13)
AST SERPL W P-5'-P-CCNC: 20 U/L (ref 5–45)
BASOPHILS # BLD AUTO: 0.12 THOUSANDS/ΜL (ref 0–0.1)
BASOPHILS NFR BLD AUTO: 1 % (ref 0–1)
BILIRUB SERPL-MCNC: 0.44 MG/DL (ref 0.2–1)
BILIRUB UR QL STRIP: NEGATIVE
BUN SERPL-MCNC: 19 MG/DL (ref 5–25)
CALCIUM SERPL-MCNC: 9.3 MG/DL (ref 8.3–10.1)
CHLORIDE SERPL-SCNC: 103 MMOL/L (ref 100–108)
CHOLEST SERPL-MCNC: 161 MG/DL (ref 50–200)
CLARITY UR: CLEAR
CO2 SERPL-SCNC: 25 MMOL/L (ref 21–32)
COLOR UR: YELLOW
CREAT SERPL-MCNC: 1.14 MG/DL (ref 0.6–1.3)
EOSINOPHIL # BLD AUTO: 0.3 THOUSAND/ΜL (ref 0–0.61)
EOSINOPHIL NFR BLD AUTO: 2 % (ref 0–6)
ERYTHROCYTE [DISTWIDTH] IN BLOOD BY AUTOMATED COUNT: 13.1 % (ref 11.6–15.1)
GFR SERPL CREATININE-BSD FRML MDRD: 47 ML/MIN/1.73SQ M
GLUCOSE P FAST SERPL-MCNC: 91 MG/DL (ref 65–99)
GLUCOSE UR STRIP-MCNC: NEGATIVE MG/DL
HCT VFR BLD AUTO: 40.1 % (ref 34.8–46.1)
HDLC SERPL-MCNC: 42 MG/DL
HGB BLD-MCNC: 12.8 G/DL (ref 11.5–15.4)
HGB UR QL STRIP.AUTO: NEGATIVE
IMM GRANULOCYTES # BLD AUTO: 0.05 THOUSAND/UL (ref 0–0.2)
IMM GRANULOCYTES NFR BLD AUTO: 0 % (ref 0–2)
KETONES UR STRIP-MCNC: NEGATIVE MG/DL
LDLC SERPL CALC-MCNC: 55 MG/DL (ref 0–100)
LEUKOCYTE ESTERASE UR QL STRIP: NEGATIVE
LYMPHOCYTES # BLD AUTO: 4.03 THOUSANDS/ΜL (ref 0.6–4.47)
LYMPHOCYTES NFR BLD AUTO: 32 % (ref 14–44)
MCH RBC QN AUTO: 31.1 PG (ref 26.8–34.3)
MCHC RBC AUTO-ENTMCNC: 31.9 G/DL (ref 31.4–37.4)
MCV RBC AUTO: 97 FL (ref 82–98)
MONOCYTES # BLD AUTO: 1.36 THOUSAND/ΜL (ref 0.17–1.22)
MONOCYTES NFR BLD AUTO: 11 % (ref 4–12)
NEUTROPHILS # BLD AUTO: 6.72 THOUSANDS/ΜL (ref 1.85–7.62)
NEUTS SEG NFR BLD AUTO: 54 % (ref 43–75)
NITRITE UR QL STRIP: NEGATIVE
NONHDLC SERPL-MCNC: 119 MG/DL
NRBC BLD AUTO-RTO: 0 /100 WBCS
PH UR STRIP.AUTO: 6 [PH]
PLATELET # BLD AUTO: 277 THOUSANDS/UL (ref 149–390)
PMV BLD AUTO: 9.6 FL (ref 8.9–12.7)
POTASSIUM SERPL-SCNC: 4.3 MMOL/L (ref 3.5–5.3)
PROT SERPL-MCNC: 7.9 G/DL (ref 6.4–8.2)
PROT UR STRIP-MCNC: NEGATIVE MG/DL
RBC # BLD AUTO: 4.12 MILLION/UL (ref 3.81–5.12)
SODIUM SERPL-SCNC: 139 MMOL/L (ref 136–145)
SP GR UR STRIP.AUTO: 1.01 (ref 1–1.03)
TRIGL SERPL-MCNC: 318 MG/DL
TSH SERPL DL<=0.05 MIU/L-ACNC: 1.56 UIU/ML (ref 0.36–3.74)
UROBILINOGEN UR QL STRIP.AUTO: 0.2 E.U./DL
WBC # BLD AUTO: 12.58 THOUSAND/UL (ref 4.31–10.16)

## 2020-07-29 PROCEDURE — 84443 ASSAY THYROID STIM HORMONE: CPT

## 2020-07-29 PROCEDURE — 85025 COMPLETE CBC W/AUTO DIFF WBC: CPT

## 2020-07-29 PROCEDURE — 81003 URINALYSIS AUTO W/O SCOPE: CPT | Performed by: INTERNAL MEDICINE

## 2020-07-29 PROCEDURE — 80053 COMPREHEN METABOLIC PANEL: CPT

## 2020-07-29 PROCEDURE — 80061 LIPID PANEL: CPT

## 2020-07-29 PROCEDURE — 36415 COLL VENOUS BLD VENIPUNCTURE: CPT

## 2020-07-29 PROCEDURE — 82306 VITAMIN D 25 HYDROXY: CPT

## 2020-09-14 ENCOUNTER — TRANSCRIBE ORDERS (OUTPATIENT)
Dept: ADMINISTRATIVE | Facility: HOSPITAL | Age: 75
End: 2020-09-14

## 2020-09-14 DIAGNOSIS — R10.10 UPPER ABDOMINAL PAIN: Primary | ICD-10-CM

## 2020-09-17 ENCOUNTER — HOSPITAL ENCOUNTER (OUTPATIENT)
Dept: CT IMAGING | Facility: HOSPITAL | Age: 75
Discharge: HOME | End: 2020-09-17
Attending: INTERNAL MEDICINE
Payer: COMMERCIAL

## 2020-09-17 DIAGNOSIS — R10.10 UPPER ABDOMINAL PAIN: ICD-10-CM

## 2020-09-17 PROCEDURE — G1004 CDSM NDSC: HCPCS

## 2020-09-17 PROCEDURE — 74160 CT ABDOMEN W/CONTRAST: CPT

## 2020-09-17 RX ADMIN — IOHEXOL 100 ML: 350 INJECTION, SOLUTION INTRAVENOUS at 10:58

## 2020-09-24 ENCOUNTER — TELEPHONE (OUTPATIENT)
Dept: CARDIAC SURGERY | Facility: CLINIC | Age: 75
End: 2020-09-24

## 2020-09-24 NOTE — TELEPHONE ENCOUNTER
Left message for patient   Advised patient to call PCP for results of abdomen (they ordered CT) Also L/m to schedule chest CT due in October

## 2020-10-19 ENCOUNTER — TELEPHONE (OUTPATIENT)
Dept: CARDIAC SURGERY | Facility: CLINIC | Age: 75
End: 2020-10-19

## 2020-11-06 ENCOUNTER — TELEPHONE (OUTPATIENT)
Dept: CARDIAC SURGERY | Facility: CLINIC | Age: 75
End: 2020-11-06

## 2020-11-06 ENCOUNTER — HOSPITAL ENCOUNTER (OUTPATIENT)
Dept: RADIOLOGY | Facility: MEDICAL CENTER | Age: 75
Discharge: HOME | End: 2020-11-06
Payer: COMMERCIAL

## 2020-11-06 DIAGNOSIS — C34.11 PRIMARY ADENOCARCINOMA OF UPPER LOBE OF RIGHT LUNG (HCC): ICD-10-CM

## 2020-11-06 PROCEDURE — 71250 CT THORAX DX C-: CPT

## 2020-11-10 ENCOUNTER — TELEMEDICINE (OUTPATIENT)
Dept: CARDIAC SURGERY | Facility: CLINIC | Age: 75
End: 2020-11-10
Payer: COMMERCIAL

## 2020-11-10 DIAGNOSIS — C34.11 PRIMARY ADENOCARCINOMA OF UPPER LOBE OF RIGHT LUNG (HCC): Primary | ICD-10-CM

## 2020-11-10 PROCEDURE — 99213 OFFICE O/P EST LOW 20 MIN: CPT | Performed by: THORACIC SURGERY (CARDIOTHORACIC VASCULAR SURGERY)

## 2021-01-27 DIAGNOSIS — Z23 ENCOUNTER FOR IMMUNIZATION: ICD-10-CM

## 2021-02-23 ENCOUNTER — OFFICE VISIT (OUTPATIENT)
Dept: GASTROENTEROLOGY | Facility: AMBULARY SURGERY CENTER | Age: 76
End: 2021-02-23
Payer: COMMERCIAL

## 2021-02-23 VITALS
HEIGHT: 65 IN | SYSTOLIC BLOOD PRESSURE: 134 MMHG | WEIGHT: 168 LBS | BODY MASS INDEX: 27.99 KG/M2 | DIASTOLIC BLOOD PRESSURE: 80 MMHG

## 2021-02-23 DIAGNOSIS — R10.13 DYSPEPSIA: Primary | ICD-10-CM

## 2021-02-23 DIAGNOSIS — K21.9 GASTROESOPHAGEAL REFLUX DISEASE, UNSPECIFIED WHETHER ESOPHAGITIS PRESENT: ICD-10-CM

## 2021-02-23 PROCEDURE — 99214 OFFICE O/P EST MOD 30 MIN: CPT | Performed by: PHYSICIAN ASSISTANT

## 2021-02-23 PROCEDURE — 1160F RVW MEDS BY RX/DR IN RCRD: CPT | Performed by: PHYSICIAN ASSISTANT

## 2021-02-23 PROCEDURE — 1036F TOBACCO NON-USER: CPT | Performed by: PHYSICIAN ASSISTANT

## 2021-02-23 RX ORDER — DEXTROMETHORPHAN HYDROBROMIDE AND PROMETHAZINE HYDROCHLORIDE 15; 6.25 MG/5ML; MG/5ML
SYRUP ORAL
COMMUNITY
Start: 2021-01-12

## 2021-02-23 RX ORDER — FAMOTIDINE 40 MG/1
40 TABLET, FILM COATED ORAL DAILY
COMMUNITY
Start: 2020-12-28 | End: 2021-02-23 | Stop reason: SDUPTHER

## 2021-02-23 RX ORDER — PANTOPRAZOLE SODIUM 40 MG/1
40 TABLET, DELAYED RELEASE ORAL DAILY
COMMUNITY
Start: 2021-01-11 | End: 2021-02-23 | Stop reason: SDUPTHER

## 2021-02-23 RX ORDER — OFLOXACIN 3 MG/ML
SOLUTION/ DROPS OPHTHALMIC
COMMUNITY
Start: 2021-02-01 | End: 2021-04-13 | Stop reason: ALTCHOICE

## 2021-02-23 RX ORDER — PANTOPRAZOLE SODIUM 40 MG/1
40 TABLET, DELAYED RELEASE ORAL DAILY
Qty: 90 TABLET | Refills: 0 | Status: SHIPPED | OUTPATIENT
Start: 2021-02-23 | End: 2021-04-13 | Stop reason: HOSPADM

## 2021-02-23 RX ORDER — FAMOTIDINE 40 MG/1
40 TABLET, FILM COATED ORAL
Qty: 30 TABLET | Refills: 5 | Status: SHIPPED | OUTPATIENT
Start: 2021-02-23 | End: 2021-04-13 | Stop reason: HOSPADM

## 2021-02-23 NOTE — ASSESSMENT & PLAN NOTE
Worsening bloating and reflux symptoms happening over the last several months, patient has been taking Pepto-Bismol frequently with minor relief of symptoms, has also been on Pepcid once daily for least a few years she tells me  Has not had endoscopic evaluation for at least 2 years, but her last 2 EGDs were limited in that they noted significant retained food material in the stomach    Highly suspect gastroparesis but should exclude underlying peptic ulcer disease, gastritis    -will schedule patient for EGD for further evaluation     -patient was advised regarding risks and benefits of the procedure, risks including but not limited to infection, perforation bleeding     -continue avoidance of NSAIDs     -will change patient's acid reducing regimen, start pantoprazole 40 mg daily in the morning, and Pepcid 40 mg each evening; can make adjustments to patient's acid reducing regimen pending her clinical course and results of the EGD     -I also advised patient regarding gastroparesis diet including small frequent meals, limitation of roughage and fatty/greasy foods

## 2021-02-23 NOTE — PROGRESS NOTES
Follow-up Note -  [facility]  Misael Guadalupe 1945 66 y o  female         Reason:  Follow-up; dyspepsia, GERD    HPI:  27-year-old female with history of COPD, diabetes, lung cancer status post right upper lobe therapeutic wedge resection 1 year ago, chronic diarrhea since total colectomy and ileorectal anastomosis 6 years ago for ischemic colitis who presents for follow-up  She was last seen in our office about 2 years ago in March 2019 with our physician assistant Nedra Mccartney, she was recommended a trial cholestyramine powder for the chronic diarrhea, she also was scheduled for EGD due to epigastric discomfort and this was done by Dr Madison Jameson in April 2019, showing a large amount of undigested food material in the stomach, but no evidence of Christianson's esophagus  She had a Nissen fundoplication in [id number]  Patient tells me that for the last several months, she has been having some gradually worsening issues with indigestion, bloating, sometimes reflux  She is taking Pepcid once daily in the morning, she says she has been using Pepto-Bismol a lot which she says has helped a little with her symptoms  She denies any nausea or vomiting  She has about 7-8 bowel movements a day which is actually consistent with how her bowel habits have been since she underwent total colectomy 6 years ago  She denies any rectal bleeding or melena  Denies any weight loss  Denies any difficulty swallowing or choking episodes  REVIEW OF SYSTEMS:      CONSTITUTIONAL: Denies any fever, chills, or rigors  Good appetite, and no recent weight loss  HEENT: No earache or tinnitus  Denies hearing loss or visual disturbances  CARDIOVASCULAR: No chest pain or palpitations  RESPIRATORY: Denies any cough, hemoptysis, shortness of breath or dyspnea on exertion  GASTROINTESTINAL: As noted in the History of Present Illness  GENITOURINARY: No problems with urination   Denies any hematuria or dysuria  NEUROLOGIC: No dizziness or vertigo, denies headaches  MUSCULOSKELETAL: Denies any muscle or joint pain  SKIN: Denies skin rashes or itching  ENDOCRINE: Denies excessive thirst  Denies intolerance to heat or cold  PSYCHOSOCIAL: Denies depression or anxiety  Denies any recent memory loss  Past Medical History:   Diagnosis Date    Colitis     COPD (chronic obstructive pulmonary disease) (HCC)     Dehydration     Depression     Diabetes mellitus (HCC)     Familial tremor     GERD (gastroesophageal reflux disease)     Ground glass opacity present on imaging of lung     Hiatal hernia     High cholesterol     Hyperlipidemia     Hypertension     Hypoglycemia     Migraine     Nosebleed     Primary adenocarcinoma of upper lobe of right lung (HCC)     Sleep apnea     C pap    Solitary pulmonary nodule     Syncope       Past Surgical History:   Procedure Laterality Date    APPENDECTOMY      BACK SURGERY      Cervical spine    BREAST BIOPSY      CHOLECYSTECTOMY      COLECTOMY      complete colectomy    COLONOSCOPY      CT NEEDLE BX ASPIRATION INJECTION LOCALIZATION  11/7/2019    ESOPHAGOGASTRODUODENOSCOPY N/A 1/22/2018    Procedure: ESOPHAGOGASTRODUODENOSCOPY (EGD); Surgeon: Luis Angel Malagon MD;  [facility]; Service: Gastroenterology    LUNG SEGMENTECTOMY Right 11/7/2019    Procedure: RESECTION WEDGE LUNG;  Surgeon: Lyndsey Bray MD;  [facility];  Service: Thoracic    NASAL/SINUS ENDOSCOPY N/A 11/7/2019    Procedure: ENDOSCOPIC BIOPSY OF NASOPHARYNX LESION;  Surgeon: Susan Sauceda MD;  [facility];  Service: ENT    NISSEN Andersonjerardo N/A 11/7/2019    Procedure: Devon Michelle;  Surgeon: Lyndsey Bray MD;  [facility];  Service: Thoracic    TN ESOPHAGOGASTRODUODENOSCOPY TRANSORAL DIAGNOSTIC N/A 4/17/2019    Procedure: ESOPHAGOGASTRODUODENOSCOPY (EGD);   Surgeon: Luis Angel Malagon MD; [facility]; Service: Gastroenterology    AL LAP, REPAIR PARAESOPHAGEAL HERNIA, INCL FUNDOPLASTY W/ MESH N/A 6/2/2016    Procedure: ROBOTIC LAPAROSCOPIC Daquan Fundoplication, Liver biopsy;  Surgeon: Olivia Reyes MD;  [facility];  Service: General    AL THORACOSCOPY W/THERA WEDGE RESEXN INITIAL UNILAT Right 11/7/2019    Procedure: THORACOSCOPY VIDEO ASSISTED SURGERY (VATS);   Surgeon: Jen Lemon MD;  [facility];  Service: Thoracic    UPPER GASTROINTESTINAL ENDOSCOPY       Social History     Socioeconomic History    Marital status: /Civil Union     Spouse name: Not on file    Number of children: Not on file    Years of education: Not on file    Highest education level: Not on file   Occupational History    Not on file   Social Needs    Financial resource strain: Not on file    Food insecurity     Worry: Not on file     Inability: Not on file    Transportation needs     Medical: Not on file     Non-medical: Not on file   Tobacco Use    Smoking status: Never Smoker    Smokeless tobacco: Never Used   Substance and Sexual Activity    Alcohol use: Not Currently    Drug use: No    Sexual activity: Not on file   Lifestyle    Physical activity     Days per week: Not on file     Minutes per session: Not on file    Stress: Not on file   Relationships    Social connections     Talks on phone: Not on file     Gets together: Not on file     Attends Restoration service: Not on file     Active member of club or organization: Not on file     Attends meetings of clubs or organizations: Not on file     Relationship status: Not on file    Intimate partner violence     Fear of current or ex partner: Not on file     Emotionally abused: Not on file     Physically abused: Not on file     Forced sexual activity: Not on file   Other Topics Concern    Not on file   Social History Narrative    Not on file     Family History   Problem Relation Age of Onset    Leukemia Mother 58    Colon cancer Father 79    Leukemia Brother 60     Biaxin [clarithromycin], Cephalosporins, Darvon [propoxyphene], Latex, Other, Wound dressing adhesive, Cefzil [cefprozil], and Ciprofloxacin  Current Outpatient Medications   Medication Sig Dispense Refill    acetaminophen (TYLENOL) 325 mg tablet 650 mg every 4 to 6 hours as needed for pain  30 tablet 0    bismuth subsalicylate (PEPTO BISMOL) 262 MG chewable tablet Chew 262 mg 2 (two) times a day as needed for indigestion       Cholecalciferol (VITAMIN D3) 5000 UNITS CAPS Take 1 capsule by mouth daily   cholestyramine (QUESTRAN) 4 g packet Take 1 packet (4 g total) by mouth 3 (three) times a day with meals Take 1 a day, can increase to 3 a day  Take 2 hrs apart from other meds 60 packet 11    escitalopram (LEXAPRO) 20 mg tablet       famotidine (PEPCID) 40 MG tablet Take 1 tablet (40 mg total) by mouth daily at bedtime 30 tablet 5    gabapentin (NEURONTIN) 300 mg capsule Take 1 capsule (300 mg total) by mouth 3 (three) times a day 63 capsule 0    ofloxacin (OCUFLOX) 0 3 % ophthalmic solution       propranolol (INDERAL) 60 mg tablet Take 60 mg by mouth daily      simvastatin (ZOCOR) 40 mg tablet Take 80 mg by mouth daily at bedtime       ergocalciferol (VITAMIN D2) 50,000 units Take 50,000 Units by mouth once a week      pantoprazole (PROTONIX) 40 mg tablet Take 1 tablet (40 mg total) by mouth daily 90 tablet 0    promethazine-dextromethorphan (PHENERGAN-DM) 6 25-15 mg/5 mL oral syrup TAKE 15ML BY MOUTH EVERY 6 HOURS AS NEEDED FOR COUGH/CONGESTION       No current facility-administered medications for this visit  Blood pressure 134/80, height 5' 5" (1 651 m), weight 76 2 kg (168 lb)      PHYSICAL EXAM:      General Appearance:   Alert, cooperative, no distress, appears stated age    HEENT:   Normocephalic, atraumatic, anicteric      Neck:  Supple, symmetrical, trachea midline, no adenopathy;    thyroid: no enlargement/tenderness/nodules; no carotid  bruit or JVD    Lungs:   Clear to auscultation bilaterally; no rales, rhonchi or wheezing; respirations unlabored    Heart[de-identified]   S1 and S2 normal; regular rate and rhythm; no murmur, rub, or gallop  Abdomen:   Soft, non-tender, non-distended; normal bowel sounds; no masses, no organomegaly    Extremities: No edema, erythema, wounds, rashes   Rectal:   Deferred                      Lab Results   Component Value Date    WBC 12 58 (H) 07/29/2020    HGB 12 8 07/29/2020    HCT 40 1 07/29/2020    MCV 97 07/29/2020     07/29/2020     Lab Results   Component Value Date    GLUCOSE 88 10/09/2015    CALCIUM 9 3 07/29/2020     10/09/2015    K 4 3 07/29/2020    CO2 25 07/29/2020     07/29/2020    BUN 19 07/29/2020    CREATININE 1 14 07/29/2020     Lab Results   Component Value Date    ALT 37 07/29/2020    AST 20 07/29/2020    ALKPHOS 133 (H) 07/29/2020    BILITOT 0 32 10/09/2015     Lab Results   Component Value Date    INR 1 03 10/21/2019    PROTIME 13 1 10/21/2019       Ct Chest Wo Contrast    Result Date: 11/9/2020  Impression: No recurrent tumor  Workstation performed: [id number]       ASSESSMENT & PLAN:    Dyspepsia  Worsening bloating and reflux symptoms happening over the last several months, patient has been taking Pepto-Bismol frequently with minor relief of symptoms, has also been on Pepcid once daily for least a few years she tells me  Has not had endoscopic evaluation for at least 2 years, but her last 2 EGDs were limited in that they noted significant retained food material in the stomach    Highly suspect gastroparesis but should exclude underlying peptic ulcer disease, gastritis    -will schedule patient for EGD for further evaluation     -patient was advised regarding risks and benefits of the procedure, risks including but not limited to infection, perforation bleeding     -continue avoidance of NSAIDs     -will change patient's acid reducing regimen, start pantoprazole 40 mg daily in the morning, and Pepcid 40 mg each evening; can make adjustments to patient's acid reducing regimen pending her clinical course and results of the EGD     -I also advised patient regarding gastroparesis diet including small frequent meals, limitation of roughage and fatty/greasy foods

## 2021-03-21 ENCOUNTER — IMMUNIZATIONS (OUTPATIENT)
Dept: FAMILY MEDICINE CLINIC | Facility: HOSPITAL | Age: 76
End: 2021-03-21

## 2021-03-21 DIAGNOSIS — Z23 ENCOUNTER FOR IMMUNIZATION: Primary | ICD-10-CM

## 2021-03-21 PROCEDURE — 91300 SARS-COV-2 / COVID-19 MRNA VACCINE (PFIZER-BIONTECH) 30 MCG: CPT

## 2021-03-21 PROCEDURE — 0001A SARS-COV-2 / COVID-19 MRNA VACCINE (PFIZER-BIONTECH) 30 MCG: CPT

## 2021-03-30 ENCOUNTER — ANESTHESIA EVENT (OUTPATIENT)
Dept: GASTROENTEROLOGY | Facility: AMBULARY SURGERY CENTER | Age: 76
End: 2021-03-30

## 2021-04-12 NOTE — ANESTHESIA PREPROCEDURE EVALUATION
Procedure:  EGD    Relevant Problems   CARDIO   (+) Essential hypertension   (+) HLD (hyperlipidemia)      11/6/20 CT chest   LUNGS:  Nothing to indicate recurrent tumor      Right upper lobe wedge resection  Mild scarring      No significant filling defects in the trachea and bronchi      PLEURA:  Unremarkable      HEART/GREAT VESSELS:  Mild coronary artery calcification indicating atherosclerotic heart disease      MEDIASTINUM AND NICOLAS:  Small hiatal hernia status post Nissen fundoplication      CHEST WALL AND LOWER NECK:   Unremarkable      VISUALIZED STRUCTURES IN THE UPPER ABDOMEN:  Hepatic steatosis      OSSEOUS STRUCTURES:  Mild degenerative disease in the spine  Healed sternal manubrial fracture      IMPRESSION:     No recurrent tumor      Physical Exam    Airway    Mallampati score: II  TM Distance: >3 FB  Neck ROM: full     Dental   No notable dental hx     Cardiovascular      Pulmonary      Other Findings        Anesthesia Plan  ASA Score- 2     Anesthesia Type- IV sedation with anesthesia with ASA Monitors  Additional Monitors:   Airway Plan:     Comment: Plan for IV sedation with GETA as back up          Plan Factors-    Chart reviewed  EKG reviewed  Imaging results reviewed  Existing labs reviewed  Patient summary reviewed  Induction- intravenous  Postoperative Plan-     Informed Consent- Anesthetic plan and risks discussed with patient  I personally reviewed this patient with the CRNA  Discussed and agreed on the Anesthesia Plan with the CRNA  Unique Abbott

## 2021-04-13 ENCOUNTER — HOSPITAL ENCOUNTER (OUTPATIENT)
Dept: GASTROENTEROLOGY | Facility: AMBULARY SURGERY CENTER | Age: 76
Setting detail: OUTPATIENT SURGERY
Discharge: HOME | End: 2021-04-13
Attending: INTERNAL MEDICINE
Payer: COMMERCIAL

## 2021-04-13 ENCOUNTER — TRANSCRIBE ORDERS (OUTPATIENT)
Dept: ADMINISTRATIVE | Facility: HOSPITAL | Age: 76
End: 2021-04-13

## 2021-04-13 ENCOUNTER — ANESTHESIA (OUTPATIENT)
Dept: GASTROENTEROLOGY | Facility: AMBULARY SURGERY CENTER | Age: 76
End: 2021-04-13

## 2021-04-13 VITALS
DIASTOLIC BLOOD PRESSURE: 68 MMHG | BODY MASS INDEX: 27.99 KG/M2 | SYSTOLIC BLOOD PRESSURE: 157 MMHG | OXYGEN SATURATION: 97 % | WEIGHT: 168 LBS | TEMPERATURE: 96.9 F | RESPIRATION RATE: 18 BRPM | HEIGHT: 65 IN | HEART RATE: 62 BPM

## 2021-04-13 DIAGNOSIS — R10.13 DYSPEPSIA: ICD-10-CM

## 2021-04-13 DIAGNOSIS — Z12.31 ENCOUNTER FOR SCREENING MAMMOGRAM FOR MALIGNANT NEOPLASM OF BREAST: Primary | ICD-10-CM

## 2021-04-13 DIAGNOSIS — R11.0 NAUSEA: ICD-10-CM

## 2021-04-13 DIAGNOSIS — K21.9 GASTROESOPHAGEAL REFLUX DISEASE, UNSPECIFIED WHETHER ESOPHAGITIS PRESENT: ICD-10-CM

## 2021-04-13 DIAGNOSIS — K59.1 FUNCTIONAL DIARRHEA: ICD-10-CM

## 2021-04-13 DIAGNOSIS — R10.13 EPIGASTRIC PAIN: ICD-10-CM

## 2021-04-13 PROCEDURE — 43239 EGD BIOPSY SINGLE/MULTIPLE: CPT | Performed by: INTERNAL MEDICINE

## 2021-04-13 PROCEDURE — 88305 TISSUE EXAM BY PATHOLOGIST: CPT | Performed by: PATHOLOGY

## 2021-04-13 RX ORDER — SODIUM CHLORIDE, SODIUM LACTATE, POTASSIUM CHLORIDE, CALCIUM CHLORIDE 600; 310; 30; 20 MG/100ML; MG/100ML; MG/100ML; MG/100ML
INJECTION, SOLUTION INTRAVENOUS CONTINUOUS PRN
Status: DISCONTINUED | OUTPATIENT
Start: 2021-04-13 | End: 2021-04-13

## 2021-04-13 RX ORDER — PROPOFOL 10 MG/ML
INJECTION, EMULSION INTRAVENOUS AS NEEDED
Status: DISCONTINUED | OUTPATIENT
Start: 2021-04-13 | End: 2021-04-13

## 2021-04-13 RX ORDER — FAMOTIDINE 20 MG/1
20 TABLET, FILM COATED ORAL 2 TIMES DAILY
Qty: 60 TABLET | Refills: 11 | Status: SHIPPED | OUTPATIENT
Start: 2021-04-13

## 2021-04-13 RX ORDER — GLYCOPYRROLATE 0.2 MG/ML
INJECTION INTRAMUSCULAR; INTRAVENOUS AS NEEDED
Status: DISCONTINUED | OUTPATIENT
Start: 2021-04-13 | End: 2021-04-13

## 2021-04-13 RX ADMIN — PROPOFOL 100 MG: 10 INJECTION, EMULSION INTRAVENOUS at 09:12

## 2021-04-13 RX ADMIN — SODIUM CHLORIDE, SODIUM LACTATE, POTASSIUM CHLORIDE, AND CALCIUM CHLORIDE: .6; .31; .03; .02 INJECTION, SOLUTION INTRAVENOUS at 09:03

## 2021-04-13 RX ADMIN — GLYCOPYRROLATE 0.2 MG: 0.2 INJECTION, SOLUTION INTRAMUSCULAR; INTRAVENOUS at 09:09

## 2021-04-13 RX ADMIN — PROPOFOL 30 MG: 10 INJECTION, EMULSION INTRAVENOUS at 09:15

## 2021-04-13 NOTE — H&P
History and Physical - [facility]  Tao Pimentel 66 y o  female MRN: [id number]        HPI:  28-year-old female with history of diabetes mellitus reports having symptoms of indigestion  Historical Information   Past Medical History:   Diagnosis Date    Colitis     Colon polyp     COPD (chronic obstructive pulmonary disease) ([facility] [address] )     Dehydration     Depression     Diabetes mellitus (HCC)     Familial tremor     GERD (gastroesophageal reflux disease)     Ground glass opacity present on imaging of lung     Hiatal hernia     High cholesterol     Hyperlipidemia     Hypertension     Hypoglycemia     Migraine     Nosebleed     Primary adenocarcinoma of upper lobe of right lung (HCC)     Sleep apnea     C pap    Solitary pulmonary nodule     Syncope      Past Surgical History:   Procedure Laterality Date    APPENDECTOMY      BACK SURGERY      Cervical spine    BREAST BIOPSY      CHOLECYSTECTOMY      COLECTOMY      complete colectomy    COLONOSCOPY      CT NEEDLE BX ASPIRATION INJECTION LOCALIZATION  11/7/2019    ESOPHAGOGASTRODUODENOSCOPY N/A 1/22/2018    Procedure: ESOPHAGOGASTRODUODENOSCOPY (EGD); Surgeon: Lexie Jaramillo MD;  [facility]; Service: Gastroenterology    LUNG SEGMENTECTOMY Right 11/7/2019    Procedure: RESECTION WEDGE LUNG;  Surgeon: Roni Leger MD;  [facility];  Service: Thoracic    NASAL/SINUS ENDOSCOPY N/A 11/7/2019    Procedure: ENDOSCOPIC BIOPSY OF NASOPHARYNX LESION;  Surgeon: Cadence Hogan MD;  [facility];  Service: ENT    NISSEN [place] N/A 11/7/2019    Procedure: Tana Huang;  Surgeon: Roni Leger MD;  [facility];  Service: Thoracic    NM ESOPHAGOGASTRODUODENOSCOPY TRANSORAL DIAGNOSTIC N/A 4/17/2019    Procedure: ESOPHAGOGASTRODUODENOSCOPY (EGD); Surgeon: Lexie Jaramillo MD;  [facility];   Service: Gastroenterology    NM LAP, REPAIR PARAESOPHAGEAL HERNIA, INCL FUNDOPLASTY W/ MESH N/A 6/2/2016    Procedure: ROBOTIC LAPAROSCOPIC Daquan Fundoplication, Liver biopsy;  Surgeon: Radha Ruano MD;  [facility];  Service: General    PA THORACOSCOPY W/THERA WEDGE RESEXN INITIAL UNILAT Right 11/7/2019    Procedure: THORACOSCOPY VIDEO ASSISTED SURGERY (VATS); Surgeon: Delfin Borges MD;  [facility];  Service: Thoracic    UPPER GASTROINTESTINAL ENDOSCOPY       Social History   Social History     Substance and Sexual Activity   Alcohol Use Not Currently     Social History     Substance and Sexual Activity   Drug Use No     Social History     Tobacco Use   Smoking Status Never Smoker   Smokeless Tobacco Never Used     Family History   Problem Relation Age of Onset    Leukemia Mother 58    Colon cancer Father 79    Leukemia Brother 61    Colon cancer Paternal Aunt        Meds/Allergies     (Not in a hospital admission)      Allergies   Allergen Reactions    Biaxin [Clarithromycin] GI Intolerance    Cephalosporins     Darvon [Propoxyphene]     Latex      Added based on information entered during case entry, please review and add reactions, type, and severity as needed    Other     Wound Dressing Adhesive     Cefzil [Cefprozil] Rash    Ciprofloxacin Rash       Objective     Blood pressure 144/67, pulse 63, temperature (!) 96 4 °F (35 8 °C), temperature source Temporal, resp  rate 15, height 5' 5" (1 651 m), weight 76 2 kg (168 lb), SpO2 97 %      PHYSICAL EXAM:    Gen: NAD  CV: S1 & S2 normal, RRR  CHEST: Clear to auscultate  ABD: soft, NT/ND, good bowel sounds  EXT: no edema    ASSESSMENT:     Dyspepsia    PLAN:    EGD

## 2021-04-13 NOTE — ANESTHESIA POSTPROCEDURE EVALUATION
Post-Op Assessment Note    CV Status:  Stable  Pain Score: 0    Pain management: adequate     Mental Status:  Alert and awake   Hydration Status:  Euvolemic and stable   PONV Controlled:  None   Airway Patency:  Patent      Post Op Vitals Reviewed: Yes      Staff: CRNA         No complications documented      BP (!) 89/47 (04/13/21 0924)    Temp     Pulse 59 (04/13/21 0924)   Resp 22 (04/13/21 0924)    SpO2 96 % (04/13/21 0924)

## 2021-04-15 ENCOUNTER — IMMUNIZATIONS (OUTPATIENT)
Dept: FAMILY MEDICINE CLINIC | Facility: HOSPITAL | Age: 76
End: 2021-04-15

## 2021-04-15 DIAGNOSIS — Z23 ENCOUNTER FOR IMMUNIZATION: Primary | ICD-10-CM

## 2021-04-15 PROCEDURE — 91300 SARS-COV-2 / COVID-19 MRNA VACCINE (PFIZER-BIONTECH) 30 MCG: CPT

## 2021-04-15 PROCEDURE — 0002A SARS-COV-2 / COVID-19 MRNA VACCINE (PFIZER-BIONTECH) 30 MCG: CPT

## 2021-04-26 ENCOUNTER — TELEPHONE (OUTPATIENT)
Dept: GASTROENTEROLOGY | Facility: CLINIC | Age: 76
End: 2021-04-26

## 2021-04-26 NOTE — TELEPHONE ENCOUNTER
Patients GI provider:  Dr Elaina Hampton    Number to return call: [phone]    Reason for call: Pt returning your call for results    Scheduled procedure/appointment date if applicable: NA

## 2021-05-02 NOTE — H&P (VIEW-ONLY)
Thoracic Follow-Up  Assessment/Plan:    Ground glass opacity present on imaging of lung  Ms Pimentel underwent an updated H and P today and all questions regarding her procedure were discussed  We will attempt to perform a diagnostic and therapeutic wedge resection, given her poor clinical pulmonary function  She underwent her blood work and EKG and is scheduled for 11/7/19  Diagnoses and all orders for this visit:    Ground glass opacity present on imaging of lung          Thoracic History       Diagnosis: Right upper lobe lung nodule        Patient ID: Lizbeth Tirado is a 74 y o  female  HPI    Ms Pimentel is a 73 yo non smoking female who was originally referred by Dr Wisam Elmore for a right upper lobe ground glass nodule found in April 2016  There was slight enlargement seen in February 2018, with no solid component  She was last seen on 10/8/19, at which time her PET from revealed mild uptake in the 1 5 x 1 2 cm right upper lobe nodule, along with some non-specific activity in the posterior nasopharynx and left anterior 5th rib, without a CT correlate  She also had a 2 2 cm right adnexal cyst without any uptake  PFT's from 10/4/19, demonstrated a FVC of 3 15L, 106% predicted, FEV1 of 2 43L, 108% predicted, and a DLCO of 55% predicted  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 and therefore the recommendation was a wedge resection, since her clinical lung function is impaired  We will use IR localization, since there is no solid component, which would make it difficult to palpate  The procedure has been scheduled for 11/7/19  She presents today for an updated H and P  On discussion, she is feeling fine  She continues with dyspnea on exertion, but denies fever, chills, cough, shortness of breath at rest, or hemoptysis  She was seen by ENT for her nasopharyngeal uptake on PET scan  They are planning on performing a biopsy during the procedure   She will follow up with her PCP or gynecologist for the adnexal cyst      Past Medical History:   Diagnosis Date    Colitis     Dehydration     Depression     Diabetes mellitus (HCC)     Familial tremor     GERD (gastroesophageal reflux disease)     Ground glass opacity present on imaging of lung     Hiatal hernia     High cholesterol     Hyperlipidemia     Hypertension     Hypoglycemia     Migraine     Nosebleed     Sleep apnea     C pap    Solitary pulmonary nodule     Syncope      Past Surgical History:   Procedure Laterality Date    APPENDECTOMY      BACK SURGERY      Cervical spine    BREAST BIOPSY      CHOLECYSTECTOMY      COLECTOMY      complete colectomy    ESOPHAGOGASTRODUODENOSCOPY N/A 1/22/2018    Procedure: ESOPHAGOGASTRODUODENOSCOPY (EGD); Surgeon: Tyrone Ray MD;  Location[facility]; Service: Gastroenterology    NISSEN FUNDOPLICATION      NH ESOPHAGOGASTRODUODENOSCOPY TRANSORAL DIAGNOSTIC N/A 4/17/2019    Procedure: ESOPHAGOGASTRODUODENOSCOPY (EGD); Surgeon: Tyrone Ray MD;  [facility]: [facility]; Service: Gastroenterology    NH LAP, REPAIR PARAESOPHAGEAL HERNIA, INCL FUNDOPLASTY W/ MESH N/A 6/2/2016    Procedure: ROBOTIC LAPAROSCOPIC Daquan Fundoplication, Liver biopsy;  Surgeon: Merlinda Garin, MD;  [facility];  Service: General     Family History   Problem Relation Age of Onset    Leukemia Mother 58    Colon cancer Father 79    Leukemia Brother 61     Social History     Allergies   Allergen Reactions    Biaxin [Clarithromycin] GI Intolerance    Cephalosporins     Darvon [Propoxyphene]     Latex      Added based on information entered during case entry, please review and add reactions, type, and severity as needed    Other     Wound Dressing Adhesive     Cefzil [Cefprozil] Rash    Ciprofloxacin Rash     Current Outpatient Medications on File Prior to Visit   Medication Sig Dispense Refill    acetaminophen (TYLENOL) 325 mg tablet 650 mg every 4 to 6 hours as needed for pain   30 tablet 0    bismuth subsalicylate (PEPTO BISMOL) 262 MG chewable tablet Chew 262 mg 2 (two) times a day as needed for indigestion      Cholecalciferol (VITAMIN D3) 5000 UNITS CAPS Take 1 capsule by mouth daily   cholestyramine (QUESTRAN) 4 g packet Take 1 packet (4 g total) by mouth 3 (three) times a day with meals Take 1 a day, can increase to 3 a day  Take 2 hrs apart from other meds (Patient taking differently: Take 1 packet by mouth as needed ) 60 packet 11    escitalopram (LEXAPRO) 10 mg tablet Take 10 mg by mouth daily   famotidine (PEPCID) 40 MG tablet TAKE 1 TABLET BY MOUTH EVERY DAY 90 tablet 1    ondansetron (ZOFRAN-ODT) 8 mg disintegrating tablet Take 1 tablet by mouth every 8 (eight) hours as needed for nausea or vomiting for up to 10 days 20 tablet 0    propranolol (INDERAL) 60 mg tablet Take 60 mg by mouth daily      simvastatin (ZOCOR) 40 mg tablet Take 40 mg by mouth daily at bedtime  No current facility-administered medications on file prior to visit  Review of Systems   Constitutional: Negative for chills, fever and unexpected weight change  HENT: Negative for congestion, trouble swallowing and voice change  Respiratory: Positive for shortness of breath  Negative for cough and wheezing  Cardiovascular: Negative for chest pain  Gastrointestinal: Negative for nausea and vomiting  Musculoskeletal: Negative for back pain and gait problem  Neurological: Negative for weakness and headaches  Psychiatric/Behavioral: Negative for agitation, behavioral problems and confusion  All other systems reviewed and are negative  Objective:   Physical Exam   Constitutional: She is oriented to person, place, and time  She appears well-developed and well-nourished  HENT:   Head: Normocephalic and atraumatic  Mouth/Throat: No oropharyngeal exudate  Eyes: Pupils are equal, round, and reactive to light  EOM are normal  No scleral icterus    + glasses   Neck: Normal range of motion  Neck supple  Cardiovascular: Normal rate, regular rhythm and normal heart sounds  Pulmonary/Chest: Effort normal and breath sounds normal  No respiratory distress  She has no wheezes  Abdominal: Soft  She exhibits no distension  Musculoskeletal: Normal range of motion  Neurological: She is alert and oriented to person, place, and time  Skin: Skin is warm and dry  Psychiatric: She has a normal mood and affect  Her behavior is normal    Vitals reviewed  /71 (BP Location: Left arm, Patient Position: Sitting, Cuff Size: Adult)   Pulse 64   Temp 98 5 °F (36 9 °C)   Ht 5' 5" (1 651 m)   Wt 74 9 kg (165 lb 3 2 oz)   SpO2 94%   BMI 27 49 kg/m²       Nm Pet Ct Skull Base To Mid Thigh    Result Date: 10/2/2019  Narrative PET/CT SCAN INDICATION: Abnormal chest CT  Enlarging groundglass nodule  D38 1: Neoplasm of uncertain behavior of trachea, bronchus and lung MODIFIER: PI COMPARISON: CT chest exam 9/17/2019, CT abdomen pelvis 11/26/2017 and additional priors CELL TYPE:  N/A TECHNIQUE:   8 5 mCi F-18-FDG administered IV  Multiplanar attenuation corrected and non attenuation corrected PET images are available for interpretation, and contiguous, low dose, axial CT sections were obtained from the skull base through the femurs   Intravenous contrast material was not utilized  This examination, like all CT scans performed in the [facility], was performed utilizing techniques to minimize radiation dose exposure, including the use of iterative reconstruction and automated exposure control  Fasting serum glucose: 105 mg/dl FINDINGS: VISUALIZED BRAIN:   No acute abnormalities are seen  HEAD/NECK:   Asymmetric FDG uptake at the left posterior nasopharynx, SUV max of 7 2  No obvious findings here on CT  No FDG avid lymph nodes  CT images: Unremarkable  CHEST:   Mild FDG uptake noted at the 1 5 x 1 2 cm right upper lobe lateral groundglass nodule    SUV max here is 1 6, SUV max of 2 4 on the Q clear images  This is higher than background activity, SUV max of 0 7  This has demonstrated slow interval enlargement over time  On the 2/13/2017 CT examination, this measured 1 1 x 0 8 cm  No suspicious focal FDG uptake in the mediastinal or perihilar regions  CT images: Small hiatal hernia  Mild to moderate coronary artery calcifications  ABDOMEN:   No FDG avid soft tissue lesions are seen  CT images: Prior cholecystectomy  PELVIS: No FDG avid soft tissue lesions are seen  CT images: Bowel staple line at the sigmoid colon  2 2 cm right adnexal cyst without FDG uptake  This does appear new from a 2017 CT exam  OSSEOUS STRUCTURES: Slight focal FDG uptake at the left anterior 5th rib, SUV max of 1 3  No obvious findings here on CT, probably posttraumatic  Mild focal FDG uptake at the right iliac wing laterally where there is enthesopathy on CT, SUV max of 2 5  This is likely inflammatory  CT images: Prior posterior fusion and laminectomy in the cervical spine  Vertebral body hemangioma noted of L2  Impression 1  Mild FDG uptake noted in the 1 5 cm right upper lobe groundglass nodule, above lung background activity  Given the slow interval enlargement of this nodule, this is likely a low-grade adenocarcinoma  2    No findings suspicious for hypermetabolic metastasis  3   Asymmetric FDG uptake at the left posterior nasopharynx, SUV max of 7 2  No obvious findings here on CT  While this could be inflammatory an underlying lesion is not excluded  Correlate with direct visualization  4  Slight focal FDG uptake at the left anterior 5th rib  No obvious findings here on CT, favor posttraumatic  Correlate clinically  5   2 2 cm right adnexal cyst   No focal FDG uptake here but cystic neoplasms may not demonstrate significant FDG uptake  According to current guidelines (J Am Yoan Radiol 0402;66:[id number]) in this late postmenopausal woman, this should be evaluated by pelvic ultrasound   The study was marked in EPIC for significant notification   Workstation performed: [id number]

## 2021-05-07 ENCOUNTER — HOSPITAL ENCOUNTER (OUTPATIENT)
Dept: RADIOLOGY | Facility: MEDICAL CENTER | Age: 76
Discharge: HOME | End: 2021-05-07
Payer: COMMERCIAL

## 2021-05-07 DIAGNOSIS — C34.11 PRIMARY ADENOCARCINOMA OF UPPER LOBE OF RIGHT LUNG (HCC): ICD-10-CM

## 2021-05-07 PROCEDURE — 71250 CT THORAX DX C-: CPT

## 2021-05-07 PROCEDURE — G1004 CDSM NDSC: HCPCS

## 2021-05-08 ENCOUNTER — HOSPITAL ENCOUNTER (OUTPATIENT)
Dept: NUCLEAR MEDICINE | Facility: HOSPITAL | Age: 76
Discharge: HOME | End: 2021-05-08
Attending: INTERNAL MEDICINE

## 2021-05-08 DIAGNOSIS — R11.0 NAUSEA: ICD-10-CM

## 2021-05-11 ENCOUNTER — OFFICE VISIT (OUTPATIENT)
Dept: CARDIAC SURGERY | Facility: CLINIC | Age: 76
End: 2021-05-11
Payer: COMMERCIAL

## 2021-05-11 VITALS
TEMPERATURE: 97.9 F | SYSTOLIC BLOOD PRESSURE: 108 MMHG | DIASTOLIC BLOOD PRESSURE: 61 MMHG | OXYGEN SATURATION: 97 % | RESPIRATION RATE: 16 BRPM | HEIGHT: 65 IN | WEIGHT: 170.42 LBS | BODY MASS INDEX: 28.39 KG/M2 | HEART RATE: 75 BPM

## 2021-05-11 DIAGNOSIS — C34.11 PRIMARY ADENOCARCINOMA OF UPPER LOBE OF RIGHT LUNG (HCC): Primary | ICD-10-CM

## 2021-05-11 PROCEDURE — 99213 OFFICE O/P EST LOW 20 MIN: CPT | Performed by: PHYSICIAN ASSISTANT

## 2021-05-11 PROCEDURE — 1036F TOBACCO NON-USER: CPT | Performed by: PHYSICIAN ASSISTANT

## 2021-05-11 PROCEDURE — 1160F RVW MEDS BY RX/DR IN RCRD: CPT | Performed by: PHYSICIAN ASSISTANT

## 2021-05-11 NOTE — ASSESSMENT & PLAN NOTE
Ms Rj Marroquin presents 1 5 years out from right thoracoscopic upper lobe therapeutic wedge resection for Stage IA adenocarcinoma of the lung  She is doing very well from a thoracic surgery standpoint, and her CT chest does not demonstrate any evidence of recurrent or metastatic lung cancer  She does have atherosclerotic heart disease, but is asymptomatic  I recommended she call if she experiences experiencing chest pain with activity  She will return in 6 months with another chest CT for continued lung cancer surveillance  All questions were addressed

## 2021-05-11 NOTE — PROGRESS NOTES
Thoracic Follow-Up  Assessment/Plan:    Primary adenocarcinoma of upper lobe of right lung [facility])  Ms Pimentel presents 1 5 years out from right thoracoscopic upper lobe therapeutic wedge resection for Stage IA adenocarcinoma of the lung  She is doing very well from a thoracic surgery standpoint, and her CT chest does not demonstrate any evidence of recurrent or metastatic lung cancer  She does have atherosclerotic heart disease, but is asymptomatic  I recommended she call if she experiences experiencing chest pain with activity  She will return in 6 months with another chest CT for continued lung cancer surveillance  All questions were addressed  Diagnoses and all orders for this visit:    Primary adenocarcinoma of upper lobe of right lung ([facility] [address] )  -     CT chest wo contrast; Future          Thoracic History   Diagnosis: Right upper lobe adenocarcinoma   Procedure: Flexible bronchoscopy, right thoracoscopic upper lobe therapeutic wedge resection, endoscopic biopsy of nasopharyngeal lesion (Dr Landy Gomez) performed on 11/7/19  Pathology: Right upper lobe tumor revealing acinar pattern invasive adenocarcinoma, measuring 1 8 cm with negative margins  8th Edition AJCC tumor stage IA2 (pT1b,pNx)  Subjective:    Patient ID: Giovani Bustillo is a 66 y o  female  HPI   Ms Pimentel is a 66year old female who underwent a right thoracoscopic upper lobe therapeutic wedge resection 11/7/19 for a Stage IA adenocarcinoma of the lung  She was last evaluated 11/10/20 at which time CT scan was stable  She returns today for continued lung cancer surveillance after obtaining a CT chest 5/7/21  This demonstrates no new pulmonary nodules or enlarged mediastinal or hilar lymph nodes  On discussion, she has stable shortness of breath on exertion  Denies cough, chest pain, fevers, chills, weight loss, vision changes, headaches, bone/joint pains  She has burping and is being worked up for gastroparesis       The following portions of the patient's history were reviewed and updated as appropriate: allergies, current medications, past family history, past medical history, past social history, past surgical history and problem list     Review of Systems   Constitutional: Negative for activity change, appetite change, chills, diaphoresis, fatigue, fever and unexpected weight change  Eyes: Negative for visual disturbance  Respiratory: Positive for shortness of breath  Negative for cough and chest tightness  Cardiovascular: Negative for palpitations  Gastrointestinal: Negative for abdominal pain and nausea  Musculoskeletal: Negative for joint swelling and myalgias  Skin: Negative for color change and rash  Neurological: Negative for weakness and headaches  Hematological: Negative for adenopathy  Does not bruise/bleed easily  Psychiatric/Behavioral: Negative for confusion  Objective:   Physical Exam  Constitutional:       General: She is not in acute distress  Appearance: Normal appearance  HENT:      Head: Normocephalic and atraumatic  Eyes:      General: No scleral icterus  Conjunctiva/sclera: Conjunctivae normal    Neck:      Musculoskeletal: Neck supple  Cardiovascular:      Rate and Rhythm: Normal rate and regular rhythm  Pulmonary:      Effort: Pulmonary effort is normal  No respiratory distress  Breath sounds: Normal breath sounds  Abdominal:      General: There is no distension  Palpations: Abdomen is soft  Lymphadenopathy:      Cervical: No cervical adenopathy  Skin:     General: Skin is warm and dry  Neurological:      General: No focal deficit present  Mental Status: She is alert and oriented to person, place, and time     Psychiatric:         Mood and Affect: Mood normal      /61   Pulse 75   Temp 97 9 °F (36 6 °C) (Temporal)   Resp 16   Ht 5' 5" (1 651 m)   Wt 77 3 kg (170 lb 6 7 oz)   SpO2 97%   BMI 28 36 kg/m²     Xr Chest Pa & Lateral    Result Date: 7/7/2020  Impression No acute cardiopulmonary disease  Workstation performed: [id number]      Ct Chest Wo Contrast    Result Date: 5/10/2021  Narrative CT CHEST WITHOUT IV CONTRAST INDICATION:   C34 11: Malignant neoplasm of upper lobe, right bronchus or lung  Right upper lobe wedge resection for adenocarcinoma on 11/7/2019  COMPARISON:  Chest CT from 11/6/2020 and 5/12/2020  TECHNIQUE: Chest CT without intravenous contrast   Axial, sagittal, coronal 2D reformats and coronal MIPS from source data  Radiation dose length product (DLP):  307 mGy-cm   Radiation dose exposure minimized using iterative reconstruction and automated exposure control  FINDINGS: LUNGS:  Nothing to indicate recurrent tumor  Right upper lobe wedge resection  Mild right lower lobe paraspinal scar  AIRWAYS: No significant filling defects  PLEURA:  Unremarkable  HEART/GREAT VESSELS:  Normal heart size  Moderate coronary artery calcification indicating atherosclerotic heart disease  MEDIASTINUM AND NICOLAS:  Unremarkable  CHEST WALL AND LOWER NECK: Unremarkable  UPPER ABDOMEN:  Small hiatal hernia with clips at the gastroesophageal junction  Hepatic steatosis  OSSEOUS STRUCTURES: Mild degenerative disease in the spine  Cervical fusion  Impression Nothing to indicate recurrent tumor  Workstation performed: [id number]     Ct Chest Wo Contrast    Result Date: 11/9/2020  Narrative CT CHEST WITHOUT IV CONTRAST INDICATION:   C34 11: Malignant neoplasm of upper lobe, right bronchus or lung  Right upper lobe wedge resection for adenocarcinoma on 11/7/2019  COMPARISON:  Chest CT from 5/12/2020, 9/17/2019, and 2/19/2018  TECHNIQUE: CT examination of the chest was performed without intravenous contrast   Axial, sagittal, and coronal 2D reformatted images were created from the source data and submitted for interpretation  Radiation dose length product (DLP) for this visit:  223 mGy-cm     This examination, like all CT scans performed in the [facility]  [facility], was performed utilizing techniques to minimize radiation dose exposure, including the use of iterative reconstruction and automated exposure control  FINDINGS: LUNGS:  Nothing to indicate recurrent tumor  Right upper lobe wedge resection  Mild scarring  No significant filling defects in the trachea and bronchi  PLEURA:  Unremarkable  HEART/GREAT VESSELS:  Mild coronary artery calcification indicating atherosclerotic heart disease  MEDIASTINUM AND NICOLAS:  Small hiatal hernia status post Nissen fundoplication  CHEST WALL AND LOWER NECK:   Unremarkable  VISUALIZED STRUCTURES IN THE UPPER ABDOMEN:  Hepatic steatosis  OSSEOUS STRUCTURES:  Mild degenerative disease in the spine  Healed sternal manubrial fracture  Impression No recurrent tumor  Workstation performed: [id number]     Ct Chest Wo Contrast    Result Date: 5/12/2020  Narrative CT CHEST WITHOUT IV CONTRAST INDICATION:   C34 11: Malignant neoplasm of upper lobe, right bronchus or lung  Nonproductive cough, fever, exposure to Covid patient  Right upper lobe wedge resection for adenocarcinoma on 11/7/2019  COMPARISON:  Preoperative PET CT from 10/2/2019 and the preoperative chest CT from 9/17/2019 TECHNIQUE: CT examination of the chest was performed without intravenous contrast   Axial, sagittal, and coronal 2D reformatted images were created from the source data and submitted for interpretation  Radiation dose length product (DLP) for this visit:  267 mGy-cm   This examination, like all CT scans performed in the [facility], was performed utilizing techniques to minimize radiation dose exposure, including the use of iterative reconstruction and automated exposure control  FINDINGS: LUNGS:  Nothing to indicate tumor recurrence  No acute pulmonary disease  Right upper lobe wedge resection  Redemonstration of a few linear scars  No significant filling defects in the trachea and bronchi  PLEURA:  Unremarkable  HEART/GREAT VESSELS:  Moderate coronary artery calcification indicating atherosclerotic heart disease  MEDIASTINUM AND NICOLAS:  Unremarkable  CHEST WALL AND LOWER NECK:   Unremarkable  VISUALIZED STRUCTURES IN THE UPPER ABDOMEN:  Cholecystectomy  Clips about the gastroesophageal junction due to paraesophageal hernia repair  OSSEOUS STRUCTURES:  Moderate degenerative disease in the thoracic spine  Hemangioma in the upper thoracic spine  Large hemangioma in the lumbar spine  Impression Nothing to indicate recurrent tumor  No acute pulmonary disease   Workstation performed: [id number]

## 2021-05-12 ENCOUNTER — HOSPITAL ENCOUNTER (OUTPATIENT)
Dept: NUCLEAR MEDICINE | Facility: HOSPITAL | Age: 76
Discharge: HOME | End: 2021-05-12
Payer: COMMERCIAL

## 2021-05-12 PROCEDURE — A9541 TC99M SULFUR COLLOID: HCPCS

## 2021-05-12 PROCEDURE — 78264 GASTRIC EMPTYING IMG STUDY: CPT

## 2021-05-12 PROCEDURE — G1004 CDSM NDSC: HCPCS

## 2021-06-08 ENCOUNTER — TELEPHONE (OUTPATIENT)
Dept: GASTROENTEROLOGY | Facility: AMBULARY SURGERY CENTER | Age: 76
End: 2021-06-08

## 2021-06-08 NOTE — TELEPHONE ENCOUNTER
----- Message from Ela Nolen PA-C sent at 5/13/2021  3:02 PM EDT -----  Patient called back and I spoke with her, answered her questions  Please schedule for EGD with pyloric Botox injection with Dr Mraina Nix    Thank you

## 2021-06-08 NOTE — TELEPHONE ENCOUNTER
Pt is scheduled for egd w/ botox injection into the pylorus (per dr Lorena Traore) 8/19/2021 at [facility]

## 2021-06-18 ENCOUNTER — OFFICE VISIT (OUTPATIENT)
Dept: URGENT CARE | Facility: MEDICAL CENTER | Age: 76
End: 2021-06-18
Payer: COMMERCIAL

## 2021-06-18 VITALS
WEIGHT: 165 LBS | HEIGHT: 65 IN | RESPIRATION RATE: 18 BRPM | OXYGEN SATURATION: 94 % | TEMPERATURE: 98.6 F | BODY MASS INDEX: 27.49 KG/M2 | HEART RATE: 78 BPM

## 2021-06-18 DIAGNOSIS — J30.2 SEASONAL ALLERGIC RHINITIS, UNSPECIFIED TRIGGER: Primary | ICD-10-CM

## 2021-06-18 PROCEDURE — S9088 SERVICES PROVIDED IN URGENT: HCPCS | Performed by: PHYSICIAN ASSISTANT

## 2021-06-18 PROCEDURE — 99213 OFFICE O/P EST LOW 20 MIN: CPT | Performed by: PHYSICIAN ASSISTANT

## 2021-06-18 NOTE — PROGRESS NOTES
[address]        NAME: Jeannette Bateman is a 66 y o  female  : 1945    MRN: [id number]  DATE: 2021  TIME: 4:02 PM    Assessment and Plan   Seasonal allergic rhinitis, unspecified trigger [J30 2]  1  Seasonal allergic rhinitis, unspecified trigger           Patient Instructions     Start Singulair and use Flonase  Follow up with PCP in 3-5 days  Proceed to  ER if symptoms worsen  Chief Complaint     Chief Complaint   Patient presents with    Cold Like Symptoms     allergies and cough since early spring she says and she had lung cancer and also is fully vaccinated    Cough         History of Present Illness        Patient is a 68-year-old female who presents today with ongoing cough, postnasal drip, runny nose for the past 2 months  It started in the spring with her seasonal allergies  She is fully COVID-19 vaccinated  She denies any shortness of breath, wheezing, chest tightness  No fevers or chills or body aches  Has not taken any OTC medications for her allergies recently  Review of Systems   Review of Systems   Constitutional: Negative for chills and fever  HENT: Positive for postnasal drip and rhinorrhea  Negative for congestion, ear pain and sore throat  Respiratory: Positive for cough  Negative for chest tightness, shortness of breath and wheezing  Cardiovascular: Negative for chest pain  Musculoskeletal: Negative for myalgias  Current Medications       Current Outpatient Medications:     acetaminophen (TYLENOL) 325 mg tablet, 650 mg every 4 to 6 hours as needed for pain , Disp: 30 tablet, Rfl: 0    bismuth subsalicylate (PEPTO BISMOL) 262 MG chewable tablet, Chew 262 mg 2 (two) times a day as needed for indigestion , Disp: , Rfl:     Cholecalciferol (VITAMIN D3) 5000 UNITS CAPS, Take 1 capsule by mouth daily  , Disp: , Rfl:     cholestyramine (QUESTRAN) 4 g packet, Take 1 packet (4 g total) by mouth 3 (three) times a day with meals Take 1 a day, can increase to 3 a day   Take 2 hrs apart from other meds, Disp: 60 packet, Rfl: 11    escitalopram (LEXAPRO) 20 mg tablet, , Disp: , Rfl:     famotidine (PEPCID) 20 mg tablet, Take 1 tablet (20 mg total) by mouth 2 (two) times a day, Disp: 60 tablet, Rfl: 11    Loratadine (CLARITIN PO), Take by mouth as needed, Disp: , Rfl:     promethazine-dextromethorphan (PHENERGAN-DM) 6 25-15 mg/5 mL oral syrup, TAKE 15ML BY MOUTH EVERY 6 HOURS AS NEEDED FOR COUGH/CONGESTION, Disp: , Rfl:     propranolol (INDERAL) 60 mg tablet, Take 60 mg by mouth daily, Disp: , Rfl:     simvastatin (ZOCOR) 40 mg tablet, Take 80 mg by mouth daily at bedtime , Disp: , Rfl:     Current Allergies     Allergies as of 06/18/2021 - Reviewed 06/18/2021   Allergen Reaction Noted    Biaxin [clarithromycin] GI Intolerance 05/31/2016    Cephalosporins      Darvon [propoxyphene]  11/26/2017    Latex  10/29/2019    Other  07/20/2016    Wound dressing adhesive  07/20/2016    Cefzil [cefprozil] Rash 05/31/2016    Ciprofloxacin Rash 05/31/2016            The following portions of the patient's history were reviewed and updated as appropriate: allergies, current medications, past family history, past medical history, past social history, past surgical history and problem list      Past Medical History:   Diagnosis Date    Colitis     Colon polyp     COPD [facility]) [facility] [address] [facility]     Dehydration     Depression     Diabetes mellitus ([facility] [address] [facility]     Familial tremor     GERD (gastroesophageal reflux disease)     Ground glass opacity present on imaging of lung     Hiatal hernia     High cholesterol     Hyperlipidemia     Hypertension     Hypoglycemia     Migraine     Nosebleed     Primary adenocarcinoma of upper lobe of right lung ([address] )     Sleep apnea     C pap    Solitary pulmonary nodule     Syncope        Past Surgical History:   Procedure Laterality Date    APPENDECTOMY      BACK SURGERY      Cervical spine    BREAST BIOPSY      CHOLECYSTECTOMY      COLECTOMY      complete colectomy    COLONOSCOPY      CT NEEDLE BX ASPIRATION INJECTION LOCALIZATION  11/7/2019    ESOPHAGOGASTRODUODENOSCOPY N/A 1/22/2018    Procedure: ESOPHAGOGASTRODUODENOSCOPY (EGD); Surgeon: Jennifer Hagan MD;  [facility]; Service: Gastroenterology    LUNG SEGMENTECTOMY Right 11/7/2019    Procedure: RESECTION WEDGE LUNG;  Surgeon: Marialuisa De Anda MD;  [facility];  Service: Thoracic    NASAL/SINUS ENDOSCOPY N/A 11/7/2019    Procedure: ENDOSCOPIC BIOPSY OF NASOPHARYNX LESION;  Surgeon: Shahram Celaya MD;  [facility];  Service: ENT    NISSEN [place] N/A 11/7/2019    Procedure: Dedra Hill;  Surgeon: Marialuisa De Anda MD;  [facility];  Service: Thoracic    PA ESOPHAGOGASTRODUODENOSCOPY TRANSORAL DIAGNOSTIC N/A 4/17/2019    Procedure: ESOPHAGOGASTRODUODENOSCOPY (EGD); Surgeon: Jennifer Hagan MD;  [facility]; Service: Gastroenterology    PA LAP, REPAIR PARAESOPHAGEAL HERNIA, INCL FUNDOPLASTY W/ MESH N/A 6/2/2016    Procedure: ROBOTIC LAPAROSCOPIC Daquan Fundoplication, Liver biopsy;  Surgeon: Natividad Robles MD;  [facility];  Service: General    PA THORACOSCOPY W/THERA WEDGE RESEXN INITIAL UNILAT Right 11/7/2019    Procedure: THORACOSCOPY VIDEO ASSISTED SURGERY (VATS); Surgeon: Marialuisa De Anda MD;  [facility];  Service: Thoracic    UPPER GASTROINTESTINAL ENDOSCOPY         Family History   Problem Relation Age of Onset    Leukemia Mother 58    Colon cancer Father 79    Leukemia Brother 61    Colon cancer Paternal Aunt          Medications have been verified  Objective   Pulse 78   Temp 98 6 °F (37 °C)   Resp 18   Ht 5' 5" (1 651 m)   Wt 74 8 kg (165 lb)   SpO2 94%   BMI 27 46 kg/m²        Physical Exam     Physical Exam  Constitutional:       General: She is not in acute distress  Appearance: Normal appearance  She is not ill-appearing  HENT:      Head: Normocephalic and atraumatic  Right Ear: Tympanic membrane and ear canal normal       Left Ear: Tympanic membrane and ear canal normal       Nose: No congestion or rhinorrhea  Mouth/Throat:      Mouth: Mucous membranes are moist       Pharynx: Oropharynx is clear  No posterior oropharyngeal erythema  Eyes:      Conjunctiva/sclera: Conjunctivae normal       Pupils: Pupils are equal, round, and reactive to light  Cardiovascular:      Rate and Rhythm: Normal rate and regular rhythm  Pulmonary:      Effort: Pulmonary effort is normal       Breath sounds: Normal breath sounds  Musculoskeletal:      Cervical back: Neck supple  Lymphadenopathy:      Cervical: No cervical adenopathy  Skin:     General: Skin is warm and dry  Neurological:      Mental Status: She is alert

## 2021-07-15 ENCOUNTER — APPOINTMENT (OUTPATIENT)
Dept: LAB | Facility: MEDICAL CENTER | Age: 76
End: 2021-07-15
Payer: COMMERCIAL

## 2021-07-15 DIAGNOSIS — R53.82 CHRONIC FATIGUE SYNDROME: ICD-10-CM

## 2021-07-15 DIAGNOSIS — N18.30 STAGE 3 CHRONIC KIDNEY DISEASE, UNSPECIFIED WHETHER STAGE 3A OR 3B CKD (HCC): ICD-10-CM

## 2021-07-15 DIAGNOSIS — F34.1 DYSTHYMIC DISORDER: ICD-10-CM

## 2021-07-15 DIAGNOSIS — E78.5 HYPERLIPIDEMIA, UNSPECIFIED HYPERLIPIDEMIA TYPE: ICD-10-CM

## 2021-07-15 DIAGNOSIS — I10 ESSENTIAL HYPERTENSION, BENIGN: ICD-10-CM

## 2021-07-15 DIAGNOSIS — E55.9 VITAMIN D DEFICIENCY: ICD-10-CM

## 2021-07-15 LAB
25(OH)D3 SERPL-MCNC: 22.2 NG/ML (ref 30–100)
ALBUMIN SERPL BCP-MCNC: 3.7 G/DL (ref 3.5–5)
ALP SERPL-CCNC: 105 U/L (ref 46–116)
ALT SERPL W P-5'-P-CCNC: 32 U/L (ref 12–78)
ANION GAP SERPL CALCULATED.3IONS-SCNC: 8 MMOL/L (ref 4–13)
AST SERPL W P-5'-P-CCNC: 19 U/L (ref 5–45)
BASOPHILS # BLD AUTO: 0.1 THOUSANDS/ΜL (ref 0–0.1)
BASOPHILS NFR BLD AUTO: 1 % (ref 0–1)
BILIRUB SERPL-MCNC: 0.62 MG/DL (ref 0.2–1)
BUN SERPL-MCNC: 22 MG/DL (ref 5–25)
CALCIUM SERPL-MCNC: 9.5 MG/DL (ref 8.3–10.1)
CHLORIDE SERPL-SCNC: 107 MMOL/L (ref 100–108)
CHOLEST SERPL-MCNC: 165 MG/DL (ref 50–200)
CO2 SERPL-SCNC: 21 MMOL/L (ref 21–32)
CREAT SERPL-MCNC: 1.24 MG/DL (ref 0.6–1.3)
EOSINOPHIL # BLD AUTO: 0.28 THOUSAND/ΜL (ref 0–0.61)
EOSINOPHIL NFR BLD AUTO: 2 % (ref 0–6)
ERYTHROCYTE [DISTWIDTH] IN BLOOD BY AUTOMATED COUNT: 13.3 % (ref 11.6–15.1)
GFR SERPL CREATININE-BSD FRML MDRD: 42 ML/MIN/1.73SQ M
GLUCOSE P FAST SERPL-MCNC: 119 MG/DL (ref 65–99)
HCT VFR BLD AUTO: 41.9 % (ref 34.8–46.1)
HDLC SERPL-MCNC: 51 MG/DL
HGB BLD-MCNC: 13.2 G/DL (ref 11.5–15.4)
IMM GRANULOCYTES # BLD AUTO: 0.05 THOUSAND/UL (ref 0–0.2)
IMM GRANULOCYTES NFR BLD AUTO: 0 % (ref 0–2)
LDLC SERPL CALC-MCNC: 73 MG/DL (ref 0–100)
LYMPHOCYTES # BLD AUTO: 3.84 THOUSANDS/ΜL (ref 0.6–4.47)
LYMPHOCYTES NFR BLD AUTO: 32 % (ref 14–44)
MCH RBC QN AUTO: 31.1 PG (ref 26.8–34.3)
MCHC RBC AUTO-ENTMCNC: 31.5 G/DL (ref 31.4–37.4)
MCV RBC AUTO: 99 FL (ref 82–98)
MONOCYTES # BLD AUTO: 0.88 THOUSAND/ΜL (ref 0.17–1.22)
MONOCYTES NFR BLD AUTO: 7 % (ref 4–12)
NEUTROPHILS # BLD AUTO: 7.03 THOUSANDS/ΜL (ref 1.85–7.62)
NEUTS SEG NFR BLD AUTO: 58 % (ref 43–75)
NONHDLC SERPL-MCNC: 114 MG/DL
NRBC BLD AUTO-RTO: 0 /100 WBCS
PLATELET # BLD AUTO: 286 THOUSANDS/UL (ref 149–390)
PMV BLD AUTO: 11.2 FL (ref 8.9–12.7)
POTASSIUM SERPL-SCNC: 4.4 MMOL/L (ref 3.5–5.3)
PROT SERPL-MCNC: 8 G/DL (ref 6.4–8.2)
RBC # BLD AUTO: 4.25 MILLION/UL (ref 3.81–5.12)
SODIUM SERPL-SCNC: 136 MMOL/L (ref 136–145)
T4 FREE SERPL-MCNC: 0.95 NG/DL (ref 0.76–1.46)
TRIGL SERPL-MCNC: 204 MG/DL
TSH SERPL DL<=0.05 MIU/L-ACNC: 1.51 UIU/ML (ref 0.36–3.74)
WBC # BLD AUTO: 12.18 THOUSAND/UL (ref 4.31–10.16)

## 2021-07-15 PROCEDURE — 80053 COMPREHEN METABOLIC PANEL: CPT

## 2021-07-15 PROCEDURE — 85025 COMPLETE CBC W/AUTO DIFF WBC: CPT

## 2021-07-15 PROCEDURE — 84439 ASSAY OF FREE THYROXINE: CPT

## 2021-07-15 PROCEDURE — 84443 ASSAY THYROID STIM HORMONE: CPT

## 2021-07-15 PROCEDURE — 80061 LIPID PANEL: CPT

## 2021-07-15 PROCEDURE — 36415 COLL VENOUS BLD VENIPUNCTURE: CPT

## 2021-07-15 PROCEDURE — 82306 VITAMIN D 25 HYDROXY: CPT

## 2021-08-09 ENCOUNTER — HOSPITAL ENCOUNTER (OUTPATIENT)
Dept: MAMMOGRAPHY | Facility: HOSPITAL | Age: 76
Discharge: HOME | End: 2021-08-09
Attending: INTERNAL MEDICINE
Payer: COMMERCIAL

## 2021-08-09 VITALS — WEIGHT: 165 LBS | HEIGHT: 65 IN | BODY MASS INDEX: 27.49 KG/M2

## 2021-08-09 DIAGNOSIS — Z12.31 ENCOUNTER FOR SCREENING MAMMOGRAM FOR MALIGNANT NEOPLASM OF BREAST: ICD-10-CM

## 2021-08-09 PROCEDURE — 77067 SCR MAMMO BI INCL CAD: CPT

## 2021-08-09 PROCEDURE — 77063 BREAST TOMOSYNTHESIS BI: CPT

## 2021-08-17 NOTE — TELEPHONE ENCOUNTER
Patients GI provider:  Dr Ho [facility]     Number to return call: [phone]    Reason for call: Pt calling requesting to speak with someone about what she can have to eat prior to procedure       Scheduled procedure/appointment date if applicable: Apt/procedure 8/19/21

## 2021-08-18 ENCOUNTER — TELEPHONE (OUTPATIENT)
Dept: GASTROENTEROLOGY | Facility: HOSPITAL | Age: 76
End: 2021-08-18

## 2021-08-19 ENCOUNTER — HOSPITAL ENCOUNTER (OUTPATIENT)
Dept: GASTROENTEROLOGY | Facility: HOSPITAL | Age: 76
Setting detail: OUTPATIENT SURGERY
Discharge: HOME | End: 2021-08-19
Attending: INTERNAL MEDICINE | Admitting: INTERNAL MEDICINE
Payer: COMMERCIAL

## 2021-08-19 ENCOUNTER — ANESTHESIA EVENT (OUTPATIENT)
Dept: GASTROENTEROLOGY | Facility: HOSPITAL | Age: 76
End: 2021-08-19

## 2021-08-19 ENCOUNTER — ANESTHESIA (OUTPATIENT)
Dept: GASTROENTEROLOGY | Facility: HOSPITAL | Age: 76
End: 2021-08-19

## 2021-08-19 VITALS
RESPIRATION RATE: 16 BRPM | DIASTOLIC BLOOD PRESSURE: 67 MMHG | HEART RATE: 62 BPM | SYSTOLIC BLOOD PRESSURE: 142 MMHG | OXYGEN SATURATION: 95 % | WEIGHT: 169 LBS | HEIGHT: 65 IN | BODY MASS INDEX: 28.16 KG/M2 | TEMPERATURE: 96.9 F

## 2021-08-19 DIAGNOSIS — K21.9 GASTROESOPHAGEAL REFLUX DISEASE, UNSPECIFIED WHETHER ESOPHAGITIS PRESENT: ICD-10-CM

## 2021-08-19 DIAGNOSIS — R10.13 DYSPEPSIA: ICD-10-CM

## 2021-08-19 DIAGNOSIS — K59.1 FUNCTIONAL DIARRHEA: ICD-10-CM

## 2021-08-19 DIAGNOSIS — R11.0 NAUSEA: ICD-10-CM

## 2021-08-19 DIAGNOSIS — R10.13 EPIGASTRIC PAIN: ICD-10-CM

## 2021-08-19 PROBLEM — R14.2 BELCHING: Status: ACTIVE | Noted: 2017-12-19

## 2021-08-19 PROBLEM — J01.90 ACUTE SINUSITIS: Status: ACTIVE | Noted: 2017-09-10

## 2021-08-19 PROCEDURE — 43236 UPPR GI SCOPE W/SUBMUC INJ: CPT | Performed by: INTERNAL MEDICINE

## 2021-08-19 RX ORDER — SODIUM CHLORIDE, SODIUM LACTATE, POTASSIUM CHLORIDE, CALCIUM CHLORIDE 600; 310; 30; 20 MG/100ML; MG/100ML; MG/100ML; MG/100ML
INJECTION, SOLUTION INTRAVENOUS CONTINUOUS PRN
Status: DISCONTINUED | OUTPATIENT
Start: 2021-08-19 | End: 2021-08-19

## 2021-08-19 RX ORDER — PROPOFOL 10 MG/ML
INJECTION, EMULSION INTRAVENOUS AS NEEDED
Status: DISCONTINUED | OUTPATIENT
Start: 2021-08-19 | End: 2021-08-19

## 2021-08-19 RX ORDER — SERTRALINE HYDROCHLORIDE 25 MG/1
25 TABLET, FILM COATED ORAL
COMMUNITY

## 2021-08-19 RX ORDER — LIDOCAINE HYDROCHLORIDE 10 MG/ML
INJECTION, SOLUTION EPIDURAL; INFILTRATION; INTRACAUDAL; PERINEURAL AS NEEDED
Status: DISCONTINUED | OUTPATIENT
Start: 2021-08-19 | End: 2021-08-19

## 2021-08-19 RX ADMIN — ONABOTULINUMTOXINA 100 UNITS: 100 INJECTION, POWDER, LYOPHILIZED, FOR SOLUTION INTRADERMAL; INTRAMUSCULAR at 12:27

## 2021-08-19 RX ADMIN — PROPOFOL 40 MG: 10 INJECTION, EMULSION INTRAVENOUS at 12:26

## 2021-08-19 RX ADMIN — PROPOFOL 40 MG: 10 INJECTION, EMULSION INTRAVENOUS at 12:24

## 2021-08-19 RX ADMIN — LIDOCAINE HYDROCHLORIDE 50 MG: 10 INJECTION, SOLUTION EPIDURAL; INFILTRATION; INTRACAUDAL at 12:22

## 2021-08-19 RX ADMIN — SODIUM CHLORIDE, SODIUM LACTATE, POTASSIUM CHLORIDE, AND CALCIUM CHLORIDE: .6; .31; .03; .02 INJECTION, SOLUTION INTRAVENOUS at 12:06

## 2021-08-19 RX ADMIN — PROPOFOL 120 MG: 10 INJECTION, EMULSION INTRAVENOUS at 12:22

## 2021-08-19 NOTE — H&P
History and Physical - [facility]  Sydney Pimentel 66 y o  female MRN: [id number]        HPI:  70-year-old female with history of COPD, total colectomy for ischemic colitis was diagnosed with gastroparesis  Historical Information   Past Medical History:   Diagnosis Date    Colitis     Colon polyp     [facility]) [facility] [address] [facility]     Dehydration     Depression     Familial tremor     GERD (gastroesophageal reflux disease)     Ground glass opacity present on imaging of lung     Hiatal hernia     High cholesterol     Hyperlipidemia     Hypertension     Hypoglycemia     Migraine     Nosebleed     Primary adenocarcinoma of upper lobe of right lung (HCC)     Sleep apnea     C pap    Solitary pulmonary nodule     Syncope      Past Surgical History:   Procedure Laterality Date    APPENDECTOMY      BACK SURGERY      Cervical spine    BREAST BIOPSY Bilateral BENIGN    CHOLECYSTECTOMY      COLECTOMY      complete colectomy    COLONOSCOPY      CT NEEDLE BX ASPIRATION INJECTION LOCALIZATION  11/7/2019    ESOPHAGOGASTRODUODENOSCOPY N/A 1/22/2018    Procedure: ESOPHAGOGASTRODUODENOSCOPY (EGD); Surgeon: Lanny Jordan MD;  [facility]; Service: Gastroenterology    LUNG SEGMENTECTOMY Right 11/7/2019    Procedure: RESECTION WEDGE LUNG;  Surgeon: Felicia Bustamante MD;  [facility];  Service: Thoracic    NASAL/SINUS ENDOSCOPY N/A 11/7/2019    Procedure: ENDOSCOPIC BIOPSY OF NASOPHARYNX LESION;  Surgeon: Yohannes Casas MD;  [facility];  Service: ENT    NISSEN [place] N/A 11/7/2019    Procedure: Agus Bongo;  Surgeon: Felicia Bustamante MD;  [facility];  Service: Thoracic    [place] ESOPHAGOGASTRODUODENOSCOPY TRANSORAL DIAGNOSTIC N/A 4/17/2019    Procedure: ESOPHAGOGASTRODUODENOSCOPY (EGD); Surgeon: Lanny Jordan MD;  [facility];   Service: Gastroenterology  ND LAP, REPAIR PARAESOPHAGEAL HERNIA, INCL FUNDOPLASTY W/ MESH N/A 6/2/2016    Procedure: ROBOTIC LAPAROSCOPIC Daquan Fundoplication, Liver biopsy;  Surgeon: Ulysess Ormond, MD;  [facility];  Service: General    ND THORACOSCOPY W/THERA WEDGE RESEXN INITIAL UNILAT Right 11/7/2019    Procedure: THORACOSCOPY VIDEO ASSISTED SURGERY (VATS); Surgeon: Angel Ledbetter MD;  [facility];  Service: Thoracic    UPPER GASTROINTESTINAL ENDOSCOPY       Social History   Social History     Substance and Sexual Activity   Alcohol Use Not Currently     Social History     Substance and Sexual Activity   Drug Use No     Social History     Tobacco Use   Smoking Status Never Smoker   Smokeless Tobacco Never Used     Family History   Problem Relation Age of Onset    Leukemia Mother 58    Colon cancer Father 79    Leukemia Brother 61    No Known Problems Daughter     No Known Problems Maternal Grandmother     No Known Problems Paternal Grandmother     Breast cancer Maternal Aunt     Breast cancer Maternal Aunt     No Known Problems Maternal Aunt        Meds/Allergies     (Not in a hospital admission)      Allergies   Allergen Reactions    Biaxin [Clarithromycin] GI Intolerance    Cephalosporins     Darvon [Propoxyphene]     Latex      Added based on information entered during case entry, please review and add reactions, type, and severity as needed    Other     Wound Dressing Adhesive     Cefzil [Cefprozil] Rash    Ciprofloxacin Rash       Objective     Blood pressure 139/79, pulse 93, temperature (!) 97 1 °F (36 2 °C), temperature source Temporal, resp  rate 16, height 5' 5" (1 651 m), weight 76 7 kg (169 lb), SpO2 94 %      PHYSICAL EXAM:    Gen: NAD  CV: S1 & S2 normal, RRR  CHEST: Clear to auscultate  ABD: soft, NT/ND, good bowel sounds  EXT: no edema    ASSESSMENT:     Gastroparesis    PLAN:    EGD with Botox

## 2021-08-19 NOTE — ANESTHESIA POSTPROCEDURE EVALUATION
Post-Op Assessment Note    CV Status:  Stable  Pain Score: 0    Pain management: adequate     Mental Status:  Alert and awake   Hydration Status:  Euvolemic   PONV Controlled:  Controlled   Airway Patency:  Patent      Post Op Vitals Reviewed: Yes      Staff: CRNA         No complications documented      BP   139/72   Temp     Pulse  73   Resp   17   SpO2   96

## 2021-08-19 NOTE — ANESTHESIA PREPROCEDURE EVALUATION
Procedure:  EGD    Relevant Problems   CARDIO   (+) Essential hypertension   (+) HLD (hyperlipidemia)   (+) Short of breath on exertion      GI/HEPATIC   (+) GERD without esophagitis      NEURO/PSYCH   (+) History of Lyme disease      PULMONARY   (+) Obstructive sleep apnea   (+) Short of breath on exertion      Other   (+) Cognitive impairment   (+) Dyspepsia   (+) Epigastric pain   (+) Intractable diarrhea   (+) Nasopharyngeal mass   (+) Other diseases of pharynx   (+) Primary adenocarcinoma of upper lobe of right lung (HCC)   (+) Status post colectomy   (+) Status post laparoscopic Nissen fundoplication   (+) Syncope   (+) Tremor      Lab Results   Component Value Date     10/09/2015    SODIUM 136 07/15/2021    K 4 4 07/15/2021     07/15/2021    CO2 21 07/15/2021    ANIONGAP 9 10/09/2015    AGAP 8 07/15/2021    BUN 22 07/15/2021    CREATININE 1 24 07/15/2021    GLUC 99 02/02/2020    GLUF 119 (H) 07/15/2021    CALCIUM 9 5 07/15/2021    AST 19 07/15/2021    ALT 32 07/15/2021    ALKPHOS 105 07/15/2021    PROT 7 8 10/09/2015    TP 8 0 07/15/2021    BILITOT 0 32 10/09/2015    TBILI 0 62 07/15/2021    EGFR 42 07/15/2021     Lab Results   Component Value Date    WBC 12 18 (H) 07/15/2021    HGB 13 2 07/15/2021    HCT 41 9 07/15/2021    MCV 99 (H) 07/15/2021     07/15/2021       Physical Exam    Airway    Mallampati score: II  TM Distance: >3 FB  Neck ROM: full     Dental   No notable dental hx     Cardiovascular      Pulmonary      Other Findings        Anesthesia Plan  ASA Score- 2     Anesthesia Type- IV sedation with anesthesia with ASA Monitors  Additional Monitors:   Airway Plan:           Plan Factors-Exercise tolerance (METS): >4 METS  Chart reviewed  Existing labs reviewed  Patient is not a current smoker  Patient did not smoke on day of surgery  Induction- intravenous      Postoperative Plan-     Informed Consent- Anesthetic plan and risks discussed with patient and daughter  I personally reviewed this patient with the CRNA  Discussed and agreed on the Anesthesia Plan with the CRNA  Roslyn Feldman

## 2021-11-01 ENCOUNTER — HOSPITAL ENCOUNTER (OUTPATIENT)
Dept: RADIOLOGY | Facility: MEDICAL CENTER | Age: 76
Discharge: HOME | End: 2021-11-01
Payer: COMMERCIAL

## 2021-11-01 DIAGNOSIS — C34.11 PRIMARY ADENOCARCINOMA OF UPPER LOBE OF RIGHT LUNG (HCC): ICD-10-CM

## 2021-11-01 PROCEDURE — G1004 CDSM NDSC: HCPCS

## 2021-11-01 PROCEDURE — 71250 CT THORAX DX C-: CPT

## 2021-11-16 ENCOUNTER — OFFICE VISIT (OUTPATIENT)
Dept: CARDIAC SURGERY | Facility: CLINIC | Age: 76
End: 2021-11-16
Payer: COMMERCIAL

## 2021-11-16 VITALS
DIASTOLIC BLOOD PRESSURE: 78 MMHG | HEART RATE: 81 BPM | WEIGHT: 173.72 LBS | SYSTOLIC BLOOD PRESSURE: 124 MMHG | HEIGHT: 65 IN | BODY MASS INDEX: 28.94 KG/M2 | TEMPERATURE: 97.7 F | RESPIRATION RATE: 16 BRPM | OXYGEN SATURATION: 100 %

## 2021-11-16 DIAGNOSIS — C34.11 PRIMARY ADENOCARCINOMA OF UPPER LOBE OF RIGHT LUNG (HCC): Primary | ICD-10-CM

## 2021-11-16 PROCEDURE — 99213 OFFICE O/P EST LOW 20 MIN: CPT | Performed by: PHYSICIAN ASSISTANT

## 2022-08-08 ENCOUNTER — TELEPHONE (OUTPATIENT)
Dept: CARDIAC SURGERY | Facility: CLINIC | Age: 77
End: 2022-08-08

## 2022-08-08 NOTE — TELEPHONE ENCOUNTER
I spoke with Johana June earlier today she wanted to let us know she was having some pain in her right shoulder, shortness of breathe and feeling weak  She has an appt with pulmonology scheduled for Oct 4th  She wanted to see if she should move her Nov appt with Dr Richard Small sooner  I spoke with Stephanie Sandra our PA and LVM for pt that we can more her scan even sooner and a follow up with Dr Richard Small sooner if she would like to  Please call back

## 2022-08-10 NOTE — TELEPHONE ENCOUNTER
I spoke with pt, she is ok leaving her CT until the end of sept  She will see Dr Con Blackman on Oct 4th and if he feels she should be seen by Dr Ashby sooner she will give us a call,she does not want to r/s her CT or f/u w/ Dr Ashby earlier at this time

## 2022-08-19 ENCOUNTER — HOSPITAL ENCOUNTER (OUTPATIENT)
Dept: RADIOLOGY | Age: 77
Discharge: HOME | End: 2022-08-19
Payer: COMMERCIAL

## 2022-08-19 DIAGNOSIS — C34.11 PRIMARY ADENOCARCINOMA OF UPPER LOBE OF RIGHT LUNG (HCC): ICD-10-CM

## 2022-08-19 PROCEDURE — 71250 CT THORAX DX C-: CPT

## 2022-08-19 PROCEDURE — G1004 CDSM NDSC: HCPCS

## 2022-08-23 ENCOUNTER — OFFICE VISIT (OUTPATIENT)
Dept: CARDIAC SURGERY | Facility: CLINIC | Age: 77
End: 2022-08-23
Payer: COMMERCIAL

## 2022-08-23 VITALS
WEIGHT: 169.97 LBS | RESPIRATION RATE: 16 BRPM | BODY MASS INDEX: 28.32 KG/M2 | TEMPERATURE: 97.7 F | DIASTOLIC BLOOD PRESSURE: 62 MMHG | OXYGEN SATURATION: 97 % | HEIGHT: 65 IN | HEART RATE: 82 BPM | SYSTOLIC BLOOD PRESSURE: 106 MMHG

## 2022-08-23 DIAGNOSIS — C34.11 PRIMARY ADENOCARCINOMA OF UPPER LOBE OF RIGHT LUNG (HCC): Primary | ICD-10-CM

## 2022-08-23 PROCEDURE — 99213 OFFICE O/P EST LOW 20 MIN: CPT | Performed by: THORACIC SURGERY (CARDIOTHORACIC VASCULAR SURGERY)

## 2022-08-23 NOTE — ASSESSMENT & PLAN NOTE
Ms Nikos Tirado presents 2 5 years out from right thoracoscopic upper lobe therapeutic wedge resection for Stage IA adenocarcinoma of the lung  She reports worsening dyspnea on exertion for which she is seeing her pulmonologist on 10/4/22  Her most recent chest CT (8/19/22) demonstrates no evidence of recurrent or metastatic lung cancer  We will order PFTs for her so she has them when she sees her pulmonologist  She will return in one year with a repeat chest CT for continued lung cancer surveillance  All questions addressed and she is in agreement

## 2022-08-23 NOTE — PROGRESS NOTES
Thoracic Follow-Up  Assessment/Plan:    Primary adenocarcinoma of upper lobe of right lung [facility])  Ms Pimentel presents 2 5 years out from right thoracoscopic upper lobe therapeutic wedge resection for Stage IA adenocarcinoma of the lung  She reports worsening dyspnea on exertion for which she is seeing her pulmonologist on 10/4/22  Her most recent chest CT (8/19/22) demonstrates no evidence of recurrent or metastatic lung cancer  We will order PFTs for her so she has them when she sees her pulmonologist  She will return in one year with a repeat chest CT for continued lung cancer surveillance  All questions addressed and she is in agreement  Diagnoses and all orders for this visit:    Primary adenocarcinoma of upper lobe of right lung ([facility] [address] )  -     Complete PFT with post bronchodilator; Future          Thoracic History   Diagnosis: Right upper lobe adenocarcinoma   Procedure: Flexible bronchoscopy, right thoracoscopic upper lobe therapeutic wedge resection, endoscopic biopsy of nasopharyngeal lesion (Dr Serafin Uriostegui) performed on 11/7/19  Pathology: Right upper lobe tumor revealing acinar pattern invasive adenocarcinoma, measuring 1 8 cm with negative margins  8th Edition AJCC tumor stage IA2 (pT1b,pNx)  Subjective:    Patient ID: Laura Gonzalez is a 64 y o  female  Ms Nikos Tirado is a 64year old female with a history of stage IA adenocarcinoma status post right thoracoscopic upper lobe therapeutic wedge resection 11/7/19  Chest CT 11/1/21 demonstrated no new pulmonary nodules or enlarged mediastinal or hilar lymph nodes  She presents today with chest CT on 8/19/22 showing nothing to indicate recurrent tumor  On discussion, she has fatigue and worsening dyspnea on exertion, for which she is seeing her pulmonologist on 10/4/22  She reports that her cough has become more "barky" over the past 6 months  She denies hemoptysis, chest pain, fevers, chills, weight loss, or new bone pain        The following portions of the patient's history were reviewed and updated as appropriate: allergies, current medications, past family history, past medical history, past social history, past surgical history and problem list     Review of Systems   Constitutional: Positive for fatigue  Negative for chills, fever and unexpected weight change  HENT: Negative for ear pain and sore throat  Eyes: Negative for pain and visual disturbance  Respiratory: Positive for cough and shortness of breath  Cardiovascular: Negative for chest pain and palpitations  Gastrointestinal: Negative for abdominal pain and vomiting  Genitourinary: Negative for dysuria and hematuria  Musculoskeletal: Negative for arthralgias and back pain  Skin: Negative for color change and rash  Neurological: Negative for seizures and syncope  Psychiatric/Behavioral: Negative for confusion  All other systems reviewed and are negative  Objective:   Physical Exam  Vitals reviewed  Constitutional:       General: She is not in acute distress  HENT:      Head: Normocephalic and atraumatic  Eyes:      Extraocular Movements: Extraocular movements intact  Conjunctiva/sclera: Conjunctivae normal    Cardiovascular:      Rate and Rhythm: Normal rate and regular rhythm  Pulses: Normal pulses  Heart sounds: Normal heart sounds  Pulmonary:      Effort: Pulmonary effort is normal       Breath sounds: Normal breath sounds  Abdominal:      General: Bowel sounds are normal       Palpations: Abdomen is soft  Musculoskeletal:         General: Normal range of motion  Cervical back: Normal range of motion  Skin:     General: Skin is warm and dry  Neurological:      General: No focal deficit present  Mental Status: She is alert and oriented to person, place, and time     Psychiatric:         Mood and Affect: Mood normal          Behavior: Behavior normal      /62   Pulse 82   Temp 97 7 °F (36 5 °C)   Resp 16   Ht 5' 5" (1 651 m)   Wt 77 1 kg (169 lb 15 6 oz)   SpO2 97%   BMI 28 29 kg/m²     No Chest XR results available for this patient  CT chest wo contrast    Result Date: 8/22/2022  Narrative CT CHEST WITHOUT IV CONTRAST INDICATION:   C34 11: Malignant neoplasm of upper lobe, right bronchus or lung  Right upper lobe wedge resection for adenocarcinoma on 11/7/2019  COMPARISON:  Chest CT from 11/1/2021 and 9/17/2019  TECHNIQUE: Chest CT without intravenous contrast   Axial, sagittal, coronal 2D reformats and coronal MIPS from source data  Radiation dose length product (DLP):  255 mGy-cm   Radiation dose exposure minimized using iterative reconstruction and automated exposure control  FINDINGS: LUNGS:  Nothing to indicate recurrent tumor  Right upper lobe wedge resection  AIRWAYS: No significant filling defects  PLEURA:  Unremarkable  HEART/GREAT VESSELS:  Normal heart size  Mild coronary artery calcification indicating atherosclerotic heart disease  MEDIASTINUM AND NICOLAS:  Small hiatal hernia with clips at the GE junction  CHEST WALL AND LOWER NECK: Unremarkable  UPPER ABDOMEN:  2 2 cm low-attenuation lesion in the upper spleen, stable since at least 2019 and benign  Mild heterogeneous hepatic steatosis  OSSEOUS STRUCTURES: Moderate degenerative disease in the spine  Old sternal fracture  Impression Nothing to indicate recurrent tumor  Mild hepatic steatosis  Workstation performed: [id number]     CT chest wo contrast    Result Date: 11/5/2021  Narrative CT CHEST WITHOUT IV CONTRAST INDICATION:   C34 11: Malignant neoplasm of upper lobe, right bronchus or lung  COMPARISON:  Compared with 5/7/2021  TECHNIQUE: CT examination of the chest was performed without intravenous contrast   Axial, sagittal, and coronal 2D reformatted images were created from the source data and submitted for interpretation  Radiation dose length product (DLP) for this visit:  237 mGy-cm     This examination, like all CT scans performed in the [facility], was performed utilizing techniques to minimize radiation dose exposure, including the use of iterative reconstruction and automated exposure control  FINDINGS: LUNGS: Sutures in the right upper lung  Lungs are clear  There is no tracheal or endobronchial lesion  PLEURA:  Unremarkable  HEART/GREAT VESSELS:  Unremarkable for patient's age  MEDIASTINUM AND NICOLAS:  GE junction demonstrates wall thickening and unchanged  CHEST WALL AND LOWER NECK:   Unremarkable  VISUALIZED STRUCTURES IN THE UPPER ABDOMEN:  Unremarkable  OSSEOUS STRUCTURES:  No acute fracture or destructive osseous lesion  Impression Postsurgical changes in the right lung  No findings to suggest recurrence/metastasis  No change from prior study  Workstation performed: [id number]     No CT Chest,Abdomen,Pelvis results available for this patient  No NM PET CT results available for this patient  No Barium Swallow results available for this patient

## 2022-09-09 ENCOUNTER — HOSPITAL ENCOUNTER (OUTPATIENT)
Dept: PULMONOLOGY | Facility: HOSPITAL | Age: 77
End: 2022-09-09
Attending: THORACIC SURGERY (CARDIOTHORACIC VASCULAR SURGERY)
Payer: COMMERCIAL

## 2022-09-09 DIAGNOSIS — C34.11 PRIMARY ADENOCARCINOMA OF UPPER LOBE OF RIGHT LUNG (HCC): ICD-10-CM

## 2022-09-09 PROCEDURE — 94726 PLETHYSMOGRAPHY LUNG VOLUMES: CPT | Performed by: INTERNAL MEDICINE

## 2022-09-09 PROCEDURE — 94010 BREATHING CAPACITY TEST: CPT

## 2022-09-09 PROCEDURE — 94729 DIFFUSING CAPACITY: CPT

## 2022-09-09 PROCEDURE — 94726 PLETHYSMOGRAPHY LUNG VOLUMES: CPT

## 2022-09-09 PROCEDURE — 94729 DIFFUSING CAPACITY: CPT | Performed by: INTERNAL MEDICINE

## 2022-09-09 PROCEDURE — 94010 BREATHING CAPACITY TEST: CPT | Performed by: INTERNAL MEDICINE

## 2022-09-09 PROCEDURE — 94760 N-INVAS EAR/PLS OXIMETRY 1: CPT

## 2022-10-04 ENCOUNTER — CONSULT (OUTPATIENT)
Dept: PULMONOLOGY | Facility: CLINIC | Age: 77
End: 2022-10-04
Payer: COMMERCIAL

## 2022-10-04 VITALS
TEMPERATURE: 99.3 F | SYSTOLIC BLOOD PRESSURE: 120 MMHG | WEIGHT: 170 LBS | BODY MASS INDEX: 28.32 KG/M2 | HEART RATE: 73 BPM | OXYGEN SATURATION: 97 % | RESPIRATION RATE: 18 BRPM | DIASTOLIC BLOOD PRESSURE: 68 MMHG | HEIGHT: 65 IN

## 2022-10-04 DIAGNOSIS — R06.02 SHORT OF BREATH ON EXERTION: ICD-10-CM

## 2022-10-04 DIAGNOSIS — R94.2 DIFFUSION CAPACITY OF LUNG (DL), DECREASED: ICD-10-CM

## 2022-10-04 DIAGNOSIS — G47.33 OBSTRUCTIVE SLEEP APNEA: Primary | ICD-10-CM

## 2022-10-04 PROBLEM — J01.90 ACUTE SINUSITIS: Status: RESOLVED | Noted: 2017-09-10 | Resolved: 2022-10-04

## 2022-10-04 PROBLEM — B34.9 NONSPECIFIC SYNDROME SUGGESTIVE OF VIRAL ILLNESS: Status: RESOLVED | Noted: 2020-02-01 | Resolved: 2022-10-04

## 2022-10-04 PROBLEM — R19.7 INTRACTABLE DIARRHEA: Status: RESOLVED | Noted: 2017-11-26 | Resolved: 2022-10-04

## 2022-10-04 PROBLEM — R11.0 NAUSEA: Status: RESOLVED | Noted: 2019-03-21 | Resolved: 2022-10-04

## 2022-10-04 PROBLEM — A48.0: Status: RESOLVED | Noted: 2019-03-21 | Resolved: 2022-10-04

## 2022-10-04 PROBLEM — R19.7 DIARRHEA: Status: RESOLVED | Noted: 2019-03-21 | Resolved: 2022-10-04

## 2022-10-04 PROCEDURE — 99204 OFFICE O/P NEW MOD 45 MIN: CPT | Performed by: INTERNAL MEDICINE

## 2022-10-04 RX ORDER — PANTOPRAZOLE SODIUM 40 MG/1
40 TABLET, DELAYED RELEASE ORAL DAILY
COMMUNITY
Start: 2022-09-22

## 2022-10-04 RX ORDER — FLUTICASONE PROPIONATE AND SALMETEROL XINAFOATE 115; 21 UG/1; UG/1
2 AEROSOL, METERED RESPIRATORY (INHALATION) 2 TIMES DAILY
Qty: 12 G | Refills: 6 | Status: SHIPPED | OUTPATIENT
Start: 2022-10-04

## 2022-10-04 RX ORDER — FAMOTIDINE 40 MG/1
40 TABLET, FILM COATED ORAL DAILY
COMMUNITY
Start: 2022-09-11

## 2022-10-04 RX ORDER — SIMVASTATIN 80 MG
80 TABLET ORAL DAILY
COMMUNITY
Start: 2022-09-02

## 2022-10-04 NOTE — ASSESSMENT & PLAN NOTE
· Reduction in DLCO may be in part due to resection but certainly other contributing factors may be present  · Need to consider pulmonary hypertension given untreated SEEMA  · Chest last echo in 2017    Will check echocardiogram

## 2022-10-04 NOTE — ASSESSMENT & PLAN NOTE
· Shortness of breath is exclusively on exertion and without associated chest pain or cardiac symptoms   · Mild cough but no wheezing  Did have COVID infection a few weeks ago and has some lingering phlegm production with throat clearing    · Does not wheeze  · Has gained weight and may have an element of cardiovascular deconditioning from lack of physical activity  · Will try Advair HFA which should help cough/mucus production and may help shortness of breath

## 2022-10-04 NOTE — LETTER
October 4, 2022     Trey García, [address]  [place] [address] [facility] [address] [address]    Patient: Shyla Parsons   YOB: 1945   Date of Visit: 10/4/2022       Dear Dr Bria Lockwood: Thank you for referring David Carreon to me for evaluation  Below are my notes for this consultation  If you have questions, please do not hesitate to call me  I look forward to following your patient along with you  Sincerely,        Scott Welch MD        CC: No Recipients  Scott Welch MD  10/4/2022  2:59 PM  Incomplete      Consultation - Pulmonary Medicine   Soniajhoan Yanely Pimentel 64 y o  female MRN: [id number]    Physician Requesting Consult:  Dr Bria Lockwood  Reason for Consult:  Abnormal PFT, shortness of breath on exertion    Short of breath on exertion  · Shortness of breath is exclusively on exertion and without associated chest pain or cardiac symptoms   · Mild cough but no wheezing  Did have COVID infection a few weeks ago and has some lingering phlegm production with throat clearing  · Does not wheeze  · Has gained weight and may have an element of cardiovascular deconditioning from lack of physical activity  · Will try Advair HFA which should help cough/mucus production and may help shortness of breath    Diffusion capacity of lung (dl), decreased  · Reduction in DLCO may be in part due to resection but certainly other contributing factors may be present  · Need to consider pulmonary hypertension given untreated SEEMA  · Chest last echo in 2017    Will check echocardiogram    Obstructive sleep apnea  · Has history of very remote CPAP use  · Also previously used mandibular advancement device until the dog ate it in 2016  · Has gained some weight and has recurrent symptoms of hypersomnolence, poor sleep quality, and frequent awakening  · Will check home study and consider resuming AutoSet CPAP      Follow-up in 2 months  ______________________________________________________________________    HPI:    Shyla Parsons is a 64 y o  female who presents for evaluation of shortness of breath at the request of Dr Bria Lockwood  She has seen me in 2018 for pulmonary nodule which ultimately was consistent with right upper lobe adenocarcinoma  She is status post wedge resection with no evidence of recurrent disease  She is a lifelong nonsmoker  No other risk factors  She did report to Dr Bria Lockwood that she has been having increased shortness of breath recently  This is exertional   She denies any chest pain or palpitations  No other cardiovascular complaints  She does not wheeze  She did have a recent COVID infection  Since that time has had cough with a minimal amount of phlegm production  The phlegm has been somewhat difficult to expectorate  No wheezing  She has been sleeping poorly  She awakens frequently at nighttime  She has significant daytime somnolence and does not feel refreshed after a night of sleep  She was previously treated for obstructive sleep apnea with CPAP  She has not used the CPAP device in many years  She had transitioned to a mandibular advancement device but has not used that since 2016  Review of Systems:  Aside from what is mentioned in the HPI, the review of systems otherwise negative  Current Medications:    Current Outpatient Medications:     acetaminophen (TYLENOL) 325 mg tablet, 650 mg every 4 to 6 hours as needed for pain , Disp: 30 tablet, Rfl: 0    Cholecalciferol (VITAMIN D3) 5000 UNITS CAPS, Take 1 capsule by mouth daily  , Disp: , Rfl:     cholestyramine (QUESTRAN) 4 g packet, Take 1 packet (4 g total) by mouth 3 (three) times a day with meals Take 1 a day, can increase to 3 a day   Take 2 hrs apart from other meds, Disp: 60 packet, Rfl: 11    escitalopram (LEXAPRO) 20 mg tablet, Take 10 mg by mouth daily , Disp: , Rfl:     famotidine (PEPCID) 40 MG tablet, Take 40 mg by mouth daily, Disp: , Rfl:     fluticasone-salmeterol (Advair HFA) 115-21 MCG/ACT inhaler, Inhale 2 puffs 2 (two) times a day Rinse mouth after use , Disp: 12 g, Rfl: 6    Loratadine (CLARITIN PO), Take by mouth as needed, Disp: , Rfl:     pantoprazole (PROTONIX) 40 mg tablet, Take 40 mg by mouth daily, Disp: , Rfl:     promethazine-dextromethorphan (PHENERGAN-DM) 6 25-15 mg/5 mL oral syrup, TAKE 15ML BY MOUTH EVERY 6 HOURS AS NEEDED FOR COUGH/CONGESTION, Disp: , Rfl:     propranolol (INDERAL) 60 mg tablet, Take 60 mg by mouth daily, Disp: , Rfl:     sertraline (ZOLOFT) 25 mg tablet, Take 25 mg by mouth daily at bedtime, Disp: , Rfl:     simvastatin (ZOCOR) 80 mg tablet, Take 80 mg by mouth daily, Disp: , Rfl:     bismuth subsalicylate (PEPTO BISMOL) 262 MG chewable tablet, Chew 262 mg 2 (two) times a day as needed for indigestion , Disp: , Rfl:     Historical Information   Past Medical History:   Diagnosis Date    Cataract 2015    had surgery on both eyes    Colitis     Colon polyp     COPD (chronic obstructive pulmonary disease) (HCC)     Dehydration     Depression     Familial tremor     GERD (gastroesophageal reflux disease)     Ground glass opacity present on imaging of lung     Hiatal hernia     High cholesterol     Hyperlipidemia     Hypertension     Hypoglycemia     Migraine     Nosebleed     Primary adenocarcinoma of upper lobe of right lung ([address] )     Sleep apnea     C pap    Sleep apnea, obstructive     Solitary pulmonary nodule     Syncope      Past Surgical History:   Procedure Laterality Date    ADENOIDECTOMY  Child    APPENDECTOMY      BACK SURGERY      Cervical spine    BREAST BIOPSY Bilateral BENIGN    CHOLECYSTECTOMY      COLECTOMY      complete colectomy    COLONOSCOPY      CT NEEDLE BX ASPIRATION INJECTION LOCALIZATION  11/07/2019    ESOPHAGOGASTRODUODENOSCOPY N/A 01/22/2018    Procedure: ESOPHAGOGASTRODUODENOSCOPY (EGD);   Surgeon: Lexie Jaramillo MD; [facility]; Service: Gastroenterology    EYE SURGERY      LUNG BIOPSY      LUNG SEGMENTECTOMY Right 11/07/2019    Procedure: RESECTION WEDGE LUNG;  Surgeon: Verona Wick MD;  [facility];  Service: Thoracic    NASAL/SINUS ENDOSCOPY N/A 11/07/2019    Procedure: ENDOSCOPIC BIOPSY OF NASOPHARYNX LESION;  Surgeon: Joann Alvarado MD;  [facility];  Service: ENT    NISSEN [place] N/A 11/07/2019    Procedure: Garnell Guerline;  Surgeon: Verona Wick MD;  [facility];  Service: Thoracic    AK ESOPHAGOGASTRODUODENOSCOPY TRANSORAL DIAGNOSTIC N/A 04/17/2019    Procedure: ESOPHAGOGASTRODUODENOSCOPY (EGD); Surgeon: Elroy Doll MD;  [facility]; Service: Gastroenterology    AK LAP, REPAIR PARAESOPHAGEAL HERNIA, INCL FUNDOPLASTY W/ MESH N/A 06/02/2016    Procedure: ROBOTIC LAPAROSCOPIC Daquan Fundoplication, Liver biopsy;  Surgeon: Elsa Ayers MD;  [facility];  Service: General    AK THORACOSCOPY W/THERA WEDGE RESEXN INITIAL UNILAT Right 11/07/2019    Procedure: THORACOSCOPY VIDEO ASSISTED SURGERY (VATS);   Surgeon: Verona Wick MD;  [facility];  Service: Thoracic    SPINE SURGERY  2017    cervical spine    TONSILLECTOMY  Child    UPPER GASTROINTESTINAL ENDOSCOPY       Social History   Social History     Tobacco Use   Smoking Status Never Smoker   Smokeless Tobacco Never Used       Family History:   Family History   Problem Relation Age of Onset    Leukemia Mother 58    Cancer Mother     Colon cancer Father 79    Cancer Father     Leukemia Brother 61    No Known Problems Daughter     No Known Problems Maternal Grandmother     No Known Problems Paternal Grandmother     Breast cancer Maternal Aunt     Breast cancer Maternal Aunt     No Known Problems Maternal Aunt     Cancer Brother         Cancer         PhysicalExamination:  Vitals:   /68 (BP Location: Right arm, Patient Position: Sitting, Cuff Size: Large)   Pulse 73   Temp 99 3 °F (37 4 °C) (Tympanic)   Resp 18   Ht 5' 5" (1 651 m)   Wt 77 1 kg (170 lb)   SpO2 97%   BMI 28 29 kg/m²     Gen:  Comfortable on room air  No conversational dyspnea  HEENT:  Conjugate gaze  sclerae anicteric  Oropharynx moist   Right ear with small amount of excess tissue at the auricular opening  Easy to engage the canal and eardrum appears normal  Neck: Trachea is midline  No JVD  No adenopathy  Chest:  Symmetric chest wall excursion  No rales or crackles  No wheezing  Cardiac:  Regular  no murmur  Abdomen:  benign  Extremities: No edema  Neuro:  Normal speech and mentation      Diagnostic Data:  Labs: I personally reviewed the most recent laboratory data pertinent to today's visit    Lab Results   Component Value Date    WBC 12 18 (H) 07/15/2021    HGB 13 2 07/15/2021    HCT 41 9 07/15/2021    MCV 99 (H) 07/15/2021     07/15/2021     Lab Results   Component Value Date    GLUCOSE 88 10/09/2015    CALCIUM 9 5 07/15/2021     10/09/2015    K 4 4 07/15/2021    CO2 21 07/15/2021     07/15/2021    BUN 22 07/15/2021    CREATININE 1 24 07/15/2021     No results found for: IGE  Lab Results   Component Value Date    ALT 32 07/15/2021    AST 19 07/15/2021    ALKPHOS 105 07/15/2021    BILITOT 0 32 10/09/2015       PFT results: The most recent pulmonary function tests were reviewed  Recent complete pulmonary function tests show normal spirometry, normal lung volumes, DLCO is moderately reduced at 50% predicted    Imaging:  I personally reviewed the images on the [facility] system pertinent to today's visit  Had recent CT scan in September  This showed evidence of resection  No evidence for current lung cancer  Lungs are clear otherwise    Other studies:  Echocardiogram from 2017 did show mild to moderate tricuspid regurgitation with an estimated peak pulmonary pressure of 30 mmHg    This was at the upper limit of normal     Demaris Mika

## 2022-10-04 NOTE — PROGRESS NOTES
Consultation - Pulmonary Medicine   Myles Pimentel 64 y o  female MRN: [id number]    Physician Requesting Consult:  Dr Jimmy Laird  Reason for Consult:  Abnormal PFT, shortness of breath on exertion    Short of breath on exertion  · Shortness of breath is exclusively on exertion and without associated chest pain or cardiac symptoms   · Mild cough but no wheezing  Did have COVID infection a few weeks ago and has some lingering phlegm production with throat clearing  · Does not wheeze  · Has gained weight and may have an element of cardiovascular deconditioning from lack of physical activity  · Will try Advair HFA which should help cough/mucus production and may help shortness of breath    Diffusion capacity of lung (dl), decreased  · Reduction in DLCO may be in part due to resection but certainly other contributing factors may be present  · Need to consider pulmonary hypertension given untreated SEEMA  · Chest last echo in 2017  Will check echocardiogram    Obstructive sleep apnea  · Has history of very remote CPAP use  · Also previously used mandibular advancement device until the dog ate it in 2016  · Has gained some weight and has recurrent symptoms of hypersomnolence, poor sleep quality, and frequent awakening  · Will check home study and consider resuming AutoSet CPAP      Follow-up in 2 months  ______________________________________________________________________    HPI:    Radha Thornton is a 64 y o  female who presents for evaluation of shortness of breath at the request of Dr Jimmy Laird  She has seen me in 2018 for pulmonary nodule which ultimately was consistent with right upper lobe adenocarcinoma  She is status post wedge resection with no evidence of recurrent disease  She is a lifelong nonsmoker  No other risk factors  She did report to Dr Jimmy Laird that she has been having increased shortness of breath recently  This is exertional   She denies any chest pain or palpitations    No other cardiovascular complaints  She does not wheeze  She did have a recent COVID infection  Since that time has had cough with a minimal amount of phlegm production  The phlegm has been somewhat difficult to expectorate  No wheezing  She has been sleeping poorly  She awakens frequently at nighttime  She has significant daytime somnolence and does not feel refreshed after a night of sleep  She was previously treated for obstructive sleep apnea with CPAP  She has not used the CPAP device in many years  She had transitioned to a mandibular advancement device but has not used that since 2016  Review of Systems:  Aside from what is mentioned in the HPI, the review of systems otherwise negative  Current Medications:    Current Outpatient Medications:     acetaminophen (TYLENOL) 325 mg tablet, 650 mg every 4 to 6 hours as needed for pain , Disp: 30 tablet, Rfl: 0    Cholecalciferol (VITAMIN D3) 5000 UNITS CAPS, Take 1 capsule by mouth daily  , Disp: , Rfl:     cholestyramine (QUESTRAN) 4 g packet, Take 1 packet (4 g total) by mouth 3 (three) times a day with meals Take 1 a day, can increase to 3 a day   Take 2 hrs apart from other meds, Disp: 60 packet, Rfl: 11    escitalopram (LEXAPRO) 20 mg tablet, Take 10 mg by mouth daily , Disp: , Rfl:     famotidine (PEPCID) 40 MG tablet, Take 40 mg by mouth daily, Disp: , Rfl:     fluticasone-salmeterol (Advair HFA) 115-21 MCG/ACT inhaler, Inhale 2 puffs 2 (two) times a day Rinse mouth after use , Disp: 12 g, Rfl: 6    Loratadine (CLARITIN PO), Take by mouth as needed, Disp: , Rfl:     pantoprazole (PROTONIX) 40 mg tablet, Take 40 mg by mouth daily, Disp: , Rfl:     promethazine-dextromethorphan (PHENERGAN-DM) 6 25-15 mg/5 mL oral syrup, TAKE 15ML BY MOUTH EVERY 6 HOURS AS NEEDED FOR COUGH/CONGESTION, Disp: , Rfl:     propranolol (INDERAL) 60 mg tablet, Take 60 mg by mouth daily, Disp: , Rfl:     sertraline (ZOLOFT) 25 mg tablet, Take 25 mg by mouth daily at bedtime, Disp: , Rfl:     simvastatin (ZOCOR) 80 mg tablet, Take 80 mg by mouth daily, Disp: , Rfl:     bismuth subsalicylate (PEPTO BISMOL) 262 MG chewable tablet, Chew 262 mg 2 (two) times a day as needed for indigestion , Disp: , Rfl:     Historical Information   Past Medical History:   Diagnosis Date    Cataract 2015    had surgery on both eyes    Colitis     Colon polyp     COPD (chronic obstructive pulmonary disease) (HCC)     Dehydration     Depression     Familial tremor     GERD (gastroesophageal reflux disease)     Ground glass opacity present on imaging of lung     Hiatal hernia     High cholesterol     Hyperlipidemia     Hypertension     Hypoglycemia     Migraine     Nosebleed     Primary adenocarcinoma of upper lobe of right lung (HCC)     Sleep apnea     C pap    Sleep apnea, obstructive     Solitary pulmonary nodule     Syncope      Past Surgical History:   Procedure Laterality Date    ADENOIDECTOMY  Child    APPENDECTOMY      BACK SURGERY      Cervical spine    BREAST BIOPSY Bilateral BENIGN    CHOLECYSTECTOMY      COLECTOMY      complete colectomy    COLONOSCOPY      CT NEEDLE BX ASPIRATION INJECTION LOCALIZATION  11/07/2019    ESOPHAGOGASTRODUODENOSCOPY N/A 01/22/2018    Procedure: ESOPHAGOGASTRODUODENOSCOPY (EGD); Surgeon: Lucrecia Holman MD;  [facility];   Service: Gastroenterology    EYE SURGERY      LUNG BIOPSY      LUNG SEGMENTECTOMY Right 11/07/2019    Procedure: RESECTION WEDGE LUNG;  Surgeon: Katya Bhatia MD;  [facility];  Service: Thoracic    NASAL/SINUS ENDOSCOPY N/A 11/07/2019    Procedure: ENDOSCOPIC BIOPSY OF NASOPHARYNX LESION;  Surgeon: Glenn Woo MD;  [facility];  Service: ENT    NISSEN [place] N/A 11/07/2019    Procedure: Keyon Hollingsworth;  Surgeon: Katya Bhatia MD;  [facility];  Service: Thoracic    TX ESOPHAGOGASTRODUODENOSCOPY TRANSORAL DIAGNOSTIC N/A 04/17/2019    Procedure: ESOPHAGOGASTRODUODENOSCOPY (EGD); Surgeon: Jorge Fontana MD;  [facility]; Service: Gastroenterology    OK LAP, REPAIR PARAESOPHAGEAL HERNIA, INCL FUNDOPLASTY W/ MESH N/A 06/02/2016    Procedure: ROBOTIC LAPAROSCOPIC Daquan Fundoplication, Liver biopsy;  Surgeon: Gilson Ivan MD;  [facility];  Service: General    OK THORACOSCOPY W/THERA WEDGE RESEXN INITIAL UNILAT Right 11/07/2019    Procedure: THORACOSCOPY VIDEO ASSISTED SURGERY (VATS); Surgeon: Florida Leary MD;  [facility];  Service: Thoracic    SPINE SURGERY  2017    cervical spine    TONSILLECTOMY  Child    UPPER GASTROINTESTINAL ENDOSCOPY       Social History   Social History     Tobacco Use   Smoking Status Never Smoker   Smokeless Tobacco Never Used       Family History:   Family History   Problem Relation Age of Onset    Leukemia Mother 58    Cancer Mother     Colon cancer Father 79    Cancer Father     Leukemia Brother 61    No Known Problems Daughter     No Known Problems Maternal Grandmother     No Known Problems Paternal Grandmother     Breast cancer Maternal Aunt     Breast cancer Maternal Aunt     No Known Problems Maternal Aunt     Cancer Brother         Cancer         PhysicalExamination:  Vitals:   /68 (BP Location: Right arm, Patient Position: Sitting, Cuff Size: Large)   Pulse 73   Temp 99 3 °F (37 4 °C) (Tympanic)   Resp 18   Ht 5' 5" (1 651 m)   Wt 77 1 kg (170 lb)   SpO2 97%   BMI 28 29 kg/m²     Gen:  Comfortable on room air  No conversational dyspnea  HEENT:  Conjugate gaze  sclerae anicteric  Oropharynx moist   Right ear with small amount of excess tissue at the auricular opening  Easy to engage the canal and eardrum appears normal  Neck: Trachea is midline  No JVD  No adenopathy  Chest:  Symmetric chest wall excursion  No rales or crackles  No wheezing  Cardiac:  Regular   no murmur  Abdomen:  benign  Extremities: No edema  Neuro:  Normal speech and mentation      Diagnostic Data:  Labs: I personally reviewed the most recent laboratory data pertinent to today's visit    Lab Results   Component Value Date    WBC 12 18 (H) 07/15/2021    HGB 13 2 07/15/2021    HCT 41 9 07/15/2021    MCV 99 (H) 07/15/2021     07/15/2021     Lab Results   Component Value Date    GLUCOSE 88 10/09/2015    CALCIUM 9 5 07/15/2021     10/09/2015    K 4 4 07/15/2021    CO2 21 07/15/2021     07/15/2021    BUN 22 07/15/2021    CREATININE 1 24 07/15/2021     No results found for: IGE  Lab Results   Component Value Date    ALT 32 07/15/2021    AST 19 07/15/2021    ALKPHOS 105 07/15/2021    BILITOT 0 32 10/09/2015       PFT results: The most recent pulmonary function tests were reviewed  Recent complete pulmonary function tests show normal spirometry, normal lung volumes, DLCO is moderately reduced at 50% predicted    Imaging:  I personally reviewed the images on the [facility] system pertinent to today's visit  Had recent CT scan in September  This showed evidence of resection  No evidence for current lung cancer  Lungs are clear otherwise    Other studies:  Echocardiogram from 2017 did show mild to moderate tricuspid regurgitation with an estimated peak pulmonary pressure of 30 mmHg    This was at the upper limit of normal     [place]

## 2022-10-04 NOTE — ASSESSMENT & PLAN NOTE
· Has history of very remote CPAP use  · Also previously used mandibular advancement device until the dog ate it in 2016  · Has gained some weight and has recurrent symptoms of hypersomnolence, poor sleep quality, and frequent awakening  · Will check home study and consider resuming AutoSet CPAP

## 2022-11-02 ENCOUNTER — HOSPITAL ENCOUNTER (OUTPATIENT)
Dept: SLEEP CENTER | Facility: CLINIC | Age: 77
Discharge: HOME | End: 2022-11-02

## 2022-11-02 ENCOUNTER — TELEPHONE (OUTPATIENT)
Dept: SLEEP CENTER | Facility: CLINIC | Age: 77
End: 2022-11-02

## 2022-11-02 DIAGNOSIS — G47.33 OBSTRUCTIVE SLEEP APNEA: ICD-10-CM

## 2022-11-02 NOTE — TELEPHONE ENCOUNTER
----- Message from Jennifer Saldaña MD sent at 11/2/2022  9:13 AM EDT -----  Approved  ----- Message -----  From: Dayanna Diggs  Sent: 11/1/2022   8:13 AM EDT  To: Sleep Medicine [facility] Provider    This Home sleep study needs approval      If approved please sign and return to clerical pool  If denied please include reasons why  Also provide alternative testing if warranted  Please sign and return to clerical pool

## 2022-11-03 ENCOUNTER — TELEPHONE (OUTPATIENT)
Dept: SLEEP CENTER | Facility: CLINIC | Age: 77
End: 2022-11-03

## 2022-11-04 NOTE — PROGRESS NOTES
Home Sleep Study Documentation    HOME STUDY DEVICE: Noxturnal no                                           Yahaira G3 yes      Pre-Sleep Home Study:    Set-up and instructions performed by: RENZO Vieyra    Technician performed demonstration for Patient: yes    Return demonstration performed by Patient: yes    Written instructions provided to Patient: yes    Patient signed consent form: yes        Post-Sleep Home Study:    Additional comments by Patient: None    Home Sleep Study Failed:no:    Failure reason: N/A    Reported or Detected: N/A    Scored by: ABIOLA Martin

## 2022-11-08 ENCOUNTER — HOSPITAL ENCOUNTER (OUTPATIENT)
Dept: NON INVASIVE DIAGNOSTICS | Facility: MEDICAL CENTER | Age: 77
Discharge: HOME | End: 2022-11-08

## 2022-11-08 VITALS
WEIGHT: 170 LBS | SYSTOLIC BLOOD PRESSURE: 120 MMHG | DIASTOLIC BLOOD PRESSURE: 68 MMHG | HEIGHT: 65 IN | HEART RATE: 73 BPM | BODY MASS INDEX: 28.32 KG/M2

## 2022-11-08 DIAGNOSIS — R06.02 SHORT OF BREATH ON EXERTION: ICD-10-CM

## 2022-11-08 DIAGNOSIS — R94.2 DIFFUSION CAPACITY OF LUNG (DL), DECREASED: ICD-10-CM

## 2022-11-08 LAB
AORTIC ROOT: 2.7 CM
APICAL FOUR CHAMBER EJECTION FRACTION: 67 %
ASCENDING AORTA: 2.6 CM
E WAVE DECELERATION TIME: 307 MS
FRACTIONAL SHORTENING: 28 % (ref 28–44)
INTERVENTRICULAR SEPTUM IN DIASTOLE (PARASTERNAL SHORT AXIS VIEW): 1.1 CM
INTERVENTRICULAR SEPTUM: 1.1 CM (ref 0.6–1.1)
LAAS-AP2: 14.9 CM2
LAAS-AP4: 13.1 CM2
LEFT ATRIUM SIZE: 3 CM
LEFT INTERNAL DIMENSION IN SYSTOLE: 2.9 CM (ref 2.1–4)
LEFT VENTRICULAR INTERNAL DIMENSION IN DIASTOLE: 4 CM (ref 3.5–6)
LEFT VENTRICULAR POSTERIOR WALL IN END DIASTOLE: 1 CM
LEFT VENTRICULAR STROKE VOLUME: 38 ML
LVSV (TEICH): 38 ML
MV E'TISSUE VEL-SEP: 7 CM/S
MV PEAK A VEL: 0.82 M/S
MV PEAK E VEL: 59 CM/S
MV STENOSIS PRESSURE HALF TIME: 89 MS
MV VALVE AREA P 1/2 METHOD: 2.47 CM2
RIGHT ATRIAL 2D VOLUME: 27 ML
RIGHT ATRIUM AREA SYSTOLE A4C: 11.8 CM2
RIGHT VENTRICLE ID DIMENSION: 2.6 CM
SL CV LEFT ATRIUM LENGTH A2C: 4.8 CM
SL CV LV EF: 60
SL CV PED ECHO LEFT VENTRICLE DIASTOLIC VOLUME (MOD BIPLANE) 2D: 70 ML
SL CV PED ECHO LEFT VENTRICLE SYSTOLIC VOLUME (MOD BIPLANE) 2D: 32 ML
TR MAX PG: 29 MMHG
TR PEAK VELOCITY: 2.7 M/S
TRICUSPID VALVE PEAK REGURGITATION VELOCITY: 2.68 M/S

## 2022-11-16 ENCOUNTER — TELEPHONE (OUTPATIENT)
Dept: SLEEP CENTER | Facility: CLINIC | Age: 77
End: 2022-11-16

## 2022-11-16 NOTE — TELEPHONE ENCOUNTER
Patient of Dr Fabricio Muir at [facility] [address]    Sleep study shows mild SEEMA     Left message for the patient to call back for sleep study results

## 2022-12-13 ENCOUNTER — OFFICE VISIT (OUTPATIENT)
Dept: PULMONOLOGY | Facility: CLINIC | Age: 77
End: 2022-12-13

## 2022-12-13 ENCOUNTER — DOCUMENTATION (OUTPATIENT)
Dept: PULMONOLOGY | Facility: CLINIC | Age: 77
End: 2022-12-13

## 2022-12-13 VITALS
OXYGEN SATURATION: 99 % | DIASTOLIC BLOOD PRESSURE: 76 MMHG | HEART RATE: 66 BPM | TEMPERATURE: 97.9 F | WEIGHT: 170 LBS | BODY MASS INDEX: 28.32 KG/M2 | HEIGHT: 65 IN | RESPIRATION RATE: 16 BRPM | SYSTOLIC BLOOD PRESSURE: 114 MMHG

## 2022-12-13 DIAGNOSIS — R94.2 DIFFUSION CAPACITY OF LUNG (DL), DECREASED: ICD-10-CM

## 2022-12-13 DIAGNOSIS — C34.11 PRIMARY ADENOCARCINOMA OF UPPER LOBE OF RIGHT LUNG (HCC): ICD-10-CM

## 2022-12-13 DIAGNOSIS — R06.02 SHORT OF BREATH ON EXERTION: ICD-10-CM

## 2022-12-13 DIAGNOSIS — G47.33 OBSTRUCTIVE SLEEP APNEA: Primary | ICD-10-CM

## 2022-12-13 DIAGNOSIS — Z23 NEEDS FLU SHOT: ICD-10-CM

## 2022-12-13 RX ORDER — PROPRANOLOL HCL 60 MG
CAPSULE, EXTENDED RELEASE 24HR ORAL
COMMUNITY
Start: 2022-12-09

## 2022-12-13 NOTE — ASSESSMENT & PLAN NOTE
· Has Advair but she has taken this only on an as-needed basis and has never noted particular benefit from regular use  · Further bronchodilators are not likely beneficial   If continuing [address] therapy would advise Xopenex HFA as needed given history of palpitations requiring propranolol

## 2022-12-13 NOTE — ASSESSMENT & PLAN NOTE
· Mild obstructive sleep apnea identified on sleep testing but did have exertional desaturation to 84%  · Had minimal time below 89% throughout the study duration  · Will start AutoSet CPAP  Discussed possibility of using mandibular advancement device but due to the desaturation events, suggested that we proceed with CPAP  · Had difficulty tolerating full facemask in the past   Suggested trial of nasal cradle    Sleeps with mouth open so nasal pillows likely would not be an appropriate device for her

## 2022-12-13 NOTE — ASSESSMENT & PLAN NOTE
· Likely secondary to prior lung surgery  · No evidence of pulmonary hypertension  · Never smoker and no evidence of emphysema  · Monitor degree of shortness of breath

## 2022-12-13 NOTE — PROGRESS NOTES
Progress note - Pulmonary Medicine   Janie Pimentel 76 y o  female MRN: [id number]       Impression & Plan:     Diffusion capacity of lung (dl), decreased  · Likely secondary to prior lung surgery  · No evidence of pulmonary hypertension  · Never smoker and no evidence of emphysema  · Monitor degree of shortness of breath  Short of breath on exertion  · Has Advair but she has taken this only on an as-needed basis and has never noted particular benefit from regular use  · Further bronchodilators are not likely beneficial   If continuing Hailer therapy would advise Xopenex HFA as needed given history of palpitations requiring propranolol    Obstructive sleep apnea  · Mild obstructive sleep apnea identified on sleep testing but did have exertional desaturation to 84%  · Had minimal time below 89% throughout the study duration  · Will start AutoSet CPAP  Discussed possibility of using mandibular advancement device but due to the desaturation events, suggested that we proceed with CPAP  · Had difficulty tolerating full facemask in the past   Suggested trial of nasal cradle  Sleeps with mouth open so nasal pillows likely would not be an appropriate device for her    Primary adenocarcinoma of upper lobe of right lung (HCC)  · Followed by Dr Benson Cranker and ENT  · Imaging surveillance by thoracic surgery    Follow-up in 3 months    She was due for influenza vaccination  This was given to her  She tells me she had a pneumonia vaccine 1 year ago at her primary care doctor's office but unclear whether this was Prevnar or Pneumovax  ______________________________________________________________________    HPI:    Jeannette Bateman presents today for follow-up of shortness of breath with exertion, obstructive sleep apnea, and history of upper lobe adenocarcinoma status post resection by thoracic surgery  She is doing well  Reports no new symptoms  She is not using any inhalers consistently    She has an Advair HFA inhaler which she only uses sporadically and has never noticed particular benefit from this  She did have an abnormal diffusion capacity on pulmonary function testing  This prompted further work-up  No evidence of pulmonary hypertension by echocardiography, possibly related to her prior lung resection  No emphysema or findings to suggest structural lung disease    Denies any new symptoms  No cough or phlegm  No chest pain or chest tightness  Denies any headache or neurologic symptoms  We discussed her sleep study and she is interested in treatment for obstructive sleep apnea given the desaturations noted on the study  Previously was treated with a mandibular appliance but has not used this in over 3 years since her dog ate the device  Remotely was treated with CPAP but did not tolerate full facemask and switched to dental appliance  No recent appetite or weight change  Current Medications:    Current Outpatient Medications:   •  acetaminophen (TYLENOL) 325 mg tablet, 650 mg every 4 to 6 hours as needed for pain , Disp: 30 tablet, Rfl: 0  •  bismuth subsalicylate (PEPTO BISMOL) 262 MG chewable tablet, Chew 262 mg 2 (two) times a day as needed for indigestion , Disp: , Rfl:   •  Cholecalciferol (VITAMIN D3) 5000 UNITS CAPS, Take 1 capsule by mouth daily  , Disp: , Rfl:   •  escitalopram (LEXAPRO) 20 mg tablet, Take 10 mg by mouth daily , Disp: , Rfl:   •  famotidine (PEPCID) 40 MG tablet, Take 40 mg by mouth daily, Disp: , Rfl:   •  fluticasone-salmeterol (Advair HFA) 115-21 MCG/ACT inhaler, Inhale 2 puffs 2 (two) times a day Rinse mouth after use , Disp: 12 g, Rfl: 6  •  Loratadine (CLARITIN PO), Take by mouth as needed, Disp: , Rfl:   •  pantoprazole (PROTONIX) 40 mg tablet, Take 40 mg by mouth daily, Disp: , Rfl:   •  promethazine-dextromethorphan (PHENERGAN-DM) 6 25-15 mg/5 mL oral syrup, TAKE 15ML BY MOUTH EVERY 6 HOURS AS NEEDED FOR COUGH/CONGESTION, Disp: , Rfl:   •  propranolol (INDERAL LA) 60 mg 24 hr capsule, , Disp: , Rfl:   •  sertraline (ZOLOFT) 25 mg tablet, Take 25 mg by mouth daily at bedtime, Disp: , Rfl:   •  simvastatin (ZOCOR) 80 mg tablet, Take 80 mg by mouth daily, Disp: , Rfl:     Review of Systems:  Answers for HPI/ROS submitted by the patient on 12/6/2022  Have you had a change in appetite?: No  Do you have chest pain?: No  Do you have shortness of breath that occurs with effort or exertion?: Yes  Do you have ear congestion?: No  Do you have ear pain?: No  Do you have a fever?: No  Do you have headaches?: Yes  Do you have heartburn?: Yes  Do you have fatigue?: Yes  Do you have muscle pain?: No  Do you have nasal congestion?: No  Do you have shortness of breath when lying flat?: No  Do you have shortness of breath when you wake up?: No  Do you have post-nasal drip?: Yes  Do you have a runny nose?: Yes  Do you have sneezing?: No  Do you have a sore throat?: No  Do you have sweats?: No  Do you have trouble swallowing?: No  Have you experienced weight loss?: No  Which of the following makes your symptoms worse?: climbing stairs, minimal activity, strenuous activity    Aside from what is mentioned in the HPI, the review of systems is otherwise negative    Past medical history, surgical history, and family history were reviewed and updated as appropriate    Social history updates:  Social History     Tobacco Use   Smoking Status Never   Smokeless Tobacco Never       PhysicalExamination:  Vitals:   /76 (BP Location: Right arm, Patient Position: Sitting, Cuff Size: Adult)   Pulse 66   Temp 97 9 °F (36 6 °C) (Tympanic)   Resp 16   Ht 5' 5" (1 651 m)   Wt 77 1 kg (170 lb)   SpO2 99%   BMI 28 29 kg/m²   Gen:  Comfortable on room air  No conversational dyspnea  HEENT:  Conjugate gaze  sclerae anicteric  Oropharynx moist  Neck: Trachea is midline  No JVD  No adenopathy  Chest: Symmetric chest wall excursion with clear breath sounds  Cardiac: Regular   no murmur  Abdomen: benign  Extremities: No edema  Neuro:  Normal speech and mentation    Diagnostic Data:  Labs: I personally reviewed the most recent laboratory data pertinent to today's visit    Lab Results   Component Value Date    WBC 12 18 (H) 07/15/2021    HGB 13 2 07/15/2021    HCT 41 9 07/15/2021    MCV 99 (H) 07/15/2021     07/15/2021     Lab Results   Component Value Date    SODIUM 136 07/15/2021    K 4 4 07/15/2021    CO2 21 07/15/2021     07/15/2021    BUN 22 07/15/2021    CREATININE 1 24 07/15/2021    CALCIUM 9 5 07/15/2021       PFT results: The most recent pulmonary function tests were reviewed  Updated pulmonary function tests in August did show mild reduction in diffusion capacity      Imaging:  No new interim imaging, followed by thoracic surgery    Other studies:  Home sleep study showed mild obstructive sleep apnea    Echocardiogram was normal with a normal ejection fraction and no evidence of pulmonary hypertension    Dionna Mederos MD

## 2022-12-16 LAB

## 2023-01-10 LAB

## 2023-01-26 LAB

## 2023-03-23 ENCOUNTER — OFFICE VISIT (OUTPATIENT)
Dept: PULMONOLOGY | Facility: CLINIC | Age: 78
End: 2023-03-23

## 2023-03-23 VITALS
SYSTOLIC BLOOD PRESSURE: 114 MMHG | RESPIRATION RATE: 16 BRPM | OXYGEN SATURATION: 97 % | BODY MASS INDEX: 28.81 KG/M2 | HEART RATE: 66 BPM | WEIGHT: 173.1 LBS | TEMPERATURE: 97.8 F | DIASTOLIC BLOOD PRESSURE: 66 MMHG

## 2023-03-23 DIAGNOSIS — R94.2 DIFFUSION CAPACITY OF LUNG (DL), DECREASED: Primary | ICD-10-CM

## 2023-03-23 DIAGNOSIS — G47.33 OBSTRUCTIVE SLEEP APNEA: ICD-10-CM

## 2023-03-23 DIAGNOSIS — R06.02 SHORT OF BREATH ON EXERTION: ICD-10-CM

## 2023-03-23 NOTE — ASSESSMENT & PLAN NOTE
· Likely related to prior lung resection  · No evidence of pulmonary hypertension by testing  · If symptoms worsen, would consider repeat pulmonary function test with diffusion assessment

## 2023-03-23 NOTE — ASSESSMENT & PLAN NOTE
· Using AutoSet device nightly, averaging up to 9 hours nightly  · Has a nasal cradle  · Feels that the CPAP has been beneficial in improving morning alertness and feels more refreshed after sleep

## 2023-03-23 NOTE — PROGRESS NOTES
Progress note - Pulmonary Medicine   Rupalimisa Pimentel 76 y o  female MRN: [id number]       Impression & Plan:     Obstructive sleep apnea  · Using AutoSet device nightly, averaging up to 9 hours nightly  · Has a nasal cradle  · Feels that the CPAP has been beneficial in improving morning alertness and feels more refreshed after sleep    Short of breath on exertion  · Continues to have exertional shortness of breath which is likely multifactorial  · Has not noticed benefit from Advair or other inhalers and remains off inhaler therapy at this time  · Has become more sedentary but I did encourage regular cardiovascular activity to maintain cardiovascular conditioning as this could worsen her shortness of breath if she becomes more deconditioned    Diffusion capacity of lung (dl), decreased  · Likely related to prior lung resection  · No evidence of pulmonary hypertension by testing  · If symptoms worsen, would consider repeat pulmonary function test with diffusion assessment      She will follow-up with me in 6 months  ______________________________________________________________________    HPI:    Matt Barker presents today for follow-up of obstructive sleep apnea  She is here for compliance check after initiation of CPAP  She is on an AutoSet device with a nasal cradle mask  She is using heated humidification  She notices that she is tolerating the CPAP very well  She uses it nightly and is averaging close to 9 hours with no significant leak or problems  The only thing she has noticed is the development of a slight soreness to the inferior aspect of her nose  Her breathing has been unchanged  She does note exertional shortness of breath  She has been a little bit more sedentary as a result of this  She does not get regular exercise  No cough  No wheezing  Prior evaluation did not indicate any identified cause    Use of inhalers has not been beneficial   She does have a reduced diffusion capacity but this is likely on the basis of lung resection for her history of adenocarcinoma    Current Medications:    Current Outpatient Medications:   •  acetaminophen (TYLENOL) 325 mg tablet, 650 mg every 4 to 6 hours as needed for pain , Disp: 30 tablet, Rfl: 0  •  bismuth subsalicylate (PEPTO BISMOL) 262 MG chewable tablet, Chew 262 mg 2 (two) times a day as needed for indigestion , Disp: , Rfl:   •  Cholecalciferol (VITAMIN D3) 5000 UNITS CAPS, Take 1 capsule by mouth daily  , Disp: , Rfl:   •  escitalopram (LEXAPRO) 20 mg tablet, Take 10 mg by mouth daily , Disp: , Rfl:   •  famotidine (PEPCID) 40 MG tablet, Take 40 mg by mouth daily, Disp: , Rfl:   •  Loratadine (CLARITIN PO), Take by mouth as needed, Disp: , Rfl:   •  pantoprazole (PROTONIX) 40 mg tablet, Take 40 mg by mouth daily, Disp: , Rfl:   •  promethazine-dextromethorphan (PHENERGAN-DM) 6 25-15 mg/5 mL oral syrup, TAKE 15ML BY MOUTH EVERY 6 HOURS AS NEEDED FOR COUGH/CONGESTION, Disp: , Rfl:   •  propranolol (INDERAL LA) 60 mg 24 hr capsule, , Disp: , Rfl:   •  sertraline (ZOLOFT) 25 mg tablet, Take 25 mg by mouth daily at bedtime, Disp: , Rfl:   •  simvastatin (ZOCOR) 80 mg tablet, Take 80 mg by mouth daily, Disp: , Rfl:     Review of Systems:    Aside from what is mentioned in the HPI, the review of systems is otherwise negative    Past medical history, surgical history, and family history were reviewed and updated as appropriate    Social history updates:  Social History     Tobacco Use   Smoking Status Never   Smokeless Tobacco Never       PhysicalExamination:  Vitals:   /66 (BP Location: Left arm, Patient Position: Sitting, Cuff Size: Large)   Pulse 66   Temp 97 8 °F (36 6 °C) (Tympanic)   Resp 16   Wt 78 5 kg (173 lb 1 6 oz)   SpO2 97%   BMI 28 81 kg/m²   Gen:  Comfortable on room air  No conversational dyspnea  HEENT:  Conjugate gaze  sclerae anicteric    Oropharynx moist   Mild redness of the inferior nasal septum  Neck: Trachea is midline  No JVD  No adenopathy  Chest: Symmetric  No wheeze or crackles  Cardiac: Regular  no murmur  Abdomen:  benign  Extremities: No edema  Neuro:  Normal speech and mentation    Diagnostic Data:  Labs:   I personally reviewed the most recent laboratory data pertinent to today's visit    Lab Results   Component Value Date    WBC 12 18 (H) 07/15/2021    HGB 13 2 07/15/2021    HCT 41 9 07/15/2021    MCV 99 (H) 07/15/2021     07/15/2021     Lab Results   Component Value Date    SODIUM 136 07/15/2021    K 4 4 07/15/2021    CO2 21 07/15/2021     07/15/2021    BUN 22 07/15/2021    CREATININE 1 24 07/15/2021    CALCIUM 9 5 07/15/2021       No new imaging or diagnostic testing    Tootie Ortiz MD

## 2023-03-23 NOTE — ASSESSMENT & PLAN NOTE
· Continues to have exertional shortness of breath which is likely multifactorial  · Has not noticed benefit from Advair or other inhalers and remains off inhaler therapy at this time  · Has become more sedentary but I did encourage regular cardiovascular activity to maintain cardiovascular conditioning as this could worsen her shortness of breath if she becomes more deconditioned

## 2023-08-22 ENCOUNTER — TELEPHONE (OUTPATIENT)
Dept: CARDIAC SURGERY | Facility: CLINIC | Age: 78
End: 2023-08-22

## 2023-08-28 ENCOUNTER — TELEPHONE (OUTPATIENT)
Dept: CARDIAC SURGERY | Facility: CLINIC | Age: 78
End: 2023-08-28

## 2023-08-29 ENCOUNTER — TELEPHONE (OUTPATIENT)
Dept: CARDIAC SURGERY | Facility: CLINIC | Age: 78
End: 2023-08-29

## 2023-08-29 NOTE — TELEPHONE ENCOUNTER
I have been unable to reach pt by phone.  Letter mailed to her home address asking her to please contact our office to r/s her appt with Dr. Fareed Eddy

## 2023-09-26 ENCOUNTER — APPOINTMENT (OUTPATIENT)
Dept: LAB | Facility: MEDICAL CENTER | Age: 78
End: 2023-09-26
Payer: COMMERCIAL

## 2023-09-26 DIAGNOSIS — E78.5 HYPERLIPIDEMIA, UNSPECIFIED HYPERLIPIDEMIA TYPE: ICD-10-CM

## 2023-09-26 DIAGNOSIS — E13.9 DIABETES MELLITUS OF OTHER TYPE WITHOUT COMPLICATION, UNSPECIFIED WHETHER LONG TERM INSULIN USE (HCC): ICD-10-CM

## 2023-09-26 DIAGNOSIS — Z00.00 ROUTINE GENERAL MEDICAL EXAMINATION AT A HEALTH CARE FACILITY: ICD-10-CM

## 2023-09-26 DIAGNOSIS — Z79.899 ENCOUNTER FOR LONG-TERM (CURRENT) USE OF OTHER MEDICATIONS: ICD-10-CM

## 2023-09-26 DIAGNOSIS — I10 ESSENTIAL HYPERTENSION, BENIGN: ICD-10-CM

## 2023-09-26 LAB
ALBUMIN SERPL BCP-MCNC: 4.1 G/DL (ref 3.5–5)
ALP SERPL-CCNC: 88 U/L (ref 34–104)
ALT SERPL W P-5'-P-CCNC: 30 U/L (ref 7–52)
ANION GAP SERPL CALCULATED.3IONS-SCNC: 7 MMOL/L
AST SERPL W P-5'-P-CCNC: 20 U/L (ref 13–39)
BASOPHILS # BLD AUTO: 0.12 THOUSANDS/ÂΜL (ref 0–0.1)
BASOPHILS NFR BLD AUTO: 1 % (ref 0–1)
BILIRUB SERPL-MCNC: 0.49 MG/DL (ref 0.2–1)
BUN SERPL-MCNC: 27 MG/DL (ref 5–25)
CALCIUM SERPL-MCNC: 9.5 MG/DL (ref 8.4–10.2)
CHLORIDE SERPL-SCNC: 104 MMOL/L (ref 96–108)
CHOLEST SERPL-MCNC: 171 MG/DL
CO2 SERPL-SCNC: 25 MMOL/L (ref 21–32)
CREAT SERPL-MCNC: 1.07 MG/DL (ref 0.6–1.3)
EOSINOPHIL # BLD AUTO: 0.26 THOUSAND/ÂΜL (ref 0–0.61)
EOSINOPHIL NFR BLD AUTO: 3 % (ref 0–6)
ERYTHROCYTE [DISTWIDTH] IN BLOOD BY AUTOMATED COUNT: 13.5 % (ref 11.6–15.1)
EST. AVERAGE GLUCOSE BLD GHB EST-MCNC: 143 MG/DL
GFR SERPL CREATININE-BSD FRML MDRD: 49 ML/MIN/1.73SQ M
GLUCOSE P FAST SERPL-MCNC: 130 MG/DL (ref 65–99)
HBA1C MFR BLD: 6.6 %
HCT VFR BLD AUTO: 41.4 % (ref 34.8–46.1)
HDLC SERPL-MCNC: 55 MG/DL
HGB BLD-MCNC: 13.3 G/DL (ref 11.5–15.4)
IMM GRANULOCYTES # BLD AUTO: 0.05 THOUSAND/UL (ref 0–0.2)
IMM GRANULOCYTES NFR BLD AUTO: 1 % (ref 0–2)
LDLC SERPL CALC-MCNC: 75 MG/DL (ref 0–100)
LYMPHOCYTES # BLD AUTO: 3.49 THOUSANDS/ÂΜL (ref 0.6–4.47)
LYMPHOCYTES NFR BLD AUTO: 34 % (ref 14–44)
MCH RBC QN AUTO: 31.6 PG (ref 26.8–34.3)
MCHC RBC AUTO-ENTMCNC: 32.1 G/DL (ref 31.4–37.4)
MCV RBC AUTO: 98 FL (ref 82–98)
MONOCYTES # BLD AUTO: 0.79 THOUSAND/ÂΜL (ref 0.17–1.22)
MONOCYTES NFR BLD AUTO: 8 % (ref 4–12)
NEUTROPHILS # BLD AUTO: 5.51 THOUSANDS/ÂΜL (ref 1.85–7.62)
NEUTS SEG NFR BLD AUTO: 53 % (ref 43–75)
NONHDLC SERPL-MCNC: 116 MG/DL
NRBC BLD AUTO-RTO: 0 /100 WBCS
PLATELET # BLD AUTO: 300 THOUSANDS/UL (ref 149–390)
PMV BLD AUTO: 10.8 FL (ref 8.9–12.7)
POTASSIUM SERPL-SCNC: 4.6 MMOL/L (ref 3.5–5.3)
PROT SERPL-MCNC: 8 G/DL (ref 6.4–8.4)
RBC # BLD AUTO: 4.21 MILLION/UL (ref 3.81–5.12)
SODIUM SERPL-SCNC: 136 MMOL/L (ref 135–147)
TRIGL SERPL-MCNC: 206 MG/DL
WBC # BLD AUTO: 10.22 THOUSAND/UL (ref 4.31–10.16)

## 2023-09-26 PROCEDURE — 83036 HEMOGLOBIN GLYCOSYLATED A1C: CPT

## 2023-09-26 PROCEDURE — 80061 LIPID PANEL: CPT

## 2023-09-26 PROCEDURE — 85025 COMPLETE CBC W/AUTO DIFF WBC: CPT

## 2023-09-26 PROCEDURE — 36415 COLL VENOUS BLD VENIPUNCTURE: CPT

## 2023-09-26 PROCEDURE — 80053 COMPREHEN METABOLIC PANEL: CPT

## 2024-01-30 ENCOUNTER — HOSPITAL ENCOUNTER (EMERGENCY)
Facility: HOSPITAL | Age: 79
Discharge: HOME | End: 2024-01-31
Attending: EMERGENCY MEDICINE | Admitting: EMERGENCY MEDICINE
Payer: COMMERCIAL

## 2024-01-30 DIAGNOSIS — J20.5 RSV BRONCHITIS: Primary | ICD-10-CM

## 2024-01-30 LAB
FLUAV RNA RESP QL NAA+PROBE: NEGATIVE
FLUBV RNA RESP QL NAA+PROBE: NEGATIVE
RSV RNA RESP QL NAA+PROBE: POSITIVE
SARS-COV-2 RNA RESP QL NAA+PROBE: NEGATIVE

## 2024-01-30 PROCEDURE — 99284 EMERGENCY DEPT VISIT MOD MDM: CPT | Performed by: EMERGENCY MEDICINE

## 2024-01-30 PROCEDURE — 0241U HB NFCT DS VIR RESP RNA 4 TRGT: CPT | Performed by: EMERGENCY MEDICINE

## 2024-01-30 PROCEDURE — 94640 AIRWAY INHALATION TREATMENT: CPT

## 2024-01-30 PROCEDURE — 99284 EMERGENCY DEPT VISIT MOD MDM: CPT

## 2024-01-30 RX ORDER — IPRATROPIUM BROMIDE AND ALBUTEROL SULFATE 2.5; .5 MG/3ML; MG/3ML
3 SOLUTION RESPIRATORY (INHALATION)
Status: DISCONTINUED | OUTPATIENT
Start: 2024-01-30 | End: 2024-01-31 | Stop reason: HOSPADM

## 2024-01-30 RX ORDER — PREDNISONE 20 MG/1
40 TABLET ORAL ONCE
Status: COMPLETED | OUTPATIENT
Start: 2024-01-30 | End: 2024-01-30

## 2024-01-30 RX ORDER — GUAIFENESIN/DEXTROMETHORPHAN 100-10MG/5
10 SYRUP ORAL ONCE
Status: COMPLETED | OUTPATIENT
Start: 2024-01-30 | End: 2024-01-30

## 2024-01-30 RX ORDER — IPRATROPIUM BROMIDE AND ALBUTEROL SULFATE 2.5; .5 MG/3ML; MG/3ML
3 SOLUTION RESPIRATORY (INHALATION)
Status: DISCONTINUED | OUTPATIENT
Start: 2024-01-31 | End: 2024-01-30

## 2024-01-30 RX ADMIN — IPRATROPIUM BROMIDE AND ALBUTEROL SULFATE 3 ML: .5; 3 SOLUTION RESPIRATORY (INHALATION) at 23:29

## 2024-01-30 RX ADMIN — PREDNISONE 40 MG: 20 TABLET ORAL at 23:21

## 2024-01-30 RX ADMIN — GUAIFENESIN AND DEXTROMETHORPHAN 10 ML: 100; 10 SYRUP ORAL at 23:21

## 2024-01-31 ENCOUNTER — APPOINTMENT (EMERGENCY)
Dept: RADIOLOGY | Facility: HOSPITAL | Age: 79
End: 2024-01-31
Payer: COMMERCIAL

## 2024-01-31 VITALS
SYSTOLIC BLOOD PRESSURE: 151 MMHG | OXYGEN SATURATION: 95 % | HEART RATE: 71 BPM | DIASTOLIC BLOOD PRESSURE: 80 MMHG | RESPIRATION RATE: 19 BRPM | TEMPERATURE: 98.2 F

## 2024-01-31 PROCEDURE — 94640 AIRWAY INHALATION TREATMENT: CPT

## 2024-01-31 PROCEDURE — 71045 X-RAY EXAM CHEST 1 VIEW: CPT

## 2024-01-31 RX ORDER — ALBUTEROL SULFATE 2.5 MG/3ML
5 SOLUTION RESPIRATORY (INHALATION) ONCE
Status: COMPLETED | OUTPATIENT
Start: 2024-01-31 | End: 2024-01-31

## 2024-01-31 RX ORDER — ALBUTEROL SULFATE 90 UG/1
2 AEROSOL, METERED RESPIRATORY (INHALATION) ONCE
Status: COMPLETED | OUTPATIENT
Start: 2024-01-31 | End: 2024-01-31

## 2024-01-31 RX ORDER — PREDNISONE 20 MG/1
40 TABLET ORAL DAILY
Qty: 8 TABLET | Refills: 0 | Status: SHIPPED | OUTPATIENT
Start: 2024-01-31 | End: 2024-02-04

## 2024-01-31 RX ADMIN — ALBUTEROL SULFATE 5 MG: 2.5 SOLUTION RESPIRATORY (INHALATION) at 00:42

## 2024-01-31 RX ADMIN — ALBUTEROL SULFATE 2 PUFF: 90 AEROSOL, METERED RESPIRATORY (INHALATION) at 01:12

## 2024-01-31 NOTE — ED ATTENDING ATTESTATION
1/30/2024  ICiaran DO, saw and evaluated the patient. I have discussed the patient with the resident/non-physician practitioner and agree with the resident's/non-physician practitioner's findings, Plan of Care, and MDM as documented in the resident's/non-physician practitioner's note, except where noted. All available labs and Radiology studies were reviewed.  I was present for key portions of any procedure(s) performed by the resident/non-physician practitioner and I was immediately available to provide assistance.       At this point I agree with the current assessment done in the Emergency Department.  I have conducted an independent evaluation of this patient a history and physical is as follows:    79 yo F coming into the ED for a 5 day history of cough, fevers, chills, myalgias, weakness. Started on zithromax today by PCP. Sick contacts at home (lives with , grandchildren).      PE:  The patient is well appearing, non-toxic, in NAD. Head: normocephalic, atraumatic. HEENT: mucous membranes moist.  Lungs: bilateral wheezing on expiration, most prominent posteriorly, equal BS b/l with good air movement, no accessory muscle use or tachypnea. Heart: RRR. No M/R/G. Abdomen: NT, ND, no R/R/G. Neuro: CN2-12 intact, GCS 15. Normal strength and sensation, normal speech. Cap refill < 2 sec, skin warm and dry. No rashes or lesions.    Pt given prednisone and bronchodilators via nebulizer with improvement in wheezing.  CXR negative for pneumonia, + RSV.  Dx RSV bronchitis. Stable for d/c home, RTER precautions.      ED Course         Critical Care Time  Procedures

## 2024-01-31 NOTE — ED PROVIDER NOTES
"History  Chief Complaint   Patient presents with    Cold Like Symptoms     Reports \"I have a cold and its in my ears, I can't hear a damn thing.\" Reports cough, congestion, mucous, low grade fevers.  Started on a Zpac today by provider for symptoms started Thursday last week.      HPI    77 yo female with hx of COPD, hiatal hernia, HTN, lung CA s/p lung segmentectomy presents for evaluation of URI symptoms.  She reports cough, congestion, low-grade fever, myalgias since Thursday.  Symptoms have been consistent since onset, with major concern for significant mucus drainage.  Cough is productive for yellow sputum.  She was started on azithromycin today by her PCP to cover for possible superimposed bacterial infection.    Prior to Admission Medications   Prescriptions Last Dose Informant Patient Reported? Taking?   Cholecalciferol (VITAMIN D3) 5000 UNITS CAPS  Self Yes No   Sig: Take 1 capsule by mouth daily.   Loratadine (CLARITIN PO)  Self Yes No   Sig: Take by mouth as needed   acetaminophen (TYLENOL) 325 mg tablet  Self No No   Si mg every 4 to 6 hours as needed for pain.   bismuth subsalicylate (PEPTO BISMOL) 262 MG chewable tablet  Self Yes No   Sig: Chew 262 mg 2 (two) times a day as needed for indigestion    escitalopram (LEXAPRO) 20 mg tablet  Self Yes No   Sig: Take 10 mg by mouth daily    famotidine (PEPCID) 40 MG tablet  Self Yes No   Sig: Take 40 mg by mouth daily   pantoprazole (PROTONIX) 40 mg tablet  Self Yes No   Sig: Take 40 mg by mouth daily   promethazine-dextromethorphan (PHENERGAN-DM) 6.25-15 mg/5 mL oral syrup  Self Yes No   Sig: TAKE 15ML BY MOUTH EVERY 6 HOURS AS NEEDED FOR COUGH/CONGESTION   propranolol (INDERAL LA) 60 mg 24 hr capsule  Self Yes No   sertraline (ZOLOFT) 25 mg tablet  Self Yes No   Sig: Take 25 mg by mouth daily at bedtime   simvastatin (ZOCOR) 80 mg tablet  Self Yes No   Sig: Take 80 mg by mouth daily      Facility-Administered Medications: None       Past Medical " History:   Diagnosis Date    Cataract 2015    had surgery on both eyes    Colitis     Colon polyp     COPD (chronic obstructive pulmonary disease) (HCC)     Dehydration     Depression     Familial tremor     GERD (gastroesophageal reflux disease)     Ground glass opacity present on imaging of lung     Hiatal hernia     High cholesterol     Hyperlipidemia     Hypertension     Hypoglycemia     Migraine     Nosebleed     Primary adenocarcinoma of upper lobe of right lung (HCC)     Sleep apnea     C pap    Sleep apnea, obstructive     Solitary pulmonary nodule     Syncope        Past Surgical History:   Procedure Laterality Date    ADENOIDECTOMY  Child    APPENDECTOMY      BACK SURGERY      Cervical spine    BREAST BIOPSY Bilateral BENIGN    CHOLECYSTECTOMY      COLECTOMY      complete colectomy    COLONOSCOPY      CT NEEDLE BX ASPIRATION INJECTION LOCALIZATION  11/07/2019    ESOPHAGOGASTRODUODENOSCOPY N/A 01/22/2018    Procedure: ESOPHAGOGASTRODUODENOSCOPY (EGD);  Surgeon: Lalito Espino MD;  [facility];  Service: Gastroenterology    EYE SURGERY      LUNG BIOPSY      LUNG SEGMENTECTOMY Right 11/07/2019    Procedure: RESECTION WEDGE LUNG;  Surgeon: Livan Lema MD;  [facility];  Service: Thoracic    NASAL/SINUS ENDOSCOPY N/A 11/07/2019    Procedure: ENDOSCOPIC BIOPSY OF NASOPHARYNX LESION;  Surgeon: Perez Horner MD;  [facility];  Service: ENT    NISSEN FUNDOPLICATION      IN BRNCHSC INCL FLUOR GDNCE DX W/CELL WASHG SPX N/A 11/07/2019    Procedure: BRONCHOSCOPY FLEXIBLE;  Surgeon: Livan Lema MD;  [facility];  Service: Thoracic    IN ESOPHAGOGASTRODUODENOSCOPY TRANSORAL DIAGNOSTIC N/A 04/17/2019    Procedure: ESOPHAGOGASTRODUODENOSCOPY (EGD);  Surgeon: Lalito Espino MD;  [facility];  Service: Gastroenterology    IN LAPS RPR PARAESPHGL HRNA INCL FUNDPLSTY W/MESH N/A 06/02/2016    Procedure: ROBOTIC LAPAROSCOPIC Daquan Fundoplication, Liver biopsy;   Surgeon: Levi Momin MD;  [facility];  Service: General    IA THORACOSCOPY W/THERA WEDGE RESEXN INITIAL UNILAT Right 11/07/2019    Procedure: THORACOSCOPY VIDEO ASSISTED SURGERY (VATS);  Surgeon: Livan Lema MD;  [facility];  Service: Thoracic    SPINE SURGERY  2017    cervical spine    TONSILLECTOMY  Child    UPPER GASTROINTESTINAL ENDOSCOPY         Family History   Problem Relation Age of Onset    Leukemia Mother 62    Cancer Mother     Colon cancer Father 70    Cancer Father     Leukemia Brother 60    No Known Problems Daughter     No Known Problems Maternal Grandmother     No Known Problems Paternal Grandmother     Breast cancer Maternal Aunt     Breast cancer Maternal Aunt     No Known Problems Maternal Aunt     Cancer Brother         Cancer     I have reviewed and agree with the history as documented.    E-Cigarette/Vaping    E-Cigarette Use Never User      E-Cigarette/Vaping Substances    Nicotine No     THC No     CBD No     Flavoring No     Other No      Social History     Tobacco Use    Smoking status: Never    Smokeless tobacco: Never   Vaping Use    Vaping status: Never Used   Substance Use Topics    Alcohol use: Yes     Comment: Social drinker    Drug use: No        Review of Systems   Constitutional:  Positive for fatigue. Negative for chills and fever.   HENT:  Positive for congestion and rhinorrhea. Negative for sore throat.    Eyes:  Negative for photophobia and visual disturbance.   Respiratory:  Positive for cough. Negative for shortness of breath.    Cardiovascular:  Negative for palpitations.   Gastrointestinal:  Positive for nausea. Negative for abdominal pain and vomiting.   Genitourinary:  Negative for dysuria and hematuria.   Musculoskeletal:  Positive for myalgias.   Skin:  Negative for color change.   Neurological:  Negative for dizziness, syncope and headaches.   All other systems reviewed and are negative.      Physical Exam  ED Triage Vitals [01/30/24  2108]   Temperature Pulse Respirations Blood Pressure SpO2   98.2 °F (36.8 °C) 86 20 117/67 95 %      Temp Source Heart Rate Source Patient Position - Orthostatic VS BP Location FiO2 (%)   Oral Monitor Sitting Right arm --      Pain Score       --             Orthostatic Vital Signs  Vitals:    01/30/24 2108 01/31/24 0100   BP: 117/67 151/80   Pulse: 86 71   Patient Position - Orthostatic VS: Sitting Lying       Physical Exam  Vitals and nursing note reviewed.   Constitutional:       General: She is not in acute distress.     Appearance: She is well-developed.   HENT:      Head: Normocephalic and atraumatic.      Nose: Congestion present.      Mouth/Throat:      Mouth: Mucous membranes are moist.      Pharynx: Oropharynx is clear.   Eyes:      Conjunctiva/sclera: Conjunctivae normal.      Pupils: Pupils are equal, round, and reactive to light.   Cardiovascular:      Rate and Rhythm: Normal rate and regular rhythm.      Heart sounds: No murmur heard.  Pulmonary:      Effort: Pulmonary effort is normal. No respiratory distress.      Breath sounds: Wheezing and rhonchi present.   Abdominal:      Palpations: Abdomen is soft.      Tenderness: There is no abdominal tenderness.   Musculoskeletal:         General: No swelling.      Cervical back: Neck supple.   Skin:     General: Skin is warm and dry.      Capillary Refill: Capillary refill takes less than 2 seconds.   Neurological:      General: No focal deficit present.      Mental Status: She is alert.   Psychiatric:         Mood and Affect: Mood normal.         ED Medications  Medications   dextromethorphan-guaiFENesin (ROBITUSSIN DM) oral syrup 10 mL (10 mL Oral Given 1/30/24 2321)   predniSONE tablet 40 mg (40 mg Oral Given 1/30/24 2321)   albuterol inhalation solution 5 mg (5 mg Nebulization Given 1/31/24 0042)   albuterol (PROVENTIL HFA,VENTOLIN HFA) inhaler 2 puff (2 puffs Inhalation Given 1/31/24 0112)       Diagnostic Studies  Results Reviewed       Procedure  Component Value Units Date/Time    FLU/RSV/COVID - if FLU/RSV clinically relevant [[id number]]  (Abnormal) Collected: 01/30/24 2112    Lab Status: Final result Specimen: Nares from Nose Updated: 01/30/24 2212     SARS-CoV-2 Negative     INFLUENZA A PCR Negative     INFLUENZA B PCR Negative     RSV PCR Positive    Narrative:      FOR PEDIATRIC PATIENTS - copy/paste COVID Guidelines URL to browser: [email or web address]    SARS-CoV-2 assay is a Nucleic Acid Amplification assay intended for the  qualitative detection of nucleic acid from SARS-CoV-2 in nasopharyngeal  swabs. Results are for the presumptive identification of SARS-CoV-2 RNA.    Positive results are indicative of infection with SARS-CoV-2, the virus  causing COVID-19, but do not rule out bacterial infection or co-infection  with other viruses. Laboratories within the [place] and its  territories are required to report all positive results to the appropriate  public health authorities. Negative results do not preclude SARS-CoV-2  infection and should not be used as the sole basis for treatment or other  patient management decisions. Negative results must be combined with  clinical observations, patient history, and epidemiological information.  This test has not been FDA cleared or approved.    This test has been authorized by FDA under an Emergency Use Authorization  (EUA). This test is only authorized for the duration of time the  declaration that circumstances exist justifying the authorization of the  emergency use of an in vitro diagnostic tests for detection of SARS-CoV-2  virus and/or diagnosis of COVID-19 infection under section 564(b)(1) of  the Act, 21 U.S.C. 360bbb-3(b)(1), unless the authorization is terminated  or revoked sooner. The test has been validated but independent review by FDA  and CLIA is pending.    Test performed using [employer]: This RT-PCR assay targets N2,  a  region unique to SARS-CoV-2. A conserved region in the E-gene was chosen  for pan-Sarbecovirus detection which includes SARS-CoV-2.    According to CMS-2020-01-R, this platform meets the definition of high-throughput technology.                   XR chest 1 view portable    (Results Pending)         Procedures  Procedures      ED Course  ED Course as of 01/31/24 0558   Tue Jan 30, 2024   2224 RSV PCR(!): Positive                                       Medical Decision Making  78-year-old female presents for evaluation of URI symptoms.    Differentials include but not limited to viral bronchitis, COPD exacerbation, pneumonia, COVID-19, influenza, RSV.  COVID/flu/RSV swab ordered, and came back positive for RSV.  Physical exam remarkable for coarse breath sounds and expiratory wheezes.  She was given albuterol and DuoNeb nebulizer treatments, prednisone 40mg, Robitussin DM, and albuterol inhaler for symptomatic management. On reevaluation, cough had improved mildly, and wheezing had resolved. Pt was discharged with prescription for 5 day course of prednisone, and instructions to continue supportive care.     Amount and/or Complexity of Data Reviewed  Labs:  Decision-making details documented in ED Course.  Radiology: ordered.    Risk  OTC drugs.  Prescription drug management.          Disposition  Final diagnoses:   RSV bronchitis     Time reflects when diagnosis was documented in both MDM as applicable and the Disposition within this note       Time User Action Codes Description Comment    1/31/2024  1:08 AM Phoebe Berumen Add [J20.5] RSV bronchitis           ED Disposition       ED Disposition   Discharge    Condition   Stable    Date/Time   Wed Jan 31, 2024  1:07 AM    Comment   Lyn Pimentel discharge to home/self care.                   Follow-up Information       Follow up With Specialties Details Why Contact Info    Chris Shin MD Nephrology, Internal Medicine   [address] [place] [address]  [place] [address]  [phone]              Discharge Medication List as of 1/31/2024  1:14 AM        START taking these medications    Details   predniSONE 20 mg tablet Take 2 tablets (40 mg total) by mouth daily for 4 days, Starting Wed 1/31/2024, Until Sun 2/4/2024, Normal           CONTINUE these medications which have NOT CHANGED    Details   acetaminophen (TYLENOL) 325 mg tablet 650 mg every 4 to 6 hours as needed for pain., Print      bismuth subsalicylate (PEPTO BISMOL) 262 MG chewable tablet Chew 262 mg 2 (two) times a day as needed for indigestion , Historical Med      Cholecalciferol (VITAMIN D3) 5000 UNITS CAPS Take 1 capsule by mouth daily., Historical Med      escitalopram (LEXAPRO) 20 mg tablet Take 10 mg by mouth daily , Starting Mon 12/2/2019, Historical Med      famotidine (PEPCID) 40 MG tablet Take 40 mg by mouth daily, Starting Sun 9/11/2022, Historical Med      Loratadine (CLARITIN PO) Take by mouth as needed, Historical Med      pantoprazole (PROTONIX) 40 mg tablet Take 40 mg by mouth daily, Starting Thu 9/22/2022, Historical Med      promethazine-dextromethorphan (PHENERGAN-DM) 6.25-15 mg/5 mL oral syrup TAKE 15ML BY MOUTH EVERY 6 HOURS AS NEEDED FOR COUGH/CONGESTION, Historical Med      propranolol (INDERAL LA) 60 mg 24 hr capsule Starting Fri 12/9/2022, Historical Med      sertraline (ZOLOFT) 25 mg tablet Take 25 mg by mouth daily at bedtime, Historical Med      simvastatin (ZOCOR) 80 mg tablet Take 80 mg by mouth daily, Starting Fri 9/2/2022, Historical Med           No discharge procedures on file.    PDMP Review       None             ED Provider  Attending physically available and evaluated Lyn RENZO Margarito. I managed the patient along with the ED Attending.    Electronically Signed by           Phoebe Berumen MD  01/31/24 0584

## 2024-02-23 ENCOUNTER — OFFICE VISIT (OUTPATIENT)
Dept: URGENT CARE | Facility: MEDICAL CENTER | Age: 79
End: 2024-02-23
Payer: COMMERCIAL

## 2024-02-23 ENCOUNTER — APPOINTMENT (OUTPATIENT)
Dept: RADIOLOGY | Facility: MEDICAL CENTER | Age: 79
End: 2024-02-23
Payer: COMMERCIAL

## 2024-02-23 VITALS
WEIGHT: 179 LBS | OXYGEN SATURATION: 98 % | HEART RATE: 74 BPM | DIASTOLIC BLOOD PRESSURE: 82 MMHG | TEMPERATURE: 97.3 F | BODY MASS INDEX: 29.82 KG/M2 | HEIGHT: 65 IN | SYSTOLIC BLOOD PRESSURE: 123 MMHG | RESPIRATION RATE: 18 BRPM

## 2024-02-23 DIAGNOSIS — R06.02 SHORTNESS OF BREATH: Primary | ICD-10-CM

## 2024-02-23 DIAGNOSIS — R06.02 SHORTNESS OF BREATH: ICD-10-CM

## 2024-02-23 PROCEDURE — 99213 OFFICE O/P EST LOW 20 MIN: CPT | Performed by: NURSE PRACTITIONER

## 2024-02-23 PROCEDURE — 71046 X-RAY EXAM CHEST 2 VIEWS: CPT

## 2024-02-23 PROCEDURE — S9088 SERVICES PROVIDED IN URGENT: HCPCS | Performed by: NURSE PRACTITIONER

## 2024-02-23 RX ORDER — PREDNISONE 20 MG/1
20 TABLET ORAL 2 TIMES DAILY WITH MEALS
Qty: 10 TABLET | Refills: 0 | Status: SHIPPED | OUTPATIENT
Start: 2024-02-23 | End: 2024-02-28

## 2024-02-23 NOTE — PROGRESS NOTES
[facility]        NAME: Lyn Pimentel is a 78 y.o. female  : 1945    MRN: [id number]  DATE: 2024  TIME: 11:18 AM    Assessment and Plan   Shortness of breath [R06.02]  1. Shortness of breath  XR chest pa & lateral    predniSONE 20 mg tablet            Patient Instructions     Take med as prescribed  Likely viral sequelae from RSV infection   Follow up with PCP in 3-5 days.  Proceed to  ER if symptoms worsen.    Chief Complaint     Chief Complaint   Patient presents with    Shortness of Breath     Pt. Dx with RSV about a month ago. She continues to cough and has SOB. Also reports B/L shoulder pain. She has a hx of lung CA.          History of Present Illness       HPI  Reports shortness of breath x 1 month. Intermittent. Also intermittent low grade temp in the 99s and low 100s. Other symptoms include fatigue and chills. Says she called her PCP and was advised to go to a [facility] and r/o pneumonia. Was diagnosed with RSV 1 month ago.     Review of Systems   Review of Systems   Constitutional:  Positive for chills, fatigue and fever.   HENT:  Positive for congestion and rhinorrhea. Negative for sore throat.    Respiratory:  Positive for cough and shortness of breath. Negative for chest tightness and wheezing.    Cardiovascular:  Negative for chest pain.   Gastrointestinal:  Negative for diarrhea and vomiting.   Neurological:  Negative for headaches.         Current Medications       Current Outpatient Medications:     acetaminophen (TYLENOL) 325 mg tablet, 650 mg every 4 to 6 hours as needed for pain., Disp: 30 tablet, Rfl: 0    Cholecalciferol (VITAMIN D3) 5000 UNITS CAPS, Take 1 capsule by mouth daily., Disp: , Rfl:     escitalopram (LEXAPRO) 20 mg tablet, Take 10 mg by mouth daily , Disp: , Rfl:     famotidine (PEPCID) 40 MG tablet, Take 40 mg by mouth daily, Disp: , Rfl:     Loratadine (CLARITIN PO), Take by mouth as needed, Disp: , Rfl:     pantoprazole (PROTONIX) 40 mg  tablet, Take 40 mg by mouth daily, Disp: , Rfl:     predniSONE 20 mg tablet, Take 1 tablet (20 mg total) by mouth 2 (two) times a day with meals for 5 days, Disp: 10 tablet, Rfl: 0    propranolol (INDERAL LA) 60 mg 24 hr capsule, , Disp: , Rfl:     sertraline (ZOLOFT) 25 mg tablet, Take 25 mg by mouth daily at bedtime, Disp: , Rfl:     simvastatin (ZOCOR) 80 mg tablet, Take 80 mg by mouth daily, Disp: , Rfl:     bismuth subsalicylate (PEPTO BISMOL) 262 MG chewable tablet, Chew 262 mg 2 (two) times a day as needed for indigestion  (Patient not taking: Reported on 2/23/2024), Disp: , Rfl:     promethazine-dextromethorphan (PHENERGAN-DM) 6.25-15 mg/5 mL oral syrup, TAKE 15ML BY MOUTH EVERY 6 HOURS AS NEEDED FOR COUGH/CONGESTION (Patient not taking: Reported on 2/23/2024), Disp: , Rfl:     Current Allergies     Allergies as of 02/23/2024 - Reviewed 02/23/2024   Allergen Reaction Noted    Biaxin [clarithromycin] GI Intolerance 05/31/2016    Cephalosporins      Darvon [propoxyphene]  11/26/2017    Latex  10/29/2019    Other  07/20/2016    Wound dressing adhesive  07/20/2016    Cefzil [cefprozil] Rash 05/31/2016    Ciprofloxacin Rash 05/31/2016            The following portions of the patient's history were reviewed and updated as appropriate: allergies, current medications, past family history, past medical history, past social history, past surgical history and problem list.     Past Medical History:   Diagnosis Date    Cataract 2015    had surgery on both eyes    Colitis     Colon polyp     COPD (chronic obstructive pulmonary disease) (HCC)     Dehydration     Depression     Familial tremor     GERD (gastroesophageal reflux disease)     Ground glass opacity present on imaging of lung     Hiatal hernia     High cholesterol     Hyperlipidemia     Hypertension     Hypoglycemia     Migraine     Nosebleed     Primary adenocarcinoma of upper lobe of right lung (HCC)     Sleep apnea     C pap    Sleep apnea, obstructive      Solitary pulmonary nodule     Syncope        Past Surgical History:   Procedure Laterality Date    ADENOIDECTOMY  Child    APPENDECTOMY      BACK SURGERY      Cervical spine    BREAST BIOPSY Bilateral BENIGN    CHOLECYSTECTOMY      COLECTOMY      complete colectomy    COLONOSCOPY      CT NEEDLE BX ASPIRATION INJECTION LOCALIZATION  11/07/2019    ESOPHAGOGASTRODUODENOSCOPY N/A 01/22/2018    Procedure: ESOPHAGOGASTRODUODENOSCOPY (EGD);  Surgeon: Lalito Espino MD;  [facility];  Service: Gastroenterology    EYE SURGERY      LUNG BIOPSY      LUNG SEGMENTECTOMY Right 11/07/2019    Procedure: RESECTION WEDGE LUNG;  Surgeon: Livan Lema MD;  [facility];  Service: Thoracic    NASAL/SINUS ENDOSCOPY N/A 11/07/2019    Procedure: ENDOSCOPIC BIOPSY OF NASOPHARYNX LESION;  Surgeon: Perez Horner MD;  [facility];  Service: ENT    NISSEN FUNDOPLICATION      RI BRNCHSC INCL FLUOR GDNCE DX W/CELL WASHG SPX N/A 11/07/2019    Procedure: BRONCHOSCOPY FLEXIBLE;  Surgeon: Livan Lema MD;  [facility];  Service: Thoracic    RI ESOPHAGOGASTRODUODENOSCOPY TRANSORAL DIAGNOSTIC N/A 04/17/2019    Procedure: ESOPHAGOGASTRODUODENOSCOPY (EGD);  Surgeon: Lalito Espino MD;  [facility];  Service: Gastroenterology    RI LAPS RPR PARAESPHGL HRNA INCL FUNDPLSTY W/MESH N/A 06/02/2016    Procedure: ROBOTIC LAPAROSCOPIC Daquan Fundoplication, Liver biopsy;  Surgeon: Levi Momin MD;  [facility];  Service: General    RI THORACOSCOPY W/THERA WEDGE RESEXN INITIAL UNILAT Right 11/07/2019    Procedure: THORACOSCOPY VIDEO ASSISTED SURGERY (VATS);  Surgeon: Livan Lema MD;  [facility];  Service: Thoracic    SPINE SURGERY  2017    cervical spine    TONSILLECTOMY  Child    UPPER GASTROINTESTINAL ENDOSCOPY         Family History   Problem Relation Age of Onset    Leukemia Mother 62    Cancer Mother     Colon cancer Father 70    Cancer Father     Leukemia Brother 60  "   No Known Problems Daughter     No Known Problems Maternal Grandmother     No Known Problems Paternal Grandmother     Breast cancer Maternal Aunt     Breast cancer Maternal Aunt     No Known Problems Maternal Aunt     Cancer Brother         Cancer         Medications have been verified.        Objective   /82   Pulse 74   Temp (!) 97.3 °F (36.3 °C)   Resp 18   Ht 5' 5\" (1.651 m)   Wt 81.2 kg (179 lb)   SpO2 98%   BMI 29.79 kg/m²   No LMP recorded. Patient is postmenopausal.       Physical Exam     Physical Exam  Constitutional:       Appearance: She is not ill-appearing or diaphoretic.   HENT:      Right Ear: Tympanic membrane normal.      Left Ear: Tympanic membrane normal.      Nose: Rhinorrhea present.      Mouth/Throat:      Pharynx: No posterior oropharyngeal erythema.   Cardiovascular:      Rate and Rhythm: Regular rhythm.      Heart sounds: Normal heart sounds.   Pulmonary:      Effort: Pulmonary effort is normal.      Breath sounds: Normal breath sounds.                   "

## 2024-03-31 DIAGNOSIS — R06.02 SHORTNESS OF BREATH: ICD-10-CM

## 2024-06-29 RX ORDER — PREDNISONE 20 MG/1
TABLET ORAL
Qty: 10 TABLET | Refills: 0 | OUTPATIENT
Start: 2024-06-29

## 2024-07-03 ENCOUNTER — APPOINTMENT (OUTPATIENT)
Dept: RADIOLOGY | Facility: MEDICAL CENTER | Age: 79
End: 2024-07-03
Payer: COMMERCIAL

## 2024-07-03 DIAGNOSIS — R05.3 CHRONIC COUGH: ICD-10-CM

## 2024-07-03 DIAGNOSIS — J98.4 DISEASE OF LUNG: ICD-10-CM

## 2024-07-03 PROCEDURE — 71046 X-RAY EXAM CHEST 2 VIEWS: CPT

## 2024-10-24 ENCOUNTER — OFFICE VISIT (OUTPATIENT)
Dept: URGENT CARE | Facility: MEDICAL CENTER | Age: 79
End: 2024-10-24
Payer: COMMERCIAL

## 2024-10-24 VITALS
RESPIRATION RATE: 18 BRPM | OXYGEN SATURATION: 96 % | HEART RATE: 72 BPM | SYSTOLIC BLOOD PRESSURE: 126 MMHG | WEIGHT: 177 LBS | DIASTOLIC BLOOD PRESSURE: 65 MMHG | BODY MASS INDEX: 29.49 KG/M2 | TEMPERATURE: 98.3 F | HEIGHT: 65 IN

## 2024-10-24 DIAGNOSIS — H10.89 OTHER CONJUNCTIVITIS OF BOTH EYES: ICD-10-CM

## 2024-10-24 DIAGNOSIS — R05.1 ACUTE COUGH: ICD-10-CM

## 2024-10-24 DIAGNOSIS — J06.9 VIRAL UPPER RESPIRATORY TRACT INFECTION: Primary | ICD-10-CM

## 2024-10-24 DIAGNOSIS — R09.81 NASAL CONGESTION: ICD-10-CM

## 2024-10-24 PROCEDURE — S9088 SERVICES PROVIDED IN URGENT: HCPCS | Performed by: PHYSICIAN ASSISTANT

## 2024-10-24 PROCEDURE — 99213 OFFICE O/P EST LOW 20 MIN: CPT | Performed by: PHYSICIAN ASSISTANT

## 2024-10-24 RX ORDER — DEXTROMETHORPHAN HYDROBROMIDE AND PROMETHAZINE HYDROCHLORIDE 15; 6.25 MG/5ML; MG/5ML
5 SYRUP ORAL 4 TIMES DAILY PRN
Qty: 100 ML | Refills: 0 | Status: SHIPPED | OUTPATIENT
Start: 2024-10-24

## 2024-10-24 RX ORDER — POLYMYXIN B SULFATE AND TRIMETHOPRIM 1; 10000 MG/ML; [USP'U]/ML
1 SOLUTION OPHTHALMIC EVERY 6 HOURS
Qty: 10 ML | Refills: 0 | Status: SHIPPED | OUTPATIENT
Start: 2024-10-24 | End: 2024-10-31

## 2024-10-24 NOTE — PROGRESS NOTES
[facility]        NAME: Lyn Pimentel is a 78 y.o. female  : 1945    MRN: [id number]  DATE: 2024  TIME: 10:32 AM    Assessment and Plan   Viral upper respiratory tract infection [J06.9]  1. Viral upper respiratory tract infection  promethazine-dextromethorphan (PHENERGAN-DM) 6.25-15 mg/5 mL oral syrup      2. Other conjunctivitis of both eyes  polymyxin b-trimethoprim (POLYTRIM) ophthalmic solution      3. Acute cough  promethazine-dextromethorphan (PHENERGAN-DM) 6.25-15 mg/5 mL oral syrup      4. Nasal congestion              Patient Instructions     Rx Polytrim eye drops, give 1 drop in each eye 4x daily for 1 week.  Cough medicine ordered. Take as directed on the bottle.  Follow up with PCP in 3-5 days.   ER if SOB or chest pain.     If tests have been performed at [facility], our office will contact you with results if changes need to be made to the care plan discussed with you at the visit. You can [facility] full [facility] on [facility].     Chief Complaint     Chief Complaint   Patient presents with    Cold Like Symptoms     Started week ago Monday with cough, coughing up yellow mucus, eyes red, watery, crusty in morning.  Has been taking dayquil, nyquil.         History of Present Illness       Patient is an 79 yo female who presents with cough/congestion for about a week with new onset of bilateral eye redness with excessive draining and crusting in the morning. She notes she has a 3 yo great grandson who loves to sit in her lap and is constantly with a runny nose and/or cough, because he's in . She is eating and drinking well. Energy levels are slgihtly down. No fevers. No SOB or chest pain.        Review of Systems   Review of Systems   Constitutional:  Positive for fatigue.   HENT:  Positive for congestion, postnasal drip and rhinorrhea.    Eyes:  Positive for discharge and redness.   Respiratory:  Positive for cough.    Cardiovascular: Negative.     Gastrointestinal: Negative.    Skin: Negative.    Neurological: Negative.          Current Medications       Current Outpatient Medications:     acetaminophen (TYLENOL) 325 mg tablet, 650 mg every 4 to 6 hours as needed for pain., Disp: 30 tablet, Rfl: 0    Cholecalciferol (VITAMIN D3) 5000 UNITS CAPS, Take 1 capsule by mouth daily., Disp: , Rfl:     escitalopram (LEXAPRO) 20 mg tablet, Take 10 mg by mouth daily , Disp: , Rfl:     famotidine (PEPCID) 40 MG tablet, Take 40 mg by mouth daily, Disp: , Rfl:     Loratadine (CLARITIN PO), Take by mouth as needed, Disp: , Rfl:     pantoprazole (PROTONIX) 40 mg tablet, Take 40 mg by mouth daily, Disp: , Rfl:     polymyxin b-trimethoprim (POLYTRIM) ophthalmic solution, Administer 1 drop to both eyes every 6 (six) hours for 7 days, Disp: 10 mL, Rfl: 0    promethazine-dextromethorphan (PHENERGAN-DM) 6.25-15 mg/5 mL oral syrup, Take 5 mL by mouth 4 (four) times a day as needed for cough, Disp: 100 mL, Rfl: 0    propranolol (INDERAL LA) 60 mg 24 hr capsule, , Disp: , Rfl:     sertraline (ZOLOFT) 25 mg tablet, Take 25 mg by mouth daily at bedtime, Disp: , Rfl:     simvastatin (ZOCOR) 80 mg tablet, Take 80 mg by mouth daily, Disp: , Rfl:     bismuth subsalicylate (PEPTO BISMOL) 262 MG chewable tablet, Chew 262 mg 2 (two) times a day as needed for indigestion  (Patient not taking: Reported on 2/23/2024), Disp: , Rfl:     Current Allergies     Allergies as of 10/24/2024 - Reviewed 10/24/2024   Allergen Reaction Noted    Biaxin [clarithromycin] GI Intolerance 05/31/2016    Cephalosporins      Darvon [propoxyphene]  11/26/2017    Latex  10/29/2019    Other  07/20/2016    Wound dressing adhesive  07/20/2016    Cefzil [cefprozil] Rash 05/31/2016    Ciprofloxacin Rash 05/31/2016            The following portions of the patient's history were reviewed and updated as appropriate: allergies, current medications, past family history, past medical history, past social history, past surgical  history and problem list.     Past Medical History:   Diagnosis Date    Cataract 2015    had surgery on both eyes    Colitis     Colon polyp     COPD (chronic obstructive pulmonary disease) (HCC)     Dehydration     Depression     Familial tremor     GERD (gastroesophageal reflux disease)     Ground glass opacity present on imaging of lung     Hiatal hernia     High cholesterol     Hyperlipidemia     Hypertension     Hypoglycemia     Migraine     Nosebleed     Primary adenocarcinoma of upper lobe of right lung (HCC)     Sleep apnea     C pap    Sleep apnea, obstructive     Solitary pulmonary nodule     Syncope        Past Surgical History:   Procedure Laterality Date    ADENOIDECTOMY  Child    APPENDECTOMY      BACK SURGERY      Cervical spine    BREAST BIOPSY Bilateral BENIGN    CHOLECYSTECTOMY      COLECTOMY      complete colectomy    COLONOSCOPY      CT NEEDLE BX ASPIRATION INJECTION LOCALIZATION  11/07/2019    ESOPHAGOGASTRODUODENOSCOPY N/A 01/22/2018    Procedure: ESOPHAGOGASTRODUODENOSCOPY (EGD);  Surgeon: Lalito Espino MD;  [facility];  Service: Gastroenterology    EYE SURGERY      LUNG BIOPSY      LUNG SEGMENTECTOMY Right 11/07/2019    Procedure: RESECTION WEDGE LUNG;  Surgeon: Livan Lema MD;  Location: [facility];  Service: Thoracic    NASAL/SINUS ENDOSCOPY N/A 11/07/2019    Procedure: ENDOSCOPIC BIOPSY OF NASOPHARYNX LESION;  Surgeon: Perez Horner MD;  Location: [facility];  Service: ENT    NISSEN FUNDOPLICATION      RI BRNCHSC INCL FLUOR GDNCE DX W/CELL WASHG SPX N/A 11/07/2019    Procedure: BRONCHOSCOPY FLEXIBLE;  Surgeon: Livan Lema MD;  [facility]: [facility];  Service: Thoracic    RI ESOPHAGOGASTRODUODENOSCOPY TRANSORAL DIAGNOSTIC N/A 04/17/2019    Procedure: ESOPHAGOGASTRODUODENOSCOPY (EGD);  Surgeon: Lalito Espino MD;  [facility];  Service: Gastroenterology    RI LAPS RPR PARAESPHGL HRNA INCL FUNDPLSTY W/MESH N/A 06/02/2016    Procedure: ROBOTIC  "LAPAROSCOPIC Daquan Fundoplication, Liver biopsy;  Surgeon: Levi Momin MD;  [facility];  Service: General    IA THORACOSCOPY W/THERA WEDGE RESEXN INITIAL UNILAT Right 11/07/2019    Procedure: THORACOSCOPY VIDEO ASSISTED SURGERY (VATS);  Surgeon: Livan Lema MD;  [facility];  Service: Thoracic    SPINE SURGERY  2017    cervical spine    TONSILLECTOMY  Child    UPPER GASTROINTESTINAL ENDOSCOPY         Family History   Problem Relation Age of Onset    Leukemia Mother 62    Cancer Mother     Colon cancer Father 70    Cancer Father     Leukemia Brother 60    No Known Problems Daughter     No Known Problems Maternal Grandmother     No Known Problems Paternal Grandmother     Breast cancer Maternal Aunt     Breast cancer Maternal Aunt     No Known Problems Maternal Aunt     Cancer Brother         Cancer         Medications have been verified.        Objective   /65   Pulse 72   Temp 98.3 °F (36.8 °C) (Temporal)   Resp 18   Ht 5' 5\" (1.651 m)   Wt 80.3 kg (177 lb)   SpO2 96%   BMI 29.45 kg/m²        Physical Exam     Physical Exam  Vitals reviewed.   Constitutional:       Appearance: Normal appearance.   HENT:      Head: Normocephalic and atraumatic.      Right Ear: Tympanic membrane, ear canal and external ear normal.      Left Ear: Tympanic membrane, ear canal and external ear normal.      Nose: Congestion and rhinorrhea present.      Mouth/Throat:      Mouth: Mucous membranes are moist.      Pharynx: Oropharynx is clear.   Eyes:      General:         Right eye: Discharge present.         Left eye: Discharge present.     Comments: + erythema of the sclera and conjuntiva   Cardiovascular:      Rate and Rhythm: Normal rate and regular rhythm.      Pulses: Normal pulses.      Heart sounds: Normal heart sounds. No murmur heard.  Pulmonary:      Effort: Pulmonary effort is normal. No respiratory distress.      Breath sounds: Normal breath sounds.   Musculoskeletal:      Cervical " back: Normal range of motion and neck supple.   Lymphadenopathy:      Cervical: No cervical adenopathy.   Skin:     General: Skin is warm and dry.      Capillary Refill: Capillary refill takes less than 2 seconds.      Findings: No rash.   Neurological:      Mental Status: She is alert.

## 2024-10-24 NOTE — PATIENT INSTRUCTIONS
Rx Polytrim eye drops, give 1 drop in each eye 4x daily for 1 week.  Cough medicine ordered. Take as directed on the bottle.  Follow up with PCP in 3-5 days.   ER if SOB or chest pain.

## 2024-11-04 DIAGNOSIS — Z00.6 ENCOUNTER FOR EXAMINATION FOR NORMAL COMPARISON OR CONTROL IN CLINICAL RESEARCH PROGRAM: ICD-10-CM

## 2024-11-05 ENCOUNTER — APPOINTMENT (OUTPATIENT)
Dept: LAB | Facility: MEDICAL CENTER | Age: 79
End: 2024-11-05

## 2024-11-05 DIAGNOSIS — Z00.6 ENCOUNTER FOR EXAMINATION FOR NORMAL COMPARISON OR CONTROL IN CLINICAL RESEARCH PROGRAM: ICD-10-CM

## 2024-11-05 PROCEDURE — 36415 COLL VENOUS BLD VENIPUNCTURE: CPT

## 2024-11-15 LAB
APOB+LDLR+PCSK9 GENE MUT ANL BLD/T: NOT DETECTED
BRCA1+BRCA2 DEL+DUP + FULL MUT ANL BLD/T: NOT DETECTED
MLH1+MSH2+MSH6+PMS2 GN DEL+DUP+FUL M: NOT DETECTED

## 2025-01-28 ENCOUNTER — ANESTHESIA EVENT (INPATIENT)
Dept: PERIOP | Facility: HOSPITAL | Age: 80
DRG: 494 | End: 2025-01-28
Payer: COMMERCIAL

## 2025-01-28 ENCOUNTER — APPOINTMENT (EMERGENCY)
Dept: RADIOLOGY | Facility: HOSPITAL | Age: 80
DRG: 494 | End: 2025-01-28
Payer: COMMERCIAL

## 2025-01-28 ENCOUNTER — APPOINTMENT (INPATIENT)
Dept: CT IMAGING | Facility: HOSPITAL | Age: 80
DRG: 494 | End: 2025-01-28
Payer: COMMERCIAL

## 2025-01-28 ENCOUNTER — HOSPITAL ENCOUNTER (INPATIENT)
Facility: HOSPITAL | Age: 80
LOS: 3 days | Discharge: NURSING, REHAB OR LONG-TERM CARE | DRG: 494 | End: 2025-01-31
Attending: EMERGENCY MEDICINE | Admitting: SURGERY
Payer: COMMERCIAL

## 2025-01-28 DIAGNOSIS — S82.843A BIMALLEOLAR FRACTURE: Primary | ICD-10-CM

## 2025-01-28 DIAGNOSIS — S82.891A CLOSED FRACTURE OF RIGHT ANKLE, INITIAL ENCOUNTER: ICD-10-CM

## 2025-01-28 DIAGNOSIS — S82.899A ANKLE FRACTURE: ICD-10-CM
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
## 2025-01-28 LAB
ABO GROUP BLD: NORMAL
ALBUMIN SERPL BCG-MCNC: 4.2 G/DL (ref 3.5–5)
ALP SERPL-CCNC: 98 U/L (ref 34–104)
ALT SERPL W P-5'-P-CCNC: 69 U/L (ref 7–52)
ANION GAP SERPL CALCULATED.3IONS-SCNC: 8 MMOL/L (ref 4–13)
APTT PPP: 27 SECONDS (ref 23–34)
AST SERPL W P-5'-P-CCNC: 74 U/L (ref 13–39)
ATRIAL RATE: 62 BPM
BASOPHILS # BLD AUTO: 0.11 THOUSANDS/ΜL (ref 0–0.1)
BASOPHILS NFR BLD AUTO: 1 % (ref 0–1)
BILIRUB SERPL-MCNC: 0.47 MG/DL (ref 0.2–1)
BLD GP AB SCN SERPL QL: NEGATIVE
BUN SERPL-MCNC: 21 MG/DL (ref 5–25)
CALCIUM SERPL-MCNC: 9.7 MG/DL (ref 8.4–10.2)
CHLORIDE SERPL-SCNC: 103 MMOL/L (ref 96–108)
CO2 SERPL-SCNC: 26 MMOL/L (ref 21–32)
CREAT SERPL-MCNC: 1.08 MG/DL (ref 0.6–1.3)
EOSINOPHIL # BLD AUTO: 0.3 THOUSAND/ΜL (ref 0–0.61)
EOSINOPHIL NFR BLD AUTO: 2 % (ref 0–6)
ERYTHROCYTE [DISTWIDTH] IN BLOOD BY AUTOMATED COUNT: 13.3 % (ref 11.6–15.1)
GFR SERPL CREATININE-BSD FRML MDRD: 48 ML/MIN/1.73SQ M
GLUCOSE SERPL-MCNC: 127 MG/DL (ref 65–140)
HCT VFR BLD AUTO: 39.1 % (ref 34.8–46.1)
HGB BLD-MCNC: 12.4 G/DL (ref 11.5–15.4)
IMM GRANULOCYTES # BLD AUTO: 0.07 THOUSAND/UL (ref 0–0.2)
IMM GRANULOCYTES NFR BLD AUTO: 1 % (ref 0–2)
INR PPP: 0.89 (ref 0.85–1.19)
LYMPHOCYTES # BLD AUTO: 3.37 THOUSANDS/ΜL (ref 0.6–4.47)
LYMPHOCYTES NFR BLD AUTO: 22 % (ref 14–44)
MCH RBC QN AUTO: 30.8 PG (ref 26.8–34.3)
MCHC RBC AUTO-ENTMCNC: 31.7 G/DL (ref 31.4–37.4)
MCV RBC AUTO: 97 FL (ref 82–98)
MONOCYTES # BLD AUTO: 1.21 THOUSAND/ΜL (ref 0.17–1.22)
MONOCYTES NFR BLD AUTO: 8 % (ref 4–12)
NEUTROPHILS # BLD AUTO: 10.03 THOUSANDS/ΜL (ref 1.85–7.62)
NEUTS SEG NFR BLD AUTO: 66 % (ref 43–75)
NRBC BLD AUTO-RTO: 0 /100 WBCS
P AXIS: 62 DEGREES
PLATELET # BLD AUTO: 314 THOUSANDS/UL (ref 149–390)
PMV BLD AUTO: 9.2 FL (ref 8.9–12.7)
POTASSIUM SERPL-SCNC: 4.3 MMOL/L (ref 3.5–5.3)
PR INTERVAL: 164 MS
PROT SERPL-MCNC: 7.7 G/DL (ref 6.4–8.4)
PROTHROMBIN TIME: 12.8 SECONDS (ref 12.3–15)
QRS AXIS: 16 DEGREES
QRSD INTERVAL: 80 MS
QT INTERVAL: 428 MS
QTC INTERVAL: 434 MS
RBC # BLD AUTO: 4.02 MILLION/UL (ref 3.81–5.12)
RH BLD: POSITIVE
SODIUM SERPL-SCNC: 137 MMOL/L (ref 135–147)
SPECIMEN EXPIRATION DATE: NORMAL
T WAVE AXIS: 63 DEGREES
VENTRICULAR RATE: 62 BPM
WBC # BLD AUTO: 15.09 THOUSAND/UL (ref 4.31–10.16)

## 2025-01-28 PROCEDURE — 73610 X-RAY EXAM OF ANKLE: CPT

## 2025-01-28 PROCEDURE — 86900 BLOOD TYPING SEROLOGIC ABO: CPT

## 2025-01-28 PROCEDURE — 99285 EMERGENCY DEPT VISIT HI MDM: CPT | Performed by: EMERGENCY MEDICINE

## 2025-01-28 PROCEDURE — 71045 X-RAY EXAM CHEST 1 VIEW: CPT

## 2025-01-28 PROCEDURE — 36415 COLL VENOUS BLD VENIPUNCTURE: CPT

## 2025-01-28 PROCEDURE — 96374 THER/PROPH/DIAG INJ IV PUSH: CPT

## 2025-01-28 PROCEDURE — 99152 MOD SED SAME PHYS/QHP 5/>YRS: CPT | Performed by: EMERGENCY MEDICINE

## 2025-01-28 PROCEDURE — 86850 RBC ANTIBODY SCREEN: CPT

## 2025-01-28 PROCEDURE — 85730 THROMBOPLASTIN TIME PARTIAL: CPT

## 2025-01-28 PROCEDURE — 86901 BLOOD TYPING SEROLOGIC RH(D): CPT

## 2025-01-28 PROCEDURE — 99223 1ST HOSP IP/OBS HIGH 75: CPT | Performed by: SURGERY

## 2025-01-28 PROCEDURE — 73600 X-RAY EXAM OF ANKLE: CPT

## 2025-01-28 PROCEDURE — 93005 ELECTROCARDIOGRAM TRACING: CPT

## 2025-01-28 PROCEDURE — 80053 COMPREHEN METABOLIC PANEL: CPT

## 2025-01-28 PROCEDURE — 73700 CT LOWER EXTREMITY W/O DYE: CPT

## 2025-01-28 PROCEDURE — 99284 EMERGENCY DEPT VISIT MOD MDM: CPT

## 2025-01-28 PROCEDURE — 85025 COMPLETE CBC W/AUTO DIFF WBC: CPT

## 2025-01-28 PROCEDURE — 85610 PROTHROMBIN TIME: CPT

## 2025-01-28 PROCEDURE — 27810 TREATMENT OF ANKLE FRACTURE: CPT | Performed by: EMERGENCY MEDICINE

## 2025-01-28 PROCEDURE — 93010 ELECTROCARDIOGRAM REPORT: CPT | Performed by: INTERNAL MEDICINE

## 2025-01-28 RX ORDER — FAMOTIDINE 20 MG/1
40 TABLET, FILM COATED ORAL
Status: DISCONTINUED | OUTPATIENT
Start: 2025-01-28 | End: 2025-01-31 | Stop reason: HOSPADM

## 2025-01-28 RX ORDER — ATORVASTATIN CALCIUM 40 MG/1
40 TABLET, FILM COATED ORAL
Status: DISCONTINUED | OUTPATIENT
Start: 2025-01-28 | End: 2025-01-31 | Stop reason: HOSPADM

## 2025-01-28 RX ORDER — HYDROMORPHONE HCL IN WATER/PF 6 MG/30 ML
0.2 PATIENT CONTROLLED ANALGESIA SYRINGE INTRAVENOUS EVERY 2 HOUR PRN
Status: DISCONTINUED | OUTPATIENT
Start: 2025-01-28 | End: 2025-01-31

## 2025-01-28 RX ORDER — OXYCODONE HYDROCHLORIDE 5 MG/1
5 TABLET ORAL EVERY 4 HOURS PRN
Refills: 0 | Status: DISCONTINUED | OUTPATIENT
Start: 2025-01-28 | End: 2025-01-31 | Stop reason: HOSPADM

## 2025-01-28 RX ORDER — SERTRALINE HYDROCHLORIDE 25 MG/1
25 TABLET, FILM COATED ORAL
Status: DISCONTINUED | OUTPATIENT
Start: 2025-01-28 | End: 2025-01-28

## 2025-01-28 RX ORDER — FENTANYL CITRATE 50 UG/ML
50 INJECTION, SOLUTION INTRAMUSCULAR; INTRAVENOUS ONCE
Refills: 0 | Status: DISCONTINUED | OUTPATIENT
Start: 2025-01-28 | End: 2025-01-28

## 2025-01-28 RX ORDER — POLYETHYLENE GLYCOL 3350 17 G/17G
17 POWDER, FOR SOLUTION ORAL DAILY PRN
Status: DISCONTINUED | OUTPATIENT
Start: 2025-01-28 | End: 2025-01-30

## 2025-01-28 RX ORDER — SENNOSIDES 8.6 MG
2 TABLET ORAL DAILY
Status: DISCONTINUED | OUTPATIENT
Start: 2025-01-29 | End: 2025-01-28

## 2025-01-28 RX ORDER — LORATADINE 10 MG/1
10 TABLET ORAL
Status: DISCONTINUED | OUTPATIENT
Start: 2025-01-28 | End: 2025-01-31 | Stop reason: HOSPADM

## 2025-01-28 RX ORDER — PANTOPRAZOLE SODIUM 40 MG/1
40 TABLET, DELAYED RELEASE ORAL
Status: DISCONTINUED | OUTPATIENT
Start: 2025-01-28 | End: 2025-01-31 | Stop reason: HOSPADM

## 2025-01-28 RX ORDER — ONDANSETRON 2 MG/ML
4 INJECTION INTRAMUSCULAR; INTRAVENOUS EVERY 6 HOURS PRN
Status: DISCONTINUED | OUTPATIENT
Start: 2025-01-28 | End: 2025-01-31 | Stop reason: HOSPADM

## 2025-01-28 RX ORDER — FENTANYL CITRATE 50 UG/ML
50 INJECTION, SOLUTION INTRAMUSCULAR; INTRAVENOUS ONCE
Refills: 0 | Status: COMPLETED | OUTPATIENT
Start: 2025-01-28 | End: 2025-01-28

## 2025-01-28 RX ORDER — ACETAMINOPHEN 325 MG/1
975 TABLET ORAL EVERY 8 HOURS SCHEDULED
Status: DISCONTINUED | OUTPATIENT
Start: 2025-01-28 | End: 2025-01-31 | Stop reason: HOSPADM

## 2025-01-28 RX ORDER — DOCUSATE SODIUM 100 MG/1
100 CAPSULE, LIQUID FILLED ORAL 2 TIMES DAILY
Status: DISCONTINUED | OUTPATIENT
Start: 2025-01-28 | End: 2025-01-28

## 2025-01-28 RX ORDER — ENOXAPARIN SODIUM 100 MG/ML
30 INJECTION SUBCUTANEOUS EVERY 12 HOURS
Status: DISCONTINUED | OUTPATIENT
Start: 2025-01-28 | End: 2025-01-29

## 2025-01-28 RX ORDER — HYDROMORPHONE HCL IN WATER/PF 6 MG/30 ML
0.2 PATIENT CONTROLLED ANALGESIA SYRINGE INTRAVENOUS EVERY 2 HOUR PRN
Refills: 0 | Status: DISCONTINUED | OUTPATIENT
Start: 2025-01-28 | End: 2025-01-28

## 2025-01-28 RX ORDER — HYDROMORPHONE HCL/PF 1 MG/ML
0.5 SYRINGE (ML) INJECTION ONCE
Status: COMPLETED | OUTPATIENT
Start: 2025-01-28 | End: 2025-01-28

## 2025-01-28 RX ORDER — PROPRANOLOL HYDROCHLORIDE 60 MG/1
60 CAPSULE, EXTENDED RELEASE ORAL DAILY
Status: DISCONTINUED | OUTPATIENT
Start: 2025-01-29 | End: 2025-01-31 | Stop reason: HOSPADM

## 2025-01-28 RX ORDER — FENTANYL CITRATE 50 UG/ML
INJECTION, SOLUTION INTRAMUSCULAR; INTRAVENOUS
Status: DISPENSED
Start: 2025-01-28 | End: 2025-01-29

## 2025-01-28 RX ORDER — ESCITALOPRAM OXALATE 10 MG/1
10 TABLET ORAL
Status: DISCONTINUED | OUTPATIENT
Start: 2025-01-28 | End: 2025-01-31 | Stop reason: HOSPADM

## 2025-01-28 RX ORDER — ONDANSETRON 2 MG/ML
4 INJECTION INTRAMUSCULAR; INTRAVENOUS ONCE
Status: COMPLETED | OUTPATIENT
Start: 2025-01-28 | End: 2025-01-28

## 2025-01-28 RX ORDER — PROPOFOL 10 MG/ML
1.5 INJECTION, EMULSION INTRAVENOUS ONCE
Status: COMPLETED | OUTPATIENT
Start: 2025-01-28 | End: 2025-01-28

## 2025-01-28 RX ADMIN — FENTANYL CITRATE 50 MCG: 50 INJECTION INTRAMUSCULAR; INTRAVENOUS at 18:32

## 2025-01-28 RX ADMIN — ESCITALOPRAM OXALATE 10 MG: 10 TABLET ORAL at 22:47

## 2025-01-28 RX ADMIN — ONDANSETRON 4 MG: 2 INJECTION INTRAMUSCULAR; INTRAVENOUS at 20:13

## 2025-01-28 RX ADMIN — PANTOPRAZOLE SODIUM 40 MG: 40 TABLET, DELAYED RELEASE ORAL at 21:21

## 2025-01-28 RX ADMIN — SODIUM CHLORIDE 1000 ML: 0.9 INJECTION, SOLUTION INTRAVENOUS at 18:33

## 2025-01-28 RX ADMIN — PROPOFOL 123 MG: 10 INJECTION, EMULSION INTRAVENOUS at 18:42

## 2025-01-28 RX ADMIN — ACETAMINOPHEN 975 MG: 325 TABLET, FILM COATED ORAL at 21:20

## 2025-01-28 RX ADMIN — ONDANSETRON 4 MG: 2 INJECTION INTRAMUSCULAR; INTRAVENOUS at 18:33

## 2025-01-28 RX ADMIN — LORATADINE 10 MG: 10 TABLET ORAL at 21:20

## 2025-01-28 RX ADMIN — ENOXAPARIN SODIUM 30 MG: 30 INJECTION SUBCUTANEOUS at 19:39

## 2025-01-28 RX ADMIN — FAMOTIDINE 40 MG: 20 TABLET, FILM COATED ORAL at 21:20

## 2025-01-28 RX ADMIN — OXYCODONE HYDROCHLORIDE 5 MG: 5 TABLET ORAL at 22:47

## 2025-01-28 RX ADMIN — Medication 1000 UNITS: at 21:21

## 2025-01-28 RX ADMIN — ATORVASTATIN CALCIUM 40 MG: 40 TABLET, FILM COATED ORAL at 21:21

## 2025-01-28 RX ADMIN — DOCUSATE SODIUM 100 MG: 100 CAPSULE, LIQUID FILLED ORAL at 19:39

## 2025-01-28 RX ADMIN — HYDROMORPHONE HYDROCHLORIDE 0.5 MG: 1 INJECTION, SOLUTION INTRAMUSCULAR; INTRAVENOUS; SUBCUTANEOUS at 19:40

## 2025-01-28 NOTE — ED ATTENDING ATTESTATION
1/28/2025  I, David Bergman MD, saw and evaluated the patient. I have discussed the patient with the resident/non-physician practitioner and agree with the resident's/non-physician practitioner's findings, Plan of Care, and MDM as documented in the resident's/non-physician practitioner's note, except where noted. All available labs and Radiology studies were reviewed.  I was present for key portions of any procedure(s) performed by the resident/non-physician practitioner and I was immediately available to provide assistance.       At this point I agree with the current assessment done in the Emergency Department.  I have conducted an independent evaluation of this patient a history and physical is as follows:    ED Course         Critical Care Time  Procedures

## 2025-01-28 NOTE — ED PROVIDER NOTES
Time reflects when diagnosis was documented in both MDM as applicable and the Disposition within this note       Time User Action Codes Description Comment    1/28/2025  6:21 PM Rosey Britt Add [S82.843A] Bimalleolar fracture     1/28/2025  6:57 PM Rosey Britt Add [S82.899A] Ankle fracture     1/28/2025  8:17 PM Peter Smith Add [S82.891A] Closed fracture of right ankle, initial encounter           ED Disposition       ED Disposition   Admit    Condition   Stable    Date/Time   Tue Jan 28, 2025  7:09 PM    Comment   Case was discussed with Odilia and the patient's admission status was agreed to be Admission Status: inpatient status to the service of Dr. Hartley .               Assessment & Plan       Medical Decision Making  79-year-old female with history of hypertension, hyperlipidemia, GERD, familial tremor, sleep apnea who presents to the emergency department after fall with right ankle trauma.  States she was trying to shoo a mouse out of her house when she fell forward and everted her ankle and heard a 'pop.' She arrives via EMS with obvious right ankle deformity and bruising with skin tenting over the medial aspect. Palpable distal pulses. Movement and sensation intact to the toes. No pain along the tib/fib. She is HDS here in the ED. Awake, alert, oriented. Given 50mcg fentanyl. XR confirms fracture -- appears bimalleolar. Reduced and splinted. RLE remains NVI. Ortho aware. Trauma aware. Will admit for likely OR tomorrow. Pre op labs obtained. NPO at midnight. Dr. Brown requesting CT lower extremity for pre op planning.     Amount and/or Complexity of Data Reviewed  Labs: ordered.  Radiology: ordered and independent interpretation performed.    Risk  OTC drugs.  Prescription drug management.  Decision regarding hospitalization.        ED Course as of 01/28/25 1052   Tue Jan 28, 2025   9485 Discussed case with Ortho trauma, Dr. Mejía. Sent pre and post reduction films. Pre op  labs sent. EKG. CXR. NPO and midnight, will plan for OR tomorrow. Admit to trauma. AP and Attending notified.    1945 Updated patient's daughter bedside    2252 IMPRESSION:     Trimalleolar fracture.         Medications   enoxaparin (LOVENOX) subcutaneous injection 30 mg (30 mg Subcutaneous Given 1/28/25 1939)   ondansetron (ZOFRAN) injection 4 mg (4 mg Intravenous Given 1/28/25 2013)   acetaminophen (TYLENOL) tablet 975 mg (975 mg Oral Given 1/28/25 2120)   naloxone (NARCAN) 0.04 mg/mL syringe 0.04 mg (has no administration in time range)   Cholecalciferol (VITAMIN D3) tablet 1,000 Units (1,000 Units Oral Given 1/28/25 2121)   escitalopram (LEXAPRO) tablet 10 mg (10 mg Oral Given 1/28/25 2247)   famotidine (PEPCID) tablet 40 mg (40 mg Oral Given 1/28/25 2120)   atorvastatin (LIPITOR) tablet 40 mg (40 mg Oral Given 1/28/25 2121)   pantoprazole (PROTONIX) EC tablet 40 mg (40 mg Oral Given 1/28/25 2121)   loratadine (CLARITIN) tablet 10 mg (10 mg Oral Given 1/28/25 2120)   polyethylene glycol (MIRALAX) packet 17 g (has no administration in time range)   propranolol (INDERAL LA) 24 hr capsule 60 mg (has no administration in time range)   oxyCODONE (ROXICODONE) split tablet 2.5 mg (has no administration in time range)   oxyCODONE (ROXICODONE) IR tablet 5 mg (5 mg Oral Given 1/28/25 2247)   HYDROmorphone HCl (DILAUDID) injection 0.2 mg (has no administration in time range)   propofol (DIPRIVAN) 200 MG/20ML bolus injection 123 mg (123 mg Intravenous Given by Other 1/28/25 1842)   ondansetron (ZOFRAN) injection 4 mg (4 mg Intravenous Given 1/28/25 1833)   sodium chloride 0.9 % bolus 1,000 mL (0 mL Intravenous Stopped 1/28/25 1902)   fentaNYL injection 50 mcg (50 mcg Intravenous Given 1/28/25 1832)   HYDROmorphone (DILAUDID) injection 0.5 mg (0.5 mg Intravenous Given 1/28/25 1940)       ED Risk Strat Scores                                              History of Present Illness       Chief Complaint   Patient presents with     Ankle Injury       Past Medical History:   Diagnosis Date    Cataract 2015    had surgery on both eyes    Colitis     Colon polyp     COPD (chronic obstructive pulmonary disease) (HCC)     Dehydration     Depression     Familial tremor     GERD (gastroesophageal reflux disease)     Ground glass opacity present on imaging of lung     Hiatal hernia     High cholesterol     Hyperlipidemia     Hypertension     Hypoglycemia     Migraine     Nosebleed     Primary adenocarcinoma of upper lobe of right lung (HCC)     Sleep apnea     C pap    Sleep apnea, obstructive     Solitary pulmonary nodule     Syncope       Past Surgical History:   Procedure Laterality Date    ADENOIDECTOMY  Child    APPENDECTOMY      BACK SURGERY      Cervical spine    BREAST BIOPSY Bilateral BENIGN    CHOLECYSTECTOMY      COLECTOMY      complete colectomy    COLONOSCOPY      CT NEEDLE BX ASPIRATION INJECTION LOCALIZATION  11/07/2019    ESOPHAGOGASTRODUODENOSCOPY N/A 01/22/2018    Procedure: ESOPHAGOGASTRODUODENOSCOPY (EGD);  Surgeon: Lalito Espino MD;  Location: [facility];  Service: Gastroenterology    EYE SURGERY      LUNG BIOPSY      LUNG SEGMENTECTOMY Right 11/07/2019    Procedure: RESECTION WEDGE LUNG;  Surgeon: Livan Lema MD;  Location: [facility];  Service: Thoracic    NASAL/SINUS ENDOSCOPY N/A 11/07/2019    Procedure: ENDOSCOPIC BIOPSY OF NASOPHARYNX LESION;  Surgeon: Perez Horner MD;  Location: [facility];  Service: ENT    NISSEN FUNDOPLICATION      AR BRNCHSC INCL FLUOR GDNCE DX W/CELL WASHG SPX N/A 11/07/2019    Procedure: BRONCHOSCOPY FLEXIBLE;  Surgeon: Livan Lema MD;  [facility];  Service: Thoracic    AR ESOPHAGOGASTRODUODENOSCOPY TRANSORAL DIAGNOSTIC N/A 04/17/2019    Procedure: ESOPHAGOGASTRODUODENOSCOPY (EGD);  Surgeon: Lalito Espino MD;  Location: [facility];  Service: Gastroenterology    AR LAPS RPR PARAESPHGL HRNA INCL FUNDPLSTY W/MESH N/A 06/02/2016    Procedure: ROBOTIC LAPAROSCOPIC Daquan  Fundoplication, Liver biopsy;  Surgeon: Levi Momin MD;  [facility];  Service: General    SC THORACOSCOPY W/THERA WEDGE RESEXN INITIAL UNILAT Right 11/07/2019    Procedure: THORACOSCOPY VIDEO ASSISTED SURGERY (VATS);  Surgeon: Livan Lema MD;  [facility]: [facility];  Service: Thoracic    SPINE SURGERY  2017    cervical spine    TONSILLECTOMY  Child    UPPER GASTROINTESTINAL ENDOSCOPY        Family History   Problem Relation Age of Onset    Leukemia Mother 62    Cancer Mother     Colon cancer Father 70    Cancer Father     Leukemia Brother 60    No Known Problems Daughter     No Known Problems Maternal Grandmother     No Known Problems Paternal Grandmother     Breast cancer Maternal Aunt     Breast cancer Maternal Aunt     No Known Problems Maternal Aunt     Cancer Brother         Cancer      Social History     Tobacco Use    Smoking status: Never    Smokeless tobacco: Never   Vaping Use    Vaping status: Never Used   Substance Use Topics    Alcohol use: Yes     Comment: Social drinker    Drug use: No      E-Cigarette/Vaping    E-Cigarette Use Never User       E-Cigarette/Vaping Substances    Nicotine No     THC No     CBD No     Flavoring No     Other No       I have reviewed and agree with the history as documented.     79-year-old female with history of hypertension, hyperlipidemia, GERD, familial tremor, sleep apnea who presents to the emergency department after fall with right ankle trauma.  States she was trying to shoo a mouse out of her house when she fell forward and everted her ankle and heard a 'pop.' She arrives via EMS with obvious right ankle deformity and bruising with skin tenting over the medial aspect. Palpable distal pulses. Movement and sensation intact to the toes. No pain along the tib/fib.           Review of Systems   Constitutional:  Negative for chills and fever.   HENT:  Negative for ear pain and sore throat.    Eyes:  Negative for visual disturbance.   Respiratory:   Negative for cough and shortness of breath.    Cardiovascular:  Negative for chest pain and leg swelling.   Gastrointestinal:  Negative for abdominal pain, blood in stool, constipation, diarrhea, nausea and vomiting.   Genitourinary:  Negative for dysuria and hematuria.   Musculoskeletal:  Negative for neck pain and neck stiffness.        Right ankle pain   Neurological:  Negative for dizziness, syncope, weakness and light-headedness.   All other systems reviewed and are negative.          Objective       ED Triage Vitals   Temperature Pulse Blood Pressure Respirations SpO2 Patient Position - Orthostatic VS   01/28/25 1810 01/28/25 1810 01/28/25 1810 01/28/25 1810 01/28/25 1810 01/28/25 1810   98.4 °F (36.9 °C) 69 (!) 181/87 20 97 % Lying      Temp Source Heart Rate Source BP Location FiO2 (%) Pain Score    01/28/25 1810 01/28/25 1810 01/28/25 1810 -- 01/28/25 1940    Oral Monitor Right arm  7      Vitals      Date and Time Temp Pulse SpO2 Resp BP Pain Score FACES Pain Rating User   01/28/25 2248 -- 67 98 % 20 120/65 -- --    01/28/25 2247 -- -- -- -- -- 9 --    01/28/25 2120 -- -- -- -- -- 3 --    01/28/25 1940 -- -- -- -- -- 7 --    01/28/25 1855 -- 62 98 % 16 172/72 -- -- KM   01/28/25 1850 -- 63 96 % 15 151/70 -- -- KM   01/28/25 1845 -- 64 95 % 15 169/75 -- -- KM   01/28/25 1840 -- 61 98 % 20 173/84 -- -- KM   01/28/25 1810 98.4 °F (36.9 °C) 69 97 % 20 181/87 -- -- KM            Physical Exam  Vitals and nursing note reviewed.   Constitutional:       General: She is not in acute distress.     Appearance: She is well-developed. She is not ill-appearing, toxic-appearing or diaphoretic.   HENT:      Head: Normocephalic and atraumatic.   Eyes:      Extraocular Movements: Extraocular movements intact.      Conjunctiva/sclera: Conjunctivae normal.   Cardiovascular:      Rate and Rhythm: Normal rate and regular rhythm.      Pulses: Normal pulses.           Dorsalis pedis pulses are 2+ on the right side and 2+  on the left side.      Heart sounds: Normal heart sounds. No murmur heard.  Pulmonary:      Effort: Pulmonary effort is normal. No respiratory distress.      Breath sounds: Normal breath sounds. No wheezing or rales.   Abdominal:      Palpations: Abdomen is soft.      Tenderness: There is no abdominal tenderness. There is no guarding or rebound.   Musculoskeletal:         General: Swelling (right ankle), deformity (right ankle) and signs of injury (right ankle) present.      Cervical back: Normal range of motion and neck supple.        Feet:    Skin:     General: Skin is warm and dry.      Capillary Refill: Capillary refill takes less than 2 seconds.   Neurological:      General: No focal deficit present.      Mental Status: She is alert and oriented to person, place, and time.   Psychiatric:         Mood and Affect: Mood normal.         Results Reviewed       Procedure Component Value Units Date/Time    Protime-INR [[id number]]  (Normal) Collected: 01/28/25 1834    Lab Status: Final result Specimen: Blood from Arm, Left Updated: 01/28/25 1902     Protime 12.8 seconds      INR 0.89    Narrative:      INR Therapeutic Range    Indication                                             INR Range      Atrial Fibrillation                                               2.0-3.0  Hypercoagulable State                                    2.0.2.3  Left Ventricular Asist Device                            2.0-3.0  Mechanical Heart Valve                                  -    Aortic(with afib, MI, embolism, HF, LA enlargement,    and/or coagulopathy)                                     2.0-3.0 (2.5-3.5)     Mitral                                                             2.5-3.5  Prosthetic/Bioprosthetic Heart Valve               2.0-3.0  Venous thromboembolism (VTE: VT, PE        2.0-3.0    APTT [[id number]]  (Normal) Collected: 01/28/25 1834    Lab Status: Final result Specimen: Blood from Arm, Left Updated: 01/28/25 1902     PTT 27  seconds     Comprehensive metabolic panel [[id number]]  (Abnormal) Collected: 01/28/25 1834    Lab Status: Final result Specimen: Blood from Arm, Left Updated: 01/28/25 1858     Sodium 137 mmol/L      Potassium 4.3 mmol/L      Chloride 103 mmol/L      CO2 26 mmol/L      ANION GAP 8 mmol/L      BUN 21 mg/dL      Creatinine 1.08 mg/dL      Glucose 127 mg/dL      Calcium 9.7 mg/dL      AST 74 U/L      ALT 69 U/L      Alkaline Phosphatase 98 U/L      Total Protein 7.7 g/dL      Albumin 4.2 g/dL      Total Bilirubin 0.47 mg/dL      eGFR 48 ml/min/1.73sq m     Narrative:      [employer] guidelines for Chronic Kidney Disease (CKD):     Stage 1 with normal or high GFR (GFR > 90 mL/min/1.73 square meters)    Stage 2 Mild CKD (GFR = 60-89 mL/min/1.73 square meters)    Stage 3A Moderate CKD (GFR = 45-59 mL/min/1.73 square meters)    Stage 3B Moderate CKD (GFR = 30-44 mL/min/1.73 square meters)    Stage 4 Severe CKD (GFR = 15-29 mL/min/1.73 square meters)    Stage 5 End Stage CKD (GFR <15 mL/min/1.73 square meters)  Note: GFR calculation is accurate only with a steady state creatinine    CBC and differential [[id number]]  (Abnormal) Collected: 01/28/25 1834    Lab Status: Final result Specimen: Blood from Arm, Left Updated: 01/28/25 1840     WBC 15.09 Thousand/uL      RBC 4.02 Million/uL      Hemoglobin 12.4 g/dL      Hematocrit 39.1 %      MCV 97 fL      MCH 30.8 pg      MCHC 31.7 g/dL      RDW 13.3 %      MPV 9.2 fL      Platelets 314 Thousands/uL      nRBC 0 /100 WBCs      Segmented % 66 %      Immature Grans % 1 %      Lymphocytes % 22 %      Monocytes % 8 %      Eosinophils Relative 2 %      Basophils Relative 1 %      Absolute Neutrophils 10.03 Thousands/µL      Absolute Immature Grans 0.07 Thousand/uL      Absolute Lymphocytes 3.37 Thousands/µL      Absolute Monocytes 1.21 Thousand/µL      Eosinophils Absolute 0.30 Thousand/µL      Basophils Absolute 0.11 Thousands/µL             CT lower extremity  "wo contrast right   Final Interpretation by Jean Teresa DO (01/28 2238)      Trimalleolar fracture.         Workstation performed: [id number]         XR ankle 2 views RIGHT   ED Interpretation by Rosey Britt MD (01/28 2130)   Bimalleolar fracture s/p reduction and splinting. Interpreted by me.       XR ankle 3+ views RIGHT   ED Interpretation by Rosey Britt MD (01/28 2129)   Bimalleolar fracture with posterior displacement. Interpreted by me.       XR chest 1 view portable    (Results Pending)       Orthopedic injury treatment    Date/Time: 1/28/2025 6:40 PM    Performed by: Rosey Britt MD  Authorized by: Rosey Britt MD    Patient Location:  ED  [place] Protocol:  Procedure performed by: (Valentina Murphy)  Consent: Verbal consent obtained. Written consent obtained.  Risks and benefits: risks, benefits and alternatives were discussed  Consent given by: patient  Time out: Immediately prior to procedure a \"time out\" was called to verify the correct patient, procedure, equipment, support staff and site/side marked as required.  Timeout called at: 1/28/2025 6:40 PM.  Patient understanding: patient states understanding of the procedure being performed  Patient consent: the patient's understanding of the procedure matches consent given  Procedure consent: procedure consent matches procedure scheduled  Relevant documents: relevant documents present and verified  Site marked: the operative site was marked  Radiology Images displayed and confirmed. If images not available, report reviewed: imaging studies available  Patient identity confirmed: verbally with patient    Injury location:  Ankle  Location details:  Right ankle  Injury type:  Fracture-dislocation  Fracture type: bimalleolar    Neurovascular status: Neurovascularly intact    Distal perfusion: normal    Neurological function: normal    Range of motion: reduced    Sedation type:  Moderate (conscious) sedation (See separate Procedural " Sedation form)  Manipulation performed?: Yes    Skeletal traction used?: Yes    Reduction successful?: Yes    Confirmation: Reduction confirmed by x-ray    Immobilization:  Splint and ace wrap  Splint type:  Long leg (posterior long leg with stirrups)  Supplies used:  Ortho-Glass, elastic bandage and cotton padding  Neurovascular status: Neurovascularly intact    Distal perfusion: normal    Neurological function: normal    Range of motion: unchanged    Patient tolerance:  Patient tolerated the procedure well with no immediate complications  ECG 12 Lead Documentation Only    Date/Time: 2025 7:59 PM    Performed by: Rosey Britt MD  Authorized by: Rosey Britt MD    Indications / Diagnosis:  Pre op  ECG reviewed by me, the ED Provider: yes    Patient location:  ED  Previous ECG:     Previous ECG:  Compared to current  Rate:     ECG rate:  62    ECG rate assessment: normal    Rhythm:     Rhythm: sinus rhythm    Ectopy:     Ectopy: PVCs      PVCs:  Infrequent  QRS:     QRS axis:  Normal    QRS intervals:  Normal  Conduction:     Conduction: normal    ST segments:     ST segments:  Normal  T waves:     T waves: normal    Comments:      Qtc 434      ED Medication and Procedure Management   Prior to Admission Medications   Prescriptions Last Dose Informant Patient Reported? Taking?   Cholecalciferol (VITAMIN D3) 5000 UNITS CAPS  Self Yes No   Sig: Take 1 capsule by mouth daily.   Loratadine (CLARITIN PO)  Self Yes No   Sig: Take by mouth as needed   acetaminophen (TYLENOL) 325 mg tablet  Self No No   Si mg every 4 to 6 hours as needed for pain.   bismuth subsalicylate (PEPTO BISMOL) 262 MG chewable tablet  Self Yes No   Sig: Chew 262 mg 2 (two) times a day as needed for indigestion    Patient not taking: Reported on 2024   escitalopram (LEXAPRO) 20 mg tablet  Self Yes No   Sig: Take 10 mg by mouth daily    famotidine (PEPCID) 40 MG tablet  Self Yes No   Sig: Take 40 mg by mouth daily    pantoprazole (PROTONIX) 40 mg tablet  Self Yes No   Sig: Take 40 mg by mouth daily   promethazine-dextromethorphan (PHENERGAN-DM) 6.25-15 mg/5 mL oral syrup   No No   Sig: Take 5 mL by mouth 4 (four) times a day as needed for cough   propranolol (INDERAL LA) 60 mg 24 hr capsule  Self Yes No   sertraline (ZOLOFT) 25 mg tablet  Self Yes No   Sig: Take 25 mg by mouth daily at bedtime   simvastatin (ZOCOR) 80 mg tablet  Self Yes No   Sig: Take 80 mg by mouth daily      Facility-Administered Medications: None     Patient's Medications   Discharge Prescriptions    No medications on file     No discharge procedures on file.  ED SEPSIS DOCUMENTATION   Time reflects when diagnosis was documented in both MDM as applicable and the Disposition within this note       Time User Action Codes Description Comment    1/28/2025  6:21 PM Rosey Britt Add [S82.843A] Bimalleolar fracture     1/28/2025  6:57 PM Rosey Britt Add [S82.899A] Ankle fracture     1/28/2025  8:17 PM Peter Smith Add [S82.891A] Closed fracture of right ankle, initial encounter                  Rosey Britt MD  01/28/25 6963

## 2025-01-29 ENCOUNTER — ANESTHESIA (INPATIENT)
Dept: PERIOP | Facility: HOSPITAL | Age: 80
DRG: 494 | End: 2025-01-29
Payer: COMMERCIAL

## 2025-01-29 ENCOUNTER — APPOINTMENT (INPATIENT)
Dept: RADIOLOGY | Facility: HOSPITAL | Age: 80
DRG: 494 | End: 2025-01-29
Payer: COMMERCIAL

## 2025-01-29 PROBLEM — R11.2 PONV (POSTOPERATIVE NAUSEA AND VOMITING): Status: ACTIVE | Noted: 2025-01-29

## 2025-01-29 PROBLEM — Z98.890 PONV (POSTOPERATIVE NAUSEA AND VOMITING): Status: ACTIVE | Noted: 2025-01-29

## 2025-01-29 LAB
25(OH)D3 SERPL-MCNC: 17.1 NG/ML (ref 30–100)
ANION GAP SERPL CALCULATED.3IONS-SCNC: 6 MMOL/L (ref 4–13)
BASOPHILS # BLD AUTO: 0.06 THOUSANDS/ΜL (ref 0–0.1)
BASOPHILS NFR BLD AUTO: 1 % (ref 0–1)
BUN SERPL-MCNC: 20 MG/DL (ref 5–25)
CALCIUM SERPL-MCNC: 9 MG/DL (ref 8.4–10.2)
CHLORIDE SERPL-SCNC: 104 MMOL/L (ref 96–108)
CO2 SERPL-SCNC: 28 MMOL/L (ref 21–32)
CREAT SERPL-MCNC: 0.88 MG/DL (ref 0.6–1.3)
EOSINOPHIL # BLD AUTO: 0.12 THOUSAND/ΜL (ref 0–0.61)
EOSINOPHIL NFR BLD AUTO: 1 % (ref 0–6)
ERYTHROCYTE [DISTWIDTH] IN BLOOD BY AUTOMATED COUNT: 13.2 % (ref 11.6–15.1)
GFR SERPL CREATININE-BSD FRML MDRD: 61 ML/MIN/1.73SQ M
GLUCOSE SERPL-MCNC: 116 MG/DL (ref 65–140)
HCT VFR BLD AUTO: 34.5 % (ref 34.8–46.1)
HGB BLD-MCNC: 11 G/DL (ref 11.5–15.4)
IMM GRANULOCYTES # BLD AUTO: 0.04 THOUSAND/UL (ref 0–0.2)
IMM GRANULOCYTES NFR BLD AUTO: 0 % (ref 0–2)
LYMPHOCYTES # BLD AUTO: 2.35 THOUSANDS/ΜL (ref 0.6–4.47)
LYMPHOCYTES NFR BLD AUTO: 22 % (ref 14–44)
MCH RBC QN AUTO: 30.8 PG (ref 26.8–34.3)
MCHC RBC AUTO-ENTMCNC: 31.9 G/DL (ref 31.4–37.4)
MCV RBC AUTO: 97 FL (ref 82–98)
MONOCYTES # BLD AUTO: 1.17 THOUSAND/ΜL (ref 0.17–1.22)
MONOCYTES NFR BLD AUTO: 11 % (ref 4–12)
NEUTROPHILS # BLD AUTO: 7.18 THOUSANDS/ΜL (ref 1.85–7.62)
NEUTS SEG NFR BLD AUTO: 65 % (ref 43–75)
NRBC BLD AUTO-RTO: 0 /100 WBCS
PLATELET # BLD AUTO: 258 THOUSANDS/UL (ref 149–390)
PMV BLD AUTO: 9.5 FL (ref 8.9–12.7)
POTASSIUM SERPL-SCNC: 4.1 MMOL/L (ref 3.5–5.3)
RBC # BLD AUTO: 3.57 MILLION/UL (ref 3.81–5.12)
SODIUM SERPL-SCNC: 138 MMOL/L (ref 135–147)
VIT B12 SERPL-MCNC: 740 PG/ML (ref 180–914)
WBC # BLD AUTO: 10.92 THOUSAND/UL (ref 4.31–10.16)

## 2025-01-29 PROCEDURE — C1713 ANCHOR/SCREW BN/BN,TIS/BN: HCPCS | Performed by: STUDENT IN AN ORGANIZED HEALTH CARE EDUCATION/TRAINING PROGRAM

## 2025-01-29 PROCEDURE — 82607 VITAMIN B-12: CPT | Performed by: FAMILY MEDICINE

## 2025-01-29 PROCEDURE — 90662 IIV NO PRSV INCREASED AG IM: CPT | Performed by: SURGERY

## 2025-01-29 PROCEDURE — 80048 BASIC METABOLIC PNL TOTAL CA: CPT | Performed by: SURGERY

## 2025-01-29 PROCEDURE — 99232 SBSQ HOSP IP/OBS MODERATE 35: CPT | Performed by: STUDENT IN AN ORGANIZED HEALTH CARE EDUCATION/TRAINING PROGRAM

## 2025-01-29 PROCEDURE — 27829 TREAT LOWER LEG JOINT: CPT | Performed by: STUDENT IN AN ORGANIZED HEALTH CARE EDUCATION/TRAINING PROGRAM

## 2025-01-29 PROCEDURE — 85025 COMPLETE CBC W/AUTO DIFF WBC: CPT | Performed by: SURGERY

## 2025-01-29 PROCEDURE — 36415 COLL VENOUS BLD VENIPUNCTURE: CPT | Performed by: SURGERY

## 2025-01-29 PROCEDURE — 77071 MNL APPL STRS JT RADIOGRAPHY: CPT | Performed by: STUDENT IN AN ORGANIZED HEALTH CARE EDUCATION/TRAINING PROGRAM

## 2025-01-29 PROCEDURE — 73600 X-RAY EXAM OF ANKLE: CPT

## 2025-01-29 PROCEDURE — 3E0T3BZ INTRODUCTION OF ANESTHETIC AGENT INTO PERIPHERAL NERVES AND PLEXI, PERCUTANEOUS APPROACH: ICD-10-PCS | Performed by: STUDENT IN AN ORGANIZED HEALTH CARE EDUCATION/TRAINING PROGRAM

## 2025-01-29 PROCEDURE — 99223 1ST HOSP IP/OBS HIGH 75: CPT | Performed by: STUDENT IN AN ORGANIZED HEALTH CARE EDUCATION/TRAINING PROGRAM

## 2025-01-29 PROCEDURE — 0QSG04Z REPOSITION RIGHT TIBIA WITH INTERNAL FIXATION DEVICE, OPEN APPROACH: ICD-10-PCS | Performed by: STUDENT IN AN ORGANIZED HEALTH CARE EDUCATION/TRAINING PROGRAM

## 2025-01-29 PROCEDURE — 82306 VITAMIN D 25 HYDROXY: CPT | Performed by: FAMILY MEDICINE

## 2025-01-29 PROCEDURE — 27822 TREATMENT OF ANKLE FRACTURE: CPT | Performed by: STUDENT IN AN ORGANIZED HEALTH CARE EDUCATION/TRAINING PROGRAM

## 2025-01-29 PROCEDURE — G0008 ADMIN INFLUENZA VIRUS VAC: HCPCS | Performed by: SURGERY

## 2025-01-29 PROCEDURE — 99223 1ST HOSP IP/OBS HIGH 75: CPT | Performed by: FAMILY MEDICINE

## 2025-01-29 PROCEDURE — 0QSJ04Z REPOSITION RIGHT FIBULA WITH INTERNAL FIXATION DEVICE, OPEN APPROACH: ICD-10-PCS | Performed by: STUDENT IN AN ORGANIZED HEALTH CARE EDUCATION/TRAINING PROGRAM

## 2025-01-29 DEVICE — K-LESS T-ROPE W/DRV, SYN REPR, TI
Type: IMPLANTABLE DEVICE | Site: ANKLE | Status: FUNCTIONAL
Brand: ARTHREX®

## 2025-01-29 DEVICE — BONE SCREW, FULLY THREADED,T10
Type: IMPLANTABLE DEVICE | Site: ANKLE | Status: FUNCTIONAL
Brand: VARIAX

## 2025-01-29 DEVICE — LOCKING SCREW
Type: IMPLANTABLE DEVICE | Site: ANKLE | Status: FUNCTIONAL
Brand: VARIAX

## 2025-01-29 DEVICE — DISTAL LATERAL FIBULA PLATE, 4 HOLE
Type: IMPLANTABLE DEVICE | Site: ANKLE | Status: FUNCTIONAL
Brand: VARIAX

## 2025-01-29 DEVICE — CANNULATED SCREW
Type: IMPLANTABLE DEVICE | Site: ANKLE | Status: FUNCTIONAL
Brand: ASNIS

## 2025-01-29 DEVICE — LOCKING SCREW, FULLY THREADED, T10
Type: IMPLANTABLE DEVICE | Site: ANKLE | Status: FUNCTIONAL
Brand: VARIAX

## 2025-01-29 RX ORDER — VANCOMYCIN HYDROCHLORIDE 1 G/20ML
INJECTION, POWDER, LYOPHILIZED, FOR SOLUTION INTRAVENOUS AS NEEDED
Status: DISCONTINUED | OUTPATIENT
Start: 2025-01-29 | End: 2025-01-29 | Stop reason: HOSPADM

## 2025-01-29 RX ORDER — BUPIVACAINE HYDROCHLORIDE 5 MG/ML
INJECTION, SOLUTION EPIDURAL; INTRACAUDAL
Status: COMPLETED | OUTPATIENT
Start: 2025-01-29 | End: 2025-01-29

## 2025-01-29 RX ORDER — CEFAZOLIN SODIUM 2 G/50ML
2000 SOLUTION INTRAVENOUS EVERY 8 HOURS
Status: COMPLETED | OUTPATIENT
Start: 2025-01-29 | End: 2025-01-30

## 2025-01-29 RX ORDER — ONDANSETRON 2 MG/ML
INJECTION INTRAMUSCULAR; INTRAVENOUS AS NEEDED
Status: DISCONTINUED | OUTPATIENT
Start: 2025-01-29 | End: 2025-01-29

## 2025-01-29 RX ORDER — CLINDAMYCIN PHOSPHATE 900 MG/50ML
INJECTION, SOLUTION INTRAVENOUS
Status: COMPLETED
Start: 2025-01-29 | End: 2025-01-29

## 2025-01-29 RX ORDER — PHENYLEPHRINE HCL IN 0.9% NACL 1 MG/10 ML
SYRINGE (ML) INTRAVENOUS AS NEEDED
Status: DISCONTINUED | OUTPATIENT
Start: 2025-01-29 | End: 2025-01-29

## 2025-01-29 RX ORDER — ROCURONIUM BROMIDE 10 MG/ML
INJECTION, SOLUTION INTRAVENOUS AS NEEDED
Status: DISCONTINUED | OUTPATIENT
Start: 2025-01-29 | End: 2025-01-29

## 2025-01-29 RX ORDER — HYDROMORPHONE HCL/PF 1 MG/ML
0.25 SYRINGE (ML) INJECTION
Status: DISCONTINUED | OUTPATIENT
Start: 2025-01-29 | End: 2025-01-29 | Stop reason: HOSPADM

## 2025-01-29 RX ORDER — CLINDAMYCIN PHOSPHATE 900 MG/50ML
INJECTION, SOLUTION INTRAVENOUS AS NEEDED
Status: DISCONTINUED | OUTPATIENT
Start: 2025-01-29 | End: 2025-01-29

## 2025-01-29 RX ORDER — LIDOCAINE HYDROCHLORIDE 10 MG/ML
INJECTION, SOLUTION EPIDURAL; INFILTRATION; INTRACAUDAL; PERINEURAL AS NEEDED
Status: DISCONTINUED | OUTPATIENT
Start: 2025-01-29 | End: 2025-01-29

## 2025-01-29 RX ORDER — FENTANYL CITRATE 50 UG/ML
INJECTION, SOLUTION INTRAMUSCULAR; INTRAVENOUS AS NEEDED
Status: DISCONTINUED | OUTPATIENT
Start: 2025-01-29 | End: 2025-01-29

## 2025-01-29 RX ORDER — ONDANSETRON 2 MG/ML
4 INJECTION INTRAMUSCULAR; INTRAVENOUS ONCE AS NEEDED
Status: DISCONTINUED | OUTPATIENT
Start: 2025-01-29 | End: 2025-01-29 | Stop reason: HOSPADM

## 2025-01-29 RX ORDER — PROPOFOL 10 MG/ML
INJECTION, EMULSION INTRAVENOUS CONTINUOUS PRN
Status: DISCONTINUED | OUTPATIENT
Start: 2025-01-29 | End: 2025-01-29

## 2025-01-29 RX ORDER — ENOXAPARIN SODIUM 100 MG/ML
30 INJECTION SUBCUTANEOUS EVERY 12 HOURS SCHEDULED
Status: DISCONTINUED | OUTPATIENT
Start: 2025-01-29 | End: 2025-01-31 | Stop reason: HOSPADM

## 2025-01-29 RX ORDER — PROPOFOL 10 MG/ML
INJECTION, EMULSION INTRAVENOUS AS NEEDED
Status: DISCONTINUED | OUTPATIENT
Start: 2025-01-29 | End: 2025-01-29

## 2025-01-29 RX ORDER — SODIUM CHLORIDE, SODIUM LACTATE, POTASSIUM CHLORIDE, CALCIUM CHLORIDE 600; 310; 30; 20 MG/100ML; MG/100ML; MG/100ML; MG/100ML
INJECTION, SOLUTION INTRAVENOUS CONTINUOUS PRN
Status: DISCONTINUED | OUTPATIENT
Start: 2025-01-29 | End: 2025-01-29

## 2025-01-29 RX ORDER — MAGNESIUM HYDROXIDE 1200 MG/15ML
LIQUID ORAL AS NEEDED
Status: DISCONTINUED | OUTPATIENT
Start: 2025-01-29 | End: 2025-01-29 | Stop reason: HOSPADM

## 2025-01-29 RX ORDER — FENTANYL CITRATE/PF 50 MCG/ML
25 SYRINGE (ML) INJECTION
Status: DISCONTINUED | OUTPATIENT
Start: 2025-01-29 | End: 2025-01-29 | Stop reason: HOSPADM

## 2025-01-29 RX ORDER — DEXAMETHASONE SODIUM PHOSPHATE 10 MG/ML
INJECTION, SOLUTION INTRAMUSCULAR; INTRAVENOUS AS NEEDED
Status: DISCONTINUED | OUTPATIENT
Start: 2025-01-29 | End: 2025-01-29

## 2025-01-29 RX ADMIN — SUGAMMADEX 170 MG: 100 INJECTION, SOLUTION INTRAVENOUS at 13:55

## 2025-01-29 RX ADMIN — Medication 100 MCG: at 13:16

## 2025-01-29 RX ADMIN — ESCITALOPRAM OXALATE 10 MG: 10 TABLET ORAL at 21:46

## 2025-01-29 RX ADMIN — BUPIVACAINE HYDROCHLORIDE 12 ML: 5 INJECTION, SOLUTION EPIDURAL; INTRACAUDAL at 12:15

## 2025-01-29 RX ADMIN — CEFAZOLIN SODIUM 2000 MG: 2 SOLUTION INTRAVENOUS at 21:47

## 2025-01-29 RX ADMIN — FENTANYL CITRATE 25 MCG: 50 INJECTION INTRAMUSCULAR; INTRAVENOUS at 12:32

## 2025-01-29 RX ADMIN — SODIUM CHLORIDE, SODIUM LACTATE, POTASSIUM CHLORIDE, AND CALCIUM CHLORIDE: .6; .31; .03; .02 INJECTION, SOLUTION INTRAVENOUS at 13:31

## 2025-01-29 RX ADMIN — ONDANSETRON 4 MG: 2 INJECTION, SOLUTION INTRAMUSCULAR; INTRAVENOUS at 12:26

## 2025-01-29 RX ADMIN — ENOXAPARIN SODIUM 30 MG: 30 INJECTION SUBCUTANEOUS at 21:46

## 2025-01-29 RX ADMIN — Medication 50 MCG: at 13:07

## 2025-01-29 RX ADMIN — Medication 100 MCG: at 12:44

## 2025-01-29 RX ADMIN — BUPIVACAINE 5 ML: 13.3 INJECTION, SUSPENSION, LIPOSOMAL INFILTRATION at 12:10

## 2025-01-29 RX ADMIN — ATORVASTATIN CALCIUM 40 MG: 40 TABLET, FILM COATED ORAL at 21:47

## 2025-01-29 RX ADMIN — ACETAMINOPHEN 975 MG: 325 TABLET, FILM COATED ORAL at 21:47

## 2025-01-29 RX ADMIN — Medication 1000 UNITS: at 21:47

## 2025-01-29 RX ADMIN — Medication 100 MCG: at 13:30

## 2025-01-29 RX ADMIN — LIDOCAINE HYDROCHLORIDE 50 MG: 10 INJECTION, SOLUTION EPIDURAL; INFILTRATION; INTRACAUDAL at 12:26

## 2025-01-29 RX ADMIN — LORATADINE 10 MG: 10 TABLET ORAL at 21:47

## 2025-01-29 RX ADMIN — HYDROMORPHONE HYDROCHLORIDE 0.2 MG: 0.2 INJECTION, SOLUTION INTRAMUSCULAR; INTRAVENOUS; SUBCUTANEOUS at 05:16

## 2025-01-29 RX ADMIN — DEXAMETHASONE SODIUM PHOSPHATE 10 MG: 10 INJECTION INTRAMUSCULAR; INTRAVENOUS at 12:26

## 2025-01-29 RX ADMIN — PROPOFOL 100 MG: 10 INJECTION, EMULSION INTRAVENOUS at 12:26

## 2025-01-29 RX ADMIN — FENTANYL CITRATE 25 MCG: 50 INJECTION INTRAMUSCULAR; INTRAVENOUS at 12:48

## 2025-01-29 RX ADMIN — Medication 100 MCG: at 14:04

## 2025-01-29 RX ADMIN — PROPOFOL 50 MCG/KG/MIN: 10 INJECTION, EMULSION INTRAVENOUS at 12:56

## 2025-01-29 RX ADMIN — PROPRANOLOL HYDROCHLORIDE 60 MG: 60 CAPSULE, EXTENDED RELEASE ORAL at 08:31

## 2025-01-29 RX ADMIN — ENOXAPARIN SODIUM 30 MG: 30 INJECTION SUBCUTANEOUS at 08:07

## 2025-01-29 RX ADMIN — Medication 100 MCG: at 12:58

## 2025-01-29 RX ADMIN — FAMOTIDINE 40 MG: 20 TABLET, FILM COATED ORAL at 21:46

## 2025-01-29 RX ADMIN — FENTANYL CITRATE 25 MCG: 50 INJECTION INTRAMUSCULAR; INTRAVENOUS at 12:40

## 2025-01-29 RX ADMIN — INFLUENZA A VIRUS A/VICTORIA/4897/2022 IVR-238 (H1N1) ANTIGEN (FORMALDEHYDE INACTIVATED), INFLUENZA A VIRUS A/CALIFORNIA/122/2022 SAN-022 (H3N2) ANTIGEN (FORMALDEHYDE INACTIVATED), AND INFLUENZA B VIRUS B/MICHIGAN/01/2021 ANTIGEN (FORMALDEHYDE INACTIVATED) 0.5 ML: 60; 60; 60 INJECTION, SUSPENSION INTRAMUSCULAR at 09:01

## 2025-01-29 RX ADMIN — CLINDAMYCIN PHOSPHATE 900 MG: 900 INJECTION, SOLUTION INTRAVENOUS at 12:34

## 2025-01-29 RX ADMIN — OXYCODONE HYDROCHLORIDE 5 MG: 5 TABLET ORAL at 09:01

## 2025-01-29 RX ADMIN — SODIUM CHLORIDE, SODIUM LACTATE, POTASSIUM CHLORIDE, AND CALCIUM CHLORIDE: .6; .31; .03; .02 INJECTION, SOLUTION INTRAVENOUS at 11:45

## 2025-01-29 RX ADMIN — PANTOPRAZOLE SODIUM 40 MG: 40 TABLET, DELAYED RELEASE ORAL at 21:47

## 2025-01-29 RX ADMIN — ROCURONIUM 50 MG: 50 INJECTION, SOLUTION INTRAVENOUS at 12:26

## 2025-01-29 RX ADMIN — Medication 100 MCG: at 14:15

## 2025-01-29 RX ADMIN — BUPIVACAINE HYDROCHLORIDE 12 ML: 5 INJECTION, SOLUTION EPIDURAL; INTRACAUDAL; PERINEURAL at 12:10

## 2025-01-29 RX ADMIN — HYDROMORPHONE HYDROCHLORIDE 0.2 MG: 0.2 INJECTION, SOLUTION INTRAMUSCULAR; INTRAVENOUS; SUBCUTANEOUS at 00:43

## 2025-01-29 RX ADMIN — ACETAMINOPHEN 975 MG: 325 TABLET, FILM COATED ORAL at 05:16

## 2025-01-29 RX ADMIN — FENTANYL CITRATE 25 MCG: 50 INJECTION INTRAMUSCULAR; INTRAVENOUS at 12:08

## 2025-01-29 NOTE — ED ATTENDING ATTESTATION
1/28/2025  ICiaran DO, saw and evaluated the patient. I have discussed the patient with the resident/non-physician practitioner and agree with the resident's/non-physician practitioner's findings, Plan of Care, and MDM as documented in the resident's/non-physician practitioner's note, except where noted. All available labs and Radiology studies were reviewed.  I was present for key portions of any procedure(s) performed by the resident/non-physician practitioner and I was immediately available to provide assistance.       At this point I agree with the current assessment done in the Emergency Department.  I have conducted an independent evaluation of this patient a history and physical is as follows:    79-year-old female coming into the ED for evaluation of right ankle injury and deformity.  She states she was chasing a mouse out of her house when she fell and heard a snap in her right ankle.  Denies head strike or loss of consciousness.  Came by ambulance with an ankle deformity and splinted.    PE:  The patient is well appearing, non-toxic, in NAD. Head: normocephalic, atraumatic. HEENT: mucous membranes moist.  Lungs: CTA b/l, no resp distress. Heart: RRR. No M/R/G. Abdomen: NT, ND, no R/R/G. Neuro: CN2-12 intact, GCS 15. Normal strength and sensation, normal speech and gait. Cap refill < 2 sec, skin warm and dry. No rashes or lesions.    MSK: the right ankle is deformed, foot is cool and dusky appearing, but DP/PT pulses intact, 2+. The foot was repositioned in traction, and color and temperature improved. Cap refill reduced, 3-4 seconds.  After reduction, pulses intact and color, cap refill normal.    Xrays revealed dislocated ankle fracture, bimalleolar. Fracture unstable - reduced and splinted with procedural sedation.  Alignment greatly improved.  Admit to trauma w/ ortho consult - Dr Brown aware and agrees w/ admission, approves of splint alignment.    ED Course         Critical Care  Time  Procedures

## 2025-01-29 NOTE — ASSESSMENT & PLAN NOTE
Currently not in exacerbation.  Patient does not require supplemental oxygen.  Will continue to monitor.

## 2025-01-29 NOTE — ASSESSMENT & PLAN NOTE
- Right ankle fracture, present on admission.  - Status post fall on 1/28.  - Appreciate Orthopedic surgery evaluation, recommendations and interventions as noted.   - Reduced and splinted in the ED   - NPO at midnight for OR tomorrow  - Maintain NWB RLE in splint   - Monitor right lower extremity neurovascular exam.  - Continue multimodal analgesic regimen.  - Continue DVT prophylaxis.  - PT and OT evaluation and treatment as indicated.  - Outpatient follow up with Orthopedic surgery for re-evaluation.

## 2025-01-29 NOTE — UTILIZATION REVIEW
Initial Clinical Review    Admission: Date/Time/Statement:   Admission Orders (From admission, onward)       Ordered        01/28/25 1917  INPATIENT ADMISSION  Once                          Orders Placed This Encounter   Procedures    INPATIENT ADMISSION     Standing Status:   Standing     Number of Occurrences:   1     Level of Care:   Med Surg [16]     Estimated length of stay:   More than 2 Midnights     Certification:   I certify that inpatient services are medically necessary for this patient for a duration of greater than two midnights. See H&P and MD Progress Notes for additional information about the patient's course of treatment.     ED Arrival Information       Expected   -    Arrival   1/28/2025 18:04    Acuity   Emergent              Means of arrival   Ambulance    Escorted by   [facility]    Service   Trauma    Admission type   Emergency              Arrival complaint   Fall/ Ankle Pain             Chief Complaint   Patient presents with    Ankle Injury       Initial Presentation: 79 y.o. female  with hx SEEMA on CPAP, tremor who presents to ED via EMS from home  after a mechanical fall with right ankle pain . Denies head strike or LOC.. NO AC/AP . ON exam, GCS 15, AAO  , no sensory deficit, +2 distal pulses. 0/5 R dorsi/plantar flexion . On exam,  obvious right ankle deformity and bruising with skin tenting over the medial aspect.   RLE in splint s/p ankle reduction in ED . Imaging shows R trimalleolar fracture . Labs WBC 15.09 , Hgb 12.4 . Pt given IVF, IV analgesics in ED. Admitted as Inpatient by trauma servuice with closed R ankle fx s/p fall. PLan- NPO at [facility] for OR tomorrow. Ortho consult . NWB RLE  in splint . Neurvasc checks RLE . Multimodal pain control. DVT ppx. PT/OT .     Date: 1/29   Day 2:   Hgb down to 11 today. WBC down to 10.92 .Pt  reports she has had significant pain all night. The IV medication helps but does not last long. She has no pain other than her right ankle. Right ankle:  Swelling ,tenderness present. Decreased range of motion (In splint). Lovenox DVT ppx. Pain control.   Ortho consult - right trimalleolar ankle fracture dislocation . On exam, splint is clean, dry, intact. The patient's sensation is intact light touch distally in all 5 digits. Patient is able to flex and extend the toes. Her compartments are soft compressible. She has brisk capillary refill in all 5 digits. Pt NPO for OR today , cleared by trauma team .     1/29/25 @ 1250  #1 open reduction internal fixation of right trimalleolar ankle fracture without fixation of the posterior limb  2.  Surgical fixation of the distal tibia-fibula syndesmosis with a syndesmotic tight rope  Anesthesia- General, ETT with R popliteal block and R adductor  block  Operative findings:  Patient had skin tenting over the anterior medial aspect of the ankle.  Pictures below.  This area of ecchymosis and damaged epidermis was better perfused after surgical fixation was performed and pressure had been taken off of the soft tissue area.  The patient had a long oblique fibular fracture.  The anterior inferior tibiofibular ligament was still intact and attached.  The posterior malleolar fracture fragments with a loose intracondylar fragments were debrided to allow for appropriate fixation and reduction of the fibula and posterior inferior tibiofibular ligament through the long oblique fracture segment.  The loose intra chondral fragments did not contain subchondral bone that would have been amenable to fixation.  These will likely become loose intra-articular bodies.  Today were debrided.  The fibula was reduced anatomically and then the medial malleolus was also reduced anatomically and compressed.  Interfragmentary screw was used for the fibula and a laterally based implant was secured proximally and distally with multiple locking and cortical screws.  The medial malleolus was secured with a offset clamp to gain compression and fixated with 3.5  mm fully threaded screws.  After reduction cotton test was negative but the patient had slight rotational instability of her fibula and given her posterior malleoli are fracture fragment decision was made to perform syndesmotic fixation with a syndesmotic tight rope.  Additional were taken with stress films after placement of the syndesmotic tight rope and the ankle mortise was completely stable on AP and lateral views.  Additional syndesmotic fixation was not performed as it was right through the patient's fracture segment.   POD #0- NWB RLE .  SCD's, Lovenox for DVT ppx.      Date: 1/30 POD #1  Day 3: Has surpassed a 2nd midnight with active treatments and services. R ankle fx   Pt NWB RLE in  splint  which is C/D/I /  Pt reports overnight she experienced mild pain in the extremity but states this got better when IV pain medication was administered. She offers no additional complaints at this time aside from mild ankle pain. She states most of her foot feels numb and she is unable to move her toes due to her nerve block she received yesterday. Brisk capillary refill in all 5 digits .  Thigh and calf remain soft and compressible . GCS 15. WBC 13.60 today. Hgb 10.6 from 12.4 .Monitor for ABLA. Neuro checks qshift .  Lovenox DVT ppx. Await PT/OT evals .   APS consult - Right adductor and popliteal block  functioning appropriately with expected sensory and motor deficits .Unable to move right toes.  No sensation to light touch in toes of right foot.  Recommend oxycodone 2.5 mg/5 mg PO q4hrs PRN for moderate/severe pain . Dilaudid 0.2 mg IV q2hr PRN for breakthrough pain. Scheduled po Tylenol.        ED Treatment-Medication Administration from 01/28/2025 1804 to 01/29/2025 0845         Date/Time Order Dose Route Action     01/28/2025 1842 propofol (DIPRIVAN) 200 MG/20ML bolus injection 123 mg 123 mg Intravenous Given by Other     01/28/2025 1833 ondansetron (ZOFRAN) injection 4 mg 4 mg Intravenous Given     01/28/2025  1833 sodium chloride 0.9 % bolus 1,000 mL 1,000 mL Intravenous New Bag     01/28/2025 1832 fentaNYL injection 50 mcg 50 mcg Intravenous Given     01/28/2025 1939 enoxaparin (LOVENOX) subcutaneous injection 30 mg 30 mg Subcutaneous Given     01/29/2025 0807 enoxaparin (LOVENOX) subcutaneous injection 30 mg 30 mg Subcutaneous Given     01/28/2025 1940 HYDROmorphone (DILAUDID) injection 0.5 mg 0.5 mg Intravenous Given     01/28/2025 2013 ondansetron (ZOFRAN) injection 4 mg 4 mg Intravenous Given     01/28/2025 1939 docusate sodium (COLACE) capsule 100 mg 100 mg Oral Given     01/28/2025 2120 acetaminophen (TYLENOL) tablet 975 mg 975 mg Oral Given     01/29/2025 0516 acetaminophen (TYLENOL) tablet 975 mg 975 mg Oral Given     01/28/2025 2121 Cholecalciferol (VITAMIN D3) tablet 1,000 Units 1,000 Units Oral Given     01/28/2025 2247 escitalopram (LEXAPRO) tablet 10 mg 10 mg Oral Given     01/28/2025 2120 famotidine (PEPCID) tablet 40 mg 40 mg Oral Given     01/28/2025 2121 atorvastatin (LIPITOR) tablet 40 mg 40 mg Oral Given     01/28/2025 2121 pantoprazole (PROTONIX) EC tablet 40 mg 40 mg Oral Given     01/28/2025 2120 loratadine (CLARITIN) tablet 10 mg 10 mg Oral Given     01/29/2025 0831 propranolol (INDERAL LA) 24 hr capsule 60 mg 60 mg Oral Given     01/28/2025 2247 oxyCODONE (ROXICODONE) IR tablet 5 mg 5 mg Oral Given     01/29/2025 0043 HYDROmorphone HCl (DILAUDID) injection 0.2 mg 0.2 mg Intravenous Given     01/29/2025 0516 HYDROmorphone HCl (DILAUDID) injection 0.2 mg 0.2 mg Intravenous Given            Scheduled Medications:  acetaminophen, 975 mg, Oral, Q8H BROOKS  atorvastatin, 40 mg, Oral, HS  Cholecalciferol, 1,000 Units, Oral, HS  enoxaparin, 30 mg, Subcutaneous, Q12H BROOKS  escitalopram, 10 mg, Oral, HS  famotidine, 40 mg, Oral, HS  loratadine, 10 mg, Oral, HS  pantoprazole, 40 mg, Oral, HS  polyethylene glycol, 17 g, Oral, Daily  propranolol, 60 mg, Oral, Daily    ceFAZolin (ANCEF) IVPB (premix in dextrose)  2,000 mg 50 mL  Dose: 2,000 mg  Freq: Every 8 hours Route: IV  Last Dose: Stopped (01/30/25 0701)  Start: 01/29/25 2221 End: 01/30/25 0701  Continuous IV Infusions:     PRN Meds:   HYDROmorphone, 0.2 mg, Intravenous, Q2H PRN  naloxone, 0.04 mg, Intravenous, Q1MIN PRN   ondansetron, 4 mg, Intravenous, Q6H PRN   oxyCODONE, 5 mg, Oral, Q4H PRN x1 1/29  x1 1/30   oxyCODONE, 2.5 mg, Oral, Q4H PRN   polyethylene glycol, 17 g, Oral, Daily PRN      ED Triage Vitals   Temperature Pulse Respirations Blood Pressure SpO2 Pain Score   01/28/25 1810 01/28/25 1810 01/28/25 1810 01/28/25 1810 01/28/25 1810 01/28/25 1940   98.4 °F (36.9 °C) 69 20 (!) 181/87 97 % 7     Weight (last 2 days)       Date/Time Weight    01/29/25 0848 82 (180.78)    01/28/25 1810 82 (180.78)            Vital Signs (last 3 days)       Date/Time Temp Pulse Resp BP MAP (mmHg) SpO2 O2 Flow Rate (L/min) O2 Device Cardiac (WDL) Patient Position - Orthostatic VS Greg Coma Scale Score Pain    01/30/25 0839 -- 65 -- 120/59 -- -- -- -- -- -- -- --    01/30/25 0828 -- -- -- -- -- -- -- -- -- -- -- 5    01/30/25 0800 -- -- -- -- -- -- -- -- -- -- 15 4    01/30/25 0700 97.8 °F (36.6 °C) 58 18 120/59 85 97 % -- None (Room air) -- Lying -- --    01/30/25 0400 -- 58 -- -- -- 96 % -- -- -- -- -- --    01/29/25 2222 98 °F (36.7 °C) -- -- 119/67 83 -- -- -- -- Lying -- --    01/29/25 2000 -- 63 -- 118/56 80 94 % -- None (Room air) -- -- 15 No Pain    01/29/25 1900 -- 71 -- 100/51 72 97 % -- -- -- -- -- --    01/29/25 1800 -- 66 -- 137/61 88 96 % -- -- -- -- -- --    01/29/25 1700 -- 62 -- 109/58 77 96 % -- -- -- -- -- --    01/29/25 1600 -- 63 -- 114/57 80 95 % -- -- -- -- -- --    01/29/25 1545 -- 62 -- 110/55 76 95 % -- -- -- -- -- --    01/29/25 1530 -- 62 -- 116/61 83 95 % -- -- -- -- -- --    01/29/25 1500 97.9 °F (36.6 °C) -- -- 117/56 81 -- -- Nasal cannula -- Lying -- --    01/29/25 1445 97.8 °F (36.6 °C) 62 15 109/56 80 96 % 2 L/min Nasal cannula WDL -- -- No  Pain    01/29/25 1430 97 °F (36.1 °C) 68 13 103/63 79 100 % 4 L/min Simple mask WDL -- -- --    01/29/25 1130 97.3 °F (36.3 °C) 63 20 140/63 91 92 % -- None (Room air) -- -- -- 5 01/29/25 1045 98.4 °F (36.9 °C) 62 -- 135/67 94 92 % -- None (Room air) -- Lying -- --    01/29/25 0901 -- -- -- -- -- -- -- -- -- -- -- 6 01/29/25 0900 -- -- -- -- -- -- -- -- -- -- 15 --    01/29/25 0848 98.5 °F (36.9 °C) 69 18 126/64 90 91 % -- -- -- -- -- 6 01/29/25 0806 -- 68 16 166/73 -- 95 % -- None (Room air) -- Lying -- --    01/29/25 0615 -- 66 16 116/72 -- 98 % -- None (Room air) -- Lying -- --    01/29/25 0516 -- -- -- -- -- -- -- -- -- -- -- 9 01/29/25 0043 -- -- -- -- -- -- -- -- -- -- -- 8 01/29/25 0000 -- -- -- -- -- -- -- -- -- -- 15 --    01/28/25 2248 -- 67 20 120/65 87 98 % -- None (Room air) -- Lying -- --    01/28/25 2247 -- -- -- -- -- -- -- -- -- -- -- 9 01/28/25 2120 -- -- -- -- -- -- -- -- -- -- -- 3    01/28/25 1940 -- -- -- -- -- -- -- -- -- -- -- 7 01/28/25 1908 -- -- -- -- -- -- -- None (Room air) -- -- 15 --    01/28/25 18:55:35 -- 62 16 172/72 104 98 % -- -- -- Sitting -- --    01/28/25 18:50:51 -- 63 15 151/70 -- 96 % -- None (Room air) -- Sitting -- --    01/28/25 1845 -- 64 15 169/75 108 95 % -- -- -- Sitting -- --    01/28/25 18:40:10 -- 61 20 173/84 108 98 % -- Nasal cannula -- Sitting -- --    01/28/25 18:37:01 -- -- -- -- -- -- -- Nasal cannula -- -- -- --    01/28/25 1810 98.4 °F (36.9 °C) 69 20 181/87 -- 97 % -- None (Room air) -- Lying -- --            Date and Time L Pedal Pulse   01/30/25 0800 +2       Pertinent Labs/Diagnostic Test Results:   Radiology:  XR ankle 2 vw right   Final Interpretation by Camron Busch MD (01/29 7591)      Fluoroscopic guidance provided for procedure guidance.  Please refer to the separate procedure notes for additional details.         Workstation performed: [id number]         CT lower extremity wo contrast right   Final Interpretation by  Jean Teresa DO (01/28 2238)      Trimalleolar fracture.         Workstation performed: [id number]         XR chest 1 view portable   Final Interpretation by Jere Sneed MD (01/29 1002)      No focal consolidation, pleural effusion, or pneumothorax.                        Resident: Quang Arellano I, the attending radiologist, have reviewed the images and agree with the final report above.      Workstation performed: [id number]         XR ankle 2 views RIGHT   ED Interpretation by Rosey Britt MD (01/28 2130)   Bimalleolar fracture s/p reduction and splinting. Interpreted by me.       Final Interpretation by Bradley Landon Kocher, MD (01/29 0827)      Status post casting of a trimalleolar fracture. Interval reduction of the tibiotalar dislocation with persistent mild widening of the anterior tibiotalar joint space.         Computerized Assisted Algorithm (CAA) may have been used to analyze all applicable images.               Workstation performed: [id number]         XR ankle 3+ views RIGHT   ED Interpretation by Rosey Britt MD (01/28 2129)   Bimalleolar fracture with posterior displacement. Interpreted by me.       Final Interpretation by Camron Busch MD (01/29 0821)      Trimalleolar fracture dislocation.         Computerized Assisted Algorithm (CAA) may have been used to analyze all applicable images.               Workstation performed: [id number]           Cardiology:  ECG 12 lead   Final Result by Priya Mann DO (01/28 2006)   Normal sinus rhythm   Nonspecific ST and T wave abnormality   When compared with ECG of 21-Oct-2019 13:37,   Nonspecific T wave abnormality, improved in Anterolateral leads   Confirmed by Priya Mann ([id number]) on 1/28/2025 8:06:15 PM              Results from last 7 days   Lab Units 01/30/25  0523 01/29/25  0519 01/28/25  1834   WBC Thousand/uL 13.60* 10.92* 15.09*   HEMOGLOBIN g/dL 10.6* 11.0* 12.4   HEMATOCRIT % 32.8* 34.5* 39.1   PLATELETS  Thousands/uL 250 258 314   TOTAL NEUT ABS Thousands/µL  --  7.18 10.03*         Results from last 7 days   Lab Units 01/30/25  0523 01/29/25  0519 01/28/25  1834   SODIUM mmol/L 138 138 137   POTASSIUM mmol/L 4.2 4.1 4.3   CHLORIDE mmol/L 103 104 103   CO2 mmol/L 28 28 26   ANION GAP mmol/L 7 6 8   BUN mg/dL 16 20 21   CREATININE mg/dL 0.98 0.88 1.08   EGFR ml/min/1.73sq m 54 61 48   CALCIUM mg/dL 8.8 9.0 9.7     Results from last 7 days   Lab Units 01/28/25  1834   AST U/L 74*   ALT U/L 69*   ALK PHOS U/L 98   TOTAL PROTEIN g/dL 7.7   ALBUMIN g/dL 4.2   TOTAL BILIRUBIN mg/dL 0.47         Results from last 7 days   Lab Units 01/30/25  0523 01/29/25  0519 01/28/25  1834   GLUCOSE RANDOM mg/dL 135 116 127                   Results from last 7 days   Lab Units 01/28/25  1834   PROTIME seconds 12.8   INR  0.89   PTT seconds 27                 Past Medical History:   Diagnosis Date    Cataract 2015    had surgery on both eyes    Colitis     Colon polyp     COPD (chronic obstructive pulmonary disease) (HCC)     Dehydration     Depression     Familial tremor     GERD (gastroesophageal reflux disease)     Ground glass opacity present on imaging of lung     Hiatal hernia     High cholesterol     Hyperlipidemia     Hypertension     Hypoglycemia     Migraine     Nosebleed     Primary adenocarcinoma of upper lobe of right lung (HCC)     Sleep apnea     C pap    Sleep apnea, obstructive     Solitary pulmonary nodule     Syncope      Present on Admission:   Closed fracture of right ankle   Obstructive sleep apnea   Tremor   Essential hypertension      Admitting Diagnosis: Bimalleolar fracture [S82.843A]  Ankle pain [M25.579]  Ankle fracture [S82.899A]  Closed fracture of right ankle, initial encounter [S82.891A]  Age/Sex: 79 y.o. female    Network Utilization Review Department  ATTENTION: Please call with any questions or concerns to [phone] and carefully listen to the prompts so that you are directed to the right person.  All voicemails are confidential.   For Discharge needs, contact Care Management DC Support Team at [phone] opt. [phone]  Send all requests for admission clinical reviews, approved or denied determinations and any other requests to dedicated fax number below belonging to the campus where the patient is receiving treatment. List of dedicated fax numbers for the Facilities:  FACILITY NAME UR FAX NUMBER   ADMISSION DENIALS (Administrative/Medical Necessity) [phone]   DISCHARGE SUPPORT TEAM (NETWORK) [phone]   PARENT CHILD HEALTH (Maternity/NICU/Pediatrics) [phone]   [facility] [phone]   [facility] [phone]   [facility] [phone]   [facility] [phone]   [facility] [phone]   [facility] [phone]   [facility] [phone]   [facility] [phone]   [facility] [phone]   [facility] [phone]   [facility] [phone]   [facility] [phone]

## 2025-01-29 NOTE — CONSULTS
Consultation - Orthopedics   Name: Lyn Pimentel 79 y.o. female I MRN: [id number]  Unit/Bed#: ED-28 I Date of Admission: 1/28/2025   Date of Service: 1/29/2025 I Hospital Day: 1   Inpatient consult to Orthopedic Surgery  Consult performed by: Demetrius Benitez PA-C  Consult ordered by: Ciaran Montgomery DO  Reason for consult: right ankle fx        Physician Requesting Evaluation: Salazar Hartley,*   Reason for Evaluation / Principal Problem: Right trimalleolar ankle fracture    Assessment & Plan  Ankle fracture  Status post right trimalleolar ankle fracture  NWB to right lower extremity in the  To OR today for ORIF right ankle with Dr. Brown  NPO status confirmed   Preoperative antibiotics ordered  Intraoperative TXA ordered  Cleared for trauma for or today  All labs including CBC, BMP, type and screen, INR all reviewed preoperatively.  Chest x-ray, EKG all reviewed.  Consented obtained and on file in OR  Will monitor for ABLA and administer IVF/prbc as indicated for Greater than 2 gram drop or Hgb < 7.  Patient hemoglobin currently at 11.0  Incentive spirometry postoperatively  Pain control per primary team  DVT ppx  : Per primary team  All other medical comorbidities to be managed per primary team  Dispo: Ortho will follow  See above for additional details   Case reviewed and discussed with Dr. Brown        Orthopedics service will follow.  Please contact the SecureChat role for the Orthopedics service with any questions/concerns.    History of Present Illness   HPI: Lyn Pimentel is a 79 y.o. year old female with a significant past medical history of lung cancer, hypoglycemia and essential hypertension who presents with right trimalleolar ankle fracture.  Patient ambulates with no assistive devices at baseline.  Patient states yesterday afternoon she was outdoors when she stepped on an uneven surface on the ground causing her to trip and fall.  Immediately after the incident she felt pain and  inability to bear weight on the right lower extremity.  At time of examination she only complains of pain around the ankle that denies any radiation of symptoms at this time.  She states most of her pain is concentrated on the medial and lateral aspects of the ankle made worse with any range of motion attempts and weightbearing.  Pain is described as sharp in nature.  Pain is well-controlled on current analgesic regimen at this time.  She denies pain elsewhere on her body.  She denies any new numbness or paresthesias through the right lower extremity.  Denies any fevers or chills at this time.    Review of Systems   Constitutional:  Negative for activity change, appetite change, chills and diaphoresis.   HENT:  Negative for congestion, dental problem, drooling and ear discharge.    Eyes:  Negative for discharge and itching.   Respiratory:  Negative for apnea, chest tightness and shortness of breath.    Cardiovascular:  Negative for chest pain and palpitations.   Gastrointestinal:  Negative for abdominal distention and abdominal pain.   Genitourinary:  Negative for difficulty urinating, dyspareunia and dysuria.   Musculoskeletal:  Positive for arthralgias, back pain and gait problem.   Neurological:  Negative for dizziness, numbness and headaches.   Psychiatric/Behavioral:  Negative for agitation, behavioral problems and confusion.     significant for findings described in the HPI.  I have reviewed the patient's PMH, PSH, Social History, Family History, Meds, and Allergies    Objective :  Temp:  [98.4 °F (36.9 °C)] 98.4 °F (36.9 °C)  HR:  [61-69] 66  BP: (116-181)/(65-87) 116/72  Resp:  [15-20] 16  SpO2:  [95 %-98 %] 98 %  O2 Device: None (Room air)  Physical Exam  Vitals and nursing note reviewed.   Constitutional:       Appearance: Normal appearance.   HENT:      Head: Normocephalic and atraumatic.      Nose: Nose normal.      Mouth/Throat:      Mouth: Mucous membranes are moist.      Pharynx: Oropharynx is clear.    Eyes:      Extraocular Movements: Extraocular movements intact.      Pupils: Pupils are equal, round, and reactive to light.   Cardiovascular:      Rate and Rhythm: Normal rate and regular rhythm.   Pulmonary:      Effort: Pulmonary effort is normal.   Abdominal:      General: Abdomen is flat.      Palpations: Abdomen is soft.   Musculoskeletal:         General: Swelling, tenderness, deformity and signs of injury present.      Cervical back: Normal range of motion and neck supple.      Comments:   Musculoskeletal: right lower extremity  Visible skin dry and intact, no open wounds or lacerations appreciated no erythema or ecchymosis  TTP diffusely around right ankle  Splint clean, dry, intact without evidence of strikethrough appreciated  SILT s/s/sp/dp/t.   Range of motion not assessed due to known fracture  Motor intact 5/5 strength with hip flexion/extension, knee flexion/extension, ankle dorsi/plantar flexion, EHL/FHL  Patient has full range of motion of right knee and right hip without pain elicited  2+ DP/PT pulse comparable contralateral side  Musculature is soft and compressible, no pain with passive stretch  Leg lengths equal    Tertiary: all other noninjured extremities were palpated and ranged looking for secondary injuries. Patient had no pain with range of motion of her other major joints. No tenderness over all other joints/long bones as except already stated.  Bilateral clavicles, shoulders, upper arms, elbows, forearms, wrist, hands, fingers, hips, mid femurs, knees, left ankle, feet, toes all palpated range without significant tenderness to palpation or obvious osseous deformity noted.      Neurological:      Mental Status: She is alert.           Lab Results: I have reviewed the following results:   Recent Labs     01/28/25  1834 01/29/25  0519   WBC 15.09* 10.92*   HGB 12.4 11.0*   HCT 39.1 34.5*    258   BUN 21 20   CREATININE 1.08 0.88   PTT 27  --    INR 0.89  --      Blood Culture:  "  Lab Results   Component Value Date    BLOODCX Escherichia coli (A) 02/01/2020    BLOODCX Escherichia coli (A) 02/01/2020     Wound Culture: No results found for: \"WOUNDCULT\"    Imaging Results Review: I personally reviewed the following image studies/reports in PACS and discussed pertinent findings with Radiology: CT right lower extremity reveals trimalleolar ankle fracture. My interpretation of the radiology images/reports is: Right trimalleolar ankle fracture.  Other Study Results Review: No additional pertinent studies reviewed.  "

## 2025-01-29 NOTE — H&P
H&P - Trauma   Name: Lyn Pimentel 79 y.o. female I MRN: [id number]  Unit/Bed#: ED-28 I Date of Admission: 1/28/2025   Date of Service: 1/28/2025 I Hospital Day: 0     Assessment & Plan  Closed fracture of right ankle  - Right ankle fracture, present on admission.  - Status post fall on 1/28.  - Appreciate Orthopedic surgery evaluation, recommendations and interventions as noted.   - Reduced and splinted in the ED   - NPO at midnight for OR tomorrow  - Maintain NWB RLE in splint   - Monitor right lower extremity neurovascular exam.  - Continue multimodal analgesic regimen.  - Continue DVT prophylaxis.  - PT and OT evaluation and treatment as indicated.  - Outpatient follow up with Orthopedic surgery for re-evaluation.    Obstructive sleep apnea  - CPAP QHS  Tremor  - Takes propranolol 60 mg daily  - Geriatric medicine consulted  Fall  - Status post fall with the below noted injuries.  - Fall precautions.  - Geriatric Medicine consultation for evaluation, medication review and recommendations.  - PT and OT evaluation and treatment as indicated.  - Case Management consultation for disposition planning.      Trauma Alert: consult   Model of Arrival: Ambulance    Trauma Team: Attending Odilia and AP Sarah  Consultants: Stella Dee     History of Present Illness   Chief Complaint: Right ankle pain  Mechanism:Fall     Lyn Pimentel is a 79 y.o. female who presents after a mechanical fall with right ankle pain. Pt reports she was in her yard and twisted her ankle in a divot in the ground and fell with right ankle pain. Denies head strike, LOC, or use of AC/AP medications.    Review of Systems   Constitutional:  Negative for chills and fever.   HENT:  Negative for facial swelling.    Eyes:  Negative for photophobia.   Respiratory:  Negative for shortness of breath.    Cardiovascular:  Negative for chest pain.   Gastrointestinal:  Negative for abdominal pain and nausea.   Musculoskeletal:  Positive for  arthralgias and myalgias. Negative for back pain and neck pain.        R ankle pain   Skin:  Negative for wound.   Neurological:  Negative for dizziness, syncope and headaches.   Psychiatric/Behavioral:  Negative for confusion.      I have reviewed the patient's PMH, PSH, Social History, Family History, Meds, and Allergies  Immunization History   Administered Date(s) Administered    COVID-19 MODERNA VACC 0.5 ML IM 12/22/2021    COVID-19 PFIZER VACCINE 0.3 ML IM 03/21/2021, 04/15/2021    Influenza, high dose seasonal 0.7 mL 12/13/2022    Influenza, seasonal, injectable 1945    Pneumococcal Polysaccharide PPV23 1945     Last Tetanus: unknown    1. Before the illness or injury that brought you to the Emergency, did you need someone to help you on a regular basis? 0=No   2. Since the illness or injury that brought you to the Emergency, have you needed more help than usual to take care of yourself? 1=Yes   3. Have you been hospitalized for one or more nights during the past 6 months (excluding a stay in the Emergency Department)? 0=No   4. In general, do you see well? 0=Yes   5. In general, do you have serious problems with your memory? 1=Yes   6. Do you take more than three different medications everyday? 1=Yes   TOTAL   3     Did you order a geriatric consult if the score was 2 or greater?: yes       Objective :  Temp:  [98.4 °F (36.9 °C)] 98.4 °F (36.9 °C)  HR:  [61-69] 62  BP: (151-181)/(70-87) 172/72  Resp:  [15-20] 16  SpO2:  [95 %-98 %] 98 %  O2 Device: None (Room air)    Initial Vitals:   Temperature: 98.4 °F (36.9 °C) (01/28/25 1810)  Pulse: 69 (01/28/25 1810)  Respirations: 20 (01/28/25 1810)  Blood Pressure: (!) 181/87 (01/28/25 1810)    Primary Survey:   Airway:        Status: patent;        Pre-hospital Interventions: none        Hospital Interventions: none  Breathing:        Pre-hospital Interventions: none       Effort: normal       Right breath sounds: normal       Left breath sounds:  normal  Circulation:        Rhythm: regular       Rate: regular   Right Pulses Left Pulses    R radial: 2+    R pedal: 2+     L radial: 2+    L pedal: 2+       Disability: Interventions: RLE in splint       GCS: Eye: 4; Verbal: 5 Motor: 6 Total: 15       Right Pupil: round;  reactive         Left Pupil:  round;  reactive      R Motor Strength L Motor Strength    R : 5/5  R dorsiflex: 0/5  R plantarflex: 0/5 L : 5/5  L dorsiflex: 5/5  L plantarflex: 5/5        Sensory:  No sensory deficit  Exposure:       Completed: Yes      Secondary Survey:  Physical Exam  Vitals reviewed.   Constitutional:       General: She is not in acute distress.     Appearance: Normal appearance.   HENT:      Head: Normocephalic and atraumatic.      Right Ear: External ear normal.      Left Ear: External ear normal.      Nose: Nose normal.      Mouth/Throat:      Mouth: Mucous membranes are moist.      Pharynx: Oropharynx is clear.   Eyes:      Extraocular Movements: Extraocular movements intact.      Conjunctiva/sclera: Conjunctivae normal.      Pupils: Pupils are equal, round, and reactive to light.   Cardiovascular:      Rate and Rhythm: Normal rate and regular rhythm.      Pulses: Normal pulses.      Heart sounds: Normal heart sounds.   Pulmonary:      Effort: Pulmonary effort is normal. No respiratory distress.      Breath sounds: Normal breath sounds.   Chest:      Chest wall: No tenderness.   Abdominal:      General: Abdomen is flat. Bowel sounds are normal. There is no distension.      Palpations: Abdomen is soft.      Tenderness: There is no abdominal tenderness. There is no guarding.   Musculoskeletal:         General: Tenderness and signs of injury present.      Cervical back: No tenderness.      Comments: RLE in splint s/p ankle reduction   Skin:     General: Skin is warm and dry.      Capillary Refill: Capillary refill takes less than 2 seconds.      Findings: No bruising or lesion.   Neurological:      General: No focal  deficit present.      Mental Status: She is alert and oriented to person, place, and time. Mental status is at baseline.      Sensory: No sensory deficit.      Motor: No weakness.   Psychiatric:         Mood and Affect: Mood normal.         Behavior: Behavior normal.             Lab Results: I have reviewed the following results:  Recent Labs     01/28/25  1834   WBC 15.09*   HGB 12.4   HCT 39.1      SODIUM 137   K 4.3      CO2 26   BUN 21   CREATININE 1.08   GLUC 127   AST 74*   ALT 69*   ALB 4.2   TBILI 0.47   ALKPHOS 98   PTT 27   INR 0.89       Imaging Results: I have personally reviewed pertinent images saved in PACS. CT scan findings (and other pertinent positive findings on images) were discussed with radiology. My interpretation of the images/reports are as follows:  Chest Xray(s): N/A   FAST exam(s): N/A   CT Scan(s): N/A   Additional Xray(s): positive for acute findings: R ankle fx     Other Studies: Other Study Results Review: EKG was reviewed.

## 2025-01-29 NOTE — ANESTHESIA POSTPROCEDURE EVALUATION
Post-Op Assessment Note    CV Status:  Stable  Pain Score: 0    Pain management: adequate       Mental Status:  Sleepy and arousable   PONV Controlled:  None   Airway Patency:  Patent     Post Op Vitals Reviewed: Yes    No anethesia notable event occurred.    Staff: CRNA           Last Filed PACU Vitals:  Vitals Value Taken Time   Temp 97 °F (36.1 °C) 01/29/25 1430   Pulse 64 01/29/25 1431   /63 01/29/25 1430   Resp 13 01/29/25 1430   SpO2 98 % 01/29/25 1431   Vitals shown include unfiled device data.

## 2025-01-29 NOTE — DISCHARGE INSTR - AVS FIRST PAGE
Discharge Instructions - Orthopedics  Lyn Pimentel 79 y.o. female MRN: [id number]  Unit/Bed#: AN OR MAIN    Weight Bearing Status:                                           Patient be nonweightbearing right lower extremity in splint    DVT prophylaxis:  Continue aspirin 81 mg twice daily for 6 weeks upon discharge    Pain:  Continue analgesics as directed    Dressing Instructions:   -Please keep clean, dry and intact until follow up   -DO NOT REMOVE DRESSINGS OVER SURGICAL WOUNDS  -DO NOT SHOWER UNTIL AUTHORIZED BY OPERATING PHYSICIAN   -DO NOT REMOVE STAPLES/SUTURES UNLESS AUTHORIZED BY OPERATING PHYSICIAN  -If dressing become saturated/wet contact the treating orthopedic surgeon prior to removing dressings    Appt Instructions:   If you do not have your appointment, please call the clinic at [phone] to schedule with Dr. Brown  Otherwise follow up as scheduled.    Contact the office sooner if you experience any increased numbness/tingling in the extremities.      Miscellaneous: Follow-up in 2 weeks for suture removal and repeat x-ray

## 2025-01-29 NOTE — ASSESSMENT & PLAN NOTE
Patient uses nothing for ambulation at baseline  Patient reports recent fall  Patient was releasing a mouse from a mousetrap and stepped wrong while doing so.  Patient reports she occasionally gets light headed when going from sitting to standing.  Patient denies any falls in past couple months.  Monitor orthostatic vital signs  Encourage p.o. hydration  Avoid hypotension and hypoglycemia   - Patient  on Lexapro 20mg daily at home. Decrease to 10 mg daily.

## 2025-01-29 NOTE — ANESTHESIA PREPROCEDURE EVALUATION
Procedure:  ORIF BIMALLEOLAR ANKLE FRACTURE (Right: Ankle)    Relevant Problems   ANESTHESIA   (+) PONV (postoperative nausea and vomiting)      CARDIO   (+) Essential hypertension   (+) HLD (hyperlipidemia)   (+) Short of breath on exertion      ENDO (within normal limits)      GI/HEPATIC   (+) GERD without esophagitis      /RENAL (within normal limits)      HEMATOLOGY (within normal limits)      NEURO/PSYCH (within normal limits)      PULMONARY   (+) COPD (chronic obstructive pulmonary disease) (HCC)   (+) Obstructive sleep apnea   (+) Short of breath on exertion      Neurology/Sleep   (+) Cognitive impairment      Oncology   (+) Primary adenocarcinoma of upper lobe of right lung (HCC)      Surgery/Wound/Pain   (+) Status post colectomy   (+) Status post laparoscopic Nissen fundoplication      Orthopedic/Musculoskeletal   (+) Closed fracture of right ankle      EKG 1/28/2025:  Normal sinus rhythm  Nonspecific ST and T wave abnormality  When compared with ECG of 21-Oct-2019 13:37,  Nonspecific T wave abnormality, improved in Anterolateral leads    TTE 11/2022:    Left Ventricle: Left ventricular cavity size is normal. Wall thickness is normal. The left ventricular ejection fraction is 60%. Systolic function is normal. Wall motion is normal. Diastolic function is mildly abnormal, consistent with grade I (abnormal) relaxation.    Tricuspid Valve: There is mild regurgitation.    Lab Results   Component Value Date    WBC 10.92 (H) 01/29/2025    HGB 11.0 (L) 01/29/2025    HCT 34.5 (L) 01/29/2025    MCV 97 01/29/2025     01/29/2025     Lab Results   Component Value Date    SODIUM 138 01/29/2025    K 4.1 01/29/2025     01/29/2025    CO2 28 01/29/2025    BUN 20 01/29/2025    CREATININE 0.88 01/29/2025    GLUC 116 01/29/2025    CALCIUM 9.0 01/29/2025     Lab Results   Component Value Date    INR 0.89 01/28/2025    INR 1.03 10/21/2019    PROTIME 12.8 01/28/2025    PROTIME 13.1 10/21/2019     Lab Results    Component Value Date    HGBA1C 6.6 (H) 09/26/2023          Physical Exam    Airway    Mallampati score: III  TM Distance: >3 FB  Neck ROM: full     Dental   No notable dental hx     Cardiovascular  Cardiovascular exam normal    Pulmonary  Pulmonary exam normal     Other Findings  post-pubertal.      Anesthesia Plan  ASA Score- 3     Anesthesia Type- general with ASA Monitors.         Additional Monitors:     Airway Plan: ETT and LMA.           Plan Factors-Exercise tolerance (METS): >4 METS.    Chart reviewed. EKG reviewed.  Existing labs reviewed. Patient summary reviewed.                  Induction-     Postoperative Plan-         Informed Consent- Anesthetic plan and risks discussed with patient.  I personally reviewed this patient with the CRNA. Discussed and agreed on the Anesthesia Plan with the CRNA..      NPO Status:  No vitals data found for the desired time range.

## 2025-01-29 NOTE — PROGRESS NOTES
Progress Note - Trauma   Name: Lyn Pimentel 79 y.o. female I MRN: [id number]  Unit/Bed#: ED-28 I Date of Admission: 1/28/2025   Date of Service: 1/29/2025 I Hospital Day: 1    Assessment & Plan  Closed fracture of right ankle  - Right ankle fracture, present on admission.  - Status post Reduction and splinting in ER  - Appreciate Orthopedic surgery evaluation, recommendations and interventions as noted.   - Reduced and splinted in the ED   - NPO at midnight for OR today  - Maintain NWB RLE in splint   - Monitor right lower extremity neurovascular exam.  - Continue multimodal analgesic regimen.  - Continue DVT prophylaxis.  - PT and OT evaluation and treatment as indicated.  - Outpatient follow up with Orthopedic surgery for re-evaluation.    Obstructive sleep apnea  - CPAP QHS  Tremor  - Takes propranolol 60 mg daily  - Geriatric medicine consulted  Fall  - Status post fall with the below noted injuries.  - Fall precautions.  - Geriatric Medicine consultation for evaluation, medication review and recommendations.  - PT and OT evaluation and treatment as indicated.  - Case Management consultation for disposition planning.  Essential hypertension  - Home propranolol restarted  - Monitor blood pressure while admitted    VTE Prophylaxis: VTE covered by:  enoxaparin, Subcutaneous, 30 mg at 01/28/25 1939        Disposition: Plan for operative repair of right ankle fracture today Please contact the SecureChat role for the Trauma service with any questions/concerns.    TRAUMA TERTIARY SURVEY  Summary of Diagnosed Injuries: Right distal tibia/fibula fracture with displacement    Transfer from: Home    Mechanism of Injury:Fall     Chief Complaint: Right ankle pain    24 Hour Events : No overnight events  Subjective : Patient reports she has had significant pain all night. The IV medication helps but does not last long. She has no pain other than her right ankle. She has no other complaints.     Objective :  Temp:  [98.4  °F (36.9 °C)] 98.4 °F (36.9 °C)  HR:  [61-69] 66  BP: (116-181)/(65-87) 116/72  Resp:  [15-20] 16  SpO2:  [95 %-98 %] 98 %  O2 Device: None (Room air)    I/O         01/27 0701  01/28 0700 01/28 0701  01/29 0700 01/29 0701 01/30 0700    P.O.  0     IV Piggyback  200     Total Intake(mL/kg)  200 (2.4)     Urine (mL/kg/hr)  0     Total Output  0     Net  +200                    Physical Exam  Vitals and nursing note reviewed.   Constitutional:       General: She is not in acute distress.     Appearance: Normal appearance. She is normal weight. She is not ill-appearing or toxic-appearing.   HENT:      Head: Normocephalic and atraumatic.      Nose: Nose normal. No congestion or rhinorrhea.      Mouth/Throat:      Mouth: Mucous membranes are moist.      Pharynx: Oropharynx is clear. No oropharyngeal exudate or posterior oropharyngeal erythema.   Eyes:      Extraocular Movements: Extraocular movements intact.      Conjunctiva/sclera: Conjunctivae normal.      Pupils: Pupils are equal, round, and reactive to light.   Cardiovascular:      Rate and Rhythm: Normal rate and regular rhythm.      Heart sounds: No murmur heard.     No friction rub. No gallop.   Pulmonary:      Effort: Pulmonary effort is normal.      Breath sounds: Normal breath sounds. No wheezing or rhonchi.   Abdominal:      General: Abdomen is flat. Bowel sounds are normal. There is no distension.      Palpations: Abdomen is soft.      Tenderness: There is no abdominal tenderness. There is no guarding or rebound.   Musculoskeletal:      Right shoulder: Normal.      Left shoulder: Normal.      Right upper arm: Normal.      Left upper arm: Normal.      Right elbow: Normal.      Left elbow: Normal.      Right forearm: Normal.      Left forearm: Normal.      Right wrist: Normal.      Left wrist: Normal.      Right hand: Normal.      Left hand: Normal.      Cervical back: Normal range of motion. No tenderness.      Thoracic back: No deformity or tenderness.       Lumbar back: No deformity or tenderness.      Right hip: Normal.      Left hip: Normal.      Right upper leg: Normal.      Left upper leg: Normal.      Right knee: Normal.      Left knee: Normal.      Right lower leg: Normal.      Left lower leg: Normal.      Right ankle: Swelling present. Tenderness present. Decreased range of motion (In splint).      Left ankle: Normal.      Right foot: Normal.      Left foot: Normal.   Skin:     General: Skin is warm and dry.      Capillary Refill: Capillary refill takes less than 2 seconds.      Findings: No bruising.   Neurological:      General: No focal deficit present.      Mental Status: She is alert and oriented to person, place, and time.      Sensory: No sensory deficit.      Motor: No weakness.          1. Before the illness or injury that brought you to the Emergency, did you need someone to help you on a regular basis? 0=No   2. Since the illness or injury that brought you to the Emergency, have you needed more help than usual to take care of yourself? 1=Yes   3. Have you been hospitalized for one or more nights during the past 6 months (excluding a stay in the Emergency Department)? 0=No   4. In general, do you see well? 0=Yes   5. In general, do you have serious problems with your memory? 0=No   6. Do you take more than three different medications everyday? 1=Yes   TOTAL   2     Did you order a geriatric consult if the score was 2 or greater?: no           Lab Results: I have reviewed the following results:  Recent Labs     01/28/25  1834 01/29/25  0519   WBC 15.09* 10.92*   HGB 12.4 11.0*   HCT 39.1 34.5*    258   SODIUM 137 138   K 4.3 4.1    104   CO2 26 28   BUN 21 20   CREATININE 1.08 0.88   GLUC 127 116   AST 74*  --    ALT 69*  --    ALB 4.2  --    TBILI 0.47  --    ALKPHOS 98  --    PTT 27  --    INR 0.89  --

## 2025-01-29 NOTE — ANESTHESIA POSTPROCEDURE EVALUATION
Post-Op Assessment Note    Last Filed PACU Vitals:  Vitals Value Taken Time   Temp     Pulse     BP     Resp     SpO2

## 2025-01-29 NOTE — QUICK NOTE
"Trauma Surgery   Post-Op Check Progress Note   Lyn Pimentel 79 y.o. female MRN: [id number]  Unit/Bed#: OR POOL Encounter: [id number]    Assessment and Plan:    79-year-old female with right Trimalleolar ankle fracture/dislocation status post ORIF and RLE block.   - P.R.N. Analgesia.   - Encouraged incentive spirometry use.   - NWB RLE in splint   - Lovenox for DVT prophylaxis - discharge on asa 81mg bid x 6 weeks        Subjective/Objective     Subjective: \"I just want to sleep some more\"     Objective:     Blood pressure 109/56, pulse 62, temperature 97.8 °F (36.6 °C), resp. rate 15, height 5' 5\" (1.651 m), weight 82 kg (180 lb 12.4 oz), SpO2 96%.,Body mass index is 30.08 kg/m².      Intake/Output Summary (Last 24 hours) at 1/29/2025 1506  Last data filed at 1/29/2025 1414  Gross per 24 hour   Intake 1650 ml   Output 0 ml   Net 1650 ml       Invasive Devices       Peripheral Intravenous Line  Duration             Peripheral IV 01/28/25 Left Antecubital <1 day                    Physical Exam:    GENERAL APPEARANCE: Patient in no acute distress.  HEENT: NCAT; PERRL, EOMs intact; Mucous membranes moist  CV: Regular rate and rhythm; + S1, S2; no murmur/gallops/rubs appreciated.  LUNGS: Clear to auscultation; no wheezes/rales/rhonci.  ABD: NABS; soft; non-distended; non-tender.  EXT: +2 pulses bilaterally upper & lower extremities; no clubbing/cyanosis/edema. Right ankle splint in place  NEURO: GCS 15; no focal neurologic deficits; neurovascularly intact. Right foot numbness and absent motor consistent with distal limb block  SKIN: Warm, dry and well perfused; no rash; no jaundice.                Gisselle Guzman PA-C  1/29/2025    "

## 2025-01-29 NOTE — CASE MANAGEMENT
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     Other diseases of pharynx 10/08/2019     Epigastric pain 2019     Belching 2017     Syncope 2017     Essential hypertension 2017     HLD (hyperlipidemia) 2017     Status post colectomy 2017     Status post laparoscopic Nissen fundoplication 2017     Diffusion capacity of lung (dl), decreased 2016     History of Lyme disease 2016     Tremor 2016     Cognitive impairment 2016     Obstructive sleep apnea 2016     Short of breath on exertion 2016     GERD without esophagitis 2015       LOS (days): 1  Geometric Mean LOS (GMLOS) (days): 2.8  Days to GMLOS:1.9     OBJECTIVE:    Risk of Unplanned Readmission Score: 14.39         Current admission status: Inpatient       Preferred Pharmacy:   [facility]/pharmacy #[id number] - [place], [place] - [address] [address]  Phone: [phone] Fax: [phone]    Primary Care Provider: Chris Shin MD    Primary Insurance: [facility] RE[facility]  Secondary Insurance:     ASSESSMENT:  Active Health Care Proxies    There are no active Health Care Proxies on file.       Patient Information  Admitted from:: Home  Mental Status: Alert  During Assessment patient was accompanied by: Daughter  Assessment information provided by:: Daughter  Primary Caregiver:  Self  Support Systems: Self, Spouse/significant other  County of Residence: [place]  What city do you live in?: [place]  Home entry access options. Select all that apply.: Ramp  Type of Current Residence: [facility]  Living Arrangements: Lives w/ Spouse/significant other, Lives w/ Extended Family (Spouse currently in rehab. Lives with granddaughter and great grand child)    Activities of Daily Living Prior to Admission  Functional Status: Independent  Completes ADLs independently?: Yes  Ambulates independently?: Yes  Does patient use assisted devices?: No  Does patient currently own DME?: No  Does patient have a history of Outpatient Therapy (PT/OT)?: Yes  Does the patient have a history of Short-Term Rehab?: No  Does patient have a history of HHC?: No  Does patient currently have HHC?: No    Patient Information Continued  Income Source: Pension/alf  Does patient have prescription coverage?: Yes  Does patient receive dialysis treatments?: No  Does patient have a history of substance abuse?: No  Does patient have a history of Mental Health Diagnosis?: Yes (Managed by PCP)  Has patient received inpatient treatment related to mental health in the last 2 years?: No    Means of Transportation  Means of Transport to Appts:: Drives Self    DISCHARGE DETAILS:    Discharge planning discussed with:: Pt's daughterZhane    Contacts  Patient Contacts: Zhane Gray  Relationship to Patient:: Family  Contact Method: In Person  Reason/Outcome: Continuity of Care, Emergency Contact, Discharge Planning    Other Referral/Resources/Interventions Provided:  Interventions: Other (Specify)  Referral Comments: Pt sleeping. CM met with pt's daughterZhane, at bedside. Introduced self/role with dcp. Pt resides with her , granddaughter and great grand child. Pt's  currently at [facility] for [facility]. Pt fully IPTA. Zhane reports that upon dc pt does have an option to stay with her and she  can assist her. She would have first floor set up. She also reports pt would likely be open to STR if recommended. Aware CM will f/u with pt and herself once PT/OT evaluate.

## 2025-01-29 NOTE — ANESTHESIA PROCEDURE NOTES
Peripheral Block    Patient location during procedure: holding area  Start time: 1/29/2025 12:10 PM  Reason for block: at surgeon's request and post-op pain management  Staffing  Performed by: Adrien Packer MD  Authorized by: Adrien Packer MD    Preanesthetic Checklist  Completed: patient identified, IV checked, site marked, risks and benefits discussed, surgical consent, monitors and equipment checked, pre-op evaluation and timeout performed  Peripheral Block  Patient position: supine  Prep: ChloraPrep  Patient monitoring: frequent blood pressure checks, continuous pulse oximetry and heart rate  Laterality: right  Injection technique: single-shot  Procedures: ultrasound guided, Ultrasound guidance required for the procedure to increase accuracy and safety of medication placement and decrease risk of complications.  Ultrasound permanent image saved  bupivacaine (PF) (MARCAINE) 0.5 % injection 20 mL - Perineural   12 mL - 1/29/2025 12:10:00 PM  bupivacaine liposomal (EXPAREL) 1.3 % injection 20 mL - Perineural   5 mL - 1/29/2025 12:10:00 PM  Needle  Needle type: Stimuplex   Needle gauge: 20 G  Needle length: 4 in  Needle localization: anatomical landmarks and ultrasound guidance  Assessment  Injection assessment: incremental injection, frequent aspiration, injected with ease, negative aspiration, negative for heart rate change, no paresthesia on injection, no symptoms of intraneural/intravenous injection and needle tip visualized at all times  Paresthesia pain: none  Post-procedure:  site cleaned  patient tolerated the procedure well with no immediate complications

## 2025-01-29 NOTE — ANESTHESIA PROCEDURE NOTES
Peripheral Block    Patient location during procedure: holding area  Start time: 1/29/2025 12:15 PM  Reason for block: at surgeon's request and post-op pain management  Staffing  Performed by: Adrien Packer MD  Authorized by: Adrien Packer MD    Preanesthetic Checklist  Completed: patient identified, IV checked, site marked, risks and benefits discussed, surgical consent, monitors and equipment checked, pre-op evaluation and timeout performed  Peripheral Block  Patient position: supine  Prep: ChloraPrep  Patient monitoring: frequent blood pressure checks, continuous pulse oximetry and heart rate  Block type: Popliteal  Laterality: right  Injection technique: single-shot  Procedures: ultrasound guided, Ultrasound guidance required for the procedure to increase accuracy and safety of medication placement and decrease risk of complications.  Ultrasound permanent image saved  bupivacaine (PF) (MARCAINE) 0.5 % injection 20 mL - Perineural   12 mL - 1/29/2025 12:15:00 PM  bupivacaine liposomal (EXPAREL) 1.3 % injection 20 mL - Perineural   15 mL - 1/29/2025 12:15:00 PM  Needle  Needle type: Stimuplex   Needle gauge: 20 G  Needle length: 4 in  Needle localization: anatomical landmarks and ultrasound guidance  Assessment  Injection assessment: incremental injection, frequent aspiration, injected with ease, negative aspiration, negative for heart rate change, no paresthesia on injection, no symptoms of intraneural/intravenous injection and needle tip visualized at all times  Paresthesia pain: none  Post-procedure:  site cleaned  patient tolerated the procedure well with no immediate complications

## 2025-01-29 NOTE — PHYSICAL THERAPY NOTE
Physical Therapy Cancellation Note           01/29/25 0755   Note Type   Note type Cancelled Session   Cancel Reasons Patient to operating room   Additional Comments PT orders received. Chart reviewed. pt planned for OR for fixation of LE fracture. will hold PT eval and follow up post op. please place new WBS and activity orders.     Shmuel Branch, PT

## 2025-01-29 NOTE — OP NOTE
OPERATIVE REPORT  PATIENT NAME: Lyn Pimentel    :  1945  MRN: [id number]  Pt Location: AN OR ROOM 01    SURGERY DATE: 2025    Surgeon(s) and Role:     * Scooter Brown DO - Primary    * Demetrius Benitez PA-C - Assisting     * Stephanie Jimenez MD - Assisting   I was present for the entire procedure. and I was present for all critical portions of the procedure.    Preop Diagnosis:  #1 right trimalleolar ankle fracture dislocation    Post-Op Diagnosis:  #1 right trimalleolar ankle fracture dislocation    Procedures:  #1 open reduction internal fixation of right trimalleolar ankle fracture without fixation of the posterior limb  2.  Surgical fixation of the distal tibia-fibula syndesmosis with a syndesmotic tight rope      Specimen(s):  None    Estimated Blood Loss:   15 cc    Drains:  None    Anesthesia Type:   General Endotracheal    Operative Indications:  Patient is an 79-year-old female that had a ground-level fall with a twisting injury mechanism to the right ankle sustained a right trimalleolar ankle fracture dislocation.  She was originally reduced in the emergency department by the emergency department physicians and splinted.  Further CT demonstrated posterior lateral subluxation of the ankle joint a long oblique fibular fracture with comminution at the apex.  The patient also had a posterior mall fracture with loose comminuted intra chondral fragments in between the posterior malleolar fracture and the metaphysis.  As well as a medial malleolus fracture.  Discussion was had with the patient about operative versus nonoperative management options.  In order to restore stability to the ankle the patient was consented for surgical fixation      Implants:   Libby 5 hole distal fibular locking plate  Brian 3.5 mm fully threaded cannulated screws  Arthrex titanium syndesmotic tight rope    Tourniquet time:   Tourniquet was inflated to 250 mmHg for 80 minutes      Complications:   No acute  complications were encountered.  Patient was transferred to PACU in stable condition    Operative findings:  Patient had skin tenting over the anterior medial aspect of the ankle.  Pictures below.  This area of ecchymosis and damaged epidermis was better perfused after surgical fixation was performed and pressure had been taken off of the soft tissue area.  The patient had a long oblique fibular fracture.  The anterior inferior tibiofibular ligament was still intact and attached.  The posterior malleolar fracture fragments with a loose intracondylar fragments were debrided to allow for appropriate fixation and reduction of the fibula and posterior inferior tibiofibular ligament through the long oblique fracture segment.  The loose intra chondral fragments did not contain subchondral bone that would have been amenable to fixation.  These will likely become loose intra-articular bodies.  Today were debrided.  The fibula was reduced anatomically and then the medial malleolus was also reduced anatomically and compressed.  Interfragmentary screw was used for the fibula and a laterally based implant was secured proximally and distally with multiple locking and cortical screws.  The medial malleolus was secured with a offset clamp to gain compression and fixated with 3.5 mm fully threaded screws.  After reduction cotton test was negative but the patient had slight rotational instability of her fibula and given her posterior malleoli are fracture fragment decision was made to perform syndesmotic fixation with a syndesmotic tight rope.  Additional were taken with stress films after placement of the syndesmotic tight rope and the ankle mortise was completely stable on AP and lateral views.  Additional syndesmotic fixation was not performed as it was right through the patient's fracture segment.            Procedure and Technique:  Patient is a 79-year-old female that was seen examined the preoperative holding area.  After  extremities marked.  All patient's questions were answered.  The patient was then taken back to the operating room where [facility]  The patient was then transferred over the operating table using CT LS spine precautions.  All bony prominences well-padded.  A well-padded nonsterile tourniquet was applied to the right upper thigh.  The patient's right lower extremities then prepped and draped in a standard sterile orthopedic fashion.  Timeout was performed confirm correct side, correct patient, correct procedure.  All were in agreement procedure was started.  Right lower extremity was exsanguinated and the tourniquet was inflated 250 mmHg.  A medially based incision was made posterior to the area of skin blanching to avoid that area of damaged soft tissue.  Sharp dissection was carried down to the skin subcutaneous tissues.  Electrocautery was used to obtain a stasis.  The saphenous vein and nerve were identified and freed posteriorly and retracted anteriorly.  The fracture site was encountered.  The entrapped periosteum was sharply excised using #15 blade.  The fracture segment was gapped to allow for visualization inside the ankle joint.  The ankle joint was irrigated with sterile normal saline and entrapped fracture hematoma was debrided.  Attention was then turned to the lateral side.  A laterally based incision was made sharply with #10 blade through skin subcutaneous tissues.  Electrocautery was to enhance hemostasis.  Dissection was carried down to the level of the lateral aspect of the fibula.  Fracture hematoma in the proximal aspect of the fracture segment was left intact as this was a comminuted zone however the distal segment was cleaned of entrapped fracture hematoma to allow for visualization of the fracture site and fracture edges.  Anterior inferior tibiofibular ligament was intact.  There was 6 posterior instability at the distal segment.   The ankle was externally rotated to allow for visualization of the posterior malleolar fragment segment.  Once this was able to be visualized the multiple loose intra chondral fragments were seen directly through the fracture site with a lamina .  These did not contain subchondral bone and were debrided and there was multiple 1 to 2 mm chondral fragments that were removed.  Once these were all removed the fibula was then anatomically reduced using multiple pointed reduction clamps which was confirmed on multiplanar fluoroscopic imaging.  Interfragmentary 2.7 mm cortical screw was then placed to hold the reduction.  A laterally based implant was then slid in the place and secured distally with multiple 3.5 mm locking screws and proximally with multiple 2.7 mm cortical screws.  Proximal to the fracture site.  Clamps were able to then removed.  The medial malleolus was then reduced anatomically using an offset pointed reduction clamp.  Was then secured using 2 K wires for cannulated screws.  240 mm cannulated screws were then inserted and then the clamp was removed wires removed and final reduction and plate placement confirmed under multiplanar fluoroscopic imaging.  The ankle was stressed and no significant instability was noted.  A cotton test was performed and no lateral subluxation of the fibula and the incisura was noted.  There was some rotational instability of the fibula and the incisura so therefore a syndesmotic fixation was performed.  A guidewire was placed for a syndesmotic tight rope and overdrilled.  It was then secured and the syndesmotic tight rope was passed through.  The suture button was deployed and confirmation of no entrapped soft tissue's medially was performed through the previous incision.  An syndesmotic tight rope was then tensioned down while holding the fibula in the appropriate place and the inside sure.  Once this was tensioned appropriately repeat stress views were then  performed and the ankle was stable.  No rotational instability was felt.  The wounds were then copiously irrigated with sterile normal saline.  1 g of vancomycin was split between the 2 wounds.  The patient's previous plan skin over the anterior medial aspect of the tibia had reperfused and appeared viable.  The subcutaneous tissues then repaired using a 2-0 Monocryl suture.  The skin was then closed with 2-0 nylon sutures.  The wounds were then dressed in Adaptic 4 x 4's and the patient was placed into a well-padded trilaminar splint.  The patient tourniquet was then let down and the patient was transferred off the operating table using CT LS spine precautions extubated transferred PACU in stable condition        Postoperative plan:  Patient will be nonweightbearing to the right lower extremity for 8 weeks.  The patient received 24 hours of postoperative antibiotics for infection prophylaxis.  The patient will continue on Lovenox while in the hospital for DVT prophylaxis will be discharged on aspirin 81 mg twice daily for 6 weeks for DVT prophylaxis.  The patient will need to follow-up in the office in 14 to 21 days for repeat x-ray evaluation removal of sutures and the initiation of range of motion protocols    SIGNATURE: Scooter Brown DO  DATE: January 29, 2025  TIME: 2:11 PM

## 2025-01-29 NOTE — PLAN OF CARE
Problem: Prexisting or High Potential for Compromised Skin Integrity  Goal: Skin integrity is maintained or improved  Description: INTERVENTIONS:  - Identify patients at risk for skin breakdown  - Assess and monitor skin integrity  - Assess and monitor nutrition and hydration status  - Monitor labs   - Assess for incontinence   - Turn and reposition patient  - Assist with mobility/ambulation  - Relieve pressure over bony prominences  - Avoid friction and shearing  - Provide appropriate hygiene as needed including keeping skin clean and dry  - Evaluate need for skin moisturizer/barrier cream  - Collaborate with interdisciplinary team   - Patient/family teaching  - Consider wound care consult   Outcome: Progressing     Problem: PAIN - ADULT  Goal: Verbalizes/displays adequate comfort level or baseline comfort level  Description: Interventions:  - Encourage patient to monitor pain and request assistance  - Assess pain using appropriate pain scale  - Administer analgesics based on type and severity of pain and evaluate response  - Implement non-pharmacological measures as appropriate and evaluate response  - Consider cultural and social influences on pain and pain management  - Notify physician/advanced practitioner if interventions unsuccessful or patient reports new pain  Outcome: Progressing     Problem: INFECTION - ADULT  Goal: Absence or prevention of progression during hospitalization  Description: INTERVENTIONS:  - Assess and monitor for signs and symptoms of infection  - Monitor lab/diagnostic results  - Monitor all insertion sites, i.e. indwelling lines, tubes, and drains  - Monitor endotracheal if appropriate and nasal secretions for changes in amount and color  - [place] appropriate cooling/warming therapies per order  - Administer medications as ordered  - Instruct and encourage patient and family to use good hand hygiene technique  - Identify and instruct in appropriate isolation precautions for  identified infection/condition  Outcome: Progressing  Goal: Absence of fever/infection during neutropenic period  Description: INTERVENTIONS:  - Monitor WBC    Outcome: Progressing     Problem: SAFETY ADULT  Goal: Patient will remain free of falls  Description: INTERVENTIONS:  - Educate patient/family on patient safety including physical limitations  - Instruct patient to call for assistance with activity   - Consult OT/PT to assist with strengthening/mobility   - Keep Call bell within reach  - Keep bed low and locked with side rails adjusted as appropriate  - Keep care items and personal belongings within reach  - Initiate and maintain comfort rounds  - Make Fall Risk Sign visible to staff  - Apply yellow socks and bracelet for high fall risk patients  - Consider moving patient to room near nurses station  Outcome: Progressing  Goal: Maintain or return to baseline ADL function  Description: INTERVENTIONS:  -  Assess patient's ability to carry out ADLs; assess patient's baseline for ADL function and identify physical deficits which impact ability to perform ADLs (bathing, care of mouth/teeth, toileting, grooming, dressing, etc.)  - Assess/evaluate cause of self-care deficits   - Assess range of motion  - Assess patient's mobility; develop plan if impaired  - Assess patient's need for assistive devices and provide as appropriate  - Encourage maximum independence but intervene and supervise when necessary  - Involve family in performance of ADLs  - Assess for home care needs following discharge   - Consider OT consult to assist with ADL evaluation and planning for discharge  - Provide patient education as appropriate  Outcome: Progressing  Goal: Maintains/Returns to pre admission functional level  Description: INTERVENTIONS:  - Perform AM-PAC 6 Click Basic Mobility/ Daily Activity assessment daily.  - Set and communicate daily mobility goal to care team and patient/family/caregiver.   - Collaborate with rehabilitation  services on mobility goals if consulted  - Out of bed for toileting  - Record patient progress and toleration of activity level   Outcome: Progressing     Problem: DISCHARGE PLANNING  Goal: Discharge to home or other facility with appropriate resources  Description: INTERVENTIONS:  - Identify barriers to discharge w/patient and caregiver  - Arrange for needed discharge resources and transportation as appropriate  - Identify discharge learning needs (meds, wound care, etc.)  - Arrange for interpretive services to assist at discharge as needed  - Refer to Case Management Department for coordinating discharge planning if the patient needs post-hospital services based on physician/advanced practitioner order or complex needs related to functional status, cognitive ability, or social support system  Outcome: Progressing     Problem: Knowledge Deficit  Goal: Patient/family/caregiver demonstrates understanding of disease process, treatment plan, medications, and discharge instructions  Description: Complete learning assessment and assess knowledge base.  Interventions:  - Provide teaching at level of understanding  - Provide teaching via preferred learning methods  Outcome: Progressing

## 2025-01-29 NOTE — ASSESSMENT & PLAN NOTE
- Patient currently takes propanolol 60 mg daily for tremor  - She reports this has worked well for her and has been on the medication for years.

## 2025-01-29 NOTE — ASSESSMENT & PLAN NOTE
- Patient states she uses a CPAP at home  - Consider melatonin 3 mg at bedtime if patient is having trouble sleeping

## 2025-01-29 NOTE — ANESTHESIA POSTPROCEDURE EVALUATION
Post-Op Assessment Note    CV Status:  Stable    Pain management: adequate       Mental Status:  Alert and awake   Hydration Status:  Euvolemic   PONV Controlled:  Controlled   Airway Patency:  Patent     Post Op Vitals Reviewed: Yes    No anethesia notable event occurred.    Staff: Anesthesiologist           Last Filed PACU Vitals:  Vitals Value Taken Time   Temp 97.8 °F (36.6 °C) 01/29/25 1445   Pulse 60 01/29/25 1448   /56 01/29/25 1445   Resp 15 01/29/25 1448   SpO2 97 % 01/29/25 1448   Vitals shown include unfiled device data.    Modified Talia:     Vitals Value Taken Time   Activity 2 01/29/25 1445   Respiration 2 01/29/25 1445   Circulation 2 01/29/25 1445   Consciousness 2 01/29/25 1445   Oxygen Saturation 1 01/29/25 1445     Modified Talia Score: 9

## 2025-01-29 NOTE — ASSESSMENT & PLAN NOTE
- Right ankle fracture, present on admission.  - Status post Reduction and splinting in ER  - Appreciate Orthopedic surgery evaluation, recommendations and interventions as noted.   - Reduced and splinted in the ED   - NPO at midnight for OR today  - Maintain NWB RLE in splint   - Monitor right lower extremity neurovascular exam.  - Continue multimodal analgesic regimen.  - Continue DVT prophylaxis.  - PT and OT evaluation and treatment as indicated.  - Outpatient follow up with Orthopedic surgery for re-evaluation.

## 2025-01-29 NOTE — OCCUPATIONAL THERAPY NOTE
Occupational Therapy Cancel Note        Patient Name: Lyn Pimentel  Today's Date: 1/29/2025 01/29/25 0728   OT Last Visit   OT Visit Date 01/29/25   Note Type   Note type Evaluation;Cancelled Session   Cancel Reasons Patient to operating room       OT orders received and chart was reviewed. Per chart review, pt currently with right trimalleolar ankle fracture and is pending OR today for ORIF right ankle. Pt is not appropriate to participate in skilled OT services at this time. Will continue to follow on caseload and see when appropriate.    Maria D Church MS, OTR/L

## 2025-01-29 NOTE — ED PROCEDURE NOTE
Procedure  Pre-Procedural Sedation    Performed by: Shaun Murphy DO  Authorized by: Shaun Murphy DO    Consent:     Consent obtained:  Verbal    Consent given by:  Patient    Risks discussed:  Allergic reaction, nausea, inadequate sedation, respiratory compromise necessitating ventilatory assistance and intubation and vomiting    Alternatives discussed:  Analgesia without sedation  Universal protocol:     Procedure explained and questions answered to patient or proxy's satisfaction: yes      Relevant documents present and verified: yes      Test results available and properly labeled: yes      Radiology Images displayed and confirmed.  If images not available, report reviewed: yes      Required blood products, implants, devices, and special equipment available: yes      Site/side marked: yes      Immediately prior to procedure a time out was called: yes      Patient identity confirmation method:  Verbally with patient  Indications:     Sedation purpose:  Dislocation reduction    Procedure necessitating sedation performed by:  Physician performing sedation    Intended level of sedation:  Moderate (conscious sedation)  Pre-sedation assessment:     Time since last food or drink:  1200    ASA classification: class 2 - patient with mild systemic disease      Neck mobility: normal      Mouth opening:  3 or more finger widths    Thyromental distance:  3 finger widths    Mallampati score:  II - soft palate, uvula, fauces visible    Pre-sedation assessments completed and reviewed: airway patency, cardiovascular function, hydration status, mental status, nausea/vomiting, pain level, respiratory function and temperature      History of difficult intubation: no      Pre-sedation assessment completed:  1/28/2025 6:38 PM  Procedural Sedation    Date/Time: 1/28/2025 9:36 PM    Performed by: Shaun Murphy DO  Authorized by: Shaun Murphy DO    Immediate pre-procedure details:     Reassessment: Patient reassessed immediately prior to  procedure      Reviewed: vital signs, relevant labs/tests and NPO status      Verified: bag valve mask available, emergency equipment available, intubation equipment available, IV patency confirmed, oxygen available, reversal medications available and suction available    Procedure details (see MAR for exact dosages):     Sedation start time:  1/28/2025 6:40 PM    Preoxygenation:  Nasal cannula    Sedation:  Propofol    Analgesia:  Fentanyl    Intra-procedure monitoring:  Blood pressure monitoring, continuous capnometry, frequent LOC assessments, frequent vital sign checks, continuous pulse oximetry and cardiac monitor    Intra-procedure events: none      Sedation end time:  1/28/2025 6:55 PM    Total sedation time (minutes):  15  Post-procedure details:     Post-sedation assessment completed:  1/28/2025 7:00 PM    Attendance: Constant attendance by certified staff until patient recovered      Recovery: Patient returned to pre-procedure baseline      Post-sedation assessments completed and reviewed: airway patency, cardiovascular function, hydration status, mental status, nausea/vomiting, pain level, respiratory function and temperature      Patient is stable for discharge or admission: yes      Patient tolerance:  Tolerated well, no immediate complications                   Shaun Murphy DO  01/28/25 0698

## 2025-01-29 NOTE — CONSULTS
Consultation - Geriatric Medicine   Lyn Pimentel 79 y.o. female MRN: [id number]  Unit/Bed#: [facility] Encounter: [id number]      Assessment & Plan     COPD (chronic obstructive pulmonary disease) (HCC)  Assessment & Plan  Currently not in exacerbation.  Patient does not require supplemental oxygen.  Will continue to monitor.    Fall  Assessment & Plan  Patient uses nothing for ambulation at baseline  Patient reports recent fall  Patient was releasing a mouse from a mousetrap and stepped wrong while doing so.  Patient reports she occasionally gets light headed when going from sitting to standing.  Patient denies any falls in past couple months.  Monitor orthostatic vital signs  Encourage p.o. hydration  Avoid hypotension and hypoglycemia   - Patient currently on Lexapro 20mg daily at home. Decrease to 10 mg daily.     Tremor  Assessment & Plan  - Patient currently takes propanolol 60 mg daily for tremor  - She reports this has worked well for her and has been on the medication for years.    Obstructive sleep apnea  Assessment & Plan  - Patient states she uses a CPAP at home  - Consider melatonin 3 mg at bedtime if patient is having trouble sleeping    * Closed fracture of right ankle  Assessment & Plan  - Right ankle fracture present on admission s/p mechanical fall at home  - S/p reduction and splinting  - Patient scheduled to go to OR today  - continue tylenol scheduled, consider adding gabapentin 100 mg QHS  Apply ice right ankle 3 times a day.  Continue oxycodone p.r.n..  Avoid muscle relaxants   Monitor bowel movement and adjust regimen as needed.  Monitor for urinary retention  Continue physical therapy.   Encourage OOB with meals and incentive spirometry.  Monitor CBC CMP.  Follow-up with orthopedics.   lovenox for DVT prophylaxis   Check vitamin D level                History of Present Illness   Physician Requesting Consult: Salazar Hartley,*  Reason for Consult / Principal Problem: Fall and  tremor        HPI: Lyn Pimentel is a 79 y.o. year old female who presents lying comfortably in bed.  She presented to the ER yesterday with a right ankle fracture and arrived via EMS.  Patient is a reliable historian. Patient stated she has mouse traps in her house and was trying to release a mouse she had caught and she rolled her ankle and heard a crack.  She called out for help and her neighbor heard her and called EMS.  She was then brought to [facility].  Patient stated she lives in a single-story home with her , granddaughter, and great-grandson.  Her  has a history of a stroke and is wheelchair-bound and so there are ramps located throughout the house as well as grab bars and shower chair.  Patient states she does not use any assistance when ambulating and denies use of walker and cane.  Patient has not had any falls in the past 3 months.  Patient manages the finances, cooks and cleans, and manages her own medications.  She is still driving.  Her  is not.  She drives whenever he has an appointment for she needs to this time.  She has no issues bathing or dressing herself.  Patient stated when she was working she did have someone come in and help her with the cooking and cleaning, but now she is retired and no longer has anyone coming to help her clean.  Patient wears glasses.  Patient also wears hearing aids but they are at home.  Patient denies use of dentures.  Patient is continent of bowel and bladder.  Patient stated she no longer has a large bowel and has a history of a total colectomy in 2015.  She wears depends occasionally but has not had an accident in years.  Patient states she typically sleeps well at home and uses a CPAP machine. Independent for iADLs and ADLs. Patient says she has not been sleeping well in the hospital due to pain.  Patient states she is in pain currently.  Patient stated she has a good appetite and her last bowel movement was yesterday.   Patient denies any urinary symptoms including urgency, frequency, hematuria, and dysuria.  Patient is alert and oriented x 3 and scored a 5/5 on mini cog.    Meds per pharmacy  - famotidine 40 mg tab daily  -Escitalopram 20 mg daily  -Propranolol ER 60 mg daily  -Simvastatin 80 mg daily  -Pantoprazole 40 mg daily    Inpatient consult to Gerontology  Consult performed by: Mala Jesus MD  Consult ordered by: Peter Smith PA-C          Review of Systems   Constitutional:  Negative for chills and fever.   HENT:  Negative for ear pain and sore throat.    Eyes:  Negative for pain and visual disturbance.   Respiratory:  Negative for cough and shortness of breath.    Cardiovascular:  Negative for chest pain and palpitations.   Gastrointestinal:  Negative for abdominal pain, nausea and vomiting.   Genitourinary:  Negative for dysuria (when she goes from sitting to standing she reports occasional light-headedness) and hematuria.   Musculoskeletal:  Positive for arthralgias (Right ankle pain). Negative for back pain.   Skin:  Negative for color change and rash.   Neurological:  Positive for tremors and light-headedness (when going from sitting to standing). Negative for seizures and syncope.   All other systems reviewed and are negative.          Historical Information   Past Medical History:   Diagnosis Date    Cataract 2015    had surgery on both eyes    Colitis     Colon polyp     COPD (chronic obstructive pulmonary disease) (HCC)     Dehydration     Depression     Familial tremor     GERD (gastroesophageal reflux disease)     Ground glass opacity present on imaging of lung     Hiatal hernia     High cholesterol     Hyperlipidemia     Hypertension     Hypoglycemia     Migraine     Nosebleed     Primary adenocarcinoma of upper lobe of right lung (HCC)     Sleep apnea     C pap    Sleep apnea, obstructive     Solitary pulmonary nodule     Syncope      Past Surgical History:   Procedure Laterality Date     ADENOIDECTOMY  Child    APPENDECTOMY      BACK SURGERY      Cervical spine    BREAST BIOPSY Bilateral BENIGN    CHOLECYSTECTOMY      COLECTOMY      complete colectomy    COLONOSCOPY      CT NEEDLE BX ASPIRATION INJECTION LOCALIZATION  11/07/2019    ESOPHAGOGASTRODUODENOSCOPY N/A 01/22/2018    Procedure: ESOPHAGOGASTRODUODENOSCOPY (EGD);  Surgeon: Lalito Espino MD;  [facility];  Service: Gastroenterology    EYE SURGERY      LUNG BIOPSY      LUNG SEGMENTECTOMY Right 11/07/2019    Procedure: RESECTION WEDGE LUNG;  Surgeon: Livan Lema MD;  Location: [facility];  Service: Thoracic    NASAL/SINUS ENDOSCOPY N/A 11/07/2019    Procedure: ENDOSCOPIC BIOPSY OF NASOPHARYNX LESION;  Surgeon: Perez Horner MD;  [facility];  Service: ENT    NISSEN FUNDOPLICATION      UT BRNCHSC INCL FLUOR GDNCE DX W/CELL WASHG SPX N/A 11/07/2019    Procedure: BRONCHOSCOPY FLEXIBLE;  Surgeon: Livan Lema MD;  [facility];  Service: Thoracic    UT ESOPHAGOGASTRODUODENOSCOPY TRANSORAL DIAGNOSTIC N/A 04/17/2019    Procedure: ESOPHAGOGASTRODUODENOSCOPY (EGD);  Surgeon: Lalito Espino MD;  [facility];  Service: Gastroenterology    UT LAPS RPR PARAESPHGL HRNA INCL FUNDPLSTY W/MESH N/A 06/02/2016    Procedure: ROBOTIC LAPAROSCOPIC Daquan Fundoplication, Liver biopsy;  Surgeon: Levi Momin MD;  [facility];  Service: General    UT THORACOSCOPY W/THERA WEDGE RESEXN INITIAL UNILAT Right 11/07/2019    Procedure: THORACOSCOPY VIDEO ASSISTED SURGERY (VATS);  Surgeon: Livan Lema MD;  [facility];  Service: Thoracic    SPINE SURGERY  2017    cervical spine    TONSILLECTOMY  Child    UPPER GASTROINTESTINAL ENDOSCOPY       Social History   Social History     Substance and Sexual Activity   Alcohol Use Yes    Comment: Social drinker     Social History     Substance and Sexual Activity   Drug Use No     Social History     Tobacco Use   Smoking Status Never   Smokeless Tobacco  Never     Family History:   Family History   Problem Relation Age of Onset    Leukemia Mother 62    Cancer Mother     Colon cancer Father 70    Cancer Father     Leukemia Brother 60    No Known Problems Daughter     No Known Problems Maternal Grandmother     No Known Problems Paternal Grandmother     Breast cancer Maternal Aunt     Breast cancer Maternal Aunt     No Known Problems Maternal Aunt     Cancer Brother         Cancer       Meds/Allergies   current meds:   Current Facility-Administered Medications:     [Transfer Hold] acetaminophen (TYLENOL) tablet 975 mg, Q8H BROOKS    atorvastatin (LIPITOR) tablet 40 mg, HS    bupivacaine liposomal (EXPAREL) 1.3 % injection 20 mL, Once    Cholecalciferol (VITAMIN D3) tablet 1,000 Units, HS    clindamycin (CLEOCIN) 900 mg IVPB (premix in dextrose) **ADS Override Pull**,     [Transfer Hold] enoxaparin (LOVENOX) subcutaneous injection 30 mg, Q12H    escitalopram (LEXAPRO) tablet 10 mg, HS    famotidine (PEPCID) tablet 40 mg, HS    [Transfer Hold] HYDROmorphone HCl (DILAUDID) injection 0.2 mg, Q2H PRN    loratadine (CLARITIN) tablet 10 mg, HS    [Transfer Hold] naloxone (NARCAN) 0.04 mg/mL syringe 0.04 mg, Q1MIN PRN    [Transfer Hold] ondansetron (ZOFRAN) injection 4 mg, Q6H PRN    [Transfer Hold] oxyCODONE (ROXICODONE) IR tablet 5 mg, Q4H PRN    [Transfer Hold] oxyCODONE (ROXICODONE) split tablet 2.5 mg, Q4H PRN    pantoprazole (PROTONIX) EC tablet 40 mg, HS    [Transfer Hold] polyethylene glycol (MIRALAX) packet 17 g, Daily PRN    propranolol (INDERAL LA) 24 hr capsule 60 mg, Daily    Allergies   Allergen Reactions    Biaxin [Clarithromycin] GI Intolerance    Cephalosporins     Darvon [Propoxyphene]     Latex      Added based on information entered during case entry, please review and add reactions, type, and severity as needed    Other     Wound Dressing Adhesive     Cefzil [Cefprozil] Rash    Ciprofloxacin Rash       Objective     Intake/Output Summary (Last 24 hours) at  1/29/2025 1220  Last data filed at 1/29/2025 0000  Gross per 24 hour   Intake 200 ml   Output 0 ml   Net 200 ml     Invasive Devices       Peripheral Intravenous Line  Duration             Peripheral IV 01/28/25 Left Antecubital <1 day                    Physical Exam  Vitals and nursing note reviewed.   Constitutional:       General: She is not in acute distress.     Appearance: Normal appearance. She is well-developed.   HENT:      Head: Normocephalic and atraumatic.      Right Ear: External ear normal.      Left Ear: External ear normal.      Nose: Nose normal.      Mouth/Throat:      Mouth: Mucous membranes are moist.      Pharynx: Oropharynx is clear.   Eyes:      Conjunctiva/sclera: Conjunctivae normal.   Cardiovascular:      Rate and Rhythm: Normal rate and regular rhythm.      Pulses: Normal pulses.      Heart sounds: Normal heart sounds. No murmur heard.  Pulmonary:      Effort: Pulmonary effort is normal. No respiratory distress.      Breath sounds: Normal breath sounds.   Abdominal:      General: Bowel sounds are normal. There is no distension.      Palpations: Abdomen is soft.      Tenderness: There is no abdominal tenderness.   Musculoskeletal:         General: Swelling (right ankle), tenderness (right ankle) and signs of injury (right ankle fracture) present.      Cervical back: Neck supple.      Left lower leg: No edema.      Comments: Right ankle splinted and elevated   Skin:     General: Skin is warm and dry.      Capillary Refill: Capillary refill takes less than 2 seconds.   Neurological:      General: No focal deficit present.      Mental Status: She is alert and oriented to person, place, and time. Mental status is at baseline.   Psychiatric:         Mood and Affect: Mood normal.         Behavior: Behavior normal.         Thought Content: Thought content normal.         Judgment: Judgment normal.         Lab Results:   I have personally reviewed pertinent lab results including the following:  -  CBC, CMP  I have personally reviewed the following imaging study reports in PACS:  - MRI brain (2022)      Therapies:   PT: Cancelled session due to patient going to OR today. Will hold PT evaluation until post op.  OT: Pending evaluation    VTE Prophylaxis: VTE covered by:  [Transfer Hold] enoxaparin, Subcutaneous, 30 mg at 01/29/25 0807        Code Status: Level 3 - DNAR and DNI  Advance Directive and Living Will: Yes    Power of :    POLST:      Family and Social Support: , daughter, son, granddaughter  Living Arrangements: Lives w/ Spouse/significant other; Lives w/ Extended Family  Support Systems: Self; Spouse/significant other  Assistance Needed: none  Type of Current Residence: Private residence  Current Home Care Services: No      Goals of Care: DNR    Thank you for allowing me to participate in your patients' care. Please do not hesitate to call with any additional questions.  Mala Jesus MD

## 2025-01-29 NOTE — ASSESSMENT & PLAN NOTE
- Right ankle fracture present on admission s/p mechanical fall at home  - S/p surgical repair  - continue tylenol scheduled, consider adding gabapentin 100 mg QHS  Apply ice right ankle 3 times a day.  Continue oxycodone p.r.n..  Avoid muscle relaxants   Monitor bowel movement and adjust regimen as needed.  Monitor for urinary retention  Continue physical therapy.   Encourage OOB with meals and incentive spirometry.  Monitor CBC CMP.  Follow-up with orthopedics.   lovenox for DVT prophylaxis   vitamin D level 17.1- start vitamin D supplements

## 2025-01-30 LAB
ANION GAP SERPL CALCULATED.3IONS-SCNC: 7 MMOL/L (ref 4–13)
BUN SERPL-MCNC: 16 MG/DL (ref 5–25)
CALCIUM SERPL-MCNC: 8.8 MG/DL (ref 8.4–10.2)
CHLORIDE SERPL-SCNC: 103 MMOL/L (ref 96–108)
CO2 SERPL-SCNC: 28 MMOL/L (ref 21–32)
CREAT SERPL-MCNC: 0.98 MG/DL (ref 0.6–1.3)
ERYTHROCYTE [DISTWIDTH] IN BLOOD BY AUTOMATED COUNT: 13 % (ref 11.6–15.1)
GFR SERPL CREATININE-BSD FRML MDRD: 54 ML/MIN/1.73SQ M
GLUCOSE SERPL-MCNC: 135 MG/DL (ref 65–140)
HCT VFR BLD AUTO: 32.8 % (ref 34.8–46.1)
HGB BLD-MCNC: 10.6 G/DL (ref 11.5–15.4)
MCH RBC QN AUTO: 31.3 PG (ref 26.8–34.3)
MCHC RBC AUTO-ENTMCNC: 32.3 G/DL (ref 31.4–37.4)
MCV RBC AUTO: 97 FL (ref 82–98)
PLATELET # BLD AUTO: 250 THOUSANDS/UL (ref 149–390)
PMV BLD AUTO: 9.7 FL (ref 8.9–12.7)
POTASSIUM SERPL-SCNC: 4.2 MMOL/L (ref 3.5–5.3)
RBC # BLD AUTO: 3.39 MILLION/UL (ref 3.81–5.12)
SODIUM SERPL-SCNC: 138 MMOL/L (ref 135–147)
WBC # BLD AUTO: 13.6 THOUSAND/UL (ref 4.31–10.16)

## 2025-01-30 PROCEDURE — 99233 SBSQ HOSP IP/OBS HIGH 50: CPT | Performed by: FAMILY MEDICINE

## 2025-01-30 PROCEDURE — 80048 BASIC METABOLIC PNL TOTAL CA: CPT

## 2025-01-30 PROCEDURE — 85027 COMPLETE CBC AUTOMATED: CPT

## 2025-01-30 PROCEDURE — 97163 PT EVAL HIGH COMPLEX 45 MIN: CPT

## 2025-01-30 PROCEDURE — 99024 POSTOP FOLLOW-UP VISIT: CPT

## 2025-01-30 PROCEDURE — 99232 SBSQ HOSP IP/OBS MODERATE 35: CPT | Performed by: PHYSICIAN ASSISTANT

## 2025-01-30 PROCEDURE — NC001 PR NO CHARGE: Performed by: PHYSICIAN ASSISTANT

## 2025-01-30 PROCEDURE — 97530 THERAPEUTIC ACTIVITIES: CPT

## 2025-01-30 PROCEDURE — 97167 OT EVAL HIGH COMPLEX 60 MIN: CPT

## 2025-01-30 PROCEDURE — 99232 SBSQ HOSP IP/OBS MODERATE 35: CPT | Performed by: STUDENT IN AN ORGANIZED HEALTH CARE EDUCATION/TRAINING PROGRAM

## 2025-01-30 RX ORDER — POLYETHYLENE GLYCOL 3350 17 G/17G
17 POWDER, FOR SOLUTION ORAL DAILY
Status: DISCONTINUED | OUTPATIENT
Start: 2025-01-30 | End: 2025-01-31 | Stop reason: HOSPADM

## 2025-01-30 RX ADMIN — ENOXAPARIN SODIUM 30 MG: 30 INJECTION SUBCUTANEOUS at 21:51

## 2025-01-30 RX ADMIN — OXYCODONE HYDROCHLORIDE 5 MG: 5 TABLET ORAL at 08:28

## 2025-01-30 RX ADMIN — PROPRANOLOL HYDROCHLORIDE 60 MG: 60 CAPSULE, EXTENDED RELEASE ORAL at 08:39

## 2025-01-30 RX ADMIN — ATORVASTATIN CALCIUM 40 MG: 40 TABLET, FILM COATED ORAL at 21:51

## 2025-01-30 RX ADMIN — ACETAMINOPHEN 975 MG: 325 TABLET, FILM COATED ORAL at 15:13

## 2025-01-30 RX ADMIN — ACETAMINOPHEN 975 MG: 325 TABLET, FILM COATED ORAL at 21:51

## 2025-01-30 RX ADMIN — ESCITALOPRAM OXALATE 10 MG: 10 TABLET ORAL at 21:51

## 2025-01-30 RX ADMIN — Medication 2000 UNITS: at 21:51

## 2025-01-30 RX ADMIN — PANTOPRAZOLE SODIUM 40 MG: 40 TABLET, DELAYED RELEASE ORAL at 21:51

## 2025-01-30 RX ADMIN — ENOXAPARIN SODIUM 30 MG: 30 INJECTION SUBCUTANEOUS at 08:29

## 2025-01-30 RX ADMIN — LORATADINE 10 MG: 10 TABLET ORAL at 21:51

## 2025-01-30 RX ADMIN — ACETAMINOPHEN 975 MG: 325 TABLET, FILM COATED ORAL at 05:48

## 2025-01-30 RX ADMIN — CEFAZOLIN SODIUM 2000 MG: 2 SOLUTION INTRAVENOUS at 05:48

## 2025-01-30 RX ADMIN — FAMOTIDINE 40 MG: 20 TABLET, FILM COATED ORAL at 21:51

## 2025-01-30 NOTE — CASE MANAGEMENT
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     Other diseases of pharynx 10/08/2019     Epigastric pain 2019     Belching 2017     Syncope 2017     Essential hypertension 2017     HLD (hyperlipidemia) 2017     Status post colectomy 2017     Status post laparoscopic Nissen fundoplication 2017     Diffusion capacity of lung (dl), decreased 2016     History of Lyme disease 2016     Tremor 2016     Cognitive impairment 2016     Obstructive sleep apnea 2016     Short of breath on exertion 2016     GERD without esophagitis 2015       LOS (days): 2  Geometric Mean LOS (GMLOS) (days): 2.8  Days to GMLOS:0.9     OBJECTIVE:  Risk of Unplanned Readmission Score: 13.65         Current admission status: Inpatient   Preferred Pharmacy:   [id number] - [place], [place] - [address] [address]  Phone: [phone] Fax: [phone]    Primary Care Provider: Chris Shin MD    Primary Insurance: [employer] [facility] RE[employer]  Secondary Insurance:     DISCHARGE DETAILS:    Discharge planning discussed with:: Pt  Freedom of Choice: Yes  Comments - [place] of Choice: [place] for STR    Other Referral/Resources/Interventions Provided:  Interventions: Short Term Rehab  Referral Comments: CM notified by nursing that pt has decided she would like to  go to rehab where her  is at [facility]. CM met with pt at bedside and confirmed that she would like [facility]. CM reserved and requested to initiate authorization. Awaiting NPI information.

## 2025-01-30 NOTE — ASSESSMENT & PLAN NOTE
- Takes propranolol 60 mg daily  - Geriatric medicine consultation and recommendations appreciated

## 2025-01-30 NOTE — ASSESSMENT & PLAN NOTE
- Right ankle fracture, present on admission.  - Status post Reduction and splinting in ER  - Appreciate Orthopedic surgery evaluation, recommendations and interventions as noted.   - S/p ORIF 1/29   - Maintain NWB RLE in splint   - Monitor right lower extremity neurovascular exam.  - Continue multimodal analgesic regimen.  - Continue DVT prophylaxis.  - PT and OT evaluation and treatment as indicated.  - Outpatient follow up with Orthopedic surgery for re-evaluation.

## 2025-01-30 NOTE — PLAN OF CARE
Problem: OCCUPATIONAL THERAPY ADULT  Goal: Performs self-care activities at highest level of function for planned discharge setting.  See evaluation for individualized goals.  Description: Treatment Interventions: ADL retraining, Functional transfer training, Endurance training, Patient/family training, Equipment evaluation/education, Energy conservation, Activityengagement  Equipment Recommended: Bedside commode       See flowsheet documentation for full assessment, interventions and recommendations.   Note: Limitation: Decreased ADL status, Decreased Safe judgement during ADL, Decreased endurance, Decreased self-care trans, Decreased high-level ADLs (Decreased dynamic/standing balance, (+) pain, decreased functional mobility status)  Prognosis: Good  Assessment: Pt is an 79 y.o. female being seen for an OT evaluation at [facility] on 01/30/25  s/p fall resulting in closed fx of R ankle. Reduced and splinted in the ED, pt currently NWB RLE in splint. PMH includes HTN, tremor, HLD, colectomy, nasoparyngeal mass, and cognitive impairment (7/2016). lives in a one-level home with /family . PTA, pt is I for ADLs/IADLs/functional mobility without AD. Pt is main caregiver for  w/ past CVA ~6 years ago. Pt is (+) falls and (+) driving. Personal factors that limit occupational participation include advanced age, decreased ability to perform ADLs, decreased ability to perform IADLs, decreased functional mobility status, caregiver for , and environment factor of sunken living room and limited support at home. Personal factors that support occupational participation include supportive family, FFSU, (I) PLOF, and motivated attitude. Upon eval, pt performed below baseline and would benefit from skilled OT services. Continue to see 3-5 x/week to address listed deficits, maximize independence in ADLs/IADLs/transfers/functional mobility, edu pt on proper technique and body mechanics during transfers w/ RW, edu pt  on energy conservation techniques, and maximize safety during participation in meaningful tasks . Level I resources are recommended at this time.     Rehab Resource Intensity Level, OT: I (Maximum Resource Intensity)        Frieda Tompkins, OTS

## 2025-01-30 NOTE — ASSESSMENT & PLAN NOTE
NWB to right lower extremity in splint  Will monitor for ABLA and administer IVF/prbc as indicated for Greater than 2 gram drop or Hgb < 7.  Patient hemoglobin currently at 10.6  PT/OT as tolerated  Incentive spirometry  Pain control per primary team  DVT ppx  : Recommend aspirin 81 Mg twice daily for 6 weeks upon discharge.  Lovenox while in house.  All other medical comorbidities to be managed per primary team  Dispo: Ortho will follow  See above for additional details   Patient will need follow-up with Dr. Brown in 2 weeks outpatient

## 2025-01-30 NOTE — PLAN OF CARE
Problem: PHYSICAL THERAPY ADULT  Goal: Performs mobility at highest level of function for planned discharge setting.  See evaluation for individualized goals.  Description: Treatment/Interventions: Functional transfer training, LE strengthening/ROM, Therapeutic exercise, Endurance training, Patient/family training, Equipment eval/education, Bed mobility, Gait training, Compensatory technique education (WC training)  Equipment Recommended: Walker       See flowsheet documentation for full assessment, interventions and recommendations.  1/30/2025 1529 by Esperanza Johnson PT  Note: Prognosis: Good  Problem List: Decreased strength, Decreased endurance, Impaired balance, Decreased mobility, Decreased skin integrity, Orthopedic restrictions, Pain  Assessment: Lyn Pimentel is a 79 y.o. Female who [facility] to [facility] due to fall and diagnosis of closed fx of R ankle. Orders for PT eval and treat received. Comorbidities affecting pt's functional mobility at time of evaluation include: HTN, COPD, colectomy, tremor. Personal factors affecting DC include: inability to ambulate household distances, inability to navigate level surfaces w/o external assistance, and positive fall history. At baseline, pt mobilizes independently w/ no AD, and w/ 1-4 fall(s) in the previous 6 months. Upon evaluation, pt presents w/ the following deficits: impaired strength, impaired skin integrity, impaired balance, decreased endurance/activity tolerance, and pain affecting mobility. Pt currently requires  mod Ax1 for transfers. Pt's clinical presentation is unstable/unpredictable due to abnormal lab values, need for increased assistance w/ functional mobility compared to baseline, pain affecting mobility tolerance, need for input for mobility technique, recent drastic decline in mobility status, recent h/o falls, ongoing medical management. From a PT/mobility standpoint given the above findings, DC recommendation is level: I (Maximum  Rehab Resource Intensity). During current admission, pt will benefit from continued skilled inpatient PT in the acute care setting in order to address the above deficits and to maximize function and mobility prior to DC from acute care.        Rehab Resource Intensity Level, PT: I (Maximum Resource Intensity)    See flowsheet documentation for full assessment.

## 2025-01-30 NOTE — CASE MANAGEMENT
Case Management Progress Note    Patient name Lyn Pimentel  Location ICU 03/ICU 03 MRN [id number]  : 1945 Date 2025       LOS (days): 2  Geometric Mean LOS (GMLOS) (days): 2.8  Days to GMLOS:0.9        OBJECTIVE:        Current admission status: Inpatient  Preferred Pharmacy:   [id number] - [place], [place] - [address] [facility] [address]  Phone: [phone] Fax: [phone]    Primary Care Provider: Chris Shin MD    Primary Insurance: [employer] [facility] [employer]  Secondary Insurance:     PROGRESS NOTE:    NPI information received for [facility]. Liaison confirmed pt will room with her . Request for auth sent to  DC support.     Left VM for pt's daughter, Zhane, and updated her on facility mom chose.

## 2025-01-30 NOTE — PLAN OF CARE
Problem: Prexisting or High Potential for Compromised Skin Integrity  Goal: Skin integrity is maintained or improved  Description: INTERVENTIONS:  - Identify patients at risk for skin breakdown  - Assess and monitor skin integrity  - Assess and monitor nutrition and hydration status  - Monitor labs   - Assess for incontinence   - Turn and reposition patient  - Assist with mobility/ambulation  - Relieve pressure over bony prominences  - Avoid friction and shearing  - Provide appropriate hygiene as needed including keeping skin clean and dry  - Evaluate need for skin moisturizer/barrier cream  - Collaborate with interdisciplinary team   - Patient/family teaching  - Consider wound care consult   Outcome: Progressing     Problem: PAIN - ADULT  Goal: Verbalizes/displays adequate comfort level or baseline comfort level  Description: Interventions:  - Encourage patient to monitor pain and request assistance  - Assess pain using appropriate pain scale  - Administer analgesics based on type and severity of pain and evaluate response  - Implement non-pharmacological measures as appropriate and evaluate response  - Consider cultural and social influences on pain and pain management  - Notify physician/advanced practitioner if interventions unsuccessful or patient reports new pain  Outcome: Progressing     Problem: INFECTION - ADULT  Goal: Absence or prevention of progression during hospitalization  Description: INTERVENTIONS:  - Assess and monitor for signs and symptoms of infection  - Monitor lab/diagnostic results  - Monitor all insertion sites, i.e. indwelling lines, tubes, and drains  - Monitor endotracheal if appropriate and nasal secretions for changes in amount and color  - [place] appropriate cooling/warming therapies per order  - Administer medications as ordered  - Instruct and encourage patient and family to use good hand hygiene technique  - Identify and instruct in appropriate isolation precautions for  identified infection/condition  Outcome: Progressing  Goal: Absence of fever/infection during neutropenic period  Description: INTERVENTIONS:  - Monitor WBC    Outcome: Progressing     Problem: SAFETY ADULT  Goal: Patient will remain free of falls  Description: INTERVENTIONS:  - Educate patient/family on patient safety including physical limitations  - Instruct patient to call for assistance with activity   - Consult OT/PT to assist with strengthening/mobility   - Keep Call bell within reach  - Keep bed low and locked with side rails adjusted as appropriate  - Keep care items and personal belongings within reach  - Initiate and maintain comfort rounds  - Make Fall Risk Sign visible to staff  - Apply yellow socks and bracelet for high fall risk patients  - Consider moving patient to room near nurses station  Outcome: Progressing  Goal: Maintain or return to baseline ADL function  Description: INTERVENTIONS:  -  Assess patient's ability to carry out ADLs; assess patient's baseline for ADL function and identify physical deficits which impact ability to perform ADLs (bathing, care of mouth/teeth, toileting, grooming, dressing, etc.)  - Assess/evaluate cause of self-care deficits   - Assess range of motion  - Assess patient's mobility; develop plan if impaired  - Assess patient's need for assistive devices and provide as appropriate  - Encourage maximum independence but intervene and supervise when necessary  - Involve family in performance of ADLs  - Assess for home care needs following discharge   - Consider OT consult to assist with ADL evaluation and planning for discharge  - Provide patient education as appropriate  Outcome: Progressing  Goal: Maintains/Returns to pre admission functional level  Description: INTERVENTIONS:  - Perform AM-PAC 6 Click Basic Mobility/ Daily Activity assessment daily.  - Set and communicate daily mobility goal to care team and patient/family/caregiver.   - Collaborate with rehabilitation  services on mobility goals if consulted  - Out of bed for toileting  - Record patient progress and toleration of activity level   Outcome: Progressing     Problem: DISCHARGE PLANNING  Goal: Discharge to home or other facility with appropriate resources  Description: INTERVENTIONS:  - Identify barriers to discharge w/patient and caregiver  - Arrange for needed discharge resources and transportation as appropriate  - Identify discharge learning needs (meds, wound care, etc.)  - Arrange for interpretive services to assist at discharge as needed  - Refer to Case Management Department for coordinating discharge planning if the patient needs post-hospital services based on physician/advanced practitioner order or complex needs related to functional status, cognitive ability, or social support system  Outcome: Progressing     Problem: Knowledge Deficit  Goal: Patient/family/caregiver demonstrates understanding of disease process, treatment plan, medications, and discharge instructions  Description: Complete learning assessment and assess knowledge base.  Interventions:  - Provide teaching at level of understanding  - Provide teaching via preferred learning methods  Outcome: Progressing

## 2025-01-30 NOTE — PROGRESS NOTES
Progress Note - Trauma   Name: Lyn Pimentel 79 y.o. female I MRN: [id number]  Unit/Bed#: ICU 03 I Date of Admission: 1/28/2025   Date of Service: 1/30/2025 I Hospital Day: 2    Assessment & Plan  Closed fracture of right ankle  - Right ankle fracture, present on admission.  - Status post Reduction and splinting in ER  - Appreciate Orthopedic surgery evaluation, recommendations and interventions as noted.   - S/p ORIF 1/29   - Maintain NWB RLE in splint   - Monitor right lower extremity neurovascular exam.  - Continue multimodal analgesic regimen.  - Continue DVT prophylaxis.  - PT and OT evaluation and treatment as indicated.  - Outpatient follow up with Orthopedic surgery for re-evaluation.    Obstructive sleep apnea  - CPAP QHS  Tremor  - Takes propranolol 60 mg daily  - Geriatric medicine consultation and recommendations appreciated  Fall  - Status post fall with the below noted injuries.  - Fall precautions.  - Geriatric Medicine consultation for evaluation, medication review and recommendations.  - PT and OT evaluation and treatment as indicated.  - Case Management consultation for disposition planning.  Essential hypertension  - Home propranolol restarted  - Monitor blood pressure while admitted    Bowel Regimen: Miralax  VTE Prophylaxis:VTE covered by:  enoxaparin, Subcutaneous, 30 mg at 01/29/25 2146        Disposition: Continue med/surg status pending PT/OT evaluation and dispo recommendations Please contact the [employer] role for the Trauma service with any questions/concerns.    24 Hour Events : POD#1 s/p right trimal ORIF and right lower extremity block  Subjective : Patient reports some burning in her ankle. No other complaints.     Objective :  Temp:  [97 °F (36.1 °C)-98.5 °F (36.9 °C)] 97.8 °F (36.6 °C)  HR:  [58-71] 58  BP: (100-166)/(51-73) 120/59  Resp:  [13-20] 18  SpO2:  [91 %-100 %] 97 %  O2 Device: None (Room air)    I/O         01/28 0701 01/29 0700 01/29 0701 01/30 0700 01/30  0701  01/31 0700    P.O. 0 0     I.V. (mL/kg)  1400 (17.1)     IV Piggyback 200 50 50    Total Intake(mL/kg) 200 (2.4) 1450 (17.7) 50 (0.6)    Urine (mL/kg/hr) 0 227 (0.1)     Total Output 0 227     Net +200 +1223 +50                   Physical Exam  Vitals and nursing note reviewed.   Constitutional:       General: She is not in acute distress.     Appearance: Normal appearance. She is normal weight. She is not ill-appearing or toxic-appearing.   HENT:      Head: Normocephalic and atraumatic.      Nose: Nose normal. No congestion or rhinorrhea.      Mouth/Throat:      Mouth: Mucous membranes are moist.      Pharynx: Oropharynx is clear. No oropharyngeal exudate or posterior oropharyngeal erythema.   Eyes:      Extraocular Movements: Extraocular movements intact.      Conjunctiva/sclera: Conjunctivae normal.      Pupils: Pupils are equal, round, and reactive to light.   Cardiovascular:      Rate and Rhythm: Normal rate and regular rhythm.      Heart sounds: No murmur heard.     No friction rub. No gallop.   Pulmonary:      Effort: Pulmonary effort is normal.      Breath sounds: Normal breath sounds. No wheezing or rhonchi.   Abdominal:      General: Abdomen is flat. Bowel sounds are normal. There is no distension.      Palpations: Abdomen is soft.      Tenderness: There is no abdominal tenderness. There is no guarding or rebound.   Musculoskeletal:      Right shoulder: Normal.      Left shoulder: Normal.      Right upper arm: Normal.      Left upper arm: Normal.      Right elbow: Normal.      Left elbow: Normal.      Right forearm: Normal.      Left forearm: Normal.      Right wrist: Normal.      Left wrist: Normal.      Right hand: Normal.      Left hand: Normal.      Thoracic back: No deformity or tenderness.      Lumbar back: No deformity or tenderness.      Right hip: Normal.      Left hip: Normal.      Right upper leg: Normal.      Left upper leg: Normal.      Right knee: Normal.      Left knee: Normal.       Right lower leg: Normal.      Left lower leg: Normal.      Right ankle: Decreased range of motion (in splint).      Left ankle: Normal.      Right foot: Normal. No swelling.      Left foot: Normal.   Skin:     General: Skin is warm.      Capillary Refill: Capillary refill takes less than 2 seconds.      Coloration: Skin is not pale.      Findings: No bruising.   Neurological:      General: No focal deficit present.      Mental Status: She is alert and oriented to person, place, and time.               Lab Results: I have reviewed the following results:  Recent Labs     01/28/25  1834 01/29/25  0519 01/30/25  0523   WBC 15.09*   < > 13.60*   HGB 12.4   < > 10.6*   HCT 39.1   < > 32.8*      < > 250   SODIUM 137   < > 138   K 4.3   < > 4.2      < > 103   CO2 26   < > 28   BUN 21   < > 16   CREATININE 1.08   < > 0.98   GLUC 127   < > 135   AST 74*  --   --    ALT 69*  --   --    ALB 4.2  --   --    TBILI 0.47  --   --    ALKPHOS 98  --   --    PTT 27  --   --    INR 0.89  --   --     < > = values in this interval not displayed.

## 2025-01-30 NOTE — PROGRESS NOTES
Peripheral Nerve Block Follow-up Note - Acute Pain Service    Lyn Pimentel 79 y.o. female MRN: [id number]  Unit/Bed#: ICU 03 Encounter: [id number]      Assessment:   Principal Problem:    Closed fracture of right ankle  Active Problems:    Essential hypertension    Obstructive sleep apnea    Tremor    Fall    COPD (chronic obstructive pulmonary disease) ([facility])    PONV (postoperative nausea and vomiting)    Lyn Pimentel is a 79 y.o. year old female status post ORIF right trimalleolar fracture, POD 1.    Plan:   - Right adductor and popliteal block is functioning appropriately with expected sensory and motor deficits  - Recommend oxycodone 2.5 mg/5 mg PO q4hrs PRN for moderate/severe pain  Dilaudid 0.2 mg IV q2hr PRN for breakthrough pain    - Multimodal analgesia with:  - Tylenol 975 mg PO q8hrs standing    APS will sign off at this time. Thank you for the consult. All opioids and other analgesics to be written at discretion of primary team. Please contact Acute Pain Service - [facility] via [employer] from [phone] with additional questions or concerns. See [employer] or [email or web address] for additional contacts and after hours information.    Pain History  Current pain location(s): No pain  Pain Scale:   No pain  Quality: No pain  24 hour history: Patient is postop day 1 status post ORIF right trimalleolar fracture.  Had preoperative right popliteal and adductor canal blocks with Exparel.  Patient denies any pain at all.  Has no sensation or motor function in the right toes.    Opioid requirement previous 24 hours: None    Meds/Allergies   all current active meds have been reviewed, current meds:   Current Facility-Administered Medications:     acetaminophen (TYLENOL) tablet 975 mg, Q8H BROOKS    atorvastatin (LIPITOR) tablet 40 mg, HS    Cholecalciferol (VITAMIN D3) tablet 1,000 Units, HS    enoxaparin (LOVENOX) subcutaneous injection 30 mg, Q12H BROOKS    escitalopram (LEXAPRO) tablet 10 mg, HS    famotidine (PEPCID) tablet 40 mg,  HS    HYDROmorphone HCl (DILAUDID) injection 0.2 mg, Q2H PRN    lactated ringers bolus 1,000 mL, Once PRN **AND** lactated ringers bolus 1,000 mL, Once PRN    loratadine (CLARITIN) tablet 10 mg, HS    naloxone (NARCAN) 0.04 mg/mL syringe 0.04 mg, Q1MIN PRN    ondansetron (ZOFRAN) injection 4 mg, Q6H PRN    oxyCODONE (ROXICODONE) IR tablet 5 mg, Q4H PRN    oxyCODONE (ROXICODONE) split tablet 2.5 mg, Q4H PRN    pantoprazole (PROTONIX) EC tablet 40 mg, HS    polyethylene glycol (MIRALAX) packet 17 g, Daily    propranolol (INDERAL LA) 24 hr capsule 60 mg, Daily    sodium chloride 0.9 % bolus 1,000 mL, Once PRN **AND** sodium chloride 0.9 % bolus 1,000 mL, Once PRN, and PTA meds:   Prior to Admission Medications   Prescriptions Last Dose Informant Patient Reported? Taking?   Cholecalciferol (VITAMIN D3) 5000 UNITS CAPS  Self Yes No   Sig: Take 1 capsule by mouth daily.   Loratadine (CLARITIN PO)  Self Yes No   Sig: Take by mouth as needed   acetaminophen (TYLENOL) 325 mg tablet  Self No No   Si mg every 4 to 6 hours as needed for pain.   bismuth subsalicylate (PEPTO BISMOL) 262 MG chewable tablet  Self Yes No   Sig: Chew 262 mg 2 (two) times a day as needed for indigestion    Patient not taking: Reported on 2024   escitalopram (LEXAPRO) 20 mg tablet  Self Yes No   Sig: Take 10 mg by mouth daily    famotidine (PEPCID) 40 MG tablet  Self Yes No   Sig: Take 40 mg by mouth daily   pantoprazole (PROTONIX) 40 mg tablet  Self Yes No   Sig: Take 40 mg by mouth daily   promethazine-dextromethorphan (PHENERGAN-DM) 6.25-15 mg/5 mL oral syrup   No No   Sig: Take 5 mL by mouth 4 (four) times a day as needed for cough   propranolol (INDERAL LA) 60 mg 24 hr capsule  Self Yes No   sertraline (ZOLOFT) 25 mg tablet  Self Yes No   Sig: Take 25 mg by mouth daily at bedtime   simvastatin (ZOCOR) 80 mg tablet  Self Yes No   Sig: Take 80 mg by mouth daily      Facility-Administered Medications: None       Allergies   Allergen  Reactions    Biaxin [Clarithromycin] GI Intolerance    Cephalosporins     Darvon [Propoxyphene]     Latex      Added based on information entered during case entry, please review and add reactions, type, and severity as needed    Other     Wound Dressing Adhesive     Cefzil [Cefprozil] Rash    Ciprofloxacin Rash       Objective     Temp:  [97 °F (36.1 °C)-98.4 °F (36.9 °C)] 97.8 °F (36.6 °C)  HR:  [58-71] 65  BP: (100-140)/(51-67) 120/59  Resp:  [13-20] 18  SpO2:  [92 %-100 %] 97 %  O2 Device: None (Room air)    Physical Exam  Vitals and nursing note reviewed.   Constitutional:       General: She is awake. She is not in acute distress.     Appearance: She is not ill-appearing, toxic-appearing or diaphoretic.   Skin:     General: Skin is warm and dry.   Neurological:      Mental Status: She is alert and oriented to person, place, and time.      GCS: GCS eye subscore is 4. GCS verbal subscore is 5. GCS motor subscore is 6.      Comments: Unable to move right toes.  No sensation to light touch in toes of right foot.   Psychiatric:         Attention and Perception: Attention normal.         Speech: Speech normal.         Behavior: Behavior normal. Behavior is cooperative.           Lab Results:   Results from last 7 days   Lab Units 01/30/25  0523   WBC Thousand/uL 13.60*   HEMOGLOBIN g/dL 10.6*   HEMATOCRIT % 32.8*   PLATELETS Thousands/uL 250      Results from last 7 days   Lab Units 01/30/25  0523 01/29/25  0519 01/28/25  1834   POTASSIUM mmol/L 4.2   < > 4.3   CHLORIDE mmol/L 103   < > 103   CO2 mmol/L 28   < > 26   BUN mg/dL 16   < > 21   CREATININE mg/dL 0.98   < > 1.08   CALCIUM mg/dL 8.8   < > 9.7   ALK PHOS U/L  --   --  98   ALT U/L  --   --  69*   AST U/L  --   --  74*    < > = values in this interval not displayed.       Imaging Studies: Results Review Statement: No pertinent imaging studies reviewed.  EKG, Pathology, and Other Studies: Results Review Statement: No pertinent imaging studies  reviewed.    Counseling / Coordination of Care  Total floor / unit time spent today 30 minutes. Greater than 50% of total time was spent with the patient and / or family counseling and / or coordination of care. A description of the counseling / coordination of care: Patient interview, physical examination, review of medical records, review of imaging and laboratory data, development of pain management plan, discussion of pain management plan with patient and primary service.    Please note that the APS provides consultative services regarding pain management only.  With the exception of ketamine, peripheral nerve catheters, and epidural infusions (and except when indicated), final decisions regarding starting or changing doses of analgesic medications are at the discretion of the consulting service.  Off hours consultation and/or medication management is generally not available.    Levi Cervantes PA-C  Acute Pain Service

## 2025-01-30 NOTE — PLAN OF CARE
Problem: Prexisting or High Potential for Compromised Skin Integrity  Goal: Skin integrity is maintained or improved  Description: INTERVENTIONS:  - Identify patients at risk for skin breakdown  - Assess and monitor skin integrity  - Assess and monitor nutrition and hydration status  - Monitor labs   - Assess for incontinence   - Turn and reposition patient  - Assist with mobility/ambulation  - Relieve pressure over bony prominences  - Avoid friction and shearing  - Provide appropriate hygiene as needed including keeping skin clean and dry  - Evaluate need for skin moisturizer/barrier cream  - Collaborate with interdisciplinary team   - Patient/family teaching  - Consider wound care consult   Outcome: Progressing     Problem: PAIN - ADULT  Goal: Verbalizes/displays adequate comfort level or baseline comfort level  Description: Interventions:  - Encourage patient to monitor pain and request assistance  - Assess pain using appropriate pain scale  - Administer analgesics based on type and severity of pain and evaluate response  - Implement non-pharmacological measures as appropriate and evaluate response  - Consider cultural and social influences on pain and pain management  - Notify physician/advanced practitioner if interventions unsuccessful or patient reports new pain  Outcome: Progressing     Problem: INFECTION - ADULT  Goal: Absence or prevention of progression during hospitalization  Description: INTERVENTIONS:  - Assess and monitor for signs and symptoms of infection  - Monitor lab/diagnostic results  - Monitor all insertion sites, i.e. indwelling lines, tubes, and drains  - Monitor endotracheal if appropriate and nasal secretions for changes in amount and color  - [place] appropriate cooling/warming therapies per order  - Administer medications as ordered  - Instruct and encourage patient and family to use good hand hygiene technique  - Identify and instruct in appropriate isolation precautions for  identified infection/condition  Outcome: Progressing  Goal: Absence of fever/infection during neutropenic period  Description: INTERVENTIONS:  - Monitor WBC    Outcome: Progressing     Problem: SAFETY ADULT  Goal: Patient will remain free of falls  Description: INTERVENTIONS:  - Educate patient/family on patient safety including physical limitations  - Instruct patient to call for assistance with activity   - Consult OT/PT to assist with strengthening/mobility   - Keep Call bell within reach  - Keep bed low and locked with side rails adjusted as appropriate  - Keep care items and personal belongings within reach  - Initiate and maintain comfort rounds  - Make Fall Risk Sign visible to staff  - Offer Toileting every 2 Hours, in advance of need  - Initiate/Maintain bed alarm  - Obtain necessary fall risk management equipment:   - Apply yellow socks and bracelet for high fall risk patients  - Consider moving patient to room near nurses station  Outcome: Progressing  Goal: Maintain or return to baseline ADL function  Description: INTERVENTIONS:  -  Assess patient's ability to carry out ADLs; assess patient's baseline for ADL function and identify physical deficits which impact ability to perform ADLs (bathing, care of mouth/teeth, toileting, grooming, dressing, etc.)  - Assess/evaluate cause of self-care deficits   - Assess range of motion  - Assess patient's mobility; develop plan if impaired  - Assess patient's need for assistive devices and provide as appropriate  - Encourage maximum independence but intervene and supervise when necessary  - Involve family in performance of ADLs  - Assess for home care needs following discharge   - Consider OT consult to assist with ADL evaluation and planning for discharge  - Provide patient education as appropriate  Outcome: Progressing  Goal: Maintains/Returns to pre admission functional level  Description: INTERVENTIONS:  - Perform AM-PAC 6 Click Basic Mobility/ Daily Activity  assessment daily.  - Set and communicate daily mobility goal to care team and patient/family/caregiver.   - Collaborate with rehabilitation services on mobility goals if consulted  - Perform Range of Motion 12 times a day.  - Reposition patient every 2 hours.  - Dangle patient 3 times a day  - Stand patient 3 times a day  - Ambulate patient 3 times a day  - Out of bed to chair 3 times a day   - Out of bed for meals 3 times a day  - Out of bed for toileting  - Record patient progress and toleration of activity level   Outcome: Progressing     Problem: DISCHARGE PLANNING  Goal: Discharge to home or other facility with appropriate resources  Description: INTERVENTIONS:  - Identify barriers to discharge w/patient and caregiver  - Arrange for needed discharge resources and transportation as appropriate  - Identify discharge learning needs (meds, wound care, etc.)  - Arrange for interpretive services to assist at discharge as needed  - Refer to Case Management Department for coordinating discharge planning if the patient needs post-hospital services based on physician/advanced practitioner order or complex needs related to functional status, cognitive ability, or social support system  Outcome: Progressing     Problem: Knowledge Deficit  Goal: Patient/family/caregiver demonstrates understanding of disease process, treatment plan, medications, and discharge instructions  Description: Complete learning assessment and assess knowledge base.  Interventions:  - Provide teaching at level of understanding  - Provide teaching via preferred learning methods  Outcome: Progressing

## 2025-01-30 NOTE — OCCUPATIONAL THERAPY NOTE
Occupational Therapy Evaluation     Patient Name: Lyn Pimentel  Today's Date: 1/30/2025  Problem List  Principal Problem:    Closed fracture of right ankle  Active Problems:    Essential hypertension    Obstructive sleep apnea    Tremor    Fall    COPD (chronic obstructive pulmonary disease) (HCC)    PONV (postoperative nausea and vomiting)    Past Medical History  Past Medical History:   Diagnosis Date    Cataract 2015    had surgery on both eyes    Colitis     Colon polyp     COPD (chronic obstructive pulmonary disease) (HCC)     Dehydration     Depression     Familial tremor     GERD (gastroesophageal reflux disease)     Ground glass opacity present on imaging of lung     Hiatal hernia     High cholesterol     Hyperlipidemia     Hypertension     Hypoglycemia     Migraine     Nosebleed     Primary adenocarcinoma of upper lobe of right lung (HCC)     Sleep apnea     C pap    Sleep apnea, obstructive     Solitary pulmonary nodule     Syncope      Past Surgical History  Past Surgical History:   Procedure Laterality Date    ADENOIDECTOMY  Child    APPENDECTOMY      BACK SURGERY      Cervical spine    BREAST BIOPSY Bilateral BENIGN    CHOLECYSTECTOMY      COLECTOMY      complete colectomy    COLONOSCOPY      CT NEEDLE BX ASPIRATION INJECTION LOCALIZATION  11/07/2019    ESOPHAGOGASTRODUODENOSCOPY N/A 01/22/2018    Procedure: ESOPHAGOGASTRODUODENOSCOPY (EGD);  Surgeon: Lalito Espion MD;  [facility]: [facility];  Service: Gastroenterology    EYE SURGERY      LUNG BIOPSY      LUNG SEGMENTECTOMY Right 11/07/2019    Procedure: RESECTION WEDGE LUNG;  Surgeon: Livan Lema MD;  Location: [facility];  Service: Thoracic    NASAL/SINUS ENDOSCOPY N/A 11/07/2019    Procedure: ENDOSCOPIC BIOPSY OF NASOPHARYNX LESION;  Surgeon: Perez Horner MD;  Location[facility];  Service: ENT    NISSEN FUNDOPLICATION      OH BRNCHSC INCL FLUOR GDNCE DX W/CELL WASHG SPX N/A 11/07/2019    Procedure: BRONCHOSCOPY FLEXIBLE;   Surgeon: Livan Lema MD;  [facility];  Service: Thoracic    AL ESOPHAGOGASTRODUODENOSCOPY TRANSORAL DIAGNOSTIC N/A 04/17/2019    Procedure: ESOPHAGOGASTRODUODENOSCOPY (EGD);  Surgeon: Lalito Espino MD;  Location: [facility];  Service: Gastroenterology    AL LAPS RPR PARAESPHGL HRNA INCL FUNDPLSTY W/MESH N/A 06/02/2016    Procedure: ROBOTIC LAPAROSCOPIC Daquan Fundoplication, Liver biopsy;  Surgeon: Levi Momin MD;  [facility];  Service: General    AL THORACOSCOPY W/THERA WEDGE RESEXN INITIAL UNILAT Right 11/07/2019    Procedure: THORACOSCOPY VIDEO ASSISTED SURGERY (VATS);  Surgeon: Livan Lema MD;  [facility];  Service: Thoracic    SPINE SURGERY  2017    cervical spine    TONSILLECTOMY  Child    UPPER GASTROINTESTINAL ENDOSCOPY             01/30/25 1015   OT Last Visit   OT Visit Date 01/30/25   Note Type   Note type Evaluation   Pain Assessment   Pain Assessment Tool FLACC   Pain Location/Orientation Orientation: Right;Orientation: Lower;Location: Leg  (Ankle)   Effect of Pain on Daily Activities Limits comfort during activity   Patient's Stated Pain Goal No pain   Hospital Pain Intervention(s) Repositioned;Ambulation/increased activity;Emotional support   Pain Rating: FLACC (Rest) - Face 0   Pain Rating: FLACC (Rest) - Legs 0   Pain Rating: FLACC (Rest) - Activity 0   Pain Rating: FLACC (Rest) - Cry 0   Pain Rating: FLACC (Rest) - Consolability 0   Score: FLACC (Rest) 0   Pain Rating: FLACC (Activity) - Face 1   Pain Rating: FLACC (Activity) - Legs 0   Pain Rating: FLACC (Activity) - Activity 0   Pain Rating: FLACC (Activity) - Cry 1   Pain Rating: FLACC (Activity) - Consolability 0   Score: FLACC (Activity) 2   Restrictions/Precautions   Weight Bearing Precautions Per Order Yes   RLE Weight Bearing Per Order (S)  NWB   Braces or Orthoses Splint   Other Precautions Chair Alarm;Bed Alarm;Fall Risk;Pain   Home Living   Type of Home House   Home Layout One  "level;Performs ADLs on one level;Able to live on main level with bedroom/bathroom;Ramped entrance  (Ramped enterance and ramp to sunken living room, 3 ISAIAS from garage)   Bathroom Shower/Tub Walk-in shower   Bathroom Toilet Standard   Bathroom Equipment Grab bars in shower;Shower chair;Commode;Grab bars around toilet  ( has/uses commode)   Bathroom Accessibility Accessible   Home Equipment Walker  (RW that  does not use anymore)   Additional Comments Lives w/  that uses WC from CVA ~6 years ago   Prior Function   Level of [place] Independent with ADLs;Independent with functional mobility;Independent with IADLS  (no use of AD)   Lives With Spouse;Family  (Primary caretaker for  w/ past CVA ~6 years ago,  currently at inpatient rehab for past weeks, granddaughter and 4 y.o. great grandson live w/ pt, grandEtaoshi works and pt is responsible for transportation to  for great grandson)   Receives Help From Family   IADLs Independent with driving;Independent with meal prep;Independent with medication management   Falls in the last 6 months 1 to 4  (1 fall)   Lifestyle   Autonomy Pt is a 79 y.o. female living in one-level home w/  and family. PTA pt is (I) for ADLS, IADLs, and functional mobility. Pt reports she is main caregiver for  w/ past CVA ~6 years ago and is responsible for some care and transportation to  for great grandson   Reciprocal Relationships , granddaughter, great grandson   Service to Others Caregiver for , responsible for some care and transportation to  for great grandson   General   Additional Pertinent History Pt is a 79 y.o. female admitted to [facility] s/p fall resulting in closed fx of R ankle. Reduced and splinted in the ED, pt currently NWB RLE in splint. PMH includes HTN, tremor, HLD, colectomy, nasoparyngeal mass, and cognitive impairment (7/2016).   Family/Caregiver Present No   Subjective   Subjective \"Do you " "want to know what happened to me?\"   ADL   Where Assessed Chair   Eating Assistance 7  Independent   Grooming Assistance 6  Modified Independent  (sitting in chair)   UB Bathing Assistance 5  Supervision/Setup   LB Bathing Assistance 3  Moderate Assistance   UB Dressing Assistance 5  Supervision/Setup   LB Dressing Assistance 3  Moderate Assistance   LB Dressing Deficit Setup;Steadying;Requires assistive device for steadying;Verbal cueing;Increased time to complete;Thread RLE into underwear;Thread LLE into underwear;Pull up over hips  (Pt able to thread BLEs into underwear, mod A w/ RW to stand, poor standing balance, unable to pull underwear over hips)   Toileting Assistance  2  Maximal Assistance   Bed Mobility   Additional Comments Not asssessed, pt recieved in chair and ended session in chair   Transfers   Sit to Stand 3  Moderate assistance   Additional items Assist x 1;Armrests;Increased time required;Verbal cues  (VCs and A to maintain NWB RLE, VCs for hand placement/use of RW, in standing ~20 secs to pull up underwear)   Stand to Sit 3  Moderate assistance   Additional items Assist x 1;Armrests;Increased time required;Impulsive;Verbal cues  (VC for hand placement)   Sit pivot 3  Moderate assistance   Additional items Assist x 1;Armrests;Increased time required;Verbal cues  (SPT x2 from chair to commode to L side and from commode to chair to R side, pt using armrests on chair/commode to support; easier to SPT to L side)   Functional Mobility   Functional Mobility 3  Moderate assistance   Additional Comments Pt edu on mobilizing w/ NWB status. Pt w/ RW attempted to hop/pivot L foot but unable due to poor balance   Additional items Rolling walker   Balance   Static Sitting Fair   Dynamic Sitting Fair -   Static Standing Poor +   Dynamic Standing Poor   Activity Tolerance   Activity Tolerance Patient limited by fatigue;Patient limited by pain   Medical Staff Made Aware Care coordinated w/ PT Esperanza Aguillon Made " Aware MAURO Mccormick   RUE Assessment   RUE Assessment WFL   LUE Assessment   LUE Assessment WFL   Hand Function   Gross Motor Coordination Functional   Fine Motor Coordination Functional   Sensation   Light Touch (S)  Partial deficits in the RLE  (Residual block no sensation in RLE toes)   Vision-Basic Assessment   Current Vision Wears glasses all the time   Cognition   Overall Cognitive Status WFL   Arousal/Participation Alert;Responsive;Cooperative   Attention Attends with cues to redirect   Orientation Level Oriented X4   Memory Within functional limits   Following Commands Follows one step commands without difficulty   Comments Pt pleasant and agreeable to therapy. Able to recall in detail how she broke her ankle. Able to recall WBS and apply to functional transfers. Pt presented WFL cognitively but has PMH of cognitive impairment from 7/2016   Assessment   Limitation Decreased ADL status;Decreased Safe judgement during ADL;Decreased endurance;Decreased self-care trans;Decreased high-level ADLs  (Decreased dynamic/standing balance, (+) pain, decreased functional mobility status)   Prognosis Good   Assessment Pt is an 79 y.o. female being seen for an OT evaluation at [facility] on 01/30/25  s/p fall resulting in closed fx of R ankle. Reduced and splinted in the ED, pt currently NWB RLE in splint. PMH includes HTN, tremor, HLD, colectomy, nasoparyngeal mass, and cognitive impairment (7/2016). lives in a one-level home with /family . PTA, pt is I for ADLs/IADLs/functional mobility without AD. Pt is main caregiver for  w/ past CVA ~6 years ago. Pt is (+) falls and (+) driving. Personal factors that limit occupational participation include advanced age, decreased ability to perform ADLs, decreased ability to perform IADLs, decreased functional mobility status, caregiver for , and environment factor of sunken living room and limited support at home. Personal factors that support occupational participation  include supportive family, FFSU, (I) PLOF, and motivated attitude. Upon eval, pt performed below baseline and would benefit from skilled OT services. Continue to see 3-5 x/week to address listed deficits, maximize independence in ADLs/IADLs/transfers/functional mobility, edu pt on proper technique and body mechanics during transfers w/ RW, edu pt on energy conservation techniques, and maximize safety during participation in meaningful tasks . Level I resources are recommended at this time.   Goals   Patient Goals Go to rehab or home   LTG Time Frame 10-14   Long Term Goal #1 see goals below   Plan   Treatment Interventions ADL retraining;Functional transfer training;Endurance training;Patient/family training;Equipment evaluation/education;Energy conservation;Activityengagement   Goal Expiration Date 02/09/25   OT Treatment Day 0   OT Frequency 3-5x/wk   Discharge Recommendation   Rehab Resource Intensity Level, OT I (Maximum Resource Intensity)   Equipment Recommended Bedside commode   Commode Type Standard   AM-PAC Daily Activity Inpatient   Lower Body Dressing 2   Bathing 2   Toileting 2   Upper Body Dressing 3   Grooming 4   Eating 4   Daily Activity Raw Score 17   Daily Activity Standardized Score (Calc for Raw Score >=11) 37.26   AM-PAC Applied Cognition Inpatient   Following a Speech/Presentation 4   Understanding Ordinary Conversation 4   Taking Medications 4   Remembering Where Things Are Placed or Put Away 4   Remembering List of 4-5 Errands 4   Taking Care of Complicated Tasks 3   Applied Cognition Raw Score 23   Applied Cognition Standardized Score 53.08   End of Consult   Education Provided Yes   Patient Position at End of Consult Bedside chair;Bed/Chair alarm activated;All needs within reach   Nurse Communication Nurse aware of consult  (MAURO Mccormick)     Goals    Pt will be S with LB dressing and bathing to maximize independence in ADLs.    Pt will be S with toileting to maximize independence in  ADLs.    Pt will be S for STS transfer w/ RW to increase participation in ADLs/meaningful tasks OOB and in standing.    Pt will be S for SPT transfer w/ RW to increase participation in ADLs OOB.    Pt will be S for functional mobility w/ RW over a few feet to chair/BSC while maintaining NWB on RLE to increase independence in ADLs OOB.    Pt will improve static/dynamic standing balance to fair/fair(-) to increase independence in ADL tasks in standing.    Pt will demonstrate standing tolerance of ~5 mins to increase participation in grooming tasks standing at sink.     Pt will be edu and demonstrate 2 energy conservation/pacing strategies during ADLs/IADLs to maximize activity tolerance.    Pt will maintain RLE NWB status during ADL and functional tasks 100% of the time to maintain integrity/healing of the joint.     The patient's raw score on the -PAC Daily Activity Inpatient Short Form is 17. A raw score of less than 19 suggests the patient may benefit from discharge to post-acute rehabilitation services. Please refer to the recommendation of the Occupational Therapist for safe discharge planning.    Frieda Tompkins OTS

## 2025-01-30 NOTE — CASE MANAGEMENT
[facility] received request for authorization from Care Manager.  Authorization request submitted for: [facility]  Facility Name:   [facility] [phone]  Facility MD:   Dr. Judge [id number]   Authorization initiated by contacting insurance:  [employer]   Via: [employer]   Clinicals submitted via Portal attachment   Pending Reference #: [id number]     Care Manager notified: Mary Sky,      Updates to authorization status will be noted in chart. Please reach out to CM for updates on any clinical information.

## 2025-01-30 NOTE — CASE MANAGEMENT
Case Management Discharge Planning Note    Patient name Lyn Pimentel  Location ICU 03/ICU 03 MRN [id number]  : 1945 Date 2025       Current Admission Date: 2025  Current Admission Diagnosis:Closed fracture of right ankle   Patient Active Problem List    Diagnosis Date Noted Date Diagnosed    PONV (postoperative nausea and vomiting) 2025     COPD (chronic obstructive pulmonary disease) (HCC)      Fall 2025     Closed fracture of right ankle 2025     Dyspepsia 2021     Primary adenocarcinoma of upper lobe of right lung (HCC) 2019     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     Other diseases of pharynx 10/08/2019     Epigastric pain 2019     Belching 2017     Syncope 2017     Essential hypertension 2017     HLD (hyperlipidemia) 2017     Status post colectomy 2017     Status post laparoscopic Nissen fundoplication 2017     Diffusion capacity of lung (dl), decreased 2016     History of Lyme disease 2016     Tremor 2016     Cognitive impairment 2016     Obstructive sleep apnea 2016     Short of breath on exertion 2016     GERD without esophagitis 2015       LOS (days): 2  Geometric Mean LOS (GMLOS) (days): 2.8  Days to GMLOS:1     OBJECTIVE:  Risk of Unplanned Readmission Score: 13.65         Current admission status: Inpatient   Preferred Pharmacy:   [id number] [place], [place] [facility] [address]  [address] [address]  Phone: [phone] Fax: [phone]    Primary Care Provider: Chris Shin MD    Primary Insurance: [facility] RE[facility]  Secondary Insurance:     DISCHARGE DETAILS:    Discharge planning discussed with:: Pt  Freedom of Choice: Yes  Comments - Freedom of Choice: STR at sub acute. Not interested in acute rehab    Other Referral/Resources/Interventions Provided:  Interventions: Short Term Rehab  Referral Comments: CM met with pt at bedside and reviewed  PT/OT recs for STR. She is agreeable to rehab. Unsure if she wants to go to the facility her  is at. She is requesting [facility]. Aware CM will send blanket referrals and f/u with list of options.

## 2025-01-30 NOTE — PROGRESS NOTES
Progress Note - Geriatric Medicine   Name: Lyn Pimentel 79 y.o. female I MRN: [id number]  Unit/Bed#: ICU 03 I Date of Admission: 1/28/2025   Date of Service: 1/30/2025 I Hospital Day: 2     Assessment & Plan  Closed fracture of right ankle  - Right ankle fracture present on admission s/p mechanical fall at home  - S/p surgical repair  - continue tylenol scheduled, consider adding gabapentin 100 mg QHS  Apply ice right ankle 3 times a day.  Continue oxycodone p.r.n..  Avoid muscle relaxants   Monitor bowel movement and adjust regimen as needed.  Monitor for urinary retention  Continue physical therapy.   Encourage OOB with meals and incentive spirometry.  Monitor CBC CMP.  Follow-up with orthopedics.   lovenox for DVT prophylaxis   vitamin D level 17.1- start vitamin D supplements    Essential hypertension    Obstructive sleep apnea  - Patient states she uses a CPAP at home  - Consider melatonin 3 mg at bedtime if patient is having trouble sleeping  Tremor  - Patient currently takes propanolol 60 mg daily for tremor  - She reports this has worked well for her and has been on the medication for years.  Fall  Patient uses nothing for ambulation at baseline  Patient reports recent fall  Patient was releasing a mouse from a mousetrap and stepped wrong while doing so.  Patient reports she occasionally gets light headed when going from sitting to standing.  Patient denies any falls in past couple months.  Monitor orthostatic vital signs  Encourage p.o. hydration  Avoid hypotension and hypoglycemia   - Patient  on Lexapro 20mg daily at home. Decrease to 10 mg daily.   COPD (chronic obstructive pulmonary disease) (HCC)  Currently not in exacerbation.  Patient does not require supplemental oxygen.  Will continue to monitor.  PONV (postoperative nausea and vomiting)        Subjective:   Patient seen and examined at bedside for geriatric follow-up.  At the time of encounter she denies any acute complaints.  Reports  "intermittent pain right lower extremity aggravated by movement.  She was able to sleep better last night and her appetite is improved.  No bowel movement since admission to the hospital    Review of Systems   Constitutional:  Negative for chills and fever.   HENT:  Negative for congestion and rhinorrhea.    Respiratory:  Negative for cough, shortness of breath and wheezing.    Cardiovascular:  Positive for leg swelling. Negative for palpitations.   Gastrointestinal:  Positive for constipation. Negative for abdominal pain.   Endocrine: Negative for cold intolerance.   Genitourinary:  Negative for difficulty urinating, dysuria and hematuria.   Musculoskeletal:  Positive for arthralgias and gait problem.   Skin:  Negative for wound.   Allergic/Immunologic: Negative for environmental allergies.   Neurological:  Negative for dizziness and seizures.   Hematological:  Does not bruise/bleed easily.   Psychiatric/Behavioral:  Positive for sleep disturbance. Negative for behavioral problems.          Objective:     Vitals: Blood pressure 120/59, pulse 65, temperature 97.8 °F (36.6 °C), temperature source Oral, resp. rate 18, height 5' 5\" (1.651 m), weight 82 kg (180 lb 12.4 oz), SpO2 97%.,Body mass index is 30.08 kg/m².      Intake/Output Summary (Last 24 hours) at 1/30/2025 1413  Last data filed at 1/30/2025 0801  Gross per 24 hour   Intake 930 ml   Output 427 ml   Net 503 ml       Current Medications: Reviewed    Physical Exam:   Physical Exam  Vitals and nursing note reviewed.   Constitutional:       General: She is not in acute distress.     Appearance: She is well-developed. She is obese.   HENT:      Head: Normocephalic and atraumatic.      Mouth/Throat:      Mouth: Mucous membranes are moist.   Eyes:      Conjunctiva/sclera: Conjunctivae normal.   Cardiovascular:      Rate and Rhythm: Normal rate and regular rhythm.      Heart sounds: No murmur heard.  Pulmonary:      Effort: Pulmonary effort is normal. No respiratory " distress.      Breath sounds: Normal breath sounds.   Abdominal:      Palpations: Abdomen is soft.      Tenderness: There is no abdominal tenderness.   Musculoskeletal:         General: No swelling.      Cervical back: Neck supple.      Right lower leg: Edema present.      Left lower leg: No edema.   Skin:     General: Skin is warm and dry.      Capillary Refill: Capillary refill takes less than 2 seconds.   Neurological:      Mental Status: She is alert and oriented to person, place, and time.   Psychiatric:         Mood and Affect: Mood normal.          Invasive Devices       Peripheral Intravenous Line  Duration             Peripheral IV 01/28/25 Left Antecubital 1 day                    Lab, Imaging and other studies: Results Review Statement: I reviewed radiology reports from this admission including: CT RLE.

## 2025-01-30 NOTE — PROGRESS NOTES
Progress Note - Orthopedics   Name: Lyn Pimentel 79 y.o. female I MRN: [id number]  Unit/Bed#: ICU 03 I Date of Admission: 1/28/2025   Date of Service: 1/30/2025 I Hospital Day: 2    Assessment & Plan  Closed fracture of right ankle  NWB to right lower extremity in splint  Will monitor for ABLA and administer IVF/prbc as indicated for Greater than 2 gram drop or Hgb < 7.  Patient hemoglobin currently at 10.6  PT/OT as tolerated  Incentive spirometry  Pain control per primary team  DVT ppx  : Recommend aspirin 81 Mg twice daily for 6 weeks upon discharge.  Lovenox while in house.  All other medical comorbidities to be managed per primary team  Dispo: Ortho will follow  See above for additional details   Patient will need follow-up with Dr. Brown in 2 weeks outpatient          Orthopedics service will follow.  Please contact the SecureChat role for the Orthopedics service with any questions/concerns.    Subjective   79 y.o.female seen and examined in the ICU.  Patient reports overnight she experienced mild pain in the extremity but states this got better when IV pain medication was administered.  She offers no additional complaints at this time aside from mild ankle pain.  She states most of her foot feels numb and she is unable to move her toes due to her nerve block she received yesterday.  She offers no additional complaints at this time.    Objective :  Temp:  [97 °F (36.1 °C)-98.5 °F (36.9 °C)] 98 °F (36.7 °C)  HR:  [58-71] 58  BP: (100-166)/(51-73) 119/67  Resp:  [13-20] 15  SpO2:  [91 %-100 %] 96 %  O2 Device: None (Room air)    Physical Exam  Musculoskeletal: rightlower  Visible skin intact. No erythema or ecchymosis.  Splint clean, dry, intact without evidence of strikethrough appreciated  No significant tenderness to palpation elicited  Sensation and motor exam limited due to patient's nerve block she received yesterday  Brisk capillary refill in all 5 digits  Thigh and calf remain soft and  "compressible  Limb is well-perfused      Lab Results: I have reviewed the following results:  Recent Labs     01/28/25  1834 01/29/25  0519 01/30/25  0523   WBC 15.09* 10.92* 13.60*   HGB 12.4 11.0* 10.6*   HCT 39.1 34.5* 32.8*    258 250   BUN 21 20 16   CREATININE 1.08 0.88 0.98   PTT 27  --   --    INR 0.89  --   --      Blood Culture:    Lab Results   Component Value Date    BLOODCX Escherichia coli (A) 02/01/2020    BLOODCX Escherichia coli (A) 02/01/2020     Wound Culture: No results found for: \"WOUNDCULT\"  "

## 2025-01-30 NOTE — PHYSICAL THERAPY NOTE
PHYSICAL THERAPY EVALUATION NOTE          Patient Name: Lyn Pimentel  Today's Date: 1/30/2025          AGE:   79 y.o.  Mrn:   [id number]  ADMIT DX:  Bimalleolar fracture [S82.843A]  Ankle pain [M25.579]  Ankle fracture [S82.899A]  Closed fracture of right ankle, initial encounter [W31.427V]    Past Medical History:  Past Medical History:   Diagnosis Date    Cataract 2015    had surgery on both eyes    Colitis     Colon polyp     COPD (chronic obstructive pulmonary disease) (HCC)     Dehydration     Depression     Familial tremor     GERD (gastroesophageal reflux disease)     Ground glass opacity present on imaging of lung     Hiatal hernia     High cholesterol     Hyperlipidemia     Hypertension     Hypoglycemia     Migraine     Nosebleed     Primary adenocarcinoma of upper lobe of right lung (HCC)     Sleep apnea     C pap    Sleep apnea, obstructive     Solitary pulmonary nodule     Syncope        Past Surgical History:  Past Surgical History:   Procedure Laterality Date    ADENOIDECTOMY  Child    APPENDECTOMY      BACK SURGERY      Cervical spine    BREAST BIOPSY Bilateral BENIGN    CHOLECYSTECTOMY      COLECTOMY      complete colectomy    COLONOSCOPY      CT NEEDLE BX ASPIRATION INJECTION LOCALIZATION  11/07/2019    ESOPHAGOGASTRODUODENOSCOPY N/A 01/22/2018    Procedure: ESOPHAGOGASTRODUODENOSCOPY (EGD);  Surgeon: Lalito Espino MD;  [facility]: [facility];  Service: Gastroenterology    EYE SURGERY      LUNG BIOPSY      LUNG SEGMENTECTOMY Right 11/07/2019    Procedure: RESECTION WEDGE LUNG;  Surgeon: Livan Lema MD;  Location: [facility];  Service: Thoracic    NASAL/SINUS ENDOSCOPY N/A 11/07/2019    Procedure: ENDOSCOPIC BIOPSY OF NASOPHARYNX LESION;  Surgeon: Perez Horner MD;  Location[facility];  Service: ENT    NISSEN FUNDOPLICATION      MI BRNCHSC INCL FLUOR GDNCE DX W/CELL WASHG SPX N/A 11/07/2019    Procedure: BRONCHOSCOPY FLEXIBLE;   Surgeon: Livan Lema MD;  [facility];  Service: Thoracic    NJ ESOPHAGOGASTRODUODENOSCOPY TRANSORAL DIAGNOSTIC N/A 2019    Procedure: ESOPHAGOGASTRODUODENOSCOPY (EGD);  Surgeon: Lalito Espino MD;  [facility];  Service: Gastroenterology    NJ LAPS RPR PARAESPHGL HRNA INCL FUNDPLSTY W/MESH N/A 2016    Procedure: ROBOTIC LAPAROSCOPIC Daquan Fundoplication, Liver biopsy;  Surgeon: Levi Momin MD;  [facility];  Service: General    NJ THORACOSCOPY W/THERA WEDGE RESEXN INITIAL UNILAT Right 2019    Procedure: THORACOSCOPY VIDEO ASSISTED SURGERY (VATS);  Surgeon: Livan Lema MD;  [facility];  Service: Thoracic    SPINE SURGERY  2017    cervical spine    TONSILLECTOMY  Child    UPPER GASTROINTESTINAL ENDOSCOPY       Length Of Stay: 2        PHYSICAL THERAPY EVALUATION:    Patient's identity confirmed via 2 patient identifiers (full name and ) at start of session       25 [phone]   PT Last Visit   PT Visit Date 25   Note Type   Note type Evaluation   Pain Assessment   Pain Assessment Tool FLACC   Pain Location/Orientation Orientation: Right  (ankle)   Hospital Pain Intervention(s) Repositioned;Ambulation/increased activity;Emotional support   Pain Rating: FLACC (Rest) - Face 0   Pain Rating: FLACC (Rest) - Legs 0   Pain Rating: FLACC (Rest) - Activity 0   Pain Rating: FLACC (Rest) - Cry 0   Pain Rating: FLACC (Rest) - Consolability 0   Score: FLACC (Rest) 0   Pain Rating: FLACC (Activity) - Face 1   Pain Rating: FLACC (Activity) - Legs 0   Pain Rating: FLACC (Activity) - Activity 0   Pain Rating: FLACC (Activity) - Cry 1   Pain Rating: FLACC (Activity) - Consolability 0   Score: FLACC (Activity) 2   Restrictions/Precautions   Weight Bearing Precautions Per Order Yes   RLE Weight Bearing Per Order (S)  NWB  (in splint)   Braces or Orthoses Splint  (RLE splint)   Other Precautions Chair Alarm;Bed Alarm;WBS;Multiple lines;Fall Risk;Pain    Home Living   Type of Home House   Home Layout One level;Performs ADLs on one level;Able to live on main level with bedroom/bathroom;Ramped entrance  (ramp on front door entrance, 3 stairs in the garage, sunken living room w/ ramp)   Bathroom Shower/Tub Walk-in shower   Bathroom Toilet Standard   Bathroom Equipment Shower chair;Grab bars in shower;Grab bars around toilet;Commode  (has shower chair available,  has a commode)   Home Equipment Walker  (RW)   Prior Function   Level of [place] Independent with functional mobility;Independent with ADLs;Independent with IADLS   Lives With Spouse;Family  (pt is the primary caretaker for her  (WC level due to CVA ~6 years ago - is currently at rehab), granddaughter and 4 year old great grandson also live w/ pt and are potentially moving out this week)   Receives Help From Family   IADLs Independent with driving;Independent with meal prep;Independent with medication management   Falls in the last 6 months 1 to 4  (1 per pt)   Comments At baseline pt amb ind w/o AD, performs all ADLs and IADLs ind   General   Additional Pertinent History POD 1: ORIF R trimalleolar ankle fx, surgical fixation of the distal tibia/fibula syndesmosis; NWB RLE   Family/Caregiver Present No   Cognition   Overall Cognitive Status WFL   Arousal/Participation Cooperative   Attention Attends with cues to redirect   Orientation Level Oriented X4   Memory Within functional limits   Following Commands Follows one step commands without difficulty   Comments Pt ID via name and ; pt agreeable to PT eval and mobility   RLE Assessment   RLE Assessment X  (grossly assessed w/ functional mobility)   Strength RLE   R Knee Flexion 3/5   R Knee Extension 3/5   LLE Assessment   LLE Assessment WFL   Light Touch   RLE Light Touch   (pt reports minimal sensation to R toes, intact sensation proximal to RLE splint/ACE bandage)   LLE Light Touch Grossly intact   Bed Mobility   Additional Comments pt  OOB in recliner chair upon arrival, returned to chair at end of session   Transfers   Sit to Stand 3  Moderate assistance   Additional items Assist x 1;Armrests;Increased time required;Verbal cues   Stand to Sit 3  Moderate assistance   Additional items Assist x 1;Armrests;Increased time required;Verbal cues   Additional Comments VC for hand placement + tactile cues to maintain RLE NWB, pt able to tolerate standing for aprox 20-30 sec w/ mod Ax1 and unilateral UE support to gardenia underwear   Ambulation/Elevation   Ambulation/Elevation Additional Comments Education and demonstration of ambulation technique (hopping method on LLE w/ RW to maintain RLE NWB), pt unable to clear LLE from floor - able to reposition L foot on floor via heel/toe method w/ mod Ax1 and RW   Balance   Static Sitting Fair +   Dynamic Sitting Fair   Static Standing Poor +   Dynamic Standing Poor   Activity Tolerance   Activity Tolerance Patient limited by pain   Medical Staff Made Aware Pt benefited from PT/OT care coordination w/ OT Jen due to signficant level of assistance required w/ mobility and allow for challenge of pt's activity tolerance, PT and OT goals were addressed individually during session; OTS Frieda, ALEX Cervantes   Nurse Made Aware MAURO Mccormick   Assessment   Prognosis Good   Problem List Decreased strength;Decreased endurance;Impaired balance;Decreased mobility;Decreased skin integrity;Orthopedic restrictions;Pain   Assessment Lyn Pimentel is a 79 y.o. Female who [facility] due to fall and diagnosis of closed fx of R ankle. Orders for PT eval and treat received. Comorbidities affecting pt's functional mobility at time of evaluation include: HTN, COPD, colectomy, tremor. Personal factors affecting DC include: inability to ambulate household distances, inability to navigate level surfaces w/o external assistance, and positive fall history. At baseline, pt mobilizes independently w/ no AD, and w/ 1-4 fall(s) in  the previous 6 months. Upon evaluation, pt presents w/ the following deficits: impaired strength, impaired skin integrity, impaired balance, decreased endurance/activity tolerance, and pain affecting mobility. Pt currently requires  mod Ax1 for transfers. Pt's clinical presentation is unstable/unpredictable due to abnormal lab values, need for increased assistance w/ functional mobility compared to baseline, pain affecting mobility tolerance, need for input for mobility technique, recent drastic decline in mobility status, recent h/o falls, ongoing medical management. From a PT/mobility standpoint given the above findings, DC recommendation is level: I (Maximum Rehab Resource Intensity). During current admission, pt will benefit from continued skilled inpatient PT in the acute care setting in order to address the above deficits and to maximize function and mobility prior to DC from acute care.   Goals   [facility] Expiration Date 02/09/25   Short Term Goal #1 Pt will: perform bed mobility w/ mod I to decrease pt's burden of care and increase pt's independence w/ repositioning in bed; perform transfers w/ mod I to promote OOB mobility; ambulate 25' w/ LRAD and min Ax1 to increase pt's ambulatory endurance/tolerance; self-propel manual WC at least 75' and independently manage WC parts (breaks, armrests, leg rests) as an alternative means to ambulation for mobility;  increase all balance ratings by at least 1 grade to decrease pt's risk of falls   PT Treatment Day 1   Plan   Treatment/Interventions Functional transfer training;LE strengthening/ROM;Therapeutic exercise;Endurance training;Patient/family training;Equipment eval/education;Bed mobility;Gait training;Compensatory technique education  (WC training)   PT Frequency 3-5x/wk   Discharge Recommendation   Rehab Resource Intensity Level, PT I (Maximum Resource Intensity)   Equipment Recommended Walker   Walker Package Recommended Wheeled walker   Change/add to Walker  Package? No   AM-PAC Basic Mobility Inpatient   Turning in Flat Bed Without Bedrails 3   Lying on Back to Sitting on Edge of Flat Bed Without Bedrails 2   Moving Bed to Chair 2   Standing Up From Chair Using Arms 2   Walk in Room 1   Climb 3-5 Stairs With Railing 1   Basic Mobility Inpatient Raw Score 11   Basic Mobility Standardized Score 30.25   [facility] Highest Level Of Mobility   -HL Goal 4: Move to chair/commode   -HLM Achieved 4: Move to chair/commode   Additional Treatment Session   Start Time 1011   End Time 1020   Treatment Assessment Additional PT tx provided post eval w/ pt agreeable to participate. Interventions include: transfer and balance training in order to challenge pt's activity tolerance and progress pt towards set goals. Pt performed 2 sit-pivot transfers w/ mod Ax1 w/ verbal and tactile cues to maintain RLE NWB and VC for hand placement. Pt transferred recliner chair>commode towards her L and commode> chair towards her R. Pt reporting easier transfer towards her L side post bilateral trials. Pt continues to be functioning below baseline level, and remains limited in functional mobility due to orthopedic restrictions, impaired balance, limited standing tolerance,pain. Pt will continue benefit from PT to promote independence w/ functional mobility, address mobility deficits, and progress towards set goals. Recommend DC w/ level: I (Maximum Rehab Resource Intensity) when medically cleared.   Equipment Use commode   Additional Treatment Day 1   End of Consult   Patient Position at End of Consult Bedside chair;Bed/Chair alarm activated;All needs within reach       The patient's AM-PAC Basic Mobility Inpatient Short Form Raw Score is 11. A Raw score of less than or equal to 16 suggests the patient may benefit from discharge to post-acute rehabilitation services. Please also refer to the recommendation of the Physical Therapist for safe discharge planning.    Pt will benefit from  skilled inpatient PT during this admission in order to facilitate progress towards goals and to maximize functional independence prior to DC      DC rec: level I (Maximum Rehab Resource Intensity)        Esperanza Johnson, PT, DPT  01/30/25

## 2025-01-31 VITALS
HEIGHT: 65 IN | RESPIRATION RATE: 18 BRPM | SYSTOLIC BLOOD PRESSURE: 115 MMHG | TEMPERATURE: 97.9 F | OXYGEN SATURATION: 89 % | WEIGHT: 180.78 LBS | HEART RATE: 58 BPM | DIASTOLIC BLOOD PRESSURE: 59 MMHG | BODY MASS INDEX: 30.12 KG/M2

## 2025-01-31 PROCEDURE — NC001 PR NO CHARGE: Performed by: PHYSICIAN ASSISTANT

## 2025-01-31 PROCEDURE — 99238 HOSP IP/OBS DSCHRG MGMT 30/<: CPT | Performed by: PHYSICIAN ASSISTANT

## 2025-01-31 PROCEDURE — 93005 ELECTROCARDIOGRAM TRACING: CPT

## 2025-01-31 PROCEDURE — 99024 POSTOP FOLLOW-UP VISIT: CPT | Performed by: PHYSICIAN ASSISTANT

## 2025-01-31 RX ORDER — ACETAMINOPHEN 325 MG/1
975 TABLET ORAL EVERY 8 HOURS SCHEDULED
Start: 2025-01-31

## 2025-01-31 RX ORDER — POLYETHYLENE GLYCOL 3350 17 G/17G
17 POWDER, FOR SOLUTION ORAL DAILY
Qty: 510 G | Refills: 0 | Status: SHIPPED | OUTPATIENT
Start: 2025-02-01

## 2025-01-31 RX ORDER — OXYCODONE HYDROCHLORIDE 5 MG/1
TABLET ORAL
Qty: 20 TABLET | Refills: 0 | Status: SHIPPED | OUTPATIENT
Start: 2025-01-31 | End: 2025-02-10

## 2025-01-31 RX ADMIN — ENOXAPARIN SODIUM 30 MG: 30 INJECTION SUBCUTANEOUS at 09:06

## 2025-01-31 RX ADMIN — PROPRANOLOL HYDROCHLORIDE 60 MG: 60 CAPSULE, EXTENDED RELEASE ORAL at 09:05

## 2025-01-31 RX ADMIN — MELATONIN 6 MG: 3 TAB ORAL at 01:48

## 2025-01-31 RX ADMIN — ACETAMINOPHEN 975 MG: 325 TABLET, FILM COATED ORAL at 14:51

## 2025-01-31 RX ADMIN — ACETAMINOPHEN 975 MG: 325 TABLET, FILM COATED ORAL at 05:43

## 2025-01-31 RX ADMIN — ONDANSETRON 4 MG: 2 INJECTION INTRAMUSCULAR; INTRAVENOUS at 09:05

## 2025-01-31 NOTE — DISCHARGE SUMMARY
"Discharge Summary - Trauma   Name: Lyn Pimentel 79 y.o. female I MRN: [id number]  Unit/Bed#: ICU 03 I Date of Admission: 1/28/2025   Date of Service: 1/31/2025 I Hospital Day: 3    Assessment & Plan  Fall  - Status post fall with the below noted injuries.  - Fall precautions.  - Geriatric Medicine consultation for evaluation, medication review and recommendations.  - PT and OT evaluation and treatment as indicated.  - Case Management consultation for disposition planning. D/C to [facility] today.   Closed fracture of right ankle  - Right ankle fracture, present on admission.  - Status post Reduction and splinting in ER  - Appreciate Orthopedic surgery evaluation, recommendations and interventions as noted.   - S/p ORIF 1/29   - Maintain NWB RLE in splint   - Monitor right lower extremity neurovascular exam.  - Continue multimodal analgesic regimen.  - Continue DVT prophylaxis.  - PT and OT evaluation and treatment as indicated.  - Outpatient follow up with Orthopedic surgery for re-evaluation.    Obstructive sleep apnea  - CPAP QHS  Tremor  - Takes propranolol 60 mg daily  - Geriatric medicine consultation and recommendations appreciated  Essential hypertension  - Home propranolol restarted  - Monitor blood pressure while admitted  COPD (chronic obstructive pulmonary disease) (HCC)    PONV (postoperative nausea and vomiting)      Admission Date: 1/28/2025 1804  Discharge Date: 01/31/25  Admitting Diagnosis: Bimalleolar fracture [S82.843A]  Ankle pain [M25.579]  Ankle fracture [S82.899A]  Closed fracture of right ankle, initial encounter [S82.891A]  Discharge Diagnosis:   Medical Problems       Resolved Problems  Date Reviewed: 1/31/2025   None         HPI: As documented by Peter Smith PA-C who evaluated the patient on admission, \"Lyn Pimentel is a 79 y.o. female who presents after a mechanical fall with right ankle pain. Pt reports she was in her yard and twisted her ankle in a divot in the ground and " "fell with right ankle pain. Denies head strike, LOC, or use of AC/AP medications. \"    Procedures Performed:   Orders Placed This Encounter   Procedures    ED ECG Documentation Only    Orthopedic injury treatment    Pre-Procedural Sedation    Procedural Sedation       Summary of Hospital Course:  Patient was admitted to the trauma service following fall when she sustained a R bimalleolar fracture. She was reduced and splinted. She was seen by orthopedics and taken to the OR on 1/29 for ORIF. She is NWB on the RLE post op. Her pain was controlled. She was up with PT/OT who recommended discharge to inpatient rehab. She is medically stable for discharge today. She will f/u with orthopedics in 2 weeks.    Significant Findings, Care, Treatment and Services Provided:         XR ankle 2 vw right  Result Date: 1/29/2025  Impression: Fluoroscopic guidance provided for procedure guidance.  Please refer to the separate procedure notes for additional details. Workstation performed: [id number]     XR chest 1 view portable  Result Date: 1/29/2025  Impression: No focal consolidation, pleural effusion, or pneumothorax. Resident: Quang Arellano I, the attending radiologist, have reviewed the images and agree with the final report above. Workstation performed: [id number]     XR ankle 2 views RIGHT  Result Date: 1/29/2025  Impression: Status post casting of a trimalleolar fracture. Interval reduction of the tibiotalar dislocation with persistent mild widening of the anterior tibiotalar joint space. Computerized Assisted Algorithm (CAA) may have been used to analyze all applicable images. Workstation performed: [id number]     XR ankle 3+ views RIGHT  Result Date: 1/29/2025  Impression: Trimalleolar fracture dislocation. Computerized Assisted Algorithm (CAA) may have been used to analyze all applicable images. Workstation performed: [id number]     CT lower extremity wo contrast right  Result Date: 1/28/2025  Impression: " "Trimalleolar fracture. Workstation performed: [id number]         Complications: none    Discharge Day Visit / Exam:   /59 (BP Location: Left arm)   Pulse 58   Temp 97.9 °F (36.6 °C) (Oral)   Resp 18   Ht 5' 5\" (1.651 m)   Wt 82 kg (180 lb 12.4 oz)   SpO2 (!) 89%   BMI 30.08 kg/m²   Physical Exam  Constitutional:       Appearance: Normal appearance.   HENT:      Head: Normocephalic.      Nose: Nose normal.      Mouth/Throat:      Mouth: Mucous membranes are moist.   Eyes:      Pupils: Pupils are equal, round, and reactive to light.   Cardiovascular:      Rate and Rhythm: Normal rate and regular rhythm.      Pulses: Normal pulses.   Pulmonary:      Effort: Pulmonary effort is normal. No respiratory distress.      Breath sounds: Normal breath sounds.   Abdominal:      General: Abdomen is flat.      Palpations: Abdomen is soft.      Tenderness: There is no abdominal tenderness.   Musculoskeletal:      Cervical back: Normal range of motion and neck supple. No tenderness.      Comments: + RLE in splint, limited ROM, sensation improving. Warm, and well perfused foot.    Skin:     General: Skin is warm and dry.   Neurological:      General: No focal deficit present.      Mental Status: She is alert and oriented to person, place, and time.      Sensory: No sensory deficit.      Motor: No weakness.   Psychiatric:         Behavior: Behavior normal.            Condition at Discharge: good       Discharge instructions/Information to patient and family:   See After Visit Summary (AVS) for information provided to patient and family.      Provisions for Follow-Up Care:  See after visit summary for information related to follow-up care and any pertinent home health orders.      PCP: Chris Shin MD    Disposition: Short-term rehab at [facility]    Planned Readmission: No     Discharge Medications:  See after visit summary for reconciled discharge medications provided to patient and family.      Discharge " Statement:  I have spent a total time of 25 minutes in caring for this patient on the day of the visit/encounter. .

## 2025-01-31 NOTE — CASE MANAGEMENT
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     Other diseases of pharynx 10/08/2019     Epigastric pain 2019     Belching 2017     Syncope 2017     Essential hypertension 2017     HLD (hyperlipidemia) 2017     Status post colectomy 2017     Status post laparoscopic Nissen fundoplication 2017     Diffusion capacity of lung (dl), decreased 2016     History of Lyme disease 2016     Tremor 2016     Cognitive impairment 2016     Obstructive sleep apnea 2016     Short of breath on exertion 2016     GERD without esophagitis 2015       LOS (days): 3  Geometric Mean LOS (GMLOS) (days): 2.8  Days to GMLOS:0.3     OBJECTIVE:  Risk of Unplanned Readmission Score: 13.68         Current admission status: Inpatient   Preferred Pharmacy:   [id number] - [place], [place] - [address] [address] [facility] [address]  Phone: [phone] Fax: [phone]    Primary Care Provider: Chris Shin MD    Primary Insurance: [employer] REP  Secondary Insurance:     DISCHARGE DETAILS:                                                                                                               Facility Insurance Auth Number: [id number]

## 2025-01-31 NOTE — ASSESSMENT & PLAN NOTE
- Status post fall with the below noted injuries.  - Fall precautions.  - Geriatric Medicine consultation for evaluation, medication review and recommendations.  - PT and OT evaluation and treatment as indicated.  - Case Management consultation for disposition planning. D/C to [facility] today.

## 2025-01-31 NOTE — PROGRESS NOTES
Progress Note - Orthopedics   Name: Lyn Pimentel 79 y.o. female I MRN: [id number]  Unit/Bed#: ICU 03 I Date of Admission: 1/28/2025   Date of Service: 1/31/2025 I Hospital Day: 3    Assessment & Plan  Closed fracture of right ankle  NWB to right lower extremity in splint  Will monitor for ABLA and administer IVF/prbc as indicated for Greater than 2 gram drop or Hgb < 7.  Patient hemoglobin currently at 10.6  PT/OT as tolerated  Incentive spirometry  Pain control per primary team  DVT ppx  : Recommend aspirin 81 Mg twice daily for 6 weeks upon discharge.  Lovenox while in house.  All other medical comorbidities to be managed per primary team  Dispo: Ortho will follow  See above for additional details   Patient will need follow-up with Dr. Brown in 2 weeks outpatient        Ok for discharge from Orthopedics service perspective.  Orthopedics service will follow.  Please contact the SecureChat role for the Orthopedics service with any questions/concerns.    Subjective   79 y.o.female seen and examined at bedside. Overall she reports she is doing well. Block is starting to wear off and she has had some return of sensation and movement the right foot. Some mild pain. Eager for discharge when able.     Objective :  Temp:  [97.1 °F (36.2 °C)-98.5 °F (36.9 °C)] 97.9 °F (36.6 °C)  HR:  [58-62] 58  BP: (115-136)/(56-66) 115/59  Resp:  [18] 18  SpO2:  [89 %-93 %] 89 %  O2 Device: Nasal cannula    Physical Exam  Musculoskeletal: rightlower  Surgical dressings c/d/I without strike through  Digits warm and well perfused.   Flicker movement on EHL/FHL.   Proximal calf soft and compressible.       Lab Results: I have reviewed the following results:  Recent Labs     01/28/25  1834 01/29/25  0519 01/30/25  0523   WBC 15.09* 10.92* 13.60*   HGB 12.4 11.0* 10.6*   HCT 39.1 34.5* 32.8*    258 250   BUN 21 20 16   CREATININE 1.08 0.88 0.98   PTT 27  --   --    INR 0.89  --   --      Blood Culture:    Lab Results   Component  "Value Date    BLOODCX Escherichia coli (A) 02/01/2020    BLOODCX Escherichia coli (A) 02/01/2020     Wound Culture: No results found for: \"WOUNDCULT\"  "

## 2025-01-31 NOTE — CASE MANAGEMENT
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     Other diseases of pharynx 10/08/2019     Epigastric pain 2019     Belching 2017     Syncope 2017     Essential hypertension 2017     HLD (hyperlipidemia) 2017     Status post colectomy 2017     Status post laparoscopic Nissen fundoplication 2017     Diffusion capacity of lung (dl), decreased 2016     History of Lyme disease 2016     Tremor 2016     Cognitive impairment 2016     Obstructive sleep apnea 2016     Short of breath on exertion 2016     GERD without esophagitis 2015       LOS (days): 3  Geometric Mean LOS (GMLOS) (days): 2.8  Days to GMLOS:0.1     OBJECTIVE:  Risk of Unplanned Readmission Score: 13.56         Current admission status: Inpatient   Preferred Pharmacy:   [facility]/pharmacy #[id number] - [place], [place] - [address] [address]  Phone: [phone] Fax: [phone]    Primary Care Provider: Chris Shin MD    Primary Insurance: [facility] RE[facility]  Secondary Insurance:     DISCHARGE DETAILS:    Discharge planning discussed with:: Pt  Freedom of Choice: Yes  Comments - [place] of Choice: [facility]    Other Referral/Resources/Interventions Provided:  Interventions: Short Term Rehab  Referral Comments: Authorization approved for [facility]. They confirmed bed for  today and as per Trauma pt is medically stable for dc today. Pt aware of transport arrangement for 3 pm today. She reports she will notify her family.    Treatment Team Recommendation: Short Term Rehab  Discharge Destination Plan:: Short Term Rehab  Transport at Discharge : Wheelchair van    Number/Name of Dispatcher: Round Trip [phone]  Transported by (Company and Unit #): [employer]  ETA of Transport (Date): 01/31/25  ETA of Transport (Time): 1500 (Trauma, RN, Facility, and pt)    IMM Given (Date):: 01/31/25  IMM Given to:: Patient    IMM reviewed with patient, patient agrees with discharge determination.

## 2025-01-31 NOTE — PLAN OF CARE
Problem: Prexisting or High Potential for Compromised Skin Integrity  Goal: Skin integrity is maintained or improved  Description: INTERVENTIONS:  - Identify patients at risk for skin breakdown  - Assess and monitor skin integrity  - Assess and monitor nutrition and hydration status  - Monitor labs   - Assess for incontinence   - Turn and reposition patient  - Assist with mobility/ambulation  - Relieve pressure over bony prominences  - Avoid friction and shearing  - Provide appropriate hygiene as needed including keeping skin clean and dry  - Evaluate need for skin moisturizer/barrier cream  - Collaborate with interdisciplinary team   - Patient/family teaching  - Consider wound care consult   Outcome: Progressing     Problem: PAIN - ADULT  Goal: Verbalizes/displays adequate comfort level or baseline comfort level  Description: Interventions:  - Encourage patient to monitor pain and request assistance  - Assess pain using appropriate pain scale  - Administer analgesics based on type and severity of pain and evaluate response  - Implement non-pharmacological measures as appropriate and evaluate response  - Consider cultural and social influences on pain and pain management  - Notify physician/advanced practitioner if interventions unsuccessful or patient reports new pain  Outcome: Progressing     Problem: INFECTION - ADULT  Goal: Absence or prevention of progression during hospitalization  Description: INTERVENTIONS:  - Assess and monitor for signs and symptoms of infection  - Monitor lab/diagnostic results  - Monitor all insertion sites, i.e. indwelling lines, tubes, and drains  - Monitor endotracheal if appropriate and nasal secretions for changes in amount and color  - [place] appropriate cooling/warming therapies per order  - Administer medications as ordered  - Instruct and encourage patient and family to use good hand hygiene technique  - Identify and instruct in appropriate isolation precautions for  identified infection/condition  Outcome: Progressing  Goal: Absence of fever/infection during neutropenic period  Description: INTERVENTIONS:  - Monitor WBC    Outcome: Progressing     Problem: SAFETY ADULT  Goal: Patient will remain free of falls  Description: INTERVENTIONS:  - Educate patient/family on patient safety including physical limitations  - Instruct patient to call for assistance with activity   - Consult OT/PT to assist with strengthening/mobility   - Keep Call bell within reach  - Keep bed low and locked with side rails adjusted as appropriate  - Keep care items and personal belongings within reach  - Initiate and maintain comfort rounds  - Make Fall Risk Sign visible to staff  - Offer Toileting every 2 Hours, in advance of need  - Initiate/Maintain bed alarm  - Obtain necessary fall risk management equipment:   - Apply yellow socks and bracelet for high fall risk patients  - Consider moving patient to room near nurses station  Outcome: Progressing  Goal: Maintain or return to baseline ADL function  Description: INTERVENTIONS:  -  Assess patient's ability to carry out ADLs; assess patient's baseline for ADL function and identify physical deficits which impact ability to perform ADLs (bathing, care of mouth/teeth, toileting, grooming, dressing, etc.)  - Assess/evaluate cause of self-care deficits   - Assess range of motion  - Assess patient's mobility; develop plan if impaired  - Assess patient's need for assistive devices and provide as appropriate  - Encourage maximum independence but intervene and supervise when necessary  - Involve family in performance of ADLs  - Assess for home care needs following discharge   - Consider OT consult to assist with ADL evaluation and planning for discharge  - Provide patient education as appropriate  Outcome: Progressing  Goal: Maintains/Returns to pre admission functional level  Description: INTERVENTIONS:  - Perform AM-PAC 6 Click Basic Mobility/ Daily Activity  assessment daily.  - Set and communicate daily mobility goal to care team and patient/family/caregiver.   - Collaborate with rehabilitation services on mobility goals if consulted  - Perform Range of Motion 12 times a day.  - Reposition patient every 2 hours.  - Dangle patient 3 times a day  - Stand patient 3 times a day  - Ambulate patient 3 times a day  - Out of bed to chair 3 times a day   - Out of bed for meals 3 times a day  - Out of bed for toileting  - Record patient progress and toleration of activity level   Outcome: Progressing     Problem: DISCHARGE PLANNING  Goal: Discharge to home or other facility with appropriate resources  Description: INTERVENTIONS:  - Identify barriers to discharge w/patient and caregiver  - Arrange for needed discharge resources and transportation as appropriate  - Identify discharge learning needs (meds, wound care, etc.)  - Arrange for interpretive services to assist at discharge as needed  - Refer to Case Management Department for coordinating discharge planning if the patient needs post-hospital services based on physician/advanced practitioner order or complex needs related to functional status, cognitive ability, or social support system  Outcome: Progressing     Problem: Knowledge Deficit  Goal: Patient/family/caregiver demonstrates understanding of disease process, treatment plan, medications, and discharge instructions  Description: Complete learning assessment and assess knowledge base.  Interventions:  - Provide teaching at level of understanding  - Provide teaching via preferred learning methods  Outcome: Progressing

## 2025-02-01 NOTE — PROGRESS NOTES
I realized that ASA 81 mg BID for 6 weeks for DVT ppx was not ordered on her discharge med [facility] on 1/31/25. I contacted [facility] to ensure that medication is ordered for her for DVT ppx. I spoke with her nurse who confirmed that she would make sure the medication was ordered for the patient for 6 week duration, as described.

## 2025-02-07 LAB
ATRIAL RATE: 82 BPM
P AXIS: 71 DEGREES
PR INTERVAL: 160 MS
QRS AXIS: 80 DEGREES
QRSD INTERVAL: 116 MS
QT INTERVAL: 420 MS
QTC INTERVAL: 490 MS
T WAVE AXIS: 37 DEGREES
VENTRICULAR RATE: 82 BPM

## 2025-02-07 PROCEDURE — 93010 ELECTROCARDIOGRAM REPORT: CPT | Performed by: INTERNAL MEDICINE

## 2025-02-17 ENCOUNTER — TELEPHONE (OUTPATIENT)
Age: 80
End: 2025-02-17

## 2025-02-17 ENCOUNTER — OFFICE VISIT (OUTPATIENT)
Dept: OBGYN CLINIC | Facility: CLINIC | Age: 80
End: 2025-02-17

## 2025-02-17 DIAGNOSIS — S82.851A CLOSED TRIMALLEOLAR FRACTURE OF RIGHT ANKLE, INITIAL ENCOUNTER: Primary | ICD-10-CM

## 2025-02-17 PROCEDURE — 99024 POSTOP FOLLOW-UP VISIT: CPT | Performed by: STUDENT IN AN ORGANIZED HEALTH CARE EDUCATION/TRAINING PROGRAM

## 2025-02-17 NOTE — TELEPHONE ENCOUNTER
Caller: Patient     Doctor: Dr. Brown    Reason for call: Patient called has post op appointment today, stated has the stomach virus. Please assist scheduling    Call back#: [phone] - please leave message

## 2025-02-17 NOTE — TELEPHONE ENCOUNTER
Caller: Patient     Doctor: Dr. Santo    Reason for call: returning call wants to reschedule her OP appt for today is sick    Call back#: [phone]

## 2025-02-18 ENCOUNTER — HOME CARE VISIT (OUTPATIENT)
Dept: HOME HEALTH SERVICES | Facility: HOME HEALTHCARE | Age: 80
End: 2025-02-18

## 2025-02-19 ENCOUNTER — HOSPITAL ENCOUNTER (INPATIENT)
Facility: HOSPITAL | Age: 80
LOS: 1 days | Discharge: HOME | DRG: 683 | End: 2025-02-21
Attending: EMERGENCY MEDICINE | Admitting: INTERNAL MEDICINE
Payer: COMMERCIAL

## 2025-02-19 DIAGNOSIS — N17.9 AKI (ACUTE KIDNEY INJURY) (HCC): Primary | ICD-10-CM

## 2025-02-19 DIAGNOSIS — S82.891A CLOSED FRACTURE OF RIGHT ANKLE: ICD-10-CM

## 2025-02-19 DIAGNOSIS — R11.2 NAUSEA, VOMITING, AND DIARRHEA: ICD-10-CM

## 2025-02-19 DIAGNOSIS — A08.4 VIRAL GASTROENTERITIS: ICD-10-CM

## 2025-02-19 DIAGNOSIS — R19.7 NAUSEA, VOMITING, AND DIARRHEA: ICD-10-CM

## 2025-02-19 PROBLEM — E87.29 METABOLIC ACIDOSIS, INCREASED ANION GAP: Status: ACTIVE | Noted: 2025-02-19

## 2025-02-19 LAB
ALBUMIN SERPL BCG-MCNC: 4.5 G/DL (ref 3.5–5)
ALP SERPL-CCNC: 137 U/L (ref 34–104)
ALT SERPL W P-5'-P-CCNC: 47 U/L (ref 7–52)
ANION GAP SERPL CALCULATED.3IONS-SCNC: 15 MMOL/L (ref 4–13)
AST SERPL W P-5'-P-CCNC: 21 U/L (ref 13–39)
BASOPHILS # BLD AUTO: 0.03 THOUSANDS/ΜL (ref 0–0.1)
BASOPHILS NFR BLD AUTO: 0 % (ref 0–1)
BILIRUB SERPL-MCNC: 0.41 MG/DL (ref 0.2–1)
BUN SERPL-MCNC: 56 MG/DL (ref 5–25)
CALCIUM SERPL-MCNC: 9.8 MG/DL (ref 8.4–10.2)
CHLORIDE SERPL-SCNC: 102 MMOL/L (ref 96–108)
CO2 SERPL-SCNC: 17 MMOL/L (ref 21–32)
CREAT SERPL-MCNC: 1.75 MG/DL (ref 0.6–1.3)
EOSINOPHIL # BLD AUTO: 0.05 THOUSAND/ΜL (ref 0–0.61)
EOSINOPHIL NFR BLD AUTO: 1 % (ref 0–6)
ERYTHROCYTE [DISTWIDTH] IN BLOOD BY AUTOMATED COUNT: 13.8 % (ref 11.6–15.1)
GFR SERPL CREATININE-BSD FRML MDRD: 26 ML/MIN/1.73SQ M
GLUCOSE SERPL-MCNC: 119 MG/DL (ref 65–140)
HCT VFR BLD AUTO: 44.7 % (ref 34.8–46.1)
HGB BLD-MCNC: 14.4 G/DL (ref 11.5–15.4)
IMM GRANULOCYTES # BLD AUTO: 0.03 THOUSAND/UL (ref 0–0.2)
IMM GRANULOCYTES NFR BLD AUTO: 0 % (ref 0–2)
LIPASE SERPL-CCNC: 67 U/L (ref 11–82)
LYMPHOCYTES # BLD AUTO: 2.7 THOUSANDS/ΜL (ref 0.6–4.47)
LYMPHOCYTES NFR BLD AUTO: 33 % (ref 14–44)
MCH RBC QN AUTO: 31.1 PG (ref 26.8–34.3)
MCHC RBC AUTO-ENTMCNC: 32.2 G/DL (ref 31.4–37.4)
MCV RBC AUTO: 97 FL (ref 82–98)
MONOCYTES # BLD AUTO: 1.38 THOUSAND/ΜL (ref 0.17–1.22)
MONOCYTES NFR BLD AUTO: 17 % (ref 4–12)
NEUTROPHILS # BLD AUTO: 3.91 THOUSANDS/ΜL (ref 1.85–7.62)
NEUTS SEG NFR BLD AUTO: 49 % (ref 43–75)
NRBC BLD AUTO-RTO: 0 /100 WBCS
PLATELET # BLD AUTO: 299 THOUSANDS/UL (ref 149–390)
PMV BLD AUTO: 9.5 FL (ref 8.9–12.7)
POTASSIUM SERPL-SCNC: 4 MMOL/L (ref 3.5–5.3)
PROT SERPL-MCNC: 8.4 G/DL (ref 6.4–8.4)
RBC # BLD AUTO: 4.63 MILLION/UL (ref 3.81–5.12)
SODIUM SERPL-SCNC: 134 MMOL/L (ref 135–147)
WBC # BLD AUTO: 8.1 THOUSAND/UL (ref 4.31–10.16)

## 2025-02-19 PROCEDURE — 83690 ASSAY OF LIPASE: CPT | Performed by: EMERGENCY MEDICINE

## 2025-02-19 PROCEDURE — 36415 COLL VENOUS BLD VENIPUNCTURE: CPT | Performed by: EMERGENCY MEDICINE

## 2025-02-19 PROCEDURE — 99285 EMERGENCY DEPT VISIT HI MDM: CPT | Performed by: EMERGENCY MEDICINE

## 2025-02-19 PROCEDURE — 83735 ASSAY OF MAGNESIUM: CPT

## 2025-02-19 PROCEDURE — 96361 HYDRATE IV INFUSION ADD-ON: CPT

## 2025-02-19 PROCEDURE — 99284 EMERGENCY DEPT VISIT MOD MDM: CPT

## 2025-02-19 PROCEDURE — 85025 COMPLETE CBC W/AUTO DIFF WBC: CPT | Performed by: EMERGENCY MEDICINE

## 2025-02-19 PROCEDURE — 96374 THER/PROPH/DIAG INJ IV PUSH: CPT

## 2025-02-19 PROCEDURE — 80053 COMPREHEN METABOLIC PANEL: CPT | Performed by: EMERGENCY MEDICINE

## 2025-02-19 PROCEDURE — 93005 ELECTROCARDIOGRAM TRACING: CPT

## 2025-02-19 RX ORDER — ONDANSETRON 2 MG/ML
4 INJECTION INTRAMUSCULAR; INTRAVENOUS EVERY 8 HOURS PRN
Status: DISCONTINUED | OUTPATIENT
Start: 2025-02-19 | End: 2025-02-20

## 2025-02-19 RX ORDER — SODIUM CHLORIDE, SODIUM GLUCONATE, SODIUM ACETATE, POTASSIUM CHLORIDE, MAGNESIUM CHLORIDE, SODIUM PHOSPHATE, DIBASIC, AND POTASSIUM PHOSPHATE .53; .5; .37; .037; .03; .012; .00082 G/100ML; G/100ML; G/100ML; G/100ML; G/100ML; G/100ML; G/100ML
75 INJECTION, SOLUTION INTRAVENOUS CONTINUOUS
Status: DISPENSED | OUTPATIENT
Start: 2025-02-19 | End: 2025-02-20

## 2025-02-19 RX ORDER — ESCITALOPRAM OXALATE 10 MG/1
10 TABLET ORAL DAILY
Status: DISCONTINUED | OUTPATIENT
Start: 2025-02-20 | End: 2025-02-21 | Stop reason: HOSPADM

## 2025-02-19 RX ORDER — SODIUM CHLORIDE, SODIUM GLUCONATE, SODIUM ACETATE, POTASSIUM CHLORIDE, MAGNESIUM CHLORIDE, SODIUM PHOSPHATE, DIBASIC, AND POTASSIUM PHOSPHATE .53; .5; .37; .037; .03; .012; .00082 G/100ML; G/100ML; G/100ML; G/100ML; G/100ML; G/100ML; G/100ML
1000 INJECTION, SOLUTION INTRAVENOUS ONCE
Status: COMPLETED | OUTPATIENT
Start: 2025-02-19 | End: 2025-02-19

## 2025-02-19 RX ORDER — PROPRANOLOL HYDROCHLORIDE 60 MG/1
60 CAPSULE, EXTENDED RELEASE ORAL DAILY
Status: DISCONTINUED | OUTPATIENT
Start: 2025-02-20 | End: 2025-02-21 | Stop reason: HOSPADM

## 2025-02-19 RX ORDER — PRAVASTATIN SODIUM 80 MG/1
80 TABLET ORAL
Status: DISCONTINUED | OUTPATIENT
Start: 2025-02-20 | End: 2025-02-21 | Stop reason: HOSPADM

## 2025-02-19 RX ORDER — ASPIRIN 81 MG/1
81 TABLET, CHEWABLE ORAL 2 TIMES DAILY
COMMUNITY

## 2025-02-19 RX ORDER — FAMOTIDINE 20 MG/1
10 TABLET, FILM COATED ORAL DAILY
Status: DISCONTINUED | OUTPATIENT
Start: 2025-02-20 | End: 2025-02-21 | Stop reason: HOSPADM

## 2025-02-19 RX ORDER — ONDANSETRON 2 MG/ML
4 INJECTION INTRAMUSCULAR; INTRAVENOUS ONCE
Status: COMPLETED | OUTPATIENT
Start: 2025-02-19 | End: 2025-02-19

## 2025-02-19 RX ORDER — PANTOPRAZOLE SODIUM 40 MG/1
40 TABLET, DELAYED RELEASE ORAL DAILY
Status: DISCONTINUED | OUTPATIENT
Start: 2025-02-20 | End: 2025-02-21 | Stop reason: HOSPADM

## 2025-02-19 RX ORDER — SODIUM CHLORIDE, SODIUM GLUCONATE, SODIUM ACETATE, POTASSIUM CHLORIDE, MAGNESIUM CHLORIDE, SODIUM PHOSPHATE, DIBASIC, AND POTASSIUM PHOSPHATE .53; .5; .37; .037; .03; .012; .00082 G/100ML; G/100ML; G/100ML; G/100ML; G/100ML; G/100ML; G/100ML
75 INJECTION, SOLUTION INTRAVENOUS CONTINUOUS
Status: DISCONTINUED | OUTPATIENT
Start: 2025-02-19 | End: 2025-02-19

## 2025-02-19 RX ORDER — HEPARIN SODIUM 5000 [USP'U]/ML
5000 INJECTION, SOLUTION INTRAVENOUS; SUBCUTANEOUS EVERY 8 HOURS SCHEDULED
Status: DISCONTINUED | OUTPATIENT
Start: 2025-02-20 | End: 2025-02-21 | Stop reason: HOSPADM

## 2025-02-19 RX ADMIN — SODIUM CHLORIDE 1000 ML: 0.9 INJECTION, SOLUTION INTRAVENOUS at 20:52

## 2025-02-19 RX ADMIN — SODIUM CHLORIDE, SODIUM GLUCONATE, SODIUM ACETATE, POTASSIUM CHLORIDE, MAGNESIUM CHLORIDE, SODIUM PHOSPHATE, DIBASIC, AND POTASSIUM PHOSPHATE 1000 ML: .53; .5; .37; .037; .03; .012; .00082 INJECTION, SOLUTION INTRAVENOUS at 22:36

## 2025-02-19 RX ADMIN — ONDANSETRON 4 MG: 2 INJECTION INTRAMUSCULAR; INTRAVENOUS at 20:52

## 2025-02-19 NOTE — TELEPHONE ENCOUNTER
Called and left message for pt, when she calls back please reschedule her appt, thanks!

## 2025-02-19 NOTE — Clinical Note
Case was discussed with Dr. Ansari and the patient's admission status was agreed to be Admission Status: observation status to the service of Dr. Ansari.

## 2025-02-20 ENCOUNTER — APPOINTMENT (OUTPATIENT)
Dept: RADIOLOGY | Facility: HOSPITAL | Age: 80
DRG: 683 | End: 2025-02-20
Payer: COMMERCIAL

## 2025-02-20 LAB
ALBUMIN SERPL BCG-MCNC: 3.6 G/DL (ref 3.5–5)
ALP SERPL-CCNC: 103 U/L (ref 34–104)
ALT SERPL W P-5'-P-CCNC: 34 U/L (ref 7–52)
ANION GAP SERPL CALCULATED.3IONS-SCNC: 12 MMOL/L (ref 4–13)
AST SERPL W P-5'-P-CCNC: 18 U/L (ref 13–39)
ATRIAL RATE: 70 BPM
BACTERIA UR QL AUTO: ABNORMAL /HPF
BASE EX.OXY STD BLDV CALC-SCNC: 62.2 % (ref 60–80)
BASE EX.OXY STD BLDV CALC-SCNC: 95.7 % (ref 60–80)
BASE EX.OXY STD BLDV CALC-SCNC: 95.7 % (ref 60–80)
BASE EXCESS BLDV CALC-SCNC: -7.2 MMOL/L
BASE EXCESS BLDV CALC-SCNC: -7.8 MMOL/L
BASE EXCESS BLDV CALC-SCNC: -9 MMOL/L
BASOPHILS # BLD AUTO: 0.04 THOUSANDS/ΜL (ref 0–0.1)
BASOPHILS NFR BLD AUTO: 1 % (ref 0–1)
BILIRUB SERPL-MCNC: 0.32 MG/DL (ref 0.2–1)
BILIRUB UR QL STRIP: NEGATIVE
BUN SERPL-MCNC: 45 MG/DL (ref 5–25)
CALCIUM SERPL-MCNC: 8.6 MG/DL (ref 8.4–10.2)
CARDIAC TROPONIN I PNL SERPL HS: <2 NG/L (ref ?–50)
CHLORIDE SERPL-SCNC: 108 MMOL/L (ref 96–108)
CLARITY UR: CLEAR
CO2 SERPL-SCNC: 17 MMOL/L (ref 21–32)
COLOR UR: ABNORMAL
CREAT SERPL-MCNC: 1.17 MG/DL (ref 0.6–1.3)
EOSINOPHIL # BLD AUTO: 0.11 THOUSAND/ΜL (ref 0–0.61)
EOSINOPHIL NFR BLD AUTO: 1 % (ref 0–6)
ERYTHROCYTE [DISTWIDTH] IN BLOOD BY AUTOMATED COUNT: 13.7 % (ref 11.6–15.1)
GFR SERPL CREATININE-BSD FRML MDRD: 43 ML/MIN/1.73SQ M
GLUCOSE P FAST SERPL-MCNC: 87 MG/DL (ref 65–99)
GLUCOSE SERPL-MCNC: 87 MG/DL (ref 65–140)
GLUCOSE UR STRIP-MCNC: NEGATIVE MG/DL
HCO3 BLDV-SCNC: 16.3 MMOL/L (ref 24–30)
HCO3 BLDV-SCNC: 17.2 MMOL/L (ref 24–30)
HCO3 BLDV-SCNC: 17.4 MMOL/L (ref 24–30)
HCT VFR BLD AUTO: 37.5 % (ref 34.8–46.1)
HGB BLD-MCNC: 11.9 G/DL (ref 11.5–15.4)
HGB UR QL STRIP.AUTO: NEGATIVE
IMM GRANULOCYTES # BLD AUTO: 0.02 THOUSAND/UL (ref 0–0.2)
IMM GRANULOCYTES NFR BLD AUTO: 0 % (ref 0–2)
KETONES UR STRIP-MCNC: NEGATIVE MG/DL
LACTATE SERPL-SCNC: 0.8 MMOL/L (ref 0.5–2)
LEUKOCYTE ESTERASE UR QL STRIP: NEGATIVE
LYMPHOCYTES # BLD AUTO: 2.99 THOUSANDS/ΜL (ref 0.6–4.47)
LYMPHOCYTES NFR BLD AUTO: 37 % (ref 14–44)
MAGNESIUM SERPL-MCNC: 2.2 MG/DL (ref 1.9–2.7)
MCH RBC QN AUTO: 31.2 PG (ref 26.8–34.3)
MCHC RBC AUTO-ENTMCNC: 31.7 G/DL (ref 31.4–37.4)
MCV RBC AUTO: 98 FL (ref 82–98)
MONOCYTES # BLD AUTO: 1.5 THOUSAND/ΜL (ref 0.17–1.22)
MONOCYTES NFR BLD AUTO: 19 % (ref 4–12)
NEUTROPHILS # BLD AUTO: 3.42 THOUSANDS/ΜL (ref 1.85–7.62)
NEUTS SEG NFR BLD AUTO: 42 % (ref 43–75)
NITRITE UR QL STRIP: NEGATIVE
NON-SQ EPI CELLS URNS QL MICRO: ABNORMAL /HPF
NRBC BLD AUTO-RTO: 0 /100 WBCS
O2 CT BLDV-SCNC: 10.7 ML/DL
O2 CT BLDV-SCNC: 17.4 ML/DL
O2 CT BLDV-SCNC: 17.5 ML/DL
P AXIS: 26 DEGREES
PCO2 BLDV: 29.2 MM HG (ref 42–50)
PCO2 BLDV: 32.6 MM HG (ref 42–50)
PCO2 BLDV: 38.1 MM HG (ref 42–50)
PH BLDV: 7.27 [PH] (ref 7.3–7.4)
PH BLDV: 7.35 [PH] (ref 7.3–7.4)
PH BLDV: 7.36 [PH] (ref 7.3–7.4)
PH UR STRIP.AUTO: 5.5 [PH]
PHOSPHATE SERPL-MCNC: 3.6 MG/DL (ref 2.3–4.1)
PLATELET # BLD AUTO: 267 THOUSANDS/UL (ref 149–390)
PMV BLD AUTO: 9.5 FL (ref 8.9–12.7)
PO2 BLDV: 103.6 MM HG (ref 35–45)
PO2 BLDV: 109.9 MM HG (ref 35–45)
PO2 BLDV: 36.1 MM HG (ref 35–45)
POTASSIUM SERPL-SCNC: 3.4 MMOL/L (ref 3.5–5.3)
PR INTERVAL: 172 MS
PROT SERPL-MCNC: 6.6 G/DL (ref 6.4–8.4)
PROT UR STRIP-MCNC: ABNORMAL MG/DL
QRS AXIS: 16 DEGREES
QRSD INTERVAL: 80 MS
QT INTERVAL: 368 MS
QTC INTERVAL: 397 MS
RBC # BLD AUTO: 3.82 MILLION/UL (ref 3.81–5.12)
RBC #/AREA URNS AUTO: ABNORMAL /HPF
SODIUM SERPL-SCNC: 137 MMOL/L (ref 135–147)
SP GR UR STRIP.AUTO: 1.02 (ref 1–1.03)
T WAVE AXIS: 5 DEGREES
UROBILINOGEN UR STRIP-ACNC: <2 MG/DL
VENTRICULAR RATE: 70 BPM
WBC # BLD AUTO: 8.08 THOUSAND/UL (ref 4.31–10.16)
WBC #/AREA URNS AUTO: ABNORMAL /HPF

## 2025-02-20 PROCEDURE — 82805 BLOOD GASES W/O2 SATURATION: CPT

## 2025-02-20 PROCEDURE — 80053 COMPREHEN METABOLIC PANEL: CPT

## 2025-02-20 PROCEDURE — 93010 ELECTROCARDIOGRAM REPORT: CPT | Performed by: INTERNAL MEDICINE

## 2025-02-20 PROCEDURE — 84100 ASSAY OF PHOSPHORUS: CPT

## 2025-02-20 PROCEDURE — 73610 X-RAY EXAM OF ANKLE: CPT

## 2025-02-20 PROCEDURE — 84484 ASSAY OF TROPONIN QUANT: CPT

## 2025-02-20 PROCEDURE — 83605 ASSAY OF LACTIC ACID: CPT

## 2025-02-20 PROCEDURE — 81001 URINALYSIS AUTO W/SCOPE: CPT

## 2025-02-20 PROCEDURE — 85025 COMPLETE CBC W/AUTO DIFF WBC: CPT

## 2025-02-20 PROCEDURE — 99223 1ST HOSP IP/OBS HIGH 75: CPT | Performed by: INTERNAL MEDICINE

## 2025-02-20 PROCEDURE — 36415 COLL VENOUS BLD VENIPUNCTURE: CPT

## 2025-02-20 RX ORDER — ONDANSETRON 2 MG/ML
4 INJECTION INTRAMUSCULAR; INTRAVENOUS EVERY 6 HOURS PRN
Status: DISCONTINUED | OUTPATIENT
Start: 2025-02-20 | End: 2025-02-21 | Stop reason: HOSPADM

## 2025-02-20 RX ORDER — DICYCLOMINE HCL 20 MG
20 TABLET ORAL
Status: DISCONTINUED | OUTPATIENT
Start: 2025-02-20 | End: 2025-02-21 | Stop reason: HOSPADM

## 2025-02-20 RX ORDER — POTASSIUM CHLORIDE 1500 MG/1
40 TABLET, EXTENDED RELEASE ORAL ONCE
Status: COMPLETED | OUTPATIENT
Start: 2025-02-20 | End: 2025-02-20

## 2025-02-20 RX ADMIN — HEPARIN SODIUM 5000 UNITS: 5000 INJECTION INTRAVENOUS; SUBCUTANEOUS at 13:09

## 2025-02-20 RX ADMIN — HEPARIN SODIUM 5000 UNITS: 5000 INJECTION INTRAVENOUS; SUBCUTANEOUS at 06:01

## 2025-02-20 RX ADMIN — DICYCLOMINE HYDROCHLORIDE 20 MG: 20 TABLET ORAL at 21:11

## 2025-02-20 RX ADMIN — SODIUM CHLORIDE, SODIUM GLUCONATE, SODIUM ACETATE, POTASSIUM CHLORIDE, MAGNESIUM CHLORIDE, SODIUM PHOSPHATE, DIBASIC, AND POTASSIUM PHOSPHATE 75 ML/HR: .53; .5; .37; .037; .03; .012; .00082 INJECTION, SOLUTION INTRAVENOUS at 08:38

## 2025-02-20 RX ADMIN — DICYCLOMINE HYDROCHLORIDE 20 MG: 20 TABLET ORAL at 17:44

## 2025-02-20 RX ADMIN — ONDANSETRON 4 MG: 2 INJECTION INTRAMUSCULAR; INTRAVENOUS at 06:12

## 2025-02-20 RX ADMIN — PRAVASTATIN SODIUM 80 MG: 80 TABLET ORAL at 17:44

## 2025-02-20 RX ADMIN — FAMOTIDINE 10 MG: 20 TABLET, FILM COATED ORAL at 08:29

## 2025-02-20 RX ADMIN — PANTOPRAZOLE SODIUM 40 MG: 40 TABLET, DELAYED RELEASE ORAL at 06:00

## 2025-02-20 RX ADMIN — Medication 1000 UNITS: at 08:29

## 2025-02-20 RX ADMIN — POTASSIUM CHLORIDE 40 MEQ: 1500 TABLET, EXTENDED RELEASE ORAL at 06:36

## 2025-02-20 RX ADMIN — DICYCLOMINE HYDROCHLORIDE 20 MG: 20 TABLET ORAL at 11:47

## 2025-02-20 RX ADMIN — ESCITALOPRAM OXALATE 10 MG: 10 TABLET ORAL at 08:33

## 2025-02-20 RX ADMIN — HEPARIN SODIUM 5000 UNITS: 5000 INJECTION INTRAVENOUS; SUBCUTANEOUS at 21:11

## 2025-02-20 RX ADMIN — SODIUM CHLORIDE, SODIUM GLUCONATE, SODIUM ACETATE, POTASSIUM CHLORIDE, MAGNESIUM CHLORIDE, SODIUM PHOSPHATE, DIBASIC, AND POTASSIUM PHOSPHATE 75 ML/HR: .53; .5; .37; .037; .03; .012; .00082 INJECTION, SOLUTION INTRAVENOUS at 00:00

## 2025-02-20 RX ADMIN — PROPRANOLOL HYDROCHLORIDE 60 MG: 60 CAPSULE, EXTENDED RELEASE ORAL at 08:32

## 2025-02-20 RX ADMIN — ONDANSETRON 4 MG: 2 INJECTION INTRAMUSCULAR; INTRAVENOUS at 21:19

## 2025-02-20 RX ADMIN — ONDANSETRON 4 MG: 2 INJECTION INTRAMUSCULAR; INTRAVENOUS at 12:26

## 2025-02-20 NOTE — TELEPHONE ENCOUNTER
Pt was to have post op follow up 2/17/25, attempted to call pt x3 for reschedule of appt, pt has not returned calls. Upon chart review pt in ER yesterday for HYACINTH and admitted, just FYI

## 2025-02-20 NOTE — QUICK NOTE
Brief orthopedics note    Patient seen and examined at bedside. Currently admitted with HYACINTH, dehydration secondary to norovirus. Reports that the facility she was in had an outbreak. She is currently feeling better overall.     She is missing her scheduled outpatient orthopedics appointment after recent right ankle ORIF with Dr. Brown performed 1/29/2025.     At time of visit she reports resolution in her right ankle pain. She has been compliant with her non weight bearing restrictions.     Splint removed from right lower extremity. Sutures in place. Sutures removed without incident.   [place] applied.   XRAYs from today reviewed: post operative changes without evidence of hardware failure.     Plan:   Patient to remain NWB in camboot at this time.   May come out of camboot for ROM exercises.   PT/OT ordered.  Outpatient PT orders on file.   Patient to return to clinic in 4 weeks for repeat evaluation and xrays with Dr. Brown.   All questions asked/answered to her satisfaction.     Carlita Weller PA-C

## 2025-02-20 NOTE — ED PROVIDER NOTES
Time reflects when diagnosis was documented in both MDM as applicable and the Disposition within this note       Time User Action Codes Description Comment    2/19/2025 10:24 PM Edita Gleason Add [N17.9] HYACINTH (acute kidney injury) (HCC)     2/19/2025 10:25 PM Edita Gleason Add [R11.2,  R19.7] Nausea, vomiting, and diarrhea           ED Disposition       ED Disposition   Admit    Condition   Stable    Date/Time   Wed Feb 19, 2025 10:27 PM    Comment   Case was discussed with Dr. Ansari and the patient's admission status was agreed to be Admission Status: observation status to the service of Dr. Mooney.               Assessment & Plan       Medical Decision Making  Amount and/or Complexity of Data Reviewed  Labs: ordered.    Risk  Prescription drug management.  Decision regarding hospitalization.      No abdominal tenderness to palpation, abdominal examination is completely benign, doubt acute surgical intra-abdominal pathology.  Blood work reveals significant HYACINTH, uremia, elevated anion gap which is likely due to starvation ketosis.  Patient started on IV fluids.  Is feeling a little better since then, no further vomiting after Zofran.  Given HYACINTH, discussed with internal medicine for hospitalization.         Medications   multi-electrolyte (ISOLYTE-S PH 7.4) bolus 1,000 mL (1,000 mL Intravenous New Bag 2/19/25 2236)   sodium chloride 0.9 % bolus 1,000 mL (0 mL Intravenous Stopped 2/19/25 2237)   ondansetron (ZOFRAN) injection 4 mg (4 mg Intravenous Given 2/19/25 2052)       ED Risk Strat Scores                            SBIRT 20yo+      Flowsheet Row Most Recent Value   Initial Alcohol Screen: US AUDIT-C     1. How often do you have a drink containing alcohol? 0 Filed at: 02/19/2025 2011   2. How many drinks containing alcohol do you have on a typical day you are drinking?  0 Filed at: 02/19/2025 2011   3b. FEMALE Any Age, or MALE 65+: How often do you have 4 or more drinks on one occassion? 0 Filed at: 02/19/2025  2011   Audit-C Score 0 Filed at: 02/19/2025 2011   LIVAN: How many times in the past year have you...    Used an illegal drug or used a prescription medication for non-medical reasons? Never Filed at: 02/19/2025 2011                            History of Present Illness       Chief Complaint   Patient presents with    Cold Like Symptoms     Pt arrived to ED c/o cold/flu like symtpoms. Per patient has been ongoing since Sunday. C/o nausea, vomiting, diarrhea.        Past Medical History:   Diagnosis Date    Cataract 2015    had surgery on both eyes    Colitis     Colon polyp     COPD (chronic obstructive pulmonary disease) (HCC)     Dehydration     Depression     Familial tremor     GERD (gastroesophageal reflux disease)     Ground glass opacity present on imaging of lung     Hiatal hernia     High cholesterol     Hyperlipidemia     Hypertension     Hypoglycemia     Migraine     Nosebleed     Primary adenocarcinoma of upper lobe of right lung (HCC)     Sleep apnea     C pap    Sleep apnea, obstructive     Solitary pulmonary nodule     Syncope       Past Surgical History:   Procedure Laterality Date    ADENOIDECTOMY  Child    APPENDECTOMY      BACK SURGERY      Cervical spine    BREAST BIOPSY Bilateral BENIGN    CHOLECYSTECTOMY      COLECTOMY      complete colectomy    COLONOSCOPY      CT NEEDLE BX ASPIRATION INJECTION LOCALIZATION  11/07/2019    ESOPHAGOGASTRODUODENOSCOPY N/A 01/22/2018    Procedure: ESOPHAGOGASTRODUODENOSCOPY (EGD);  Surgeon: Lalito Espino MD;  [facility]: [facility];  Service: Gastroenterology    EYE SURGERY      LUNG BIOPSY      LUNG SEGMENTECTOMY Right 11/07/2019    Procedure: RESECTION WEDGE LUNG;  Surgeon: Livan Lema MD;  Location: [facility];  Service: Thoracic    NASAL/SINUS ENDOSCOPY N/A 11/07/2019    Procedure: ENDOSCOPIC BIOPSY OF NASOPHARYNX LESION;  Surgeon: Perez Horner MD;  Location[facility];  Service: ENT    NISSEN FUNDOPLICATION      ORIF TIBIA & FIBULA FRACTURES  Right 1/29/2025    Procedure: ORIF BIMALLEOLAR ANKLE FRACTURE;  Surgeon: Scooter Brown DO;  Location: [facility];  Service: Orthopedics    AZ BRNCHSC INCL FLUOR GDNCE DX W/CELL WASHG SPX N/A 11/07/2019    Procedure: BRONCHOSCOPY FLEXIBLE;  Surgeon: Livan Lema MD;  [facility];  Service: Thoracic    AZ ESOPHAGOGASTRODUODENOSCOPY TRANSORAL DIAGNOSTIC N/A 04/17/2019    Procedure: ESOPHAGOGASTRODUODENOSCOPY (EGD);  Surgeon: Lalito Espino MD;  [facility];  Service: Gastroenterology    AZ LAPS RPR PARAESPHGL HRNA INCL FUNDPLSTY W/MESH N/A 06/02/2016    Procedure: ROBOTIC LAPAROSCOPIC Daquan Fundoplication, Liver biopsy;  Surgeon: Levi Momin MD;  [facility]: [facility];  Service: General    AZ THORACOSCOPY W/THERA WEDGE RESEXN INITIAL UNILAT Right 11/07/2019    Procedure: THORACOSCOPY VIDEO ASSISTED SURGERY (VATS);  Surgeon: Livan Lema MD;  [facility];  Service: Thoracic    SPINE SURGERY  2017    cervical spine    TONSILLECTOMY  Child    UPPER GASTROINTESTINAL ENDOSCOPY        Family History   Problem Relation Age of Onset    Leukemia Mother 62    Cancer Mother     Colon cancer Father 70    Cancer Father     Leukemia Brother 60    No Known Problems Daughter     No Known Problems Maternal Grandmother     No Known Problems Paternal Grandmother     Breast cancer Maternal Aunt     Breast cancer Maternal Aunt     No Known Problems Maternal Aunt     Cancer Brother         Cancer      Social History     Tobacco Use    Smoking status: Never    Smokeless tobacco: Never   Vaping Use    Vaping status: Never Used   Substance Use Topics    Alcohol use: Yes     Comment: Social drinker    Drug use: No      E-Cigarette/Vaping    E-Cigarette Use Never User       E-Cigarette/Vaping Substances    Nicotine No     THC No     CBD No     Flavoring No     Other No       I have reviewed and agree with the history as documented.     79-year-old female presenting to the emergency department with  "her son for nausea, vomiting, diarrhea that is been ongoing since Monday.  She was discharged on Monday from acute rehab for recent right ankle surgery.  States her  got sick first and she got sick afterwards.  Denies any blood in her stool or vomit.  Mentions a history of Nissen fundoplication in 2016 and prior to that she also had complete colectomy due to ischemic colitis.  States she had a fever yesterday, no fever today.  Denies chest pain or shortness of breath or bodyaches.  Mentions upper abdominal pain when she retches, states she has a \"muscle pain\".        Review of Systems   Constitutional:  Positive for fever.   Respiratory:  Negative for cough and shortness of breath.    Cardiovascular:  Negative for chest pain.   Gastrointestinal:  Positive for abdominal pain, diarrhea, nausea and vomiting. Negative for blood in stool.   Musculoskeletal:  Negative for back pain.           Objective       ED Triage Vitals   Temperature Pulse Blood Pressure Respirations SpO2 Patient Position - Orthostatic VS   02/19/25 2012 02/19/25 2011 02/19/25 2011 02/19/25 2011 02/19/25 2011 02/19/25 2100   98.1 °F (36.7 °C) 86 139/78 16 96 % Lying      Temp Source Heart Rate Source BP Location FiO2 (%) Pain Score    02/19/25 2012 02/19/25 2011 02/19/25 2100 -- 02/19/25 2011    Oral Monitor Left arm  No Pain      Vitals      Date and Time Temp Pulse SpO2 Resp BP Pain Score FACES Pain Rating User   02/19/25 2230 -- 73 96 % 18 138/74 -- -- CC   02/19/25 2200 -- 72 97 % 16 146/83 -- -- CC   02/19/25 2130 -- 69 97 % 16 143/71 -- -- CC   02/19/25 2100 -- 79 96 % 17 134/78 2 -- [facility]   02/19/25 2030 -- 84 96 % 16 126/80 -- -- CC   02/19/25 2012 98.1 °F (36.7 °C) -- -- -- -- -- --    02/19/25 2011 -- 86 96 % 16 139/78 No Pain -- CC            Physical Exam  Constitutional:       General: She is not in acute distress.     Appearance: She is not ill-appearing.   HENT:      Head: Normocephalic and atraumatic.      Nose: Nose normal.    "   Mouth/Throat:      Mouth: Mucous membranes are dry.   Eyes:      Extraocular Movements: Extraocular movements intact.      Pupils: Pupils are equal, round, and reactive to light.   Cardiovascular:      Rate and Rhythm: Normal rate and regular rhythm.   Pulmonary:      Effort: Pulmonary effort is normal. No respiratory distress.   Abdominal:      General: There is no distension.      Palpations: Abdomen is soft.      Tenderness: There is no abdominal tenderness. There is no guarding or rebound.   Musculoskeletal:         General: No swelling or deformity. Normal range of motion.      Cervical back: Normal range of motion and neck supple.   Skin:     General: Skin is warm.      Findings: No erythema.   Neurological:      Mental Status: She is alert and oriented to person, place, and time. Mental status is at baseline.         Results Reviewed       Procedure Component Value Units Date/Time    Comprehensive metabolic panel [[id number]]  (Abnormal) Collected: 02/19/25 2050    Lab Status: Final result Specimen: Blood from Arm, Right Updated: 02/19/25 2130     Sodium 134 mmol/L      Potassium 4.0 mmol/L      Chloride 102 mmol/L      CO2 17 mmol/L      ANION GAP 15 mmol/L      BUN 56 mg/dL      Creatinine 1.75 mg/dL      Glucose 119 mg/dL      Calcium 9.8 mg/dL      AST 21 U/L      ALT 47 U/L      Alkaline Phosphatase 137 U/L      Total Protein 8.4 g/dL      Albumin 4.5 g/dL      Total Bilirubin 0.41 mg/dL      eGFR 26 ml/min/1.73sq m     Narrative:      National Kidney Disease Foundation guidelines for Chronic Kidney Disease (CKD):     Stage 1 with normal or high GFR (GFR > 90 mL/min/1.73 square meters)    Stage 2 Mild CKD (GFR = 60-89 mL/min/1.73 square meters)    Stage 3A Moderate CKD (GFR = 45-59 mL/min/1.73 square meters)    Stage 3B Moderate CKD (GFR = 30-44 mL/min/1.73 square meters)    Stage 4 Severe CKD (GFR = 15-29 mL/min/1.73 square meters)    Stage 5 End Stage CKD (GFR <15 mL/min/1.73 square meters)  Note: GFR  calculation is accurate only with a steady state creatinine    Lipase [[id number]]  (Normal) Collected: 02/19/25 2050    Lab Status: Final result Specimen: Blood from Arm, Right Updated: 02/19/25 2130     Lipase 67 u/L     CBC and differential [[id number]]  (Abnormal) Collected: 02/19/25 2050    Lab Status: Final result Specimen: Blood from Arm, Right Updated: 02/19/25 2114     WBC 8.10 Thousand/uL      RBC 4.63 Million/uL      Hemoglobin 14.4 g/dL      Hematocrit 44.7 %      MCV 97 fL      MCH 31.1 pg      MCHC 32.2 g/dL      RDW 13.8 %      MPV 9.5 fL      Platelets 299 Thousands/uL      nRBC 0 /100 WBCs      Segmented % 49 %      Immature Grans % 0 %      Lymphocytes % 33 %      Monocytes % 17 %      Eosinophils Relative 1 %      Basophils Relative 0 %      Absolute Neutrophils 3.91 Thousands/µL      Absolute Immature Grans 0.03 Thousand/uL      Absolute Lymphocytes 2.70 Thousands/µL      Absolute Monocytes 1.38 Thousand/µL      Eosinophils Absolute 0.05 Thousand/µL      Basophils Absolute 0.03 Thousands/µL             No orders to display       Procedures    ED Medication and Procedure Management   Prior to Admission Medications   Prescriptions Last Dose Informant Patient Reported? Taking?   Cholecalciferol (VITAMIN D3) 5000 UNITS CAPS Not Taking Self Yes No   Sig: Take 1 capsule by mouth daily.   Patient not taking: Reported on 2/19/2025   Loratadine (CLARITIN PO) Not Taking Self Yes No   Sig: Take by mouth as needed   Patient not taking: Reported on 2/19/2025   acetaminophen (TYLENOL) 325 mg tablet Past Week  No Yes   Sig: Take 3 tablets (975 mg total) by mouth every 8 (eight) hours   aspirin 81 mg chewable tablet Past Week  Yes Yes   Sig: Chew 81 mg 2 (two) times a day   escitalopram (LEXAPRO) 20 mg tablet 2/18/2025 Self Yes Yes   Sig: Take 10 mg by mouth daily    famotidine (PEPCID) 40 MG tablet 2/18/2025 Self Yes Yes   Sig: Take 40 mg by mouth daily   pantoprazole (PROTONIX) 40 mg tablet 2/18/2025 Self Yes  Yes   Sig: Take 40 mg by mouth daily   polyethylene glycol (MIRALAX) 17 g packet Not Taking  No No   Sig: Take 17 g by mouth daily   Patient not taking: Reported on 2/19/2025   propranolol (INDERAL LA) 60 mg 24 hr capsule 2/18/2025 Self Yes Yes   sertraline (ZOLOFT) 25 mg tablet Not Taking Self Yes No   Sig: Take 25 mg by mouth daily at bedtime   Patient not taking: Reported on 2/19/2025   simvastatin (ZOCOR) 80 mg tablet 2/18/2025 Self Yes Yes   Sig: Take 80 mg by mouth daily      Facility-Administered Medications: None     Patient's Medications   Discharge Prescriptions    No medications on file     No discharge procedures on file.  ED SEPSIS DOCUMENTATION   Time reflects when diagnosis was documented in both MDM as applicable and the Disposition within this note       Time User Action Codes Description Comment    2/19/2025 10:24 PM Edita Gleason Add [N17.9] HYACINTH (acute kidney injury) (HCC)     2/19/2025 10:25 PM Edita Gleason Add [R11.2,  R19.7] Nausea, vomiting, and diarrhea                  Edita Gleason DO  02/19/25 0701

## 2025-02-20 NOTE — PROGRESS NOTES
PT ordered for the patient.  Currently inpatient.  Sutures removed in the emergency department and patient transferred over to a [facility] boot.

## 2025-02-20 NOTE — H&P
H&P - Hospitalist   Name: Lyn Pimentel 79 y.o. female I MRN: [id number]  Unit/Bed#: ED-05 I Date of Admission: 2/19/2025   Date of Service: 2/19/2025 I Hospital Day: 0     Assessment & Plan  HYACINTH (acute kidney injury) (HCC)  Creatinine 1.75 on admission from baseline around 0.9-1.1. BUN significantly elevated as well suggesting component of dehydration.  Reports poor p.o. intake and minimal hydration over the past few days.  She is urinating less as well. She has been taking aspirin 81 mg BID for DVT ppx in setting of recent ankle fracture  Received 2 L NS bolus in ER    Plan:  Continue 75 cc/h IV fluids for 10 hours and reassess renal function and volume status in the morning  Adjust medications per GFR  Check UA  Bladder scan, urinary retention protocol.  Strict I's and O's.  Avoid hypotension  Viral gastroenteritis  Presenting with 3 days of nausea, vomiting, diarrhea all nonbloody.  Both son and has been are sick with similar symptoms.  Son had to leave the room due to urge to vomit.  Was just at rehab facility with reported norovirus going around.  Endorses poor p.o. intake.  Afebrile, no leukocytosis.  No abdominal tenderness.  Abdomen soft without guarding or rebound.  Hemodynamically stable.  Diarrhea somewhat improving  S/p 4 mg IV zofran in ER    Plan:  Clear liquid diet for now, advance as tolerated  Serial abdominal exams  Optimize electrolytes  Continue gentle fluids for now  Zofran 4 mg IV as needed, QTc checked.  Continue to monitor  Metabolic acidosis, increased anion gap  Anion gap 15, bicarb 17 on admission  Delta ratio 0.42 suggesting mixed normal anion gap metabolic acidosis and high anion gap metabolic acidosis  Based on Whitfield formula and bicarb of 17, pCO2 on VBG 38 slightly higher than expected suggesting mild component of respiratory acidosis.  Patient with history of SEEMA, appearing comfortable on room air.  Saturating well.  Does have known decreased diffusion capacity of lung and  follows with pulmonary  Possibly in the setting of starvation ketosis and aspirin use on top of diarrhea  Lactic acid checked, within normal range    Plan:  VBG obtained-initial showing pH 7.27.  Recheck after more fluids and symptoms improvement.  Treat symptoms contributing to acidosis.  If pH worsens, can consider sodium bicarb in the appropriate clinical scenario  Incentive spirometry and use of CPAP at night  Closed fracture of right ankle  Previously hospitalized for right ankle fracture, status post ORIF 1/29.  Just got out of inpatient rehab.  Patient is supposed to be on DVT prophylaxis with aspirin 81 mg twice daily  Denies any pain, is not on any pain medication regimen at home    Plan:  While in hospital with her HYACINTH, continue with heparin DVT prophylaxis  Will need to be discharged home on DVT prophylaxis given right ankle fracture  F/u orthopedic surgery outpatient  Essential hypertension  While blood pressures are stable, can continue home propranolol 60 mg daily  Status post colectomy  Status post total colectomy and ileorectal anastomosis for ischemic colitis.  Also had a Niesen fundoplication in 2016.  Still having bowel movements and passing gas.  No abdominal pain or tenderness on exam.  GERD without esophagitis  Continue home Protonix.  Pepcid reduced to 10 mg daily with HYACINTH  Obstructive sleep apnea  Continue CPAP  Tremor  History of action tremor maintained on propranolol      VTE Pharmacologic Prophylaxis: VTE Score: 12 High Risk (Score >/= 5) - Pharmacological DVT Prophylaxis Ordered: heparin. Sequential Compression Devices Ordered.  Code Status: Level 3 - DNAR and DNI   Discussion with family: Updated  (son) at bedside.    Anticipated Length of Stay: Patient will be admitted on an observation basis with an anticipated length of stay of less than 2 midnights secondary to HYACINTH.    History of Present Illness   Chief Complaint: Nausea, vomiting, diarrhea    Lyn Pimentel is a 79  y.o. female with a PMH of recent ankle fracture, SEEMA, prior lung resection for cancer, HTN, tremor who presents with nausea, vomiting, diarrhea since Monday morning. Her last full meal was Sunday night. She reports a poor appetite. She was at [facility] for rehab with multiple cases of Norovirus going around. Her son and  both have similar symptoms. Her temperature today was around 97 F but yesterday she felt warm. She is urinating significantly less. She did not drink many fluids today or the past few days.  She denies any abdominal pain.  She will occasionally have some cramping but it is relieved with bowel movements.  She was previously vomiting mostly mucus but now has more dry heaves.  She denies any chest pain, shortness of breath, palpitations, diaphoresis, lightheadedness.  No hematuria or blood in stool or vomit.      Review of Systems   Constitutional:  Positive for appetite change and chills. Negative for fever.   Respiratory:  Negative for cough and shortness of breath.    Cardiovascular:  Negative for chest pain and palpitations.   Gastrointestinal:  Positive for diarrhea, nausea and vomiting. Negative for abdominal pain, blood in stool and constipation.   Genitourinary:  Positive for decreased urine volume. Negative for dysuria.   Neurological:  Negative for dizziness and light-headedness.   Psychiatric/Behavioral:  Negative for confusion.    All other systems reviewed and are negative.      Historical Information   Past Medical History:   Diagnosis Date    Cataract 2015    had surgery on both eyes    Colitis     Colon polyp     COPD (chronic obstructive pulmonary disease) (HCC)     Dehydration     Depression     Familial tremor     GERD (gastroesophageal reflux disease)     Ground glass opacity present on imaging of lung     Hiatal hernia     High cholesterol     Hyperlipidemia     Hypertension     Hypoglycemia     Migraine     Nosebleed     Primary adenocarcinoma of upper lobe of  right lung (HCC)     Sleep apnea     C pap    Sleep apnea, obstructive     Solitary pulmonary nodule     Syncope      Past Surgical History:   Procedure Laterality Date    ADENOIDECTOMY  Child    APPENDECTOMY      BACK SURGERY      Cervical spine    BREAST BIOPSY Bilateral BENIGN    CHOLECYSTECTOMY      COLECTOMY      complete colectomy    COLONOSCOPY      CT NEEDLE BX ASPIRATION INJECTION LOCALIZATION  11/07/2019    ESOPHAGOGASTRODUODENOSCOPY N/A 01/22/2018    Procedure: ESOPHAGOGASTRODUODENOSCOPY (EGD);  Surgeon: Lalito Espino MD;  [facility];  Service: Gastroenterology    EYE SURGERY      LUNG BIOPSY      LUNG SEGMENTECTOMY Right 11/07/2019    Procedure: RESECTION WEDGE LUNG;  Surgeon: Livan Lema MD;  Location: [facility];  Service: Thoracic    NASAL/SINUS ENDOSCOPY N/A 11/07/2019    Procedure: ENDOSCOPIC BIOPSY OF NASOPHARYNX LESION;  Surgeon: Perez Horner MD;  Location[facility];  Service: ENT    NISSEN FUNDOPLICATION      ORIF TIBIA & FIBULA FRACTURES Right 1/29/2025    Procedure: ORIF BIMALLEOLAR ANKLE FRACTURE;  Surgeon: Scooter Brown DO;  [facility];  Service: Orthopedics    MN BRNCHSC INCL FLUOR GDNCE DX W/CELL WASHG SPX N/A 11/07/2019    Procedure: BRONCHOSCOPY FLEXIBLE;  Surgeon: Livan Lema MD;  [facility];  Service: Thoracic    MN ESOPHAGOGASTRODUODENOSCOPY TRANSORAL DIAGNOSTIC N/A 04/17/2019    Procedure: ESOPHAGOGASTRODUODENOSCOPY (EGD);  Surgeon: Lalito Espino MD;  [facility];  Service: Gastroenterology    MN LAPS RPR PARAESPHGL HRNA INCL FUNDPLSTY W/MESH N/A 06/02/2016    Procedure: ROBOTIC LAPAROSCOPIC Daquan Fundoplication, Liver biopsy;  Surgeon: Levi Momin MD;  [facility];  Service: General    MN THORACOSCOPY W/THERA WEDGE RESEXN INITIAL UNILAT Right 11/07/2019    Procedure: THORACOSCOPY VIDEO ASSISTED SURGERY (VATS);  Surgeon: Livan Lema MD;  [facility];  Service: Thoracic    SPINE SURGERY   2017    cervical spine    TONSILLECTOMY  Child    UPPER GASTROINTESTINAL ENDOSCOPY       Social History     Tobacco Use    Smoking status: Never    Smokeless tobacco: Never   Vaping Use    Vaping status: Never Used   Substance and Sexual Activity    Alcohol use: Yes     Comment: Social drinker    Drug use: No    Sexual activity: Not on file     E-Cigarette/Vaping    E-Cigarette Use Never User      E-Cigarette/Vaping Substances    Nicotine No     THC No     CBD No     Flavoring No     Other No        Social History:  Marital Status: /Civil Union   Occupation: retired  Patient Pre-hospital Living Situation: With spouse  Patient Pre-hospital Level of Mobility: Currently with right ankle fracture  Patient Pre-hospital Diet Restrictions: none    Meds/Allergies   I have reviewed home medications with patient personally.  Prior to Admission medications    Medication Sig Start Date End Date Taking? Authorizing Provider   acetaminophen (TYLENOL) 325 mg tablet Take 3 tablets (975 mg total) by mouth every 8 (eight) hours 1/31/25   Cassy Redd PA-C   Cholecalciferol (VITAMIN D3) 5000 UNITS CAPS Take 1 capsule by mouth daily.    Historical Provider, MD   escitalopram (LEXAPRO) 20 mg tablet Take 10 mg by mouth daily  12/2/19   Historical Provider, MD   famotidine (PEPCID) 40 MG tablet Take 40 mg by mouth daily 9/11/22   Historical Provider, MD   Loratadine (CLARITIN PO) Take by mouth as needed    Historical Provider, MD   pantoprazole (PROTONIX) 40 mg tablet Take 40 mg by mouth daily 9/22/22   Historical Provider, MD   polyethylene glycol (MIRALAX) 17 g packet Take 17 g by mouth daily 2/1/25   Cassy Redd PA-C   propranolol (INDERAL LA) 60 mg 24 hr capsule  12/9/22   Historical Provider, MD   sertraline (ZOLOFT) 25 mg tablet Take 25 mg by mouth daily at bedtime    Historical Provider, MD   simvastatin (ZOCOR) 80 mg tablet Take 80 mg by mouth daily 9/2/22   Historical Provider, MD     Allergies    Allergen Reactions    Biaxin [Clarithromycin] GI Intolerance    Cephalosporins     Darvon [Propoxyphene]     Latex      Added based on information entered during case entry, please review and add reactions, type, and severity as needed    Other     Wound Dressing Adhesive     Cefzil [Cefprozil] Rash    Ciprofloxacin Rash       Objective :  Temp:  [98.1 °F (36.7 °C)] 98.1 °F (36.7 °C)  HR:  [69-86] 73  BP: (126-146)/(71-83) 138/74  Resp:  [16-18] 18  SpO2:  [96 %-97 %] 96 %  O2 Device: None (Room air)    Physical Exam  Vitals reviewed.   Constitutional:       Appearance: She is not diaphoretic.   HENT:      Head: Normocephalic and atraumatic.      Mouth/Throat:      Mouth: Mucous membranes are dry.   Eyes:      Conjunctiva/sclera: Conjunctivae normal.      Pupils: Pupils are equal, round, and reactive to light.   Cardiovascular:      Rate and Rhythm: Normal rate and regular rhythm.   Pulmonary:      Effort: Pulmonary effort is normal. No respiratory distress.      Breath sounds: Normal breath sounds. No rales.   Abdominal:      General: Bowel sounds are normal. There is no distension.      Palpations: Abdomen is soft.      Tenderness: There is no abdominal tenderness. There is no guarding or rebound.   Musculoskeletal:      Comments: Right ankle wrapped, able to wiggle toes  Trace pitting edema in right lower leg     Skin:     General: Skin is warm and dry.   Neurological:      Mental Status: She is alert and oriented to person, place, and time. Mental status is at baseline.   Psychiatric:         Mood and Affect: Mood normal.         Behavior: Behavior normal.         Thought Content: Thought content normal.            Lab Results: I have reviewed the following results:  Results from last 7 days   Lab Units 02/19/25  2050   WBC Thousand/uL 8.10   HEMOGLOBIN g/dL 14.4   HEMATOCRIT % 44.7   PLATELETS Thousands/uL 299   SEGS PCT % 49   LYMPHO PCT % 33   MONO PCT % 17*   EOS PCT % 1     Results from last 7 days   Lab  Units 02/19/25 2050   SODIUM mmol/L 134*   POTASSIUM mmol/L 4.0   CHLORIDE mmol/L 102   CO2 mmol/L 17*   BUN mg/dL 56*   CREATININE mg/dL 1.75*   ANION GAP mmol/L 15*   CALCIUM mg/dL 9.8   ALBUMIN g/dL 4.5   TOTAL BILIRUBIN mg/dL 0.41   ALK PHOS U/L 137*   ALT U/L 47   AST U/L 21   GLUCOSE RANDOM mg/dL 119             Lab Results   Component Value Date    HGBA1C 6.6 (H) 09/26/2023    HGBA1C 6.4 (H) 03/17/2023    HGBA1C 6.2 10/21/2019         Reviewed EKG    Administrative Statements       ** Please Note: This note has been constructed using a voice recognition system. **

## 2025-02-20 NOTE — UTILIZATION REVIEW
Initial Clinical Review  OBSERVATION ADMISSION 2/19/2025 2227  CONVERTED TO  INPATIENT ADMISSION 2/20/2025 1516  DUE TO HYACINTH, SUPPORTIVE MANAGEMENT OF VIRAL ENTERITIS  Admission: Date/Time/Statement:   Admission Orders (From admission, onward)       Ordered        02/20/25 1516  INPATIENT ADMISSION  Once            02/19/25 2227  Place in Observation  Once                          Orders Placed This Encounter   Procedures    INPATIENT ADMISSION     Standing Status:   Standing     Number of Occurrences:   1     Level of Care:   Med Surg [16]     Estimated length of stay:   More than 2 Midnights     Certification:   I certify that inpatient services are medically necessary for this patient for a duration of greater than two midnights. See H&P and MD Progress Notes for additional information about the patient's course of treatment.     ED Arrival Information       Expected   -    Arrival   2/19/2025 20:06    Acuity   Urgent              Means of arrival   Ambulance    Escorted by   [facility]    Service   Hospitalist    Admission type   Emergency              Arrival complaint   EMS; Flu-sx-Vomiting             Chief Complaint   Patient presents with    Cold Like Symptoms     Pt arrived to ED c/o cold/flu like symtpoms. Per patient has been ongoing since Sunday. C/o nausea, vomiting, diarrhea.        Initial Presentation: 79 y.o. 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 Her last full meal was Sunday night. Reports a poor appetite. She was at [facility] for rehab with multiple cases of Norovirus going around. Her son and  both have similar symptoms. OP Temperature around 97 F but yesterday she felt warm. U/O decreased, poor fluid intake  today & the past  few days.  Occasionally have some cramping but it is relieved with bowel movements. She was previously vomiting mostly mucus but now  dry heaves. PLAN Observation admission due to HYACINTH, Viral gastroenteritis, metabolic acidosis w increase anion GAP, closed FX R ankle    EXAM  Right ankle wrapped, able to wiggle toes  Trace pitting edema in right lower leg.  Still having bowel movements and passing gas   Labs Elevated BUN/ CR ( CR baseline 0.9-1.1), vBG pH 7.27 w/ elevated Anion gap 15, bicarb 17   Given 2 L IVF bolus. PLAN Observation admission due to HYACINTH, viral gastro, metabolic acidosis w incr anion GAP.   Anticipated Length of Stay/Certification Statement: Patient will be admitted on an observation basis with an anticipated length of stay of less than 2 midnights secondary to HYACINTH.   Date: 2/20/2025   Day 2:  Changed to Inpatient  Cont gentle IVF, meds per GFR, obtain UA, bladder scan urinary retention protocol, I/O, avoid hypotension; serial ABD exam, optimize electrolytes, prn Zofran w QTc check & monitor, Heparin ppx, OP ortho;   Attending  HYACINTH, dehydration secondary to norovirus.   Still nauseous.  Her p.o. intake is limited.  She feels cramping and discomfort in upper abdomen  Continue supportive care and gentle IVF hydration. Advance diet as tolerated.Try Bentyl for abdominal discomfort.    ORTHO  missing her scheduled outpatient orthopedics appointment after recent right ankle ORIF with ORTHO Attending performed 1/29/2025.      At time of visit she reports resolution in her right ankle pain. She has been compliant with her non weight bearing restrictions.   Splint removed from right lower extremity. Sutures in place. Sutures removed without incident.   Camboot applied.   XRAYs from today reviewed: post operative changes without evidence of hardware failure.   Plan:   Remain NWB in camboot at this time.   May come out of camboot for ROM exercises.   PT/OT ordered.  Outpatient PT orders on file.   Patient to  return to clinic in 4 weeks for repeat evaluation and xrays with OP Ortho  Date: 2/21/2025  Day 3: Has surpassed a 2nd midnight with active treatments and services.  Remained afebrile without leukocytosis and was hemodynamically stable.   AKIO  S/p IVF: Creatinine had improved with fluids and patient was started on clear liquid diet and eventually advance to regular diet which patient tolerated without any complications. Patient was symptomatically managed for nausea and acid reflux symptoms   PT/OT DC Dispo eval   Level III (Minimum Resource Intensity)     ED Treatment-Medication Administration from 02/19/2025 2006 to 02/20/2025 0912         Date/Time Order Dose Route Action     02/19/2025 2052 sodium chloride 0.9 % bolus 1,000 mL 1,000 mL Intravenous New Bag     02/19/2025 2052 ondansetron (ZOFRAN) injection 4 mg 4 mg Intravenous Given     02/19/2025 2236 multi-electrolyte (ISOLYTE-S PH 7.4) bolus 1,000 mL 1,000 mL Intravenous New Bag     02/20/2025 0829 Cholecalciferol (VITAMIN D3) tablet 1,000 Units 1,000 Units Oral Given     02/20/2025 0833 escitalopram (LEXAPRO) tablet 10 mg 10 mg Oral Given     02/20/2025 0829 famotidine (PEPCID) tablet 10 mg 10 mg Oral Given     02/20/2025 0600 pantoprazole (PROTONIX) EC tablet 40 mg 40 mg Oral Given     02/20/2025 0832 propranolol (INDERAL LA) 24 hr capsule 60 mg 60 mg Oral Given     02/20/2025 0601 heparin (porcine) subcutaneous injection 5,000 Units 5,000 Units Subcutaneous Given     02/20/2025 0000 multi-electrolyte (PLASMALYTE-A/ISOLYTE-S PH 7.4) IV solution 75 mL/hr Intravenous New Bag     02/20/2025 0838 multi-electrolyte (PLASMALYTE-A/ISOLYTE-S PH 7.4) IV solution 75 mL/hr Intravenous New Bag     02/20/2025 0612 ondansetron (ZOFRAN) injection 4 mg 4 mg Intravenous Given     02/20/2025 0636 potassium chloride (Klor-Con M20) CR tablet 40 mEq 40 mEq Oral Given            Scheduled Medications:  Cholecalciferol, 1,000 Units, Oral, Daily  dicyclomine, 20 mg, Oral, 4x  Daily (AC & HS)  escitalopram, 10 mg, Oral, Daily  famotidine, 10 mg, Oral, Daily  heparin (porcine), 5,000 Units, Subcutaneous, Q8H BROOKS  pantoprazole, 40 mg, Oral, Daily  pravastatin, 80 mg, Oral, Daily With Dinner  propranolol, 60 mg, Oral, Daily  sucralfate, 1 g, Oral, BID      Continuous IV Infusions:     Medications 02/19 02/20 02/21   multi-electrolyte (PLASMALYTE-A/ISOLYTE-S PH 7.4) IV solution  Rate: 75 mL/hr Dose: 75 mL/hr  Freq: Continuous Route: IV  Indications of Use: IV Hydration  Last Dose: Stopped (02/20/25 1710)  Start: 02/19/25 2345 End: 02/20/25 1811     0000     0838     1710 [C]               1811-D/C'd           PRN Meds:  ondansetron, 4 mg, Intravenous, Q6H PRN  simethicone, 80 mg, Oral, Q6H PRN      ED Triage Vitals   Temperature Pulse Respirations Blood Pressure SpO2 Pain Score   02/19/25 2012 02/19/25 2011 02/19/25 2011 02/19/25 2011 02/19/25 2011 02/19/25 2011   98.1 °F (36.7 °C) 86 16 139/78 96 % No Pain     Weight (last 2 days)       Date/Time Weight    02/21/25 0600 80.1 (176.59)    02/20/25 16:56:43 81.6 (179.9)    02/19/25 2011 81.6 (180)            Vital Signs (last 3 days)       Date/Time Temp Pulse Resp BP MAP (mmHg) SpO2 O2 Device Patient Position - Orthostatic VS Greg Coma Scale Score Pain    02/21/25 0908 -- -- -- -- -- -- -- -- -- No Pain    02/21/25 0907 -- -- -- -- -- -- -- -- -- No Pain    02/21/25 0800 -- -- -- -- -- -- -- -- 15 2    02/21/25 07:07:26 97.7 °F (36.5 °C) 66 -- 128/68 88 92 % -- -- -- --    02/21/25 05:50:32 97.9 °F (36.6 °C) 65 -- 132/69 90 95 % -- -- -- --    02/20/25 2331 -- -- -- -- -- 92 % -- -- 15 No Pain    02/20/25 22:11:02 98.2 °F (36.8 °C) 67 -- 130/68 89 96 % -- -- -- --    02/20/25 1705 98.4 °F (36.9 °C) -- -- -- -- -- None (Room air) -- 15 3    02/20/25 16:56:43 98.4 °F (36.9 °C) 62 18 129/67 88 94 % -- -- -- --    02/20/25 1640 -- 70 16 117/65 87 96 % None (Room air) Lying -- No Pain    02/20/25 1200 -- 70 -- 126/60 87 95 % -- -- -- --     02/20/25 0845 -- -- -- -- -- -- -- -- 15 --    02/20/25 0832 -- 77 -- 148/77 -- -- -- -- -- --    02/20/25 0607 -- -- -- -- -- -- -- -- -- No Pain    02/20/25 0400 -- 68 16 124/60 86 94 % None (Room air) -- -- --    02/20/25 0200 -- 74 16 125/64 90 92 % None (Room air) -- -- --    02/20/25 0000 -- 71 -- 136/65 93 97 % None (Room air) Lying -- --    02/19/25 2348 -- -- -- -- -- -- -- -- 15 --    02/19/25 2230 -- 73 18 138/74 101 96 % None (Room air) -- -- --    02/19/25 2200 -- 72 16 146/83 110 97 % None (Room air) -- -- --    02/19/25 2130 -- 69 16 143/71 101 97 % None (Room air) -- -- --    02/19/25 2100 -- 79 17 134/78 100 96 % None (Room air) Lying -- 2    02/19/25 2030 -- 84 16 126/80 98 96 % None (Room air) -- -- --    02/19/25 2012 98.1 °F (36.7 °C) -- -- -- -- -- -- -- -- --    02/19/25 2011 -- 86 16 139/78 -- 96 % None (Room air) -- -- No Pain              Pertinent Labs/Diagnostic Test Results:   Radiology:  XR ankle 3+ vw right   Final Interpretation by Piyush Stahl DO (02/20 1811)      Limited study. Near-anatomic alignment of previously seen trimalleolar fracture status post ORIF as above.            Workstation performed: [id number]           Cardiology:  ECG 12 lead   Final Result by Brock Jones MD (02/20 0634)   Normal sinus rhythm   Nonspecific T wave abnormality   Abnormal ECG   When compared with ECG of 31-Jan-2025 21:36,   Right bundle branch block is no longer Present   Confirmed by Brock Jones ([id number]) on 2/20/2025 6:34:42 AM        GI:  No orders to display           Results from last 7 days   Lab Units 02/21/25 0439 02/20/25 0453 02/19/25 2050   WBC Thousand/uL 7.96 8.08 8.10   HEMOGLOBIN g/dL 11.5 11.9 14.4   HEMATOCRIT % 35.8 37.5 44.7   PLATELETS Thousands/uL 274 267 299   TOTAL NEUT ABS Thousands/µL  --  3.42 3.91         Results from last 7 days   Lab Units 02/21/25 0439 02/20/25 0453 02/19/25 2050   SODIUM mmol/L 137 137 134*   POTASSIUM mmol/L 4.0 3.4* 4.0  "  CHLORIDE mmol/L 107 108 102   CO2 mmol/L 23 17* 17*   ANION GAP mmol/L 7 12 15*   BUN mg/dL 31* 45* 56*   CREATININE mg/dL 1.01 1.17 1.75*   EGFR ml/min/1.73sq m 52 43 26   CALCIUM mg/dL 8.9 8.6 9.8   MAGNESIUM mg/dL  --   --  2.2   PHOSPHORUS mg/dL  --  3.6  --      Results from last 7 days   Lab Units 02/20/25 0453 02/19/25 2050   AST U/L 18 21   ALT U/L 34 47   ALK PHOS U/L 103 137*   TOTAL PROTEIN g/dL 6.6 8.4   ALBUMIN g/dL 3.6 4.5   TOTAL BILIRUBIN mg/dL 0.32 0.41         Results from last 7 days   Lab Units 02/21/25 0439 02/20/25 0453 02/19/25 2050   GLUCOSE RANDOM mg/dL 91 87 119             No results found for: \"BETA-HYDROXYBUTYRATE\"       Results from last 7 days   Lab Units 02/21/25  0439 02/20/25  0556 02/20/25 0453   PH JULIAN  7.350 7.364 7.346   PCO2 JULIAN mm Hg 37.4* 29.2* 32.6*   PO2 JULIAN mm Hg 43.0 103.6* 109.9*   HCO3 JULIAN mmol/L 20.2* 16.3* 17.4*   BASE EXC JULIAN mmol/L -4.9 -7.8 -7.2   O2 CONTENT JULIAN ml/dL 13.1 17.4 17.5   O2 HGB, VENOUS % 75.3 95.7* 95.7*             Results from last 7 days   Lab Units 02/20/25  0001   HS TNI 0HR ng/L <2                     Results from last 7 days   Lab Units 02/20/25  0001   LACTIC ACID mmol/L 0.8                                 Results from last 7 days   Lab Units 02/19/25 2050   LIPASE u/L 67                 Results from last 7 days   Lab Units 02/20/25  0614   CLARITY UA  Clear   COLOR UA  Light Yellow   SPEC GRAV UA  1.017   PH UA  5.5   GLUCOSE UA mg/dl Negative   KETONES UA mg/dl Negative   BLOOD UA  Negative   PROTEIN UA mg/dl Trace*   NITRITE UA  Negative   BILIRUBIN UA  Negative   UROBILINOGEN UA (BE) mg/dl <2.0   LEUKOCYTES UA  Negative   WBC UA /hpf 4-10*   RBC UA /hpf 1-2   BACTERIA UA /hpf Occasional   EPITHELIAL CELLS WET PREP /hpf Occasional         Past Medical History:   Diagnosis Date    Cataract 2015    had surgery on both eyes    Colitis     Colon polyp     COPD (chronic obstructive pulmonary disease) (HCC)     Dehydration     Depression "     Familial tremor     GERD (gastroesophageal reflux disease)     Ground glass opacity present on imaging of lung     Hiatal hernia     High cholesterol     Hyperlipidemia     Hypertension     Hypoglycemia     Migraine     Nosebleed     Primary adenocarcinoma of upper lobe of right lung (HCC)     Sleep apnea     C pap    Sleep apnea, obstructive     Solitary pulmonary nodule     Syncope      Present on Admission:   Closed fracture of right ankle   Essential hypertension   GERD without esophagitis   Obstructive sleep apnea   Tremor      Admitting Diagnosis: Nausea and vomiting [R11.2]  HYACINTH (acute kidney injury) (HCC) [N17.9]  Nausea, vomiting, and diarrhea [R11.2, R19.7]  Age/Sex: 79 y.o. female    Network Utilization Review Department  ATTENTION: Please call with any questions or concerns to [phone] and carefully listen to the prompts so that you are directed to the right person. All voicemails are confidential.   For Discharge needs, contact Care Management DC Support Team at [phone] opt. 2  Send all requests for admission clinical reviews, approved or denied determinations and any other requests to dedicated fax number below belonging to the campus where the patient is receiving treatment. List of dedicated fax numbers for the Facilities:  FACILITY NAME UR FAX NUMBER   ADMISSION DENIALS (Administrative/Medical Necessity) [phone]   DISCHARGE SUPPORT TEAM (NETWORK) [phone]   PARENT CHILD HEALTH (Maternity/NICU/Pediatrics) [phone]   [facility] [phone]   [facility] [phone]   [facility] [phone]   [facility] [phone]   Critical access hospital [phone]   [facility] [phone]   [facility] [phone]   [facility] [phone]   [facility]  [place] [phone]   [facility] [phone]   [facility] [phone]   [facility] [phone]

## 2025-02-20 NOTE — PLAN OF CARE

## 2025-02-20 NOTE — ASSESSMENT & PLAN NOTE
Presenting with 3 days of nausea, vomiting, diarrhea all nonbloody.  Both son and has been are sick with similar symptoms.  Son had to leave the room due to urge to vomit.  Was just at rehab facility with reported norovirus going around.  Endorses poor p.o. intake.  Afebrile, no leukocytosis.  No abdominal tenderness.  Abdomen soft without guarding or rebound.  Hemodynamically stable.  Diarrhea somewhat improving  S/p 4 mg IV zofran in ER    Plan:  Clear liquid diet for now, advance as tolerated  Serial abdominal exams  Optimize electrolytes  Continue gentle fluids for now  Zofran 4 mg IV as needed, QTc checked.  Continue to monitor

## 2025-02-20 NOTE — ASSESSMENT & PLAN NOTE
Creatinine 1.75 on admission from baseline around 0.9-1.1. BUN significantly elevated as well suggesting component of dehydration.  Reports poor p.o. intake and minimal hydration over the past few days.  She is urinating less as well. She has been taking aspirin 81 mg BID for DVT ppx in setting of recent ankle fracture  Received 2 L NS bolus in ER    Plan:  Continue 75 cc/h IV fluids for 10 hours and reassess renal function and volume status in the morning  Adjust medications per GFR  Check UA  Bladder scan, urinary retention protocol.  Strict I's and O's.  Avoid hypotension

## 2025-02-20 NOTE — ASSESSMENT & PLAN NOTE
Previously hospitalized for right ankle fracture, status post ORIF 1/29.  Just got out of inpatient rehab.  Patient is supposed to be on DVT prophylaxis with aspirin 81 mg twice daily  Denies any pain, is not on any pain medication regimen at home    Plan:  While in hospital with her HYACINTH, continue with heparin DVT prophylaxis  Will need to be discharged home on DVT prophylaxis given right ankle fracture  F/u orthopedic surgery outpatient

## 2025-02-20 NOTE — ASSESSMENT & PLAN NOTE
Anion gap 15, bicarb 17 on admission  Delta ratio 0.42 suggesting mixed normal anion gap metabolic acidosis and high anion gap metabolic acidosis  Based on Whitfield formula and bicarb of 17, pCO2 on VBG 38 slightly higher than expected suggesting mild component of respiratory acidosis.  Patient with history of SEEMA, appearing comfortable on room air.  Saturating well.  Does have known decreased diffusion capacity of lung and follows with pulmonary  Possibly in the setting of starvation ketosis and aspirin use on top of diarrhea  Lactic acid checked, within normal range    Plan:  VBG obtained-initial showing pH 7.27.  Recheck after more fluids and symptoms improvement.  Treat symptoms contributing to acidosis.  If pH worsens, can consider sodium bicarb in the appropriate clinical scenario  Incentive spirometry and use of CPAP at night

## 2025-02-20 NOTE — ASSESSMENT & PLAN NOTE
Status post total colectomy and ileorectal anastomosis for ischemic colitis.  Also had a Niesen fundoplication in 2016.  Still having bowel movements and passing gas.  No abdominal pain or tenderness on exam.

## 2025-02-21 ENCOUNTER — HOME HEALTH ADMISSION (OUTPATIENT)
Dept: HOME HEALTH SERVICES | Facility: HOME HEALTHCARE | Age: 80
End: 2025-02-21
Payer: COMMERCIAL

## 2025-02-21 VITALS
SYSTOLIC BLOOD PRESSURE: 128 MMHG | TEMPERATURE: 97.7 F | BODY MASS INDEX: 29.42 KG/M2 | HEIGHT: 65 IN | DIASTOLIC BLOOD PRESSURE: 68 MMHG | WEIGHT: 176.59 LBS | OXYGEN SATURATION: 92 % | RESPIRATION RATE: 18 BRPM | HEART RATE: 66 BPM

## 2025-02-21 PROBLEM — E87.29 METABOLIC ACIDOSIS, INCREASED ANION GAP: Status: RESOLVED | Noted: 2025-02-19 | Resolved: 2025-02-21

## 2025-02-21 LAB
ANION GAP SERPL CALCULATED.3IONS-SCNC: 7 MMOL/L (ref 4–13)
BASE EX.OXY STD BLDV CALC-SCNC: 75.3 % (ref 60–80)
BASE EXCESS BLDV CALC-SCNC: -4.9 MMOL/L
BUN SERPL-MCNC: 31 MG/DL (ref 5–25)
CALCIUM SERPL-MCNC: 8.9 MG/DL (ref 8.4–10.2)
CHLORIDE SERPL-SCNC: 107 MMOL/L (ref 96–108)
CO2 SERPL-SCNC: 23 MMOL/L (ref 21–32)
CREAT SERPL-MCNC: 1.01 MG/DL (ref 0.6–1.3)
ERYTHROCYTE [DISTWIDTH] IN BLOOD BY AUTOMATED COUNT: 13.7 % (ref 11.6–15.1)
GFR SERPL CREATININE-BSD FRML MDRD: 52 ML/MIN/1.73SQ M
GLUCOSE SERPL-MCNC: 91 MG/DL (ref 65–140)
HCO3 BLDV-SCNC: 20.2 MMOL/L (ref 24–30)
HCT VFR BLD AUTO: 35.8 % (ref 34.8–46.1)
HGB BLD-MCNC: 11.5 G/DL (ref 11.5–15.4)
MCH RBC QN AUTO: 30.8 PG (ref 26.8–34.3)
MCHC RBC AUTO-ENTMCNC: 32.1 G/DL (ref 31.4–37.4)
MCV RBC AUTO: 96 FL (ref 82–98)
O2 CT BLDV-SCNC: 13.1 ML/DL
PCO2 BLDV: 37.4 MM HG (ref 42–50)
PH BLDV: 7.35 [PH] (ref 7.3–7.4)
PLATELET # BLD AUTO: 274 THOUSANDS/UL (ref 149–390)
PMV BLD AUTO: 9.7 FL (ref 8.9–12.7)
PO2 BLDV: 43 MM HG (ref 35–45)
POTASSIUM SERPL-SCNC: 4 MMOL/L (ref 3.5–5.3)
RBC # BLD AUTO: 3.73 MILLION/UL (ref 3.81–5.12)
SODIUM SERPL-SCNC: 137 MMOL/L (ref 135–147)
WBC # BLD AUTO: 7.96 THOUSAND/UL (ref 4.31–10.16)

## 2025-02-21 PROCEDURE — 85027 COMPLETE CBC AUTOMATED: CPT

## 2025-02-21 PROCEDURE — 82805 BLOOD GASES W/O2 SATURATION: CPT

## 2025-02-21 PROCEDURE — 97163 PT EVAL HIGH COMPLEX 45 MIN: CPT

## 2025-02-21 PROCEDURE — 97167 OT EVAL HIGH COMPLEX 60 MIN: CPT

## 2025-02-21 PROCEDURE — 99239 HOSP IP/OBS DSCHRG MGMT >30: CPT | Performed by: INTERNAL MEDICINE

## 2025-02-21 PROCEDURE — 80048 BASIC METABOLIC PNL TOTAL CA: CPT

## 2025-02-21 RX ORDER — SUCRALFATE 1 G/1
1 TABLET ORAL 2 TIMES DAILY
Status: DISCONTINUED | OUTPATIENT
Start: 2025-02-21 | End: 2025-02-21 | Stop reason: HOSPADM

## 2025-02-21 RX ORDER — SUCRALFATE 1 G/1
1 TABLET ORAL 2 TIMES DAILY PRN
Qty: 10 TABLET | Refills: 0 | Status: SHIPPED | OUTPATIENT
Start: 2025-02-21 | End: 2025-02-26

## 2025-02-21 RX ORDER — SIMETHICONE 80 MG
80 TABLET,CHEWABLE ORAL EVERY 6 HOURS PRN
Status: DISCONTINUED | OUTPATIENT
Start: 2025-02-21 | End: 2025-02-21 | Stop reason: HOSPADM

## 2025-02-21 RX ORDER — DICYCLOMINE HCL 20 MG
20 TABLET ORAL EVERY 6 HOURS PRN
Qty: 20 TABLET | Refills: 0 | Status: SHIPPED | OUTPATIENT
Start: 2025-02-21 | End: 2025-02-26

## 2025-02-21 RX ADMIN — DICYCLOMINE HYDROCHLORIDE 20 MG: 20 TABLET ORAL at 08:54

## 2025-02-21 RX ADMIN — SUCRALFATE 1 G: 1 TABLET ORAL at 11:48

## 2025-02-21 RX ADMIN — HEPARIN SODIUM 5000 UNITS: 5000 INJECTION INTRAVENOUS; SUBCUTANEOUS at 13:25

## 2025-02-21 RX ADMIN — PROPRANOLOL HYDROCHLORIDE 60 MG: 60 CAPSULE, EXTENDED RELEASE ORAL at 08:54

## 2025-02-21 RX ADMIN — FAMOTIDINE 10 MG: 20 TABLET, FILM COATED ORAL at 08:54

## 2025-02-21 RX ADMIN — PANTOPRAZOLE SODIUM 40 MG: 40 TABLET, DELAYED RELEASE ORAL at 05:25

## 2025-02-21 RX ADMIN — HEPARIN SODIUM 5000 UNITS: 5000 INJECTION INTRAVENOUS; SUBCUTANEOUS at 05:25

## 2025-02-21 RX ADMIN — DICYCLOMINE HYDROCHLORIDE 20 MG: 20 TABLET ORAL at 13:25

## 2025-02-21 RX ADMIN — Medication 1000 UNITS: at 08:54

## 2025-02-21 RX ADMIN — ESCITALOPRAM OXALATE 10 MG: 10 TABLET ORAL at 08:54

## 2025-02-21 NOTE — PLAN OF CARE
Problem: OCCUPATIONAL THERAPY ADULT  Goal: Performs self-care activities at highest level of function for planned discharge setting.  See evaluation for individualized goals.  Description: Treatment Interventions: ADL retraining, Functional transfer training, Endurance training, Patient/family training, Compensatory technique education, Equipment evaluation/education          See flowsheet documentation for full assessment, interventions and recommendations.   Note: Limitation: Decreased ADL status, Decreased endurance, Decreased self-care trans, Decreased high-level ADLs, Decreased Safe judgement during ADL (NWB RLE)  Prognosis: Good  Assessment: Patient is a 79 y.o. female seen for OT evaluation at [facility] following admission on 2/19/2025  s/p HYACINTH (acute kidney injury) (HCC). Please see above for comprehensive list of comorbidities and significant PMHx impacting functional performance.  Upon initial evaluation, pt appears to be performing below baseline functional status.   Occupational performance is affected by the following deficits: endurance , decreased balance , and decreased standing tolerance for self care tasks . Personal/Environmental factors impacting D/C include: NWB RLE. Supporting factors include: accessible home environment Patient would benefit from OT services within the acute care setting to maximize level of functional independence in the following areas self-care transfers, functional mobility, and ADLs.  From OT standpoint, recommendation at time of D/C would be Level 3: minimum resource intensity .     Rehab Resource Intensity Level, OT: III (Minimum Resource Intensity)   Shaila Marquez OT

## 2025-02-21 NOTE — DISCHARGE INSTR - AVS FIRST PAGE
Dear Lyn Pimentel,     It was our pleasure to care for you here at [facility].  It is our hope that we were always able to exceed the expected standards for your care during your stay.  You were hospitalized due to nausea, vomiting and diarrhea.  You were cared for on the west floor by Ramon Forbes MD under the service of Jayashree Mooney MD with the [facility] Internal [facility] Hospitalist Group who covers for your primary care physician (PCP), Chris Shin MD, while you were hospitalized.  If you have any questions or concerns related to this hospitalization, you may contact us at .  For follow up as well as any medication refills, we recommend that you follow up with your primary care physician.  A registered nurse will reach out to you by phone within a few days after your discharge to answer any additional questions that you may have after going home.  However, at this time we provide for you here, the most important instructions / recommendations at discharge:     Notable Medication Adjustments -   Please start taking Bentyl 20 mg by mouth every 6 hours as needed for abdominal cramping.  Please start taking Carafate 1 g twice a day as needed for acid reflux symptoms.  Please continue taking all other medications as prescribed.  Testing Required after Discharge -   None  Important follow up information -   Please follow-up with your PCP within a week of discharge.  Please follow-up with orthopedic surgery in 4 weeks.  Other Instructions -   Please take all your medications regularly as directed  If symptoms return/persist contact your PCP if unable then visit to nearest ER  Please review this entire after visit summary as additional general instructions including medication list, appointments, activity, diet, any pertinent wound care, and other additional recommendations from your care team that may be provided for you.      Sincerely,     Ramon Forbes  MD

## 2025-02-21 NOTE — PLAN OF CARE

## 2025-02-21 NOTE — ASSESSMENT & PLAN NOTE
Creatinine 1.75 on admission from baseline around 0.9-1.1. BUN significantly elevated as well suggesting component of dehydration.  Reports poor p.o. intake and minimal hydration over the past few days.  She is urinating less as well. She has been taking aspirin 81 mg BID for DVT ppx in setting of recent ankle fracture  Received 2 L NS bolus in ER    Plan:  Back to baseline

## 2025-02-21 NOTE — DISCHARGE SUMMARY
Discharge Summary - Hospitalist   Name: Lyn Pimentel 79 y.o. female I MRN: [id number]  Unit/Bed#: W -01 I Date of Admission: 2/19/2025   Date of Service: 2/21/2025 I Hospital Day: 1     Assessment & Plan  HYACINTH (acute kidney injury) (HCC)  Creatinine 1.75 on admission from baseline around 0.9-1.1. BUN significantly elevated as well suggesting component of dehydration.  Reports poor p.o. intake and minimal hydration over the past few days.  She is urinating less as well. She has been taking aspirin 81 mg BID for DVT ppx in setting of recent ankle fracture  Received 2 L NS bolus in ER    Plan:  Back to baseline  Viral gastroenteritis  Presenting with 3 days of nausea, vomiting, diarrhea all nonbloody.  Both son and has been are sick with similar symptoms.  Son had to leave the room due to urge to vomit.  Was just at rehab facility with reported norovirus going around.  Endorses poor p.o. intake.  Afebrile, no leukocytosis.  No abdominal tenderness.  Abdomen soft without guarding or rebound.  Hemodynamically stable.  Diarrhea somewhat improving  S/p 4 mg IV zofran in ER    Plan:  Patient tolerating diet  Continue Bentyl every 6 hours as needed for abdominal cramping  Continue Carafate as needed for acid reflux symptoms  Metabolic acidosis, increased anion gap (Resolved: 2/21/2025)  Anion gap 15, bicarb 17 on admission  Delta ratio 0.42 suggesting mixed normal anion gap metabolic acidosis and high anion gap metabolic acidosis  Based on Whitfield formula and bicarb of 17, pCO2 on VBG 38 slightly higher than expected suggesting mild component of respiratory acidosis.  Patient with history of SEEMA, appearing comfortable on room air.  Saturating well.  Does have known decreased diffusion capacity of lung and follows with pulmonary  Possibly in the setting of starvation ketosis and aspirin use on top of diarrhea  Lactic acid checked, within normal range    Plan:  VBG obtained-initial showing pH 7.27.  Recheck after more  fluids and symptoms improvement.  Treat symptoms contributing to acidosis.  If pH worsens, can consider sodium bicarb in the appropriate clinical scenario  Incentive spirometry and use of CPAP at night  Closed fracture of right ankle  Previously hospitalized for right ankle fracture, status post ORIF 1/29.  Just got out of inpatient rehab.  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  Also had a Niesen fundoplication in 2016.  Still having bowel movements and passing gas.  No abdominal pain or tenderness on exam.  GERD without esophagitis  Continue home Protonix and Pepcid  Obstructive sleep apnea  Continue CPAP  Tremor  History of action tremor maintained on propranolol     Medical Problems       Resolved Problems  Date Reviewed: 1/31/2025          Resolved    Metabolic acidosis, increased anion gap 2/21/2025     Resolved by  Ramon Forbes MD        Discharging Physician / Practitioner: Ramon Forbes MD  PCP: Chris Shin MD  Admission Date:   Admission Orders (From admission, onward)       Ordered        02/20/25 1516  INPATIENT ADMISSION  Once            02/19/25 2227  Place in Observation  Once                          Discharge Date: 02/21/25    Consultations During Hospital Stay:  None    Procedures Performed:   None    Significant Findings / Test Results:   XR ankle 3+ vw right  Result Date: 2/20/2025  Impression: Limited study. Near-anatomic alignment of previously seen trimalleolar fracture status post ORIF as above. Workstation performed: [id number]       No Chest XR results available for this  "patient.      Incidental Findings:   None    Test Results Pending at Discharge (will require follow up):   None     Outpatient Tests Requested:  None    Complications: None    Reason for Admission: Nausea, vomiting and diarrhea    Hospital Course:   Lyn Pimentel is a 79 y.o. female patient who originally presented to the hospital on 2/19/2025 due to nausea, vomiting and diarrhea.  A in the ED CBC within normal limits, creatinine elevated to 1.75, BUN 56, bicarb 17 and anion gap of 15 with normal lactic acid.  Initial pH on VBG 7.27 which improved to 7.34 after receiving 2 L fluid in ED.  Patient remained afebrile without leukocytosis and was hemodynamically stable.  Patient's creatinine had improved with fluids and patient was started on clear liquid diet and eventually advance to regular diet which patient tolerated without any complications.  Patient was symptomatically managed for nausea and acid reflux symptoms.  Orthopedic surgery saw her and remove sutures, got ankle x-rays and changed to cam boot.  Physical therapy recommended level 3.  At the time of discharge patient was medically stable.        Please see above list of diagnoses and related plan for additional information.     Condition at Discharge: stable    Discharge Day Visit / Exam:   Subjective: Patient is AAO X3.  Reports feeling much better and denied abdominal pain.  Patient tolerated dinner and breakfast.  Patient endorsed some acid reflux symptoms.  Vitals: Blood Pressure: 128/68 (02/21/25 0707)  Pulse: 66 (02/21/25 0707)  Temperature: 97.7 °F (36.5 °C) (02/21/25 0707)  Temp Source: Oral (02/20/25 1705)  Respirations: 18 (02/20/25 1656)  Height: 5' 5\" (165.1 cm) (02/20/25 1656)  Weight - Scale: 80.1 kg (176 lb 9.4 oz) (02/21/25 0600)  SpO2: 92 % (02/21/25 0707)  Physical Exam  Constitutional:       Appearance: Normal appearance.   HENT:      Head: Normocephalic and atraumatic.      Nose: Nose normal. No congestion or rhinorrhea.      " Mouth/Throat:      Mouth: Mucous membranes are moist.   Eyes:      Extraocular Movements: Extraocular movements intact.      Pupils: Pupils are equal, round, and reactive to light.   Cardiovascular:      Rate and Rhythm: Normal rate and regular rhythm.      Pulses: Normal pulses.      Heart sounds: Normal heart sounds.   Pulmonary:      Effort: Pulmonary effort is normal. No respiratory distress.      Breath sounds: Normal breath sounds.   Abdominal:      General: There is no distension.      Palpations: Abdomen is soft.      Tenderness: There is no abdominal tenderness.   Musculoskeletal:         General: No swelling.      Right lower leg: No edema.      Left lower leg: No edema.      Comments: Right cam boot   Skin:     General: Skin is warm and dry.   Neurological:      General: No focal deficit present.      Mental Status: She is alert and oriented to person, place, and time.   Psychiatric:         Mood and Affect: Mood normal.         Behavior: Behavior normal.          Discussion with Family: Patient declined call to .     Discharge instructions/Information to patient and family:   See after visit summary for information provided to patient and family.      Provisions for Follow-Up Care:  See after visit summary for information related to follow-up care and any pertinent home health orders.      Mobility at time of Discharge:   Basic Mobility Inpatient Raw Score: 16  JH-HLM Goal: 5: Stand one or more mins  JH-HLM Achieved: 5: Stand (1 or more minutes)  HLM Goal achieved. Continue to encourage appropriate mobility.     Disposition:   Home    Planned Readmission: No    Discharge Medications:  See after visit summary for reconciled discharge medications provided to patient and/or family.      Administrative Statements   Discharge Statement:  I have spent a total time of 30 minutes in caring for this patient on the day of the visit/encounter.    **Please Note: This note may have been constructed  using a voice recognition system**

## 2025-02-21 NOTE — PLAN OF CARE

## 2025-02-21 NOTE — QUICK NOTE
Went to evaluate patient who denies any pain at this time. She reports that she was on the commode and when she stood up felt slightly unsteady and called for assistance. She was gently lowered to the ground. She denies lightheadedness upon standing, dizziness, chest pain, palpitations, weakness now. No LOC. She was able to fully get up herself and walk back to bed. On exam, she was AAOx4. No midline tenderness. 5/5 strength in bilateral lower extremities. Vital signs stable. Patient reports she is feeling much better than when she first came in.

## 2025-02-21 NOTE — ASSESSMENT & PLAN NOTE
Previously hospitalized for right ankle fracture, status post ORIF 1/29.  Just got out of inpatient rehab.  Patient is supposed to be on DVT prophylaxis with aspirin 81 mg twice daily  Denies any pain, is not on any pain medication regimen at home  Orthopedic surgery removed sutures, got ankle x-rays, changed to cam boot and ordered PT OT    Plan:  PT OT level 3  Follow-up with orthopedic surgery in 4 weeks

## 2025-02-21 NOTE — PLAN OF CARE
Problem: PHYSICAL THERAPY ADULT  Goal: Performs mobility at highest level of function for planned discharge setting.  See evaluation for individualized goals.  Description: Treatment/Interventions: Functional transfer training, LE strengthening/ROM, Therapeutic exercise, Patient/family training, Equipment eval/education, Gait training, Spoke to nursing, Spoke to case management (WC mobility training)     See flowsheet documentation for full assessment, interventions and recommendations.  Note: Prognosis: Good  Problem List: Decreased range of motion, Impaired balance, Decreased mobility, Decreased safety awareness, Decreased skin integrity, Orthopedic restrictions, Pain  Assessment: Pt seen for PT evaluation for mobility assessment & discharge needs. Pt admitted 2/19/2025 w/ cold/flu like symptoms, dx HYACINTH (acute kidney injury) ([facility]). During PT IE, pt completes bed mobility in hospital bed at ONI level, transfers STS and SPT to/from target surfaces with S, cues to maintain NWB R LE during transfers. Therapist educated pt on fit/management of CAM boot, pt denies questions. Pt displays above outlined functional impairments & limitations, and presents below her baseline level of functional mobility. The AM-PAC & Barthel Index outcome tools were used to assist in determining pt safety w/ mobility/self care & appropriate d/c recommendations, see above for scores. Pt is at risk of falls d/t h/o falls, impaired balance, use of ambulatory aid, varying levels of pain , acuity of medical illness, and polypharmacy. Pt's clinical presentation is currently unstable/unpredictable as seen in pt's presentation of changing level of pain, increased fall risk, new onset of impairment of functional mobility, and new onset of weakness. Pt will benefit from continued PT services in order to address impairments, decrease risk of falls, maximize independence w/ fnxl mobility, & ensure safety w/ mobility for transition to next level of  care. Based on pt presentation & impairments, pt would most appropriately benefit from Level III (minimal PT intensity) resources upon d/c.    Barriers to Discharge: Decreased caregiver support (pt reports that she will be home alone upon d/c, as her son is unable to stay with her any longer)     Rehab Resource Intensity Level, PT: III (Minimum Resource Intensity)    See flowsheet documentation for full assessment.

## 2025-02-21 NOTE — OCCUPATIONAL THERAPY NOTE
Occupational Therapy Evaluation     Patient Name: Lyn Pimentel  Today's Date: 2/21/2025  Problem List  Principal Problem:    HYACINTH (acute kidney injury) (HCC)  Active Problems:    Essential hypertension    Status post colectomy    GERD without esophagitis    Obstructive sleep apnea    Tremor    Closed fracture of right ankle    Viral gastroenteritis    Metabolic acidosis, increased anion gap    Past Medical History  Past Medical History:   Diagnosis Date    Cataract 2015    had surgery on both eyes    Colitis     Colon polyp     COPD (chronic obstructive pulmonary disease) (HCC)     Dehydration     Depression     Familial tremor     GERD (gastroesophageal reflux disease)     Ground glass opacity present on imaging of lung     Hiatal hernia     High cholesterol     Hyperlipidemia     Hypertension     Hypoglycemia     Migraine     Nosebleed     Primary adenocarcinoma of upper lobe of right lung (HCC)     Sleep apnea     C pap    Sleep apnea, obstructive     Solitary pulmonary nodule     Syncope      Past Surgical History  Past Surgical History:   Procedure Laterality Date    ADENOIDECTOMY  Child    APPENDECTOMY      BACK SURGERY      Cervical spine    BREAST BIOPSY Bilateral BENIGN    CHOLECYSTECTOMY      COLECTOMY      complete colectomy    COLONOSCOPY      CT NEEDLE BX ASPIRATION INJECTION LOCALIZATION  11/07/2019    ESOPHAGOGASTRODUODENOSCOPY N/A 01/22/2018    Procedure: ESOPHAGOGASTRODUODENOSCOPY (EGD);  Surgeon: Lalito Espino MD;  [facility];  Service: Gastroenterology    EYE SURGERY      LUNG BIOPSY      LUNG SEGMENTECTOMY Right 11/07/2019    Procedure: RESECTION WEDGE LUNG;  Surgeon: Livan Lema MD;  [facility];  Service: Thoracic    NASAL/SINUS ENDOSCOPY N/A 11/07/2019    Procedure: ENDOSCOPIC BIOPSY OF NASOPHARYNX LESION;  Surgeon: Perez Horner MD;  [facility];  Service: ENT    NISSEN FUNDOPLICATION      ORIF TIBIA & FIBULA FRACTURES Right 1/29/2025     Procedure: ORIF BIMALLEOLAR ANKLE FRACTURE;  Surgeon: Scooter Brown DO;  [facility];  Service: Orthopedics    OH BRNCHSC INCL FLUOR GDNCE DX W/CELL WASHG SPX N/A 11/07/2019    Procedure: BRONCHOSCOPY FLEXIBLE;  Surgeon: Livan Lema MD;  [facility];  Service: Thoracic    OH ESOPHAGOGASTRODUODENOSCOPY TRANSORAL DIAGNOSTIC N/A 04/17/2019    Procedure: ESOPHAGOGASTRODUODENOSCOPY (EGD);  Surgeon: Lalito Espino MD;  [facility];  Service: Gastroenterology    OH LAPS RPR PARAESPHGL HRNA INCL FUNDPLSTY W/MESH N/A 06/02/2016    Procedure: ROBOTIC LAPAROSCOPIC Daquan Fundoplication, Liver biopsy;  Surgeon: Levi Momin MD;  [facility];  Service: General    OH THORACOSCOPY W/THERA WEDGE RESEXN INITIAL UNILAT Right 11/07/2019    Procedure: THORACOSCOPY VIDEO ASSISTED SURGERY (VATS);  Surgeon: Livan Lema MD;  [facility];  Service: Thoracic    SPINE SURGERY  2017    cervical spine    TONSILLECTOMY  Child    UPPER GASTROINTESTINAL ENDOSCOPY             02/21/25 0907   OT Last Visit   OT Visit Date 02/21/25   Note Type   Note type Evaluation   Pain Assessment   Pain Assessment Tool 0-10   Pain Score No Pain   Restrictions/Precautions   Weight Bearing Precautions Per Order Yes   RLE Weight Bearing Per Order (S)  NWB  (in CAM boot. s/p ORIF 1/29 following trimalleolar fx c Dr Taylor)   Braces or Orthoses (S)  CAM Boot  (R LE)   Other Precautions Chair Alarm;Bed Alarm;THR;Fall Risk   Home Living   Type of Home House   Home Layout Performs ADLs on one level;Able to live on main level with bedroom/bathroom;Ramped entrance  (ramp vs 3 ISAIAS, sunk living room c ramp access)   Bathroom Shower/Tub Walk-in shower   Bathroom Toilet Standard   Bathroom Equipment Grab bars in shower;Grab bars around toilet;Commode;Shower chair   Bathroom Accessibility Accessible via wheelchair   Home Equipment Walker;Wheelchair-manual  (knee scooter)   Additional Comments reports she has  "been mod (I) at W/C level, has not used RW or knee scooter.   Prior Function   Level of [place] Independent with ADLs;Independent with functional mobility;Independent with IADLS   Lives With Spouse  (spouse is in rehab, son was staying temporarily but he is leaving and pt will be home alone upon this dc. Daughter will be staying with patient \"soon\" (currently sick with flu))   Receives Help From Family;Home health  (was supposed to start with HHOT and HHPT, did not begin yet before re admission)   IADLs Independent with driving;Independent with meal prep;Independent with medication management  (family to assist upon DC)   Falls in the last 6 months 1 to 4  (1 fall)   Vocational Retired   Comments True baseline pt is fully independent. Since DC from rehab, pt has been mod (I) for transfer in /out of W/C, on/off toilet. Reports has a knee scooter but has not used yet, does not hop c RW.  DC from [facility] for STR approx 2 days prior to admission. Reports while at home was able to manage mobility and ADLs mod (I). Kept BSC next to bed. Spongebathing only   Lifestyle   Autonomy at true baseline pt is fully independent without AD. Recently has been mod (I) at  level d/t NWB RLE. Lives c spouse (currently in rehab), has strong family supports however reports they are sick c the flu. hx of 1 fall. + driving   Reciprocal Relationships supportive family and spouse. Reports spouse had a stroke in the past, is not able to assist physically.   Service to Others retired   Intrinsic Gratification being independent   General   Additional Pertinent History Pt admitted d/t flu like sx. Found to have HYACINTH and dehydration 2/2 norovirus. Recent ORIF of R ankle 1/29 c Dr Brown, is NWB in CAM boot c next ortho follow up in 4 weeks. PMHx: tremor, metabolic acidosis, GERD, s/p colectomy, HTN, COPD, cognitive impairement,   Family/Caregiver Present No   Subjective   Subjective denies concerns re: functional status. " reports that she is feeling better, but still fatigued   ADL   Where Assessed   (commode, recliner)   Eating Assistance 7  Independent   Grooming Assistance 6  Modified Independent   UB Bathing Assistance 6  Modified Independent   LB Bathing Assistance 5  Supervision/Setup   UB Dressing Assistance 6  Modified independent   UB Dressing Deficit Setup  (doffed gown and donned overhead shirt seated)   LB Dressing Assistance 5  Supervision/Setup   LB Dressing Deficit Increased time to complete;Supervision/safety;Requires assistive device for steadying;Setup  (doffed underwear, donned underwear. pants. education for compensatory techniques c good application. cueing for compliance to WBS in stance. Pt intermittently placing toes onto floor for steadying- education for use of RW for UE support for steadying.)   Toileting Assistance  5  Supervision/Setup   Toileting Deficit Increased time to complete;Supervison/safety;Verbal cueing;Setup  (manages pericare and clothing management c supervision but requries cueing for compliance with NWB status intermittently- primarily during standing functional tasks)   Functional Assistance 5  Supervision/Setup   Bed Mobility   Supine to Sit 6  Modified independent   Additional items HOB elevated;Bedrails   Sit to Supine   (seated OOB in recliner at end fo session)   Additional Comments (-) lightheaded/dizziness c positional changes   Transfers   Sit to Stand 5  Supervision   Additional items Increased time required;Verbal cues;Assist x 1   Stand to Sit 5  Supervision   Additional items Increased time required;Verbal cues;Assist x 1   Stand pivot 5  Supervision   Additional items Increased time required;Verbal cues;Assist x 1   Toilet transfer 5  Supervision   Additional items Armrests;Increased time required;Verbal cues   Additional Comments SPT completed EOB > recliner, recliner <>commode. completes all transfers with supervision, demontrates good compliance to WBS during transfers-  primarily requires cues during static standing tasks without UE supports. Transfers to both R and L side, completes 360* turn during transfers, reports that was the way she was educated at rehab   Functional Mobility   Additional Comments completing W/C level mobility only currently d/t RLE NWB   Balance   Static Sitting Normal   Dynamic Sitting Good   Static Standing Fair   Dynamic Standing Fair -   Activity Tolerance   Activity Tolerance Patient tolerated treatment well   Medical Staff Made Aware PT Bull. CM   Nurse Made Aware JUN Killian pre/post   RUE Assessment   RUE Assessment WFL  (grossly 4/5 based on functional assessment)   LUE Assessment   LUE Assessment WFL  (grossly 4/5 based on functional assessment)   Hand Function   Gross Motor Coordination Functional   Fine Motor Coordination Functional   Cognition   Overall Cognitive Status WFL   Arousal/Participation Alert;Cooperative   Attention Within functional limits   Orientation Level Oriented X4   Memory Within functional limits  (intermittent cues for WBS)   Following Commands Follows all commands and directions without difficulty   Comments appropriately engaged in session, fair safety awareness demonstrated   Assessment   Limitation Decreased ADL status;Decreased endurance;Decreased self-care trans;Decreased high-level ADLs;Decreased Safe judgement during ADL  (NWB RLE)   Prognosis Good   Assessment Patient is a 79 y.o. female seen for OT evaluation at [facility] following admission on 2/19/2025  s/p HYACINTH (acute kidney injury) (HCC). Please see above for comprehensive list of comorbidities and significant PMHx impacting functional performance.  Upon initial evaluation, pt appears to be performing below baseline functional status.   Occupational performance is affected by the following deficits: endurance , decreased balance , and decreased standing tolerance for self care tasks . Personal/Environmental factors impacting D/C include: NWB  RLE. Supporting factors include: accessible home environment Patient would benefit from OT services within the acute care setting to maximize level of functional independence in the following areas self-care transfers, functional mobility, and ADLs.  From OT standpoint, recommendation at time of D/C would be Level 3: minimum resource intensity .   Goals   Patient Goals to return home   Plan   Treatment Interventions ADL retraining;Functional transfer training;Endurance training;Patient/family training;Compensatory technique education;Equipment evaluation/education   Goal Expiration Date 03/03/25   OT Treatment Day 0   OT Frequency 1-2x/wk   Discharge Recommendation   Rehab Resource Intensity Level, OT III (Minimum Resource Intensity)   Additional Comments 2 The patient's raw score on the AM-PAC Daily Activity Inpatient Short Form is 21. A raw score of greater than or equal to 19 suggests the patient may benefit from discharge to home. Please refer to the recommendation of the Occupational Therapist for safe discharge planning.   AM-PAC Daily Activity Inpatient   Lower Body Dressing 3   Bathing 3   Toileting 3   Upper Body Dressing 4   Grooming 4   Eating 4   Daily Activity Raw Score 21   Daily Activity Standardized Score (Calc for Raw Score >=11) 44.27   AM-PAC Applied Cognition Inpatient   Following a Speech/Presentation 3   Understanding Ordinary Conversation 4   Taking Medications 4   Remembering Where Things Are Placed or Put Away 4   Remembering List of 4-5 Errands 3   Taking Care of Complicated Tasks 3   Applied Cognition Raw Score 21   Applied Cognition Standardized Score 44.3   End of Consult   Education Provided Yes   Patient Position at End of Consult Bed/Chair alarm activated;All needs within reach;Bedside chair   Nurse Communication Nurse aware of consult   Goals established on initial evaluation in order to achieve pt's goal of returning home      Pt will complete UB ADLs Mod independent   for increased  ADL independence within 10 days.     Pt will complete LB ADLs Mod independent   for increased ADL independence within 10 days.     Pt will complete toileting Mod independent   with use of DME for increased ADL independence within 10 days.     Pt will demonstrate proper body mechanics to complete self-care transfers and functional mobility with Mod independent  and use of LRAD for increased safety and functional independence within 10 days.     Pt will demonstrate fair + standing tolerance for self care tasks with use of LRAD for steadying for increased activity tolerance during ADL/IADL tasks within 10 days.     Pt will demonstrate proper body mechanics and fall prevention strategies during 100% of tx sessions for increased safety awareness during ADL/IADLs    Pt will verbalize and demonstrate understanding of WB status in 100% of tx sessions for increased safety and functional mobility.     Pt benefited from co-session of skilled OT and PT therapists in order to most appropriately address functional deficits d/t decreased activity tolerance.  OT/PT objectives were addressed separately; please see PT note for specific goal areas targeted.    Shaila Marquez, OT

## 2025-02-21 NOTE — CASE MANAGEMENT
Case Management Assessment & Discharge Planning Note    Patient name Lyn Pimentel  Location W /W -01 MRN [id number]  : 1945 Date 2025       Current Admission Date: 2025  Current Admission Diagnosis:HYACINTH (acute kidney injury) (HCC)   Patient Active Problem List    Diagnosis Date Noted Date Diagnosed    Viral gastroenteritis 2025     HYACINTH (acute kidney injury) (HCC) 2025     Metabolic acidosis, increased anion gap 2025     PONV (postoperative nausea and vomiting) 2025     COPD (chronic obstructive pulmonary disease) (HCC)      Fall 2025     Closed fracture of right ankle 2025     Dyspepsia 2021     Primary adenocarcinoma of upper lobe of right lung (HCC) 2019     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     Other diseases of pharynx 10/08/2019     Epigastric pain 2019     Belching 2017     Syncope 2017     Essential hypertension 2017     HLD (hyperlipidemia) 2017     Status post colectomy 2017     Status post laparoscopic Nissen fundoplication 2017     Diffusion capacity of lung (dl), decreased 2016     History of Lyme disease 2016     Tremor 2016     Cognitive impairment 2016     Obstructive sleep apnea 2016     Short of breath on exertion 2016     GERD without esophagitis 2015       LOS (days): 1  Geometric Mean LOS (GMLOS) (days): 3  Days to GMLOS:2.2     OBJECTIVE:  PATIENT READMITTED TO HOSPITAL  Risk of Unplanned Readmission Score: 16.04         Current admission status: Inpatient       Preferred Pharmacy:   [id number] - [place], [place] - [address]  [address] [address]  Phone: [phone] Fax: [phone]    Primary Care Provider: Chris Shin MD    Primary Insurance: [employer] RE[employer]  Secondary Insurance:     ASSESSMENT:  Active Health Care Proxies    There are no active Health Care Proxies on file.                  Readmission Root Cause  30 Day Readmission: Yes  During your hospital stay, did someone (provider, nurse, ) explain your care to you in a way you could understand?: Yes  Did you feel medically stable to leave the hospital?: Yes  Were you able to pay for your medication at the pharmacy?: Yes  Did you have reliable transportation to take you to your appointments?: Yes  During previous admission, was a post-acute recommendation made?: Yes  What post-acute resources were offered?: STR  Patient was readmitted due to: HYACINTH  Action Plan: IV fluids    Patient Information  Admitted from:: Home  Mental Status: Alert  During Assessment patient was accompanied by: Not accompanied during assessment  Assessment information provided by:: Patient  Primary Caregiver: Self  Support Systems: Spouse/significant other, Children  Home entry access options. Select all that apply.: Ramp  Type of Current Residence: [facility]  Living Arrangements: Lives w/ Spouse/significant other (Son currently staying with patient and )  Is patient a ?: No    Activities of Daily Living Prior to Admission  Functional Status: Independent  Completes ADLs independently?: Yes  Ambulates independently?: Yes  Does patient use assisted devices?: Yes  Assisted Devices (DME) used: Wheelchair  Does patient currently own DME?: Yes  What DME does the patient currently own?: Wheelchair  Does patient have a history of Outpatient Therapy (PT/OT)?: No  Does the patient have a history of Short-Term Rehab?: Yes  Does patient have a history of HHC?: No  Does patient currently have HHC?: Yes (Was due to start with [facility])    Current Home Health Care  Type of Current Home Care Services: Home PT, Home OT, Nurse visit  Home Health Agency Name:: [facility]  Current Home Health Follow-Up Provider:: PCP    Patient Information Continued  Income Source: Pension/penitentiary  Does patient have prescription coverage?: Yes  Does patient receive dialysis  treatments?: No  Does patient have a history of substance abuse?: No         Means of Transportation  Means of Transport to Appts:: Family transport          DISCHARGE DETAILS:    Discharge planning discussed with:: Patient  Freedom of Choice: Yes  Comments - Freedom of Choice: CM met with patient at bedside. CM introduced self and CM role. CM verified PCP and pharmacy. Patient lives with spouse, son is currently staying with them, using a WC after recent ankle fracture, uses CPAP, no O2, was to start HHC with [facility] today, but was admitted instead. Therapy recommending HHC, patient would like [facility], referral sent  CM contacted family/caregiver?: No- see comments  Were Treatment Team discharge recommendations reviewed with patient/caregiver?: Yes  Did patient/caregiver verbalize understanding of patient care needs?: Yes  Were patient/caregiver advised of the risks associated with not following Treatment Team discharge recommendations?: Yes         Requested Home Health Care         Home Health Agency Name:: [facility]         Other Referral/Resources/Interventions Provided:  Interventions: HHC  Referral Comments: HHC referral sent to [facility]

## 2025-02-21 NOTE — ASSESSMENT & PLAN NOTE
Presenting with 3 days of nausea, vomiting, diarrhea all nonbloody.  Both son and has been are sick with similar symptoms.  Son had to leave the room due to urge to vomit.  Was just at rehab facility with reported norovirus going around.  Endorses poor p.o. intake.  Afebrile, no leukocytosis.  No abdominal tenderness.  Abdomen soft without guarding or rebound.  Hemodynamically stable.  Diarrhea somewhat improving  S/p 4 mg IV zofran in ER    Plan:  Patient tolerating diet  Continue Bentyl every 6 hours as needed for abdominal cramping  Continue Carafate as needed for acid reflux symptoms

## 2025-02-21 NOTE — PROGRESS NOTES
"Patient called to use bedside commode. Upon getting her up off of commode when finished, patient took a few steps forward and stated \"I feel weak\". I was behind patient and was holding on to her to try to support her, but I was unable to keep her up and I slowly lowered her to the ground onto her bottom. Patient was able to get herself up and back into bed, reporting no pain/injuries. Provider notified. Vital signs stable. Patient resting at this time.   "

## 2025-02-21 NOTE — PHYSICAL THERAPY NOTE
PHYSICAL THERAPY EVALUATION  DATE: 02/21/25  TIME: 0908-0927    NAME:  Lyn Pimentel  AGE:   79 y.o.  Mrn:   [id number]  Length Of Stay: 1    ADMIT DX:  Nausea and vomiting [R11.2]  HYACINTH (acute kidney injury) (HCC) [N17.9]  Nausea, vomiting, and diarrhea [R11.2, R19.7]    Past Medical History:   Diagnosis Date    Cataract 2015    had surgery on both eyes    Colitis     Colon polyp     COPD (chronic obstructive pulmonary disease) (HCC)     Dehydration     Depression     Familial tremor     GERD (gastroesophageal reflux disease)     Ground glass opacity present on imaging of lung     Hiatal hernia     High cholesterol     Hyperlipidemia     Hypertension     Hypoglycemia     Migraine     Nosebleed     Primary adenocarcinoma of upper lobe of right lung (HCC)     Sleep apnea     C pap    Sleep apnea, obstructive     Solitary pulmonary nodule     Syncope      Past Surgical History:   Procedure Laterality Date    ADENOIDECTOMY  Child    APPENDECTOMY      BACK SURGERY      Cervical spine    BREAST BIOPSY Bilateral BENIGN    CHOLECYSTECTOMY      COLECTOMY      complete colectomy    COLONOSCOPY      CT NEEDLE BX ASPIRATION INJECTION LOCALIZATION  11/07/2019    ESOPHAGOGASTRODUODENOSCOPY N/A 01/22/2018    Procedure: ESOPHAGOGASTRODUODENOSCOPY (EGD);  Surgeon: Lalito Espino MD;  [facility];  Service: Gastroenterology    EYE SURGERY      LUNG BIOPSY      LUNG SEGMENTECTOMY Right 11/07/2019    Procedure: RESECTION WEDGE LUNG;  Surgeon: Livan Lema MD;  Location: [facility];  Service: Thoracic    NASAL/SINUS ENDOSCOPY N/A 11/07/2019    Procedure: ENDOSCOPIC BIOPSY OF NASOPHARYNX LESION;  Surgeon: Perez Horner MD;  Location[facility];  Service: ENT    NISSEN FUNDOPLICATION      ORIF TIBIA & FIBULA FRACTURES Right 1/29/2025    Procedure: ORIF BIMALLEOLAR ANKLE FRACTURE;  Surgeon: Scooter Brown DO;  Location: [facility];  Service: Orthopedics    HI [facility] INCL FLUOR GDNCE DX W/CELL WASHG SPX N/A  11/07/2019    Procedure: BRONCHOSCOPY FLEXIBLE;  Surgeon: Livan Lema MD;  [facility];  Service: Thoracic    MI ESOPHAGOGASTRODUODENOSCOPY TRANSORAL DIAGNOSTIC N/A 04/17/2019    Procedure: ESOPHAGOGASTRODUODENOSCOPY (EGD);  Surgeon: Lalito Espino MD;  Location: [facility];  Service: Gastroenterology    MI LAPS RPR PARAESPHGL HRNA INCL FUNDPLSTY W/MESH N/A 06/02/2016    Procedure: ROBOTIC LAPAROSCOPIC Daquan Fundoplication, Liver biopsy;  Surgeon: Levi Momin MD;  [facility]: [facility];  Service: General    MI THORACOSCOPY W/THERA WEDGE RESEXN INITIAL UNILAT Right 11/07/2019    Procedure: THORACOSCOPY VIDEO ASSISTED SURGERY (VATS);  Surgeon: Livan Lema MD;  [facility];  Service: Thoracic    SPINE SURGERY  2017    cervical spine    TONSILLECTOMY  Child    UPPER GASTROINTESTINAL ENDOSCOPY         Performed at least 2 patient identifiers during session: Name, Birthday, ID bracelet, and Epic photo     02/21/25 0908   PT Last Visit   PT Visit Date 02/21/25   Note Type   Note type Evaluation   Pain Assessment   Pain Assessment Tool 0-10   Pain Score No Pain   Restrictions/Precautions   Weight Bearing Precautions Per Order Yes   RLE Weight Bearing Per Order (S)  NWB  (in CAM boot, s/p R ankle fx and ORIF 1/29/25 by Dr. Brown)   Braces or Orthoses (S)  CAM Boot  (R LE)   Other Precautions Chair Alarm;Bed Alarm;WBS;Fall Risk   Home Living   Type of Home House   Home Layout Performs ADLs on one level;Able to live on main level with bedroom/bathroom;Ramped entrance  (Single level living, ramp to enter vs 3 ISAIAS front door, sunken living room with ramp access)   Bathroom Shower/Tub Walk-in shower   Bathroom Toilet Standard   Bathroom Equipment Grab bars in shower;Grab bars around toilet;Commode;Shower chair   Bathroom Accessibility Accessible;Accessible via wheelchair   Home Equipment Wheelchair-manual;Other (Comment)  (knee scooter)   Prior Function   Level of [place] Independent  "with ADLs;Independent with functional mobility;Independent with IADLS   Lives With (S)  Spouse  (spouse is currently in rehab, son was staying temporarily with pt but he is leaving this weekend and pt will be home alone upon this dc)   Receives Help From Family;Home health  (pt was set up with  PT/OT upon d/c from [facility] however reports they have not started yet.)   IADLs Independent with driving;Independent with meal prep;Independent with medication management   Falls in the last 6 months 1 to 4  (1 fall leading to previous admission)   Vocational Retired   Comments At pt's true baseline, she is fully independent with all aspects of self care and functional mobility of household and community distances, IADLs. Pt reports that she has been home from [facility] (s/p R ankle fx) for approx 2 days - Has been mobilizing via WC (self propels), transferring independently to each side, dressing/toileting independently, has been using BSC next to bed.   General   Additional Pertinent History Pt is an 79 yr old female admitted 2/19/25 with cold/flu like symptoms, N/V/D. Recent R ankle fx s/p ORIF 1/29/25 by Dr. Brown. Per ortho 2/20/25 pt to remain NWB R LE however now in CAM walker boot (ok to come out of boot for ROM exercises).   Family/Caregiver Present No   Cognition   Overall Cognitive Status WFL   Arousal/Participation Cooperative   Orientation Level Oriented X4   Memory Within functional limits   Following Commands Follows all commands and directions without difficulty   Subjective   Subjective \"My son's going back home so I'll be home alone.\"   RUE Assessment   RUE Assessment WFL   LUE Assessment   LUE Assessment WFL   RLE Assessment   RLE Assessment WFL  (NT at ankle d/t CAM boot and NWB status, WFL at hip and knee)   LLE Assessment   LLE Assessment WFL   Vision-Basic Assessment   Current Vision Wears glasses all the time   Coordination   Movements are Fluid and Coordinated 1   Sensation WFL   Light Touch   RLE " Light Touch Grossly intact   LLE Light Touch Grossly intact   Proprioception   RLE Proprioception Grossly intact   LLE Proprioception Grossly Intact   Bed Mobility   Supine to Sit 6  Modified independent   Additional items HOB elevated;Bedrails   Sit to Supine   (NT as pt was left seated OOB on BSC with OT present)   Additional Comments Pt denies lightheadedness or dizziness with change in positioning. Display good static and dynamic sitting balance at EOB.   Transfers   Sit to Stand 5  Supervision   Additional items Assist x 1;Increased time required;Verbal cues;Armrests  (no AD, UE support on armrests)   Stand to Sit 5  Supervision   Additional items Assist x 1;Armrests;Increased time required;Verbal cues  (no AD, UE support on armrests)   Stand pivot 5  Supervision   Additional items Assist x 1;Armrests;Increased time required;Verbal cues  (no AD, UE support on armrests, cues to maintain NWB R LE)   Additional Comments Stand pivot transfer completed from EOB to recliner chair, then recliner chair to BSC. Both with S and cues for maintenance of NWB R LE. Pt choosing to transfer towards impaired LE side - therapist provided education on transfer to intact side, however pt reports that she has been doing both at home without issue.   Ambulation/Elevation   Gait pattern Not tested  (pt reports that since d/c from [facility] 2 days PTA, she was non ambulatory, strictly using SPT and WC for mobility)   Wheelchair Activities   Wheelchair Parts Management No   Propulsion No   Balance   Static Sitting Normal   Dynamic Sitting Good   Static Standing Fair   Dynamic Standing Fair -   Activity Tolerance   Activity Tolerance Patient tolerated treatment well   Medical Staff Made Aware Spoke with SARAH Ramirez   Assessment   Prognosis Good   Problem List Decreased range of motion;Impaired balance;Decreased mobility;Decreased safety awareness;Decreased skin integrity;Orthopedic restrictions;Pain   Assessment Pt seen for PT  "evaluation for mobility assessment & discharge needs. Pt admitted 2/19/2025 w/ cold/flu like symptoms, dx HYACINTH (acute kidney injury) (HCC). During PT IE, pt completes bed mobility in hospital bed at ONI level, transfers STS and SPT to/from target surfaces with S, cues to maintain NWB R LE during transfers. Therapist educated pt on fit/management of CAM boot, pt denies questions. Pt displays above outlined functional impairments & limitations, and presents below her baseline level of functional mobility. The AM-PAC & Barthel Index outcome tools were used to assist in determining pt safety w/ mobility/self care & appropriate d/c recommendations, see above for scores. Pt is at risk of falls d/t h/o falls, impaired balance, use of ambulatory aid, varying levels of pain , acuity of medical illness, and polypharmacy. Pt's clinical presentation is currently unstable/unpredictable as seen in pt's presentation of changing level of pain, increased fall risk, new onset of impairment of functional mobility, and new onset of weakness. Pt will benefit from continued PT services in order to address impairments, decrease risk of falls, maximize independence w/ fnxl mobility, & ensure safety w/ mobility for transition to next level of care. Based on pt presentation & impairments, pt would most appropriately benefit from Level III (minimal PT intensity) resources upon d/c.   Barriers to Discharge Decreased caregiver support  (pt reports that she will be home alone upon d/c, as her son is unable to stay with her any longer)   Goals   Patient Goals \"to go home\"   [facility] Expiration Date 03/07/25   Short Term Goal #1 Patient PT goals established in order to address pt self reported goal of \"to go home\". Pt will: complete all STS and SPTs independently in order to increase safety with functional mobility; ambulate >20ft with RW and NWB R LE in order to increase safety with short household distances; demonstrate understanding and independence " with R LE ROM HEP; improve dynamic standing balance to >/= good grade in order to promote safety and increased independence with ADL tasks; improve AM-PAC score to >/= 21/24 in order to increase independence with mobility and decrease burden of care.   PT Treatment Day 0   Plan   Treatment/Interventions Functional transfer training;LE strengthening/ROM;Therapeutic exercise;Patient/family training;Equipment eval/education;Gait training;Spoke to nursing;Spoke to case management  (WC mobility training)   PT Frequency 1-2x/wk   Discharge Recommendation   Rehab Resource Intensity Level, PT III (Minimum Resource Intensity)   AM-PAC Basic Mobility Inpatient   Turning in Flat Bed Without Bedrails 4   Lying on Back to Sitting on Edge of Flat Bed Without Bedrails 3   Moving Bed to Chair 3   Standing Up From Chair Using Arms 3   Walk in Room 2   Climb 3-5 Stairs With Railing 1   Basic Mobility Inpatient Raw Score 16   Basic Mobility Standardized Score 38.32   [facility] Highest Level Of Mobility   -HLM Goal 5: Stand one or more mins   -HLM Achieved 5: Stand (1 or more minutes)   Modified [place] Scale   Modified [place] Scale 4   Barthel Index   Feeding 10   Bathing 5   Grooming Score 5   Dressing Score 10   Bladder Score 10   Bowels Score 10   Toilet Use Score 10   Transfers (Bed/Chair) Score 10   Mobility (Level Surface) Score 5   Stairs Score 0   Barthel Index Score 75   End of Consult   Patient Position at End of Consult Other (comment)  (seated on BSC with OT present in room)     Based on patient's [facility] Highest Level of Mobility scores today, patient currently has a goal of -HLM Levels: 5: STAND (1 OR MORE MINUTES), to be completed with RN staffing each shift, in order to improve overall activity tolerance and mobility, combat hospital related deconditioning, and maximize outcomes for d/c from the acute care setting.     The patient's AM-PAC Basic Mobility Inpatient Short Form Raw Score is 16. A Raw score of  less than or equal to 16 suggests the patient may benefit from discharge to post-acute rehabilitation services. Please also refer to the recommendation of the Physical Therapist for safe discharge planning.      Do Florez PT, DPT   Available via [employer]  NPI # [id number]  [place] License - [id number]  2/21/2025

## 2025-02-22 ENCOUNTER — HOME CARE VISIT (OUTPATIENT)
Dept: HOME HEALTH SERVICES | Facility: HOME HEALTHCARE | Age: 80
End: 2025-02-22
Payer: COMMERCIAL

## 2025-02-22 VITALS
OXYGEN SATURATION: 96 % | DIASTOLIC BLOOD PRESSURE: 75 MMHG | HEART RATE: 64 BPM | RESPIRATION RATE: 18 BRPM | SYSTOLIC BLOOD PRESSURE: 126 MMHG | TEMPERATURE: 97.5 F

## 2025-02-22 PROCEDURE — G0299 HHS/HOSPICE OF RN EA 15 MIN: HCPCS

## 2025-02-22 PROCEDURE — 400013 VN SOC

## 2025-02-24 ENCOUNTER — HOME CARE VISIT (OUTPATIENT)
Dept: HOME HEALTH SERVICES | Facility: HOME HEALTHCARE | Age: 80
End: 2025-02-24
Payer: COMMERCIAL

## 2025-02-24 VITALS
HEART RATE: 78 BPM | RESPIRATION RATE: 18 BRPM | SYSTOLIC BLOOD PRESSURE: 104 MMHG | OXYGEN SATURATION: 95 % | DIASTOLIC BLOOD PRESSURE: 64 MMHG

## 2025-02-24 PROCEDURE — G0152 HHCP-SERV OF OT,EA 15 MIN: HCPCS

## 2025-02-24 PROCEDURE — G0151 HHCP-SERV OF PT,EA 15 MIN: HCPCS

## 2025-02-24 NOTE — UTILIZATION REVIEW
NOTIFICATION OF ADMISSION DISCHARGE   This is a Notification of Discharge from [facility]. Please be advised that this patient has been discharge from our facility. Below you will find the admission and discharge date and time including the patient’s disposition.   UTILIZATION REVIEW CONTACT:  Rebecca Feldman  Utilization   Network Utilization Review Department  Phone: [phone] x carefully listen to the prompts. All voicemails are confidential.  Email: [email or web address]     ADMISSION INFORMATION  PRESENTATION DATE: 2/19/2025  8:06 PM  OBERVATION ADMISSION DATE: 02/19/2025 2227  INPATIENT ADMISSION DATE: 2/20/25  3:16 PM   DISCHARGE DATE: 2/21/2025  3:30 PM   DISPOSITION:Home/Self Care    Network Utilization Review Department  ATTENTION: Please call with any questions or concerns to [phone] and carefully listen to the prompts so that you are directed to the right person. All voicemails are confidential.   For Discharge needs, contact Care Management DC Support Team at [phone] opt. 2  Send all requests for admission clinical reviews, approved or denied determinations and any other requests to dedicated fax number below belonging to the campus where the patient is receiving treatment. List of dedicated fax numbers for the Facilities:  FACILITY NAME UR FAX NUMBER   ADMISSION DENIALS (Administrative/Medical Necessity) [phone]   DISCHARGE SUPPORT TEAM ([facility]) [phone]   PARENT CHILD HEALTH (Maternity/NICU/Pediatrics) [phone]   [facility] [phone]   [facility] [phone]   [facility] [phone]   [facility] [phone]   [facility] [phone]   [facility] [phone]   [facility] [phone]   [facility]  [phone]   [facility] [phone]   [facility] [phone]   [facility] [phone]   [facility] [phone]

## 2025-02-25 ENCOUNTER — HOME CARE VISIT (OUTPATIENT)
Dept: HOME HEALTH SERVICES | Facility: HOME HEALTHCARE | Age: 80
End: 2025-02-25
Payer: COMMERCIAL

## 2025-02-25 VITALS
SYSTOLIC BLOOD PRESSURE: 110 MMHG | RESPIRATION RATE: 18 BRPM | DIASTOLIC BLOOD PRESSURE: 72 MMHG | TEMPERATURE: 99.1 F | OXYGEN SATURATION: 97 % | HEART RATE: 68 BPM

## 2025-02-25 VITALS — SYSTOLIC BLOOD PRESSURE: 104 MMHG | HEART RATE: 78 BPM | DIASTOLIC BLOOD PRESSURE: 64 MMHG | OXYGEN SATURATION: 95 %

## 2025-02-25 PROCEDURE — G0300 HHS/HOSPICE OF LPN EA 15 MIN: HCPCS

## 2025-02-27 ENCOUNTER — HOME CARE VISIT (OUTPATIENT)
Dept: HOME HEALTH SERVICES | Facility: HOME HEALTHCARE | Age: 80
End: 2025-02-27
Payer: COMMERCIAL

## 2025-02-28 ENCOUNTER — HOME CARE VISIT (OUTPATIENT)
Dept: HOME HEALTH SERVICES | Facility: HOME HEALTHCARE | Age: 80
End: 2025-02-28
Payer: COMMERCIAL

## 2025-02-28 VITALS
DIASTOLIC BLOOD PRESSURE: 70 MMHG | HEART RATE: 58 BPM | TEMPERATURE: 98.1 F | OXYGEN SATURATION: 98 % | SYSTOLIC BLOOD PRESSURE: 130 MMHG | RESPIRATION RATE: 16 BRPM

## 2025-02-28 PROCEDURE — G0299 HHS/HOSPICE OF RN EA 15 MIN: HCPCS

## 2025-03-03 ENCOUNTER — HOME CARE VISIT (OUTPATIENT)
Dept: HOME HEALTH SERVICES | Facility: HOME HEALTHCARE | Age: 80
End: 2025-03-03
Payer: COMMERCIAL

## 2025-03-03 VITALS
HEART RATE: 73 BPM | DIASTOLIC BLOOD PRESSURE: 70 MMHG | SYSTOLIC BLOOD PRESSURE: 110 MMHG | RESPIRATION RATE: 16 BRPM | OXYGEN SATURATION: 98 %

## 2025-03-03 PROCEDURE — G0151 HHCP-SERV OF PT,EA 15 MIN: HCPCS

## 2025-03-04 ENCOUNTER — HOME CARE VISIT (OUTPATIENT)
Dept: HOME HEALTH SERVICES | Facility: HOME HEALTHCARE | Age: 80
End: 2025-03-04
Payer: COMMERCIAL

## 2025-03-04 PROCEDURE — G0152 HHCP-SERV OF OT,EA 15 MIN: HCPCS

## 2025-03-05 ENCOUNTER — HOME CARE VISIT (OUTPATIENT)
Dept: HOME HEALTH SERVICES | Facility: HOME HEALTHCARE | Age: 80
End: 2025-03-05
Payer: COMMERCIAL

## 2025-03-14 ENCOUNTER — HOME CARE VISIT (OUTPATIENT)
Dept: HOME HEALTH SERVICES | Facility: HOME HEALTHCARE | Age: 80
End: 2025-03-14
Payer: COMMERCIAL

## 2025-03-17 ENCOUNTER — OFFICE VISIT (OUTPATIENT)
Dept: OBGYN CLINIC | Facility: CLINIC | Age: 80
End: 2025-03-17

## 2025-03-17 ENCOUNTER — APPOINTMENT (OUTPATIENT)
Dept: RADIOLOGY | Facility: AMBULARY SURGERY CENTER | Age: 80
End: 2025-03-17
Attending: STUDENT IN AN ORGANIZED HEALTH CARE EDUCATION/TRAINING PROGRAM
Payer: COMMERCIAL

## 2025-03-17 VITALS — HEIGHT: 65 IN | WEIGHT: 176.59 LBS | BODY MASS INDEX: 29.42 KG/M2

## 2025-03-17 DIAGNOSIS — S82.851A CLOSED TRIMALLEOLAR FRACTURE OF RIGHT ANKLE, INITIAL ENCOUNTER: ICD-10-CM

## 2025-03-17 DIAGNOSIS — S82.851A CLOSED TRIMALLEOLAR FRACTURE OF RIGHT ANKLE, INITIAL ENCOUNTER: Primary | ICD-10-CM

## 2025-03-17 PROCEDURE — 99024 POSTOP FOLLOW-UP VISIT: CPT | Performed by: STUDENT IN AN ORGANIZED HEALTH CARE EDUCATION/TRAINING PROGRAM

## 2025-03-17 PROCEDURE — 73610 X-RAY EXAM OF ANKLE: CPT

## 2025-03-17 NOTE — PROGRESS NOTES
Orthopaedic Surgery - Office Note  Lyn Pimentel (79 y.o. female)   : 1945   MRN: [id number]  Encounter Date: 3/17/2025    Chief Complaint   Patient presents with    Right Ankle - Post-op     Past Surgical History:   Procedure Laterality Date    ADENOIDECTOMY  Child    APPENDECTOMY      BACK SURGERY      Cervical spine    BREAST BIOPSY Bilateral BENIGN    CHOLECYSTECTOMY      COLECTOMY      complete colectomy    COLONOSCOPY      CT NEEDLE BX ASPIRATION INJECTION LOCALIZATION  2019    ESOPHAGOGASTRODUODENOSCOPY N/A 2018    Procedure: ESOPHAGOGASTRODUODENOSCOPY (EGD);  Surgeon: Lalito Espino MD;  [facility];  Service: Gastroenterology    EYE SURGERY      LUNG BIOPSY      LUNG SEGMENTECTOMY Right 2019    Procedure: RESECTION WEDGE LUNG;  Surgeon: Livan Lema MD;  [facility];  Service: Thoracic    NASAL/SINUS ENDOSCOPY N/A 2019    Procedure: ENDOSCOPIC BIOPSY OF NASOPHARYNX LESION;  Surgeon: Perez Horner MD;  [facility];  Service: ENT    NISSEN FUNDOPLICATION      ORIF TIBIA & FIBULA FRACTURES Right 2025    Procedure: ORIF BIMALLEOLAR ANKLE FRACTURE;  Surgeon: Scooter Brown DO;  Location[facility];  Service: Orthopedics    ND BRNCHSC INCL FLUOR GDNCE DX W/CELL WASHG SPX N/A 2019    Procedure: BRONCHOSCOPY FLEXIBLE;  Surgeon: Livan Lema MD;  [facility];  Service: Thoracic    ND ESOPHAGOGASTRODUODENOSCOPY TRANSORAL DIAGNOSTIC N/A 2019    Procedure: ESOPHAGOGASTRODUODENOSCOPY (EGD);  Surgeon: Lalito Espino MD;  Location: [facility];  Service: Gastroenterology    ND LAPS RPR PARAESPHGL HRNA INCL FUNDPLSTY W/MESH N/A 2016    Procedure: ROBOTIC LAPAROSCOPIC Daquan Fundoplication, Liver biopsy;  Surgeon: Levi Momin MD;  [facility];  Service: General    ND THORACOSCOPY W/THERA WEDGE RESEXN INITIAL UNILAT Right 2019    Procedure: THORACOSCOPY VIDEO ASSISTED SURGERY (VATS);  Surgeon:  "Livan Lema MD;  [facility];  Service: Thoracic    SPINE SURGERY  2017    cervical spine    TONSILLECTOMY  Child    UPPER GASTROINTESTINAL ENDOSCOPY       Assessment / Plan  Status post open reduction internal fixation of right bimalleolar ankle fracture, date of surgery 1/29/2025    X-rays reviewed and discussed with patient revealing maintained alignment of patient's previous bimalleolar ankle fracture with no signs of hardware failure or loosening.  No acute fracture dislocation noted.  Pt to be weightbearing as tolerated to right lower extremity for another 1-2 weeks then may transition from the cam boot to a regular shoe  ROM as tolerated to right lower extremity  Discussed with patient she may begin to shower, instructed not to submerge or scrub incisions  Pt to begin physical therapy for strength and mobility training, new script given   Pt to continue at home analgesic regimen with Tylenol   Pt to follow up in 6 weeks for repeat x-ray and re-evaluation      History of Present Illness  Lyn Pimentel is a 79 y.o. female who presents approximately 6 weeks status post ORIF right ankle.  Patient was admitted inpatient for a report of the HYACINTH to which she missed her scheduled orthopedic appointment on 1/29/2025.  Patient states she has been compliant with her nonweightbearing status.  She has been compliant with her cane use while up and about.  She denies any new injuries or traumas.  Pain is overall well-controlled on current analgesic regimen.  Denies any new numbness or paresthesias.    Review of Systems  Pertinent items are noted in HPI.  All other systems were reviewed and are negative.    Physical Exam  Ht 5' 5\" (1.651 m)   Wt 80.1 kg (176 lb 9.4 oz)   BMI 29.39 kg/m²   Cons: Appears well.  No apparent distress.  Psych: Alert. Oriented x3.  Mood and affect normal.  Eyes: PERRLA, EOMI  Resp: Normal effort.  No audible wheezing or stridor.  CV: Palpable pulse.  No discernable arrhythmia. "    Lymph:  No palpable cervical, axillary, or inguinal lymphadenopathy.  Skin: Warm.  No palpable masses.  No visible lesions.  Neuro: Normal muscle tone.  Normal and symmetric DTR's.     Right lower extremity  The right lower extremity was exposed and inspected.  Surgical incisions clean, dry, intact without evidence of erythema or drainage noted.  Visible skin intact without erythema, ecchymosis, effusion or obvious osseous deformity. TTP fela-incisional. Pt able to range from 8 degrees of dorsiflexion and 35 degrees of plantarflexion. Sensation intact to superficial peroneal, deep peroneal, sural, saphenous, plantar nerve distributions. Motor intact to extensor hallux longus, tibialis anterior, gastrocnemius muscles, extensor mechanism intact. Limb is well perfused. Brisk capillary refill in all 5 digits. Compartments soft and compressible.     Studies Reviewed  X-rays right ankle reveal maintained alignment of patient's previous bimalleolar ankle fracture with no signs of hardware failure or loosening.  Interval fracture healing.  No acute fracture dislocation noted.    Procedures      Medical, Surgical, Family, and Social History  The patient's medical history, family history, and social history, were reviewed and updated as appropriate.    Past Medical History:   Diagnosis Date    Cataract 2015    had surgery on both eyes    Colitis     Colon polyp     COPD (chronic obstructive pulmonary disease) (HCC)     Dehydration     Depression     Familial tremor     GERD (gastroesophageal reflux disease)     Ground glass opacity present on imaging of lung     Hiatal hernia     High cholesterol     Hyperlipidemia     Hypertension     Hypoglycemia     Migraine     Nosebleed     Primary adenocarcinoma of upper lobe of right lung (HCC)     Sleep apnea     C pap    Sleep apnea, obstructive     Solitary pulmonary nodule     Syncope            Family History   Problem Relation Age of Onset    Leukemia Mother 62    Cancer  Mother     Colon cancer Father 70    Cancer Father     Leukemia Brother 60    No Known Problems Daughter     No Known Problems Maternal Grandmother     No Known Problems Paternal Grandmother     Breast cancer Maternal Aunt     Breast cancer Maternal Aunt     No Known Problems Maternal Aunt     Cancer Brother         Cancer       Social History     Occupational History    Not on file   Tobacco Use    Smoking status: Never    Smokeless tobacco: Never   Vaping Use    Vaping status: Never Used   Substance and Sexual Activity    Alcohol use: Yes     Comment: Social drinker    Drug use: No    Sexual activity: Not on file       Allergies   Allergen Reactions    Biaxin [Clarithromycin] GI Intolerance    Cephalosporins     Darvon [Propoxyphene]     Latex      Added based on information entered during case entry, please review and add reactions, type, and severity as needed    Other     Wound Dressing Adhesive     Cefzil [Cefprozil] Rash    Ciprofloxacin Rash         Current Outpatient Medications:     acetaminophen (TYLENOL) 325 mg tablet, Take 3 tablets (975 mg total) by mouth every 8 (eight) hours (Patient taking differently: Take 975 mg by mouth every 8 (eight) hours Pt takes PRN for pain/discomfort), Disp: , Rfl:     aspirin 81 mg chewable tablet, Chew 81 mg 2 (two) times a day, Disp: , Rfl:     diphenhydrAMINE (Sleep Aid, DiphenhydrAMINE,) 25 MG tablet, Take 25 mg by mouth daily at bedtime as needed for sleep., Disp: , Rfl:     escitalopram (LEXAPRO) 20 mg tablet, Take 20 mg by mouth daily, Disp: , Rfl:     famotidine (PEPCID) 40 MG tablet, Take 40 mg by mouth daily, Disp: , Rfl:     pantoprazole (PROTONIX) 40 mg tablet, Take 40 mg by mouth daily, Disp: , Rfl:     propranolol (INDERAL LA) 60 mg 24 hr capsule, Take 60 mg by mouth daily, Disp: , Rfl:     simvastatin (ZOCOR) 80 mg tablet, Take 80 mg by mouth daily, Disp: , Rfl:     Cholecalciferol (VITAMIN D3) 5000 UNITS CAPS, Take 1 capsule by mouth daily. (Patient not  taking: Reported on 2/19/2025), Disp: , Rfl:     dicyclomine (BENTYL) 20 mg tablet, Take 1 tablet (20 mg total) by mouth every 6 (six) hours as needed (For abdominal cramping) for up to 5 days, Disp: 20 tablet, Rfl: 0    sucralfate (CARAFATE) 1 g tablet, Take 1 tablet (1 g total) by mouth 2 (two) times a day as needed (Burning sensation in stomach) for up to 5 days, Disp: 10 tablet, Rfl: 0      Demetrius Benitez PA-C    Scribe Attestation      I,:   am acting as a scribe while in the presence of the attending physician.:       I,:   personally performed the services described in this documentation    as scribed in my presence.:

## 2025-03-19 ENCOUNTER — HOME CARE VISIT (OUTPATIENT)
Dept: HOME HEALTH SERVICES | Facility: HOME HEALTHCARE | Age: 80
End: 2025-03-19
Payer: COMMERCIAL

## 2025-03-19 PROCEDURE — G0151 HHCP-SERV OF PT,EA 15 MIN: HCPCS

## 2025-03-21 PROBLEM — A08.4 VIRAL GASTROENTERITIS: Status: RESOLVED | Noted: 2025-02-19 | Resolved: 2025-03-21

## 2025-04-28 ENCOUNTER — OFFICE VISIT (OUTPATIENT)
Dept: OBGYN CLINIC | Facility: CLINIC | Age: 80
End: 2025-04-28

## 2025-04-28 ENCOUNTER — APPOINTMENT (OUTPATIENT)
Dept: RADIOLOGY | Facility: AMBULARY SURGERY CENTER | Age: 80
End: 2025-04-28
Attending: STUDENT IN AN ORGANIZED HEALTH CARE EDUCATION/TRAINING PROGRAM
Payer: COMMERCIAL

## 2025-04-28 VITALS — BODY MASS INDEX: 29.42 KG/M2 | HEIGHT: 65 IN | WEIGHT: 176.59 LBS

## 2025-04-28 DIAGNOSIS — S82.851A CLOSED TRIMALLEOLAR FRACTURE OF RIGHT ANKLE, INITIAL ENCOUNTER: ICD-10-CM

## 2025-04-28 PROCEDURE — 99024 POSTOP FOLLOW-UP VISIT: CPT | Performed by: STUDENT IN AN ORGANIZED HEALTH CARE EDUCATION/TRAINING PROGRAM

## 2025-04-28 PROCEDURE — 73610 X-RAY EXAM OF ANKLE: CPT

## 2025-04-28 NOTE — PROGRESS NOTES
Orthopaedic Surgery - Office Note  Lyn Pimentel (79 y.o. female)   : 1945   MRN: [id number]  Encounter Date: 2025    Chief Complaint   Patient presents with    Right Ankle - Post-op     Past Surgical History:   Procedure Laterality Date    ADENOIDECTOMY  Child    APPENDECTOMY      BACK SURGERY      Cervical spine    BREAST BIOPSY Bilateral BENIGN    CHOLECYSTECTOMY      COLECTOMY      complete colectomy    COLONOSCOPY      CT NEEDLE BX ASPIRATION INJECTION LOCALIZATION  2019    ESOPHAGOGASTRODUODENOSCOPY N/A 2018    Procedure: ESOPHAGOGASTRODUODENOSCOPY (EGD);  Surgeon: Lalito Espino MD;  Location: [facility];  Service: Gastroenterology    EYE SURGERY      LUNG BIOPSY      LUNG SEGMENTECTOMY Right 2019    Procedure: RESECTION WEDGE LUNG;  Surgeon: Livan Lema MD;  Location: [facility];  Service: Thoracic    NASAL/SINUS ENDOSCOPY N/A 2019    Procedure: ENDOSCOPIC BIOPSY OF NASOPHARYNX LESION;  Surgeon: Perez Horner MD;  Location: [facility];  Service: ENT    NISSEN FUNDOPLICATION      ORIF TIBIA & FIBULA FRACTURES Right 2025    Procedure: ORIF BIMALLEOLAR ANKLE FRACTURE;  Surgeon: Scooter Brown DO;  [facility];  Service: Orthopedics    MS BRNCHSC INCL FLUOR GDNCE DX W/CELL WASHG SPX N/A 2019    Procedure: BRONCHOSCOPY FLEXIBLE;  Surgeon: Livan Lema MD;  [facility]: [facility];  Service: Thoracic    MS ESOPHAGOGASTRODUODENOSCOPY TRANSORAL DIAGNOSTIC N/A 2019    Procedure: ESOPHAGOGASTRODUODENOSCOPY (EGD);  Surgeon: Lalito Espino MD;  Location: [facility];  Service: Gastroenterology    MS LAPS RPR PARAESPHGL HRNA INCL FUNDPLSTY W/MESH N/A 2016    Procedure: ROBOTIC LAPAROSCOPIC Daquan Fundoplication, Liver biopsy;  Surgeon: Levi Momin MD;  [facility]: [facility];  Service: General    MS THORACOSCOPY W/THERA WEDGE RESEXN INITIAL UNILAT Right 2019    Procedure: THORACOSCOPY VIDEO ASSISTED SURGERY (VATS);  Surgeon:  Livan Lema MD;  [facility]: [facility];  Service: Thoracic    SPINE SURGERY  2017    cervical spine    TONSILLECTOMY  Child    UPPER GASTROINTESTINAL ENDOSCOPY       Assessment / Plan  Status post open reduction internal fixation of right trimalleolar ankle fracture, date of surgery 1/29/2025    X-rays reviewed and discussed with patient revealing maintained alignment of patient's previous trimalleolar ankle fracture with no signs of hardware failure or loosening.  No acute fracture dislocation noted.  Pt to be weightbearing as tolerated to right lower extremity in a regular shoe  ROM as tolerated to right lower extremity  Discussed with patient to continue her at home exercise regimen  Pt to continue physical therapy for strength and mobility training  Pt to continue at home analgesic regimen with Tylenol   Pt to follow up in 8 weeks for repeat x-ray and re-evaluation      History of Present Illness  Lyn Pimentel is a 79 y.o. female who presents approximately 6 weeks status post ORIF right ankle.  Patient was admitted inpatient for a report of the HYACINTH to which she missed her scheduled orthopedic appointment on 1/29/2025.  Patient states she has been compliant with her nonweightbearing status.  She has been compliant with her cane use while up and about.  She denies any new injuries or traumas.  Pain is overall well-controlled on current analgesic regimen.  Denies any new numbness or paresthesias.    Interval history 4/28/2025  Patient presents approximately 3 months status post ORIF right ankle.  Patient has been weightbearing as tolerated in a regular shoe at this time.  She has mild pain with over-exertion but this stable. She denies any significant pain in the ankle states overall pain is well-controlled on current analgesic regimen.  She denies any injuries or traumas.  Denies any acute numbness or paresthesias.    Review of Systems  Pertinent items are noted in HPI.  All other systems were reviewed  "and are negative.    Physical Exam  Ht 5' 5\" (1.651 m)   Wt 80.1 kg (176 lb 9.4 oz)   BMI 29.39 kg/m²   Cons: Appears well.  No apparent distress.  Psych: Alert. Oriented x3.  Mood and affect normal.  Eyes: PERRLA, EOMI  Resp: Normal effort.  No audible wheezing or stridor.  CV: Palpable pulse.  No discernable arrhythmia.    Lymph:  No palpable cervical, axillary, or inguinal lymphadenopathy.  Skin: Warm.  No palpable masses.  No visible lesions.  Neuro: Normal muscle tone.  Normal and symmetric DTR's.     Right lower extremity  The right lower extremity was exposed and inspected.  Surgical incisions clean, dry, intact without evidence of erythema or drainage noted.  Visible skin intact without erythema, ecchymosis, effusion or obvious osseous deformity. TTP fela-incisional. Pt able to range from 10 degrees of dorsiflexion and 40 degrees of plantarflexion. Sensation intact to superficial peroneal, deep peroneal, sural, saphenous, plantar nerve distributions. Motor intact to extensor hallux longus, tibialis anterior, gastrocnemius muscles, extensor mechanism intact. Limb is well perfused. Brisk capillary refill in all 5 digits. Compartments soft and compressible.     Studies Reviewed  X-rays right ankle reveal maintained alignment of patient's previous trimalleolar ankle fracture with no signs of hardware failure or loosening.  Interval fracture healing.  No acute fracture dislocation noted.    Procedures      Medical, Surgical, Family, and Social History  The patient's medical history, family history, and social history, were reviewed and updated as appropriate.    Past Medical History:   Diagnosis Date    Cataract 2015    had surgery on both eyes    Colitis     Colon polyp     COPD (chronic obstructive pulmonary disease) (HCC)     Dehydration     Depression     Familial tremor     GERD (gastroesophageal reflux disease)     Ground glass opacity present on imaging of lung     Hiatal hernia     High cholesterol     " Hyperlipidemia     Hypertension     Hypoglycemia     Migraine     Nosebleed     Primary adenocarcinoma of upper lobe of right lung (HCC)     Sleep apnea     C pap    Sleep apnea, obstructive     Solitary pulmonary nodule     Syncope            Family History   Problem Relation Age of Onset    Leukemia Mother 62    Cancer Mother     Colon cancer Father 70    Cancer Father     Leukemia Brother 60    No Known Problems Daughter     No Known Problems Maternal Grandmother     No Known Problems Paternal Grandmother     Breast cancer Maternal Aunt     Breast cancer Maternal Aunt     No Known Problems Maternal Aunt     Cancer Brother         Cancer       Social History     Occupational History    Not on file   Tobacco Use    Smoking status: Never    Smokeless tobacco: Never   Vaping Use    Vaping status: Never Used   Substance and Sexual Activity    Alcohol use: Yes     Comment: Social drinker    Drug use: No    Sexual activity: Not on file       Allergies   Allergen Reactions    Biaxin [Clarithromycin] GI Intolerance    Cephalosporins     Darvon [Propoxyphene]     Latex      Added based on information entered during case entry, please review and add reactions, type, and severity as needed    Other     Wound Dressing Adhesive     Cefzil [Cefprozil] Rash    Ciprofloxacin Rash         Current Outpatient Medications:     acetaminophen (TYLENOL) 325 mg tablet, Take 3 tablets (975 mg total) by mouth every 8 (eight) hours (Patient taking differently: Take 975 mg by mouth every 8 (eight) hours Pt takes PRN for pain/discomfort), Disp: , Rfl:     aspirin 81 mg chewable tablet, Chew 81 mg 2 (two) times a day, Disp: , Rfl:     diphenhydrAMINE (Sleep Aid, DiphenhydrAMINE,) 25 MG tablet, Take 25 mg by mouth daily at bedtime as needed for sleep., Disp: , Rfl:     escitalopram (LEXAPRO) 20 mg tablet, Take 20 mg by mouth daily, Disp: , Rfl:     famotidine (PEPCID) 40 MG tablet, Take 40 mg by mouth daily, Disp: , Rfl:     pantoprazole  (PROTONIX) 40 mg tablet, Take 40 mg by mouth daily, Disp: , Rfl:     propranolol (INDERAL LA) 60 mg 24 hr capsule, Take 60 mg by mouth daily, Disp: , Rfl:     simvastatin (ZOCOR) 80 mg tablet, Take 80 mg by mouth daily, Disp: , Rfl:     Cholecalciferol (VITAMIN D3) 5000 UNITS CAPS, Take 1 capsule by mouth daily. (Patient not taking: Reported on 2/19/2025), Disp: , Rfl:     dicyclomine (BENTYL) 20 mg tablet, Take 1 tablet (20 mg total) by mouth every 6 (six) hours as needed (For abdominal cramping) for up to 5 days (Patient not taking: Reported on 3/19/2025), Disp: 20 tablet, Rfl: 0    sucralfate (CARAFATE) 1 g tablet, Take 1 tablet (1 g total) by mouth 2 (two) times a day as needed (Burning sensation in stomach) for up to 5 days (Patient not taking: Reported on 3/19/2025), Disp: 10 tablet, Rfl: 0      Demetrius Benitez PA-C    Scribe Attestation      I,:   am acting as a scribe while in the presence of the attending physician.:       I,:   personally performed the services described in this documentation    as scribed in my presence.:

## (undated) DEVICE — FIRST STEP BEDSIDE KIT - STAND-UP POUCH, ENDOSCOPIC CLEANING PAD - 1 POUCH: Brand: FIRST STEP BEDSIDE KIT - STAND-UP POUCH, ENDOSCOPIC CLEANING PAD

## (undated) DEVICE — SINGLE-USE BIOPSY FORCEPS: Brand: RADIAL JAW 4

## (undated) DEVICE — CHLORAPREP HI-LITE 26ML ORANGE

## (undated) DEVICE — TISSUE RETRIEVAL SYSTEM: Brand: INZII RETRIEVAL SYSTEM

## (undated) DEVICE — SPECIMEN CONTAINER STERILE PEEL PACK

## (undated) DEVICE — SPLINT 5 X 30 IN FAST SET PLASTER

## (undated) DEVICE — GLOVE INDICATOR PI UNDERGLOVE SZ 7.5 BLUE

## (undated) DEVICE — HEAVY DUTY TABLE COVER: Brand: CONVERTORS

## (undated) DEVICE — ACE WRAP 4 IN UNSTERILE

## (undated) DEVICE — PENCIL ELECTROSURG E-Z CLEAN -0035H

## (undated) DEVICE — LIGHT GLOVE GREEN

## (undated) DEVICE — ADHESIVE SKIN HIGH VISCOSITY EXOFIN 1ML

## (undated) DEVICE — DRAPE FLUID WARMER (BIRD BATH)

## (undated) DEVICE — BONE WAX WHITE: Brand: BONE WAX WHITE

## (undated) DEVICE — WOUND RETRACTOR AND PROTECTOR: Brand: ALEXIS WOUND PROTECTOR-RETRACTOR

## (undated) DEVICE — TUBING SUCTION 5MM X 12 FT

## (undated) DEVICE — SUT ETHILON 2-0 PS 18 IN 585H

## (undated) DEVICE — DRAPE C-ARMOUR

## (undated) DEVICE — ELECTRODE BLADE MOD E-Z CLEAN  2.75IN 7CM -0012AM

## (undated) DEVICE — TROCAR: Brand: KII FIOS FIRST ENTRY

## (undated) DEVICE — GLOVE INDICATOR PI UNDERGLOVE SZ 8 BLUE

## (undated) DEVICE — CUFF TOURNIQUET 24 X 4 IN QUICK CONNECT DISP 1BLA

## (undated) DEVICE — PAD GROUNDING ADULT

## (undated) DEVICE — MAYO STAND COVER: Brand: CONVERTORS

## (undated) DEVICE — CANISTER FOR THORACIC SUCTION SYSTEM: Brand: THOPAZ DISPOSABLE CANISTER 0.8L

## (undated) DEVICE — SUT VICRYL 3-0 SH 27 IN J416H

## (undated) DEVICE — C-ARM: Brand: UNBRANDED

## (undated) DEVICE — PREP PAD BNS: Brand: CONVERTORS

## (undated) DEVICE — DRILL BIT, AO DIA2.0MM X 135MM, SCALED: Brand: VARIAX

## (undated) DEVICE — INTENDED FOR TISSUE SEPARATION, AND OTHER PROCEDURES THAT REQUIRE A SHARP SURGICAL BLADE TO PUNCTURE OR CUT.: Brand: BARD-PARKER SAFETY BLADES SIZE 10, STERILE

## (undated) DEVICE — NEEDLE SPINAL 22G X 3.5IN  QUINCKE

## (undated) DEVICE — 2000CC GUARDIAN II: Brand: GUARDIAN

## (undated) DEVICE — SUT VICRYL 2-0 SH 27 IN UNDYED J417H

## (undated) DEVICE — GLOVE SRG BIOGEL 7.5

## (undated) DEVICE — NEEDLE 25G X 1 1/2

## (undated) DEVICE — INSTRUMENT POUCH: Brand: CONVERTORS

## (undated) DEVICE — DRILL BIT, AO DIA2.6MM X 135MM, SCALED: Brand: VARIAX

## (undated) DEVICE — SUCTION BOVIE ENT

## (undated) DEVICE — SYRINGE 3ML LL

## (undated) DEVICE — SUT VICRYL 0 CT-1 27 IN J260H

## (undated) DEVICE — SPONGE SCRUB 4 PCT CHLORHEXIDINE

## (undated) DEVICE — PADDING CAST 4 IN  COTTON STRL

## (undated) DEVICE — GLOVE SRG BIOGEL 8

## (undated) DEVICE — DRAPE SHEET THREE QUARTER

## (undated) DEVICE — THE ECHELON FLEX POWERED PLUS ARTICULATING ENDOSCOPIC LINEAR CUTTERS ARE STERILE, SINGLE PATIENT USE INSTRUMENTS THAT SIMULTANEOUSLYCUT AND STAPLE TISSUE. THERE ARE SIX STAGGERED ROWS OF STAPLES, THREE ON EITHER SIDE OF THE CUT LINE. THE ECHELON FLEX 45 POWERED PLUSINSTRUMENTS HAVE A STAPLE LINE THAT IS APPROXIMATELY 45 MM LONG AND A CUT LINE THAT IS APPROXIMATELY 42 MM LONG. THE SHAFT CAN ROTATE FREELYIN BOTH DIRECTIONS AND AN ARTICULATION MECHANISM ENABLES THE DISTAL PORTION OF THE SHAFT TO PIVOT TO FACILITATE LATERAL ACCESS TO THE OPERATIVESITE.THE INSTRUMENTS ARE PACKAGED WITH A PRIMARY LITHIUM BATTERY PACK THAT MUST BE INSTALLED PRIOR TO USE. THERE ARE SPECIFIC REQUIREMENTS FORDISPOSING OF THE BATTERY PACK. REFER TO THE BATTERY PACK DISPOSAL SECTION.THE INSTRUMENTS ARE PACKAGED WITHOUT A RELOAD AND MUST BE LOADED PRIOR TO USE. A STAPLE RETAINING CAP ON THE RELOAD PROTECTS THE STAPLE LEGPOINTS DURING SHIPPING AND TRANSPORTATION. THE INSTRUMENTS’ LOCK-OUT FEATURE IS DESIGNED TO PREVENT A USED OR IMPROPERLY INSTALLED RELOADFROM BEING REFIRED OR AN INSTRUMENT FROM BEING FIRED WITHOUT A RELOAD.: Brand: ECHELON FLEX

## (undated) DEVICE — SUT PROLENE 0 CT-1 30 IN 8424H

## (undated) DEVICE — CURITY NON-ADHERENT STRIPS: Brand: CURITY

## (undated) DEVICE — UNTHREADED GUIDE WIRE
Type: IMPLANTABLE DEVICE | Site: ANKLE | Status: NON-FUNCTIONAL
Brand: FIXOS
Removed: 2025-01-29

## (undated) DEVICE — BETHLEHEM UNIVERSAL  MIONR EXT: Brand: CARDINAL HEALTH

## (undated) DEVICE — NEURO PATTIES 1/2 X 3

## (undated) DEVICE — SINGLE USE SUCTION VALVE MAJ-209: Brand: SINGLE USE SUCTION VALVE (STERILE)

## (undated) DEVICE — GAUZE SPONGES,16 PLY: Brand: CURITY

## (undated) DEVICE — SINGLE USE BIOPSY VALVE MAJ-210: Brand: SINGLE USE BIOPSY VALVE (STERILE)

## (undated) DEVICE — ANTI-FOG SOLUTION WITH FOAM PAD: Brand: DEVON

## (undated) DEVICE — SUT MONOCRYL 4-0 PS-2 18 IN Y496G

## (undated) DEVICE — CANNULATED DRILL

## (undated) DEVICE — SUT MONOCRYL 2-0 SH 27 IN Y417H

## (undated) DEVICE — DRAPE SHEET X-LG

## (undated) DEVICE — PAD CAST 4 IN COTTON NON STERILE

## (undated) DEVICE — THE ECHELON, ECHELON ENDOPATH™ AND ECHELON FLEX™ FAMILIES OF ENDOSCOPIC LINEAR CUTTERS AND RELOADS ARE STERILE, SINGLE PATIENT USE INSTRUMENTS THAT SIMULTANEOUSLY CUT AND STAPLE TISSUE. THERE ARE SIX STAGGERED ROWS OF STAPLES, THREE ON EITHER SIDE OF THE CUT LINE. THE 45 MM INSTRUMENTS HAVE A STAPLE LINE THATIS APPROXIMATELY 45 MM LONG AND A CUT LINE THAT IS APPROXIMATELY 42 MM LONG. THE SHAFT CAN ROTATE FREELY IN BOTH DIRECTIONS AND AN ARTICULATION MECHANISM ON ARTICULATING INSTRUMENTS ENABLES BENDING THE DISTAL PORTIONOF THE SHAFT TO FACILITATE LATERAL ACCESS OF THE OPERATIVE SITE.THE INSTRUMENTS ARE SHIPPED WITHOUT A RELOAD AND MUST BE LOADED PRIOR TO USE. A STAPLE RETAINING CAP ON THE RELOAD PROTECTS THE STAPLE LEG POINTS DURING SHIPPING AND TRANSPORTATION. THE INSTRUMENTS’ LOCK-OUT FEATURE IS DESIGNED TO PREVENT A USED RELOAD FROM BEING REFIRED.: Brand: ECHELON ENDOPATH

## (undated) DEVICE — KIRSCHNER WIRE
Type: IMPLANTABLE DEVICE | Site: ANKLE | Status: NON-FUNCTIONAL
Removed: 2025-01-29

## (undated) DEVICE — INTENDED FOR TISSUE SEPARATION, AND OTHER PROCEDURES THAT REQUIRE A SHARP SURGICAL BLADE TO PUNCTURE OR CUT.: Brand: BARD-PARKER SAFETY BLADES SIZE 15, STERILE

## (undated) DEVICE — DISPOSABLE OR TOWEL: Brand: CARDINAL HEALTH

## (undated) DEVICE — SINGLE TUBING WITH LARGE CONNECTOR FOR THORACIC SUCTION SYSTEM PUMP: Brand: THOPAZ TUBING SINGLE

## (undated) DEVICE — BANDAGE, ESMARK LF STR 6"X9' (20/CS): Brand: CYPRESS

## (undated) DEVICE — STERILE NASAL PACK: Brand: CARDINAL HEALTH

## (undated) DEVICE — STERILE THORACIC PACK: Brand: CARDINAL HEALTH

## (undated) DEVICE — ACE WRAP 6 IN UNSTERILE

## (undated) DEVICE — ABDOMINAL PAD: Brand: DERMACEA

## (undated) DEVICE — 3000CC GUARDIAN II: Brand: GUARDIAN